# Patient Record
Sex: FEMALE | Race: WHITE | NOT HISPANIC OR LATINO | Employment: OTHER | URBAN - METROPOLITAN AREA
[De-identification: names, ages, dates, MRNs, and addresses within clinical notes are randomized per-mention and may not be internally consistent; named-entity substitution may affect disease eponyms.]

---

## 2017-02-13 ENCOUNTER — HOSPITAL ENCOUNTER (OUTPATIENT)
Dept: OTHER | Facility: HOSPITAL | Age: 42
Discharge: HOME | End: 2017-02-13
Attending: OBSTETRICS & GYNECOLOGY | Admitting: OBSTETRICS & GYNECOLOGY

## 2017-02-13 LAB
ABO + RH BLD: NORMAL
ANION GAP SERPL CALC-SCNC: 11 MMOL/L (ref 10–20)
ARMBAND: NORMAL
BASOPHILS # BLD AUTO: 0 10*3/UL (ref 0–0.2)
BASOPHILS NFR BLD AUTO: 1 % (ref 0–2)
BLD COMPONENT TYPE: NORMAL
BLD GP AB SCN SERPL QL: NEGATIVE
BUN SERPL-MCNC: 12 MG/DL (ref 8–20)
BUN/CREAT SERPL: 13.3 (ref 5.4–26.2)
CALCIUM SERPL-MCNC: 9.1 MG/DL (ref 8.9–10.3)
CHLORIDE SERPL-SCNC: 105 MMOL/L (ref 101–111)
CONV CO2: 26 MMOL/L (ref 22–32)
CREAT UR-MCNC: 0.9 MG/DL (ref 0.4–1)
CROSSMATCH EXPIRATION: NORMAL
DIFFERENTIAL METHOD BLD: (no result)
EOSINOPHIL # BLD AUTO: 0 10*3/UL (ref 0–0.3)
EOSINOPHIL # BLD AUTO: 1 % (ref 0–3)
ERYTHROCYTE [DISTWIDTH] IN BLOOD BY AUTOMATED COUNT: 12.2 % (ref 11.5–14.5)
GLUCOSE SERPL-MCNC: 101 MG/DL (ref 65–99)
HCT VFR BLD AUTO: 38 % (ref 35–49)
HGB BLD-MCNC: 13.3 G/DL (ref 12–15)
LYMPHOCYTES # BLD AUTO: 1.2 10*3/UL (ref 0.8–4.8)
LYMPHOCYTES NFR BLD AUTO: 32 % (ref 18–42)
MCH RBC QN AUTO: 31.9 PG (ref 26–32)
MCHC RBC AUTO-ENTMCNC: 35.1 G/DL (ref 32–36)
MCV RBC AUTO: 91 FL (ref 80–94)
MONOCYTES # BLD AUTO: 0.1 10*3/UL (ref 0.1–1.3)
MONOCYTES NFR BLD AUTO: 4 % (ref 2–11)
NEUTROPHILS # BLD AUTO: 2.2 10*3/UL (ref 2.3–8.6)
NEUTROPHILS NFR BLD AUTO: 62 % (ref 50–75)
NRBC BLD AUTO-RTO: 0 /100{WBCS}
NRBC/RBC NFR BLD MANUAL: 0 10*3/UL
PLATELET # BLD AUTO: 221 10*3/UL (ref 150–450)
PMV BLD AUTO: 8.2 FL (ref 7.4–10.4)
POTASSIUM SERPL-SCNC: 4 MMOL/L (ref 3.6–5.1)
RBC # BLD AUTO: 4.18 10*6/UL (ref 4–5.4)
SODIUM SERPL-SCNC: 138 MMOL/L (ref 136–144)
WBC # BLD AUTO: 3.6 10*3/UL (ref 4.5–11.5)

## 2017-02-20 ENCOUNTER — HOSPITAL ENCOUNTER (OUTPATIENT)
Dept: OTHER | Facility: HOSPITAL | Age: 42
Setting detail: SPECIMEN
Discharge: HOME | End: 2017-02-20
Attending: OBSTETRICS & GYNECOLOGY | Admitting: OBSTETRICS & GYNECOLOGY

## 2017-06-05 ENCOUNTER — HOSPITAL ENCOUNTER (OUTPATIENT)
Dept: GENERAL RADIOLOGY | Facility: HOSPITAL | Age: 42
Discharge: HOME | End: 2017-06-05
Attending: NURSE PRACTITIONER | Admitting: NURSE PRACTITIONER

## 2017-06-22 ENCOUNTER — HOSPITAL ENCOUNTER (OUTPATIENT)
Dept: PREADMISSION TESTING | Facility: HOSPITAL | Age: 42
Discharge: HOME | End: 2017-06-22
Attending: ORTHOPAEDIC SURGERY | Admitting: ORTHOPAEDIC SURGERY

## 2017-06-22 LAB
ANION GAP SERPL CALC-SCNC: 11.4 MMOL/L (ref 10–20)
BACTERIA SPEC AEROBE CULT: NORMAL
BASOPHILS # BLD AUTO: 0 10*3/UL (ref 0–0.2)
BASOPHILS NFR BLD AUTO: 1 % (ref 0–2)
BILIRUB UR QL STRIP: NEGATIVE MG/DL
BUN SERPL-MCNC: 8 MG/DL (ref 8–20)
BUN/CREAT SERPL: 8.9 (ref 5.4–26.2)
CALCIUM SERPL-MCNC: 9.3 MG/DL (ref 8.9–10.3)
CASTS URNS QL MICRO: NORMAL /[LPF]
CHLORIDE SERPL-SCNC: 103 MMOL/L (ref 101–111)
COLOR UR: YELLOW
CONV BACTERIA IN URINE MICRO: NEGATIVE
CONV CLARITY OF URINE: CLEAR
CONV CO2: 26 MMOL/L (ref 22–32)
CONV HYALINE CASTS IN URINE MICRO: 1 /[LPF] (ref 0–5)
CONV PROTEIN IN URINE BY AUTOMATED TEST STRIP: NEGATIVE MG/DL
CONV SMALL ROUND CELLS: NORMAL /[HPF]
CONV UROBILINOGEN IN URINE BY AUTOMATED TEST STRIP: 0.2 MG/DL
CREAT UR-MCNC: 0.9 MG/DL (ref 0.4–1)
CULTURE INDICATED?: NORMAL
DIFFERENTIAL METHOD BLD: (no result)
EOSINOPHIL # BLD AUTO: 0.1 10*3/UL (ref 0–0.3)
EOSINOPHIL # BLD AUTO: 1 % (ref 0–3)
ERYTHROCYTE [DISTWIDTH] IN BLOOD BY AUTOMATED COUNT: 13.1 % (ref 11.5–14.5)
GLUCOSE SERPL-MCNC: 89 MG/DL (ref 65–99)
GLUCOSE UR QL: NEGATIVE MG/DL
HCT VFR BLD AUTO: 43.2 % (ref 35–49)
HGB BLD-MCNC: 14.8 G/DL (ref 12–15)
HGB UR QL STRIP: NEGATIVE
KETONES UR QL STRIP: NEGATIVE MG/DL
LEUKOCYTE ESTERASE UR QL STRIP: NEGATIVE
LYMPHOCYTES # BLD AUTO: 1.3 10*3/UL (ref 0.8–4.8)
LYMPHOCYTES NFR BLD AUTO: 30 % (ref 18–42)
Lab: NORMAL
MCH RBC QN AUTO: 31.7 PG (ref 26–32)
MCHC RBC AUTO-ENTMCNC: 34.4 G/DL (ref 32–36)
MCV RBC AUTO: 92.3 FL (ref 80–94)
MICRO REPORT STATUS: NORMAL
MONOCYTES # BLD AUTO: 0.3 10*3/UL (ref 0.1–1.3)
MONOCYTES NFR BLD AUTO: 8 % (ref 2–11)
NEUTROPHILS # BLD AUTO: 2.7 10*3/UL (ref 2.3–8.6)
NEUTROPHILS NFR BLD AUTO: 60 % (ref 50–75)
NITRITE UR QL STRIP: NEGATIVE
NRBC BLD AUTO-RTO: 0 /100{WBCS}
NRBC/RBC NFR BLD MANUAL: 0 10*3/UL
PH UR STRIP.AUTO: 6.5 [PH] (ref 4.5–8)
PLATELET # BLD AUTO: 230 10*3/UL (ref 150–450)
PMV BLD AUTO: 8.2 FL (ref 7.4–10.4)
POTASSIUM SERPL-SCNC: 4.4 MMOL/L (ref 3.6–5.1)
RBC # BLD AUTO: 4.68 10*6/UL (ref 4–5.4)
RBC #/AREA URNS HPF: 0 /[HPF] (ref 0–3)
SODIUM SERPL-SCNC: 136 MMOL/L (ref 136–144)
SP GR UR: 1.01 (ref 1–1.03)
SPECIMEN SOURCE: NORMAL
SPERM URNS QL MICRO: NORMAL /[HPF]
SQUAMOUS SPT QL MICRO: 1 /[HPF] (ref 0–5)
UNIDENT CRYS URNS QL MICRO: NORMAL /[HPF]
WBC # BLD AUTO: 4.4 10*3/UL (ref 4.5–11.5)
WBC #/AREA URNS HPF: 1 /[HPF] (ref 0–5)
YEAST SPEC QL WET PREP: NORMAL /[HPF]

## 2017-06-28 ENCOUNTER — HOSPITAL ENCOUNTER (OUTPATIENT)
Dept: PREOP | Facility: HOSPITAL | Age: 42
Setting detail: HOSPITAL OUTPATIENT SURGERY
Discharge: HOME | End: 2017-06-28
Attending: ORTHOPAEDIC SURGERY | Admitting: ORTHOPAEDIC SURGERY

## 2017-08-10 ENCOUNTER — HOSPITAL ENCOUNTER (OUTPATIENT)
Dept: MRI IMAGING | Facility: HOSPITAL | Age: 42
Discharge: HOME | End: 2017-08-10
Attending: ORTHOPAEDIC SURGERY | Admitting: ORTHOPAEDIC SURGERY

## 2019-07-18 ENCOUNTER — HOSPITAL ENCOUNTER (EMERGENCY)
Facility: HOSPITAL | Age: 44
Discharge: HOME | End: 2019-07-18
Admitting: EMERGENCY MEDICINE

## 2019-07-18 ENCOUNTER — APPOINTMENT (OUTPATIENT)
Dept: CT IMAGING | Facility: HOSPITAL | Age: 44
End: 2019-07-18

## 2019-07-18 VITALS
SYSTOLIC BLOOD PRESSURE: 98 MMHG | HEART RATE: 61 BPM | RESPIRATION RATE: 16 BRPM | TEMPERATURE: 97.2 F | HEIGHT: 65 IN | DIASTOLIC BLOOD PRESSURE: 63 MMHG | BODY MASS INDEX: 23.21 KG/M2 | WEIGHT: 139.33 LBS | OXYGEN SATURATION: 99 %

## 2019-07-18 DIAGNOSIS — R10.84 GENERALIZED ABDOMINAL PAIN: Primary | ICD-10-CM

## 2019-07-18 LAB
ANION GAP SERPL CALCULATED.3IONS-SCNC: 13.6 MMOL/L (ref 5–15)
BASOPHILS # BLD AUTO: 0 10*3/MM3 (ref 0–0.2)
BASOPHILS NFR BLD AUTO: 0.6 % (ref 0–1.5)
BILIRUB UR QL STRIP: NEGATIVE
BUN BLD-MCNC: 6 MG/DL (ref 8–20)
BUN/CREAT SERPL: 6 (ref 5.4–26.2)
CALCIUM SPEC-SCNC: 9.4 MG/DL (ref 8.9–10.3)
CHLORIDE SERPL-SCNC: 102 MMOL/L (ref 101–111)
CLARITY UR: CLEAR
CO2 SERPL-SCNC: 27 MMOL/L (ref 22–32)
COLOR UR: YELLOW
CREAT BLD-MCNC: 1 MG/DL (ref 0.4–1)
DEPRECATED RDW RBC AUTO: 40.7 FL (ref 37–54)
EOSINOPHIL # BLD AUTO: 0.1 10*3/MM3 (ref 0–0.4)
EOSINOPHIL NFR BLD AUTO: 1.6 % (ref 0.3–6.2)
ERYTHROCYTE [DISTWIDTH] IN BLOOD BY AUTOMATED COUNT: 12.5 % (ref 12.3–15.4)
GFR SERPL CREATININE-BSD FRML MDRD: 60 ML/MIN/1.73
GLUCOSE BLD-MCNC: 79 MG/DL (ref 65–99)
GLUCOSE UR STRIP-MCNC: NEGATIVE MG/DL
HCT VFR BLD AUTO: 43.9 % (ref 34–46.6)
HGB BLD-MCNC: 14.7 G/DL (ref 12–15.9)
HGB UR QL STRIP.AUTO: NEGATIVE
KETONES UR QL STRIP: NEGATIVE
LEUKOCYTE ESTERASE UR QL STRIP.AUTO: NEGATIVE
LYMPHOCYTES # BLD AUTO: 1.7 10*3/MM3 (ref 0.7–3.1)
LYMPHOCYTES NFR BLD AUTO: 37.8 % (ref 19.6–45.3)
MCH RBC QN AUTO: 31.4 PG (ref 26.6–33)
MCHC RBC AUTO-ENTMCNC: 33.5 G/DL (ref 31.5–35.7)
MCV RBC AUTO: 93.6 FL (ref 79–97)
MONOCYTES # BLD AUTO: 0.2 10*3/MM3 (ref 0.1–0.9)
MONOCYTES NFR BLD AUTO: 5.3 % (ref 5–12)
NEUTROPHILS # BLD AUTO: 2.4 10*3/MM3 (ref 1.7–7)
NEUTROPHILS NFR BLD AUTO: 54.7 % (ref 42.7–76)
NITRITE UR QL STRIP: NEGATIVE
NRBC BLD AUTO-RTO: 0.1 /100 WBC (ref 0–0.2)
PH UR STRIP.AUTO: 7 [PH] (ref 5–8)
PLATELET # BLD AUTO: 261 10*3/MM3 (ref 140–450)
PMV BLD AUTO: 8 FL (ref 6–12)
POTASSIUM BLD-SCNC: 3.6 MMOL/L (ref 3.6–5.1)
PROT UR QL STRIP: NEGATIVE
RBC # BLD AUTO: 4.69 10*6/MM3 (ref 3.77–5.28)
SODIUM BLD-SCNC: 139 MMOL/L (ref 136–144)
SP GR UR STRIP: 1.01 (ref 1–1.03)
UROBILINOGEN UR QL STRIP: NORMAL
WBC NRBC COR # BLD: 4.4 10*3/MM3 (ref 3.4–10.8)

## 2019-07-18 PROCEDURE — 25010000002 KETOROLAC TROMETHAMINE PER 15 MG: Performed by: NURSE PRACTITIONER

## 2019-07-18 PROCEDURE — 99283 EMERGENCY DEPT VISIT LOW MDM: CPT

## 2019-07-18 PROCEDURE — 85025 COMPLETE CBC W/AUTO DIFF WBC: CPT | Performed by: NURSE PRACTITIONER

## 2019-07-18 PROCEDURE — 96374 THER/PROPH/DIAG INJ IV PUSH: CPT

## 2019-07-18 PROCEDURE — 80048 BASIC METABOLIC PNL TOTAL CA: CPT | Performed by: NURSE PRACTITIONER

## 2019-07-18 PROCEDURE — 25010000002 ONDANSETRON PER 1 MG: Performed by: NURSE PRACTITIONER

## 2019-07-18 PROCEDURE — 81003 URINALYSIS AUTO W/O SCOPE: CPT | Performed by: NURSE PRACTITIONER

## 2019-07-18 PROCEDURE — 74176 CT ABD & PELVIS W/O CONTRAST: CPT

## 2019-07-18 PROCEDURE — 96375 TX/PRO/DX INJ NEW DRUG ADDON: CPT

## 2019-07-18 RX ORDER — LAMOTRIGINE 100 MG/1
150 TABLET ORAL DAILY
COMMUNITY
End: 2022-02-10

## 2019-07-18 RX ORDER — SODIUM CHLORIDE 0.9 % (FLUSH) 0.9 %
10 SYRINGE (ML) INJECTION AS NEEDED
Status: DISCONTINUED | OUTPATIENT
Start: 2019-07-18 | End: 2019-07-18 | Stop reason: HOSPADM

## 2019-07-18 RX ORDER — CYCLOBENZAPRINE HCL 10 MG
10 TABLET ORAL 3 TIMES DAILY PRN
Qty: 15 TABLET | Refills: 0 | Status: SHIPPED | OUTPATIENT
Start: 2019-07-18 | End: 2022-02-10

## 2019-07-18 RX ORDER — MELOXICAM 15 MG/1
15 TABLET ORAL DAILY
Qty: 30 TABLET | Refills: 0 | Status: SHIPPED | OUTPATIENT
Start: 2019-07-18 | End: 2019-07-18 | Stop reason: ALTCHOICE

## 2019-07-18 RX ORDER — ONDANSETRON 4 MG/1
4 TABLET, ORALLY DISINTEGRATING ORAL EVERY 8 HOURS PRN
Qty: 20 TABLET | Refills: 0 | Status: SHIPPED | OUTPATIENT
Start: 2019-07-18 | End: 2019-07-18 | Stop reason: ALTCHOICE

## 2019-07-18 RX ORDER — QUETIAPINE 150 MG/1
150 TABLET, FILM COATED, EXTENDED RELEASE ORAL NIGHTLY
COMMUNITY
End: 2022-02-10

## 2019-07-18 RX ORDER — ATORVASTATIN CALCIUM 20 MG/1
20 TABLET, FILM COATED ORAL DAILY
COMMUNITY
End: 2022-02-10

## 2019-07-18 RX ORDER — KETOROLAC TROMETHAMINE 30 MG/ML
30 INJECTION, SOLUTION INTRAMUSCULAR; INTRAVENOUS ONCE
Status: COMPLETED | OUTPATIENT
Start: 2019-07-18 | End: 2019-07-18

## 2019-07-18 RX ORDER — ZOLPIDEM TARTRATE 10 MG/1
10 TABLET ORAL NIGHTLY PRN
COMMUNITY
End: 2022-02-10

## 2019-07-18 RX ORDER — ONDANSETRON 2 MG/ML
4 INJECTION INTRAMUSCULAR; INTRAVENOUS ONCE
Status: COMPLETED | OUTPATIENT
Start: 2019-07-18 | End: 2019-07-18

## 2019-07-18 RX ADMIN — ONDANSETRON 4 MG: 2 INJECTION INTRAMUSCULAR; INTRAVENOUS at 17:43

## 2019-07-18 RX ADMIN — KETOROLAC TROMETHAMINE 30 MG: 30 INJECTION, SOLUTION INTRAMUSCULAR at 17:42

## 2020-02-21 ENCOUNTER — HOSPITAL ENCOUNTER (EMERGENCY)
Facility: HOSPITAL | Age: 45
Discharge: LEFT BEFORE DISCHARGE | End: 2020-02-21

## 2020-02-21 VITALS
RESPIRATION RATE: 16 BRPM | DIASTOLIC BLOOD PRESSURE: 71 MMHG | HEIGHT: 65 IN | HEART RATE: 80 BPM | WEIGHT: 124.56 LBS | BODY MASS INDEX: 20.75 KG/M2 | SYSTOLIC BLOOD PRESSURE: 109 MMHG | TEMPERATURE: 97.4 F | OXYGEN SATURATION: 96 %

## 2020-02-21 RX ORDER — CLONAZEPAM 1 MG/1
1 TABLET ORAL NIGHTLY PRN
COMMUNITY
End: 2022-02-10

## 2020-11-14 ENCOUNTER — HOSPITAL ENCOUNTER (EMERGENCY)
Facility: HOSPITAL | Age: 45
Discharge: HOME | End: 2020-11-14
Attending: EMERGENCY MEDICINE | Admitting: EMERGENCY MEDICINE

## 2020-11-14 ENCOUNTER — APPOINTMENT (OUTPATIENT)
Dept: CT IMAGING | Facility: HOSPITAL | Age: 45
End: 2020-11-14

## 2020-11-14 VITALS
RESPIRATION RATE: 18 BRPM | BODY MASS INDEX: 19.87 KG/M2 | SYSTOLIC BLOOD PRESSURE: 111 MMHG | DIASTOLIC BLOOD PRESSURE: 79 MMHG | HEIGHT: 65 IN | HEART RATE: 79 BPM | TEMPERATURE: 97.9 F | OXYGEN SATURATION: 98 % | WEIGHT: 119.27 LBS

## 2020-11-14 DIAGNOSIS — M54.50 ACUTE BILATERAL LOW BACK PAIN WITHOUT SCIATICA: Primary | ICD-10-CM

## 2020-11-14 DIAGNOSIS — R20.2 PARESTHESIA: ICD-10-CM

## 2020-11-14 DIAGNOSIS — F43.0 ACUTE STRESS REACTION: ICD-10-CM

## 2020-11-14 LAB
ALBUMIN SERPL-MCNC: 4.5 G/DL (ref 3.5–5.2)
ALBUMIN/GLOB SERPL: 2.6 G/DL
ALP SERPL-CCNC: 62 U/L (ref 39–117)
ALT SERPL W P-5'-P-CCNC: 8 U/L (ref 1–33)
AMPHET+METHAMPHET UR QL: NEGATIVE
ANION GAP SERPL CALCULATED.3IONS-SCNC: 9 MMOL/L (ref 5–15)
AST SERPL-CCNC: 13 U/L (ref 1–32)
BARBITURATES UR QL SCN: NEGATIVE
BASOPHILS # BLD AUTO: 0.1 10*3/MM3 (ref 0–0.2)
BASOPHILS NFR BLD AUTO: 1.8 % (ref 0–1.5)
BENZODIAZ UR QL SCN: POSITIVE
BILIRUB SERPL-MCNC: <0.2 MG/DL (ref 0–1.2)
BILIRUB UR QL STRIP: NEGATIVE
BUN SERPL-MCNC: 14 MG/DL (ref 6–20)
BUN/CREAT SERPL: 15.4 (ref 7–25)
CALCIUM SPEC-SCNC: 9.1 MG/DL (ref 8.6–10.5)
CANNABINOIDS SERPL QL: POSITIVE
CHLORIDE SERPL-SCNC: 103 MMOL/L (ref 98–107)
CK SERPL-CCNC: 89 U/L (ref 20–180)
CLARITY UR: CLEAR
CO2 SERPL-SCNC: 28 MMOL/L (ref 22–29)
COCAINE UR QL: NEGATIVE
COLOR UR: YELLOW
CREAT SERPL-MCNC: 0.91 MG/DL (ref 0.57–1)
DEPRECATED RDW RBC AUTO: 39.8 FL (ref 37–54)
EOSINOPHIL # BLD AUTO: 0.1 10*3/MM3 (ref 0–0.4)
EOSINOPHIL NFR BLD AUTO: 1.8 % (ref 0.3–6.2)
ERYTHROCYTE [DISTWIDTH] IN BLOOD BY AUTOMATED COUNT: 12.5 % (ref 12.3–15.4)
GFR SERPL CREATININE-BSD FRML MDRD: 67 ML/MIN/1.73
GLOBULIN UR ELPH-MCNC: 1.7 GM/DL
GLUCOSE SERPL-MCNC: 86 MG/DL (ref 65–99)
GLUCOSE UR STRIP-MCNC: NEGATIVE MG/DL
HCT VFR BLD AUTO: 39.3 % (ref 34–46.6)
HGB BLD-MCNC: 13.4 G/DL (ref 12–15.9)
HGB UR QL STRIP.AUTO: NEGATIVE
KETONES UR QL STRIP: NEGATIVE
LEUKOCYTE ESTERASE UR QL STRIP.AUTO: NEGATIVE
LYMPHOCYTES # BLD AUTO: 1.5 10*3/MM3 (ref 0.7–3.1)
LYMPHOCYTES NFR BLD AUTO: 34.9 % (ref 19.6–45.3)
MAGNESIUM SERPL-MCNC: 2.3 MG/DL (ref 1.6–2.6)
MCH RBC QN AUTO: 30.9 PG (ref 26.6–33)
MCHC RBC AUTO-ENTMCNC: 34.1 G/DL (ref 31.5–35.7)
MCV RBC AUTO: 90.5 FL (ref 79–97)
METHADONE UR QL SCN: NEGATIVE
MONOCYTES # BLD AUTO: 0.2 10*3/MM3 (ref 0.1–0.9)
MONOCYTES NFR BLD AUTO: 5.2 % (ref 5–12)
NEUTROPHILS NFR BLD AUTO: 2.5 10*3/MM3 (ref 1.7–7)
NEUTROPHILS NFR BLD AUTO: 56.3 % (ref 42.7–76)
NITRITE UR QL STRIP: NEGATIVE
NRBC BLD AUTO-RTO: 0.1 /100 WBC (ref 0–0.2)
OPIATES UR QL: NEGATIVE
OXYCODONE UR QL SCN: NEGATIVE
PH UR STRIP.AUTO: 6 [PH] (ref 5–8)
PLATELET # BLD AUTO: 241 10*3/MM3 (ref 140–450)
PMV BLD AUTO: 7.1 FL (ref 6–12)
POTASSIUM SERPL-SCNC: 4.2 MMOL/L (ref 3.5–5.2)
PROT SERPL-MCNC: 6.2 G/DL (ref 6–8.5)
PROT UR QL STRIP: NEGATIVE
RBC # BLD AUTO: 4.35 10*6/MM3 (ref 3.77–5.28)
SODIUM SERPL-SCNC: 140 MMOL/L (ref 136–145)
SP GR UR STRIP: 1.02 (ref 1–1.03)
UROBILINOGEN UR QL STRIP: NORMAL
WBC # BLD AUTO: 4.4 10*3/MM3 (ref 3.4–10.8)

## 2020-11-14 PROCEDURE — 99283 EMERGENCY DEPT VISIT LOW MDM: CPT

## 2020-11-14 PROCEDURE — 80307 DRUG TEST PRSMV CHEM ANLYZR: CPT | Performed by: EMERGENCY MEDICINE

## 2020-11-14 PROCEDURE — 85025 COMPLETE CBC W/AUTO DIFF WBC: CPT | Performed by: EMERGENCY MEDICINE

## 2020-11-14 PROCEDURE — 83735 ASSAY OF MAGNESIUM: CPT | Performed by: EMERGENCY MEDICINE

## 2020-11-14 PROCEDURE — 81003 URINALYSIS AUTO W/O SCOPE: CPT | Performed by: EMERGENCY MEDICINE

## 2020-11-14 PROCEDURE — 80053 COMPREHEN METABOLIC PANEL: CPT | Performed by: EMERGENCY MEDICINE

## 2020-11-14 PROCEDURE — 96374 THER/PROPH/DIAG INJ IV PUSH: CPT

## 2020-11-14 PROCEDURE — 74176 CT ABD & PELVIS W/O CONTRAST: CPT

## 2020-11-14 PROCEDURE — 25010000002 LORAZEPAM PER 2 MG: Performed by: EMERGENCY MEDICINE

## 2020-11-14 PROCEDURE — 82550 ASSAY OF CK (CPK): CPT | Performed by: EMERGENCY MEDICINE

## 2020-11-14 RX ORDER — LAMOTRIGINE 25 MG/1
TABLET ORAL
Qty: 60 TABLET | Refills: 0 | Status: SHIPPED | OUTPATIENT
Start: 2020-11-14 | End: 2022-02-10

## 2020-11-14 RX ORDER — LORAZEPAM 2 MG/ML
1 INJECTION INTRAMUSCULAR ONCE
Status: COMPLETED | OUTPATIENT
Start: 2020-11-14 | End: 2020-11-14

## 2020-11-14 RX ORDER — BUDESONIDE AND FORMOTEROL FUMARATE DIHYDRATE 160; 4.5 UG/1; UG/1
2 AEROSOL RESPIRATORY (INHALATION)
COMMUNITY
End: 2022-02-10

## 2020-11-14 RX ADMIN — LORAZEPAM 1 MG: 2 INJECTION INTRAMUSCULAR; INTRAVENOUS at 14:10

## 2020-11-14 RX ADMIN — SODIUM CHLORIDE 1000 ML: 9 INJECTION, SOLUTION INTRAVENOUS at 14:10

## 2020-11-14 RX ADMIN — SODIUM CHLORIDE 500 ML: 9 INJECTION, SOLUTION INTRAVENOUS at 14:12

## 2020-11-14 NOTE — ED PROVIDER NOTES
Subjective   45-year-old complaining of paresthesias and back pain.  The patient also states she has had flank discomfort.  She states she has had no definite fever or chills.  She reports that she has had no vomiting or diarrhea but has had some intermittent nausea.  She reports that she has been taking her medication as directed.  She denies night sweats hemoptysis.  She reports no history of recent impact trauma to her back.  She has been told in the past that she may have neuropathy.  She reports no cough congestion or no novel coronavirus exposure      There is no saddle anesthesia or change in bowel or bladder habits    Review of Systems   Constitutional: Positive for fatigue. Negative for chills and fever.   HENT: Negative for sore throat.    Eyes: Negative for photophobia.   Respiratory: Negative for cough, chest tightness and shortness of breath.    Gastrointestinal: Positive for abdominal pain.   Genitourinary: Positive for flank pain. Negative for pelvic pain.   Neurological: Positive for numbness.   Psychiatric/Behavioral: The patient is nervous/anxious.    All other systems reviewed and are negative.      Past Medical History:   Diagnosis Date   • Asthma    • Bipolar affective (CMS/HCC)    • COPD (chronic obstructive pulmonary disease) (CMS/HCC)    • DDD (degenerative disc disease), cervical    • Hypertension    • PTSD (post-traumatic stress disorder)        Allergies   Allergen Reactions   • Oxycodone Shortness Of Breath and Itching   • Penicillins Anaphylaxis and Swelling   • Percocet [Oxycodone-Acetaminophen] Anaphylaxis   • Codeine Hives and Itching   • Buprenorphine Rash       Past Surgical History:   Procedure Laterality Date   • CARPAL TUNNEL RELEASE     • DILATATION AND CURETTAGE     • HYSTERECTOMY         History reviewed. No pertinent family history.    Social History     Socioeconomic History   • Marital status:      Spouse name: Not on file   • Number of children: Not on file   •  Years of education: Not on file   • Highest education level: Not on file   Tobacco Use   • Smoking status: Current Every Day Smoker     Packs/day: 1.00     Types: Cigarettes   • Smokeless tobacco: Never Used   Substance and Sexual Activity   • Alcohol use: No     Frequency: Never   • Drug use: No   • Sexual activity: Defer       Inspect was reviewed    Objective   Physical Exam  Alert [place] Coma Scale 15   HEENT: Pupils equal and reactive to light. Conjunctivae are not injected. normal tympanic membranes. Oropharynx and nares are normal.   Neck: Supple. Midline trachea. No JVD. No goiter.   Chest: Clear and equal breath sounds bilaterally regular rate and rhythm without murmur or rub.   Abdomen: Positive bowel sounds nontender nondistended. No rebound or peritoneal signs. No CVA tenderness.   Extremities no clubbing cyanosis or edema motor sensory exam is normal the full range of motion is intact.  Negative straight leg raising test numbness is not in a anatomic peripheral nerve or radicular distribution   skin: Warm and dry, no rashes or petechia.   Lymphatic: No regional lymphadenopathy. No calf pain, swelling or Jorje's sign    Procedures           ED Course                                 Labs Reviewed   URINE DRUG SCREEN - Abnormal; Notable for the following components:       Result Value    Benzodiazepine Screen, Urine Positive (*)     THC, Screen, Urine Positive (*)     All other components within normal limits    Narrative:     Negative Thresholds For Drugs Screened:     Amphetamines               500 ng/ml   Barbiturates               200 ng/ml   Benzodiazepines            100 ng/ml   Cocaine                    300 ng/ml   Methadone                  300 ng/ml   Opiates                    300 ng/ml   Oxycodone                  100 ng/ml   THC                        50 ng/ml    The Normal Value for all drugs tested is negative. This report includes final unconfirmed screening results to be used for medical  treatment purposes only. Unconfirmed results must not be used for non-medical purposes such as employment or legal testing. Clinical consideration should be applied to any drug of abuse test, particulary when unconfirmed results are used.  All urine drugs of abuse requests without chain of custody are for medical screening purposes only.  False positives are possible.     CBC WITH AUTO DIFFERENTIAL - Abnormal; Notable for the following components:    Basophil % 1.8 (*)     All other components within normal limits   URINALYSIS W/ MICROSCOPIC IF INDICATED (NO CULTURE) - Normal    Narrative:     Urine microscopic not indicated.   MAGNESIUM - Normal   CK - Normal   COMPREHENSIVE METABOLIC PANEL    Narrative:     GFR Normal >60  Chronic Kidney Disease <60  Kidney Failure <15     CBC AND DIFFERENTIAL    Narrative:     The following orders were created for panel order CBC & Differential.  Procedure                               Abnormality         Status                     ---------                               -----------         ------                     CBC Auto Differential[[id number]]        Abnormal            Final result                 Please view results for these tests on the individual orders.     Medications   sodium chloride 0.9 % bolus 1,000 mL (1,000 mL Intravenous New Bag 11/14/20 1410)   sodium chloride 0.9 % bolus 500 mL (0 mL Intravenous Stopped 11/14/20 1531)   LORazepam (ATIVAN) injection 1 mg (1 mg Intravenous Given 11/14/20 1410)     Ct Abdomen Pelvis Without Contrast    Result Date: 11/14/2020  Normal CT of the abdomen and pelvis.    Electronically Signed By-Mitesh Carpio MD On:11/14/2020 3:45 PM This report was finalized on [id number] by  Mitesh Carpio MD.            MDM  Number of Diagnoses or Management Options     Amount and/or Complexity of Data Reviewed  Clinical lab tests: reviewed  Tests in the radiology section of CPT®: reviewed  Review and summarize past medical records:  yes  Independent visualization of images, tracings, or specimens: yes    Risk of Complications, Morbidity, and/or Mortality  Presenting problems: high  Diagnostic procedures: high  Management options: high  General comments: Patient will be advised to increase her lamotrigine by adding 100 mg.  The patient also placed on diclofenac for the back discomfort.  The patient was a stable at discharge and vocalized understanding of discharge instructions and warnings        Final diagnoses:   Acute bilateral low back pain without sciatica   Acute stress reaction   Paresthesia            Roverto Henry MD  11/14/20 4372

## 2020-11-14 NOTE — DISCHARGE INSTRUCTIONS
Rest next 24 hours  No work next 2 days  Avoid heavy lifting bending stooping or squatting for the next 3 days  Medication as directed  Lamotrigine: Take your current dose, 150 mg, at bedtime.  Take new dose of 50 mg during the day

## 2020-12-17 ENCOUNTER — APPOINTMENT (OUTPATIENT)
Dept: GENERAL RADIOLOGY | Facility: HOSPITAL | Age: 45
End: 2020-12-17

## 2020-12-17 ENCOUNTER — APPOINTMENT (OUTPATIENT)
Dept: CT IMAGING | Facility: HOSPITAL | Age: 45
End: 2020-12-17

## 2020-12-17 ENCOUNTER — HOSPITAL ENCOUNTER (EMERGENCY)
Facility: HOSPITAL | Age: 45
Discharge: PSYCHIATRIC OR SUBSTANCE USE FACILITY | End: 2020-12-17
Admitting: EMERGENCY MEDICINE

## 2020-12-17 VITALS
HEART RATE: 100 BPM | OXYGEN SATURATION: 98 % | HEIGHT: 65 IN | SYSTOLIC BLOOD PRESSURE: 101 MMHG | TEMPERATURE: 98.1 F | DIASTOLIC BLOOD PRESSURE: 57 MMHG | WEIGHT: 111 LBS | RESPIRATION RATE: 19 BRPM | BODY MASS INDEX: 18.49 KG/M2

## 2020-12-17 DIAGNOSIS — Z79.899 POLYPHARMACY: ICD-10-CM

## 2020-12-17 DIAGNOSIS — Z91.89 AT RISK FOR INTENTIONAL SELF-HARM: ICD-10-CM

## 2020-12-17 DIAGNOSIS — F99 PSYCHIATRIC DISORDER: ICD-10-CM

## 2020-12-17 DIAGNOSIS — R53.1 WEAKNESS: Primary | ICD-10-CM

## 2020-12-17 LAB
ALBUMIN SERPL-MCNC: 5 G/DL (ref 3.5–5.2)
ALBUMIN/GLOB SERPL: 2.4 G/DL
ALP SERPL-CCNC: 68 U/L (ref 39–117)
ALT SERPL W P-5'-P-CCNC: 12 U/L (ref 1–33)
AMPHET+METHAMPHET UR QL: NEGATIVE
ANION GAP SERPL CALCULATED.3IONS-SCNC: 12 MMOL/L (ref 5–15)
APAP SERPL-MCNC: <5 MCG/ML (ref 0–30)
AST SERPL-CCNC: 15 U/L (ref 1–32)
BARBITURATES UR QL SCN: NEGATIVE
BASOPHILS # BLD AUTO: 0 10*3/MM3 (ref 0–0.2)
BASOPHILS NFR BLD AUTO: 0.4 % (ref 0–1.5)
BENZODIAZ UR QL SCN: POSITIVE
BILIRUB SERPL-MCNC: 0.4 MG/DL (ref 0–1.2)
BUN SERPL-MCNC: 12 MG/DL (ref 6–20)
BUN/CREAT SERPL: 11.1 (ref 7–25)
CALCIUM SPEC-SCNC: 10 MG/DL (ref 8.6–10.5)
CANNABINOIDS SERPL QL: NEGATIVE
CHLORIDE SERPL-SCNC: 100 MMOL/L (ref 98–107)
CO2 SERPL-SCNC: 25 MMOL/L (ref 22–29)
COCAINE UR QL: NEGATIVE
CREAT SERPL-MCNC: 1.08 MG/DL (ref 0.57–1)
DEPRECATED RDW RBC AUTO: 40.3 FL (ref 37–54)
EOSINOPHIL # BLD AUTO: 0.1 10*3/MM3 (ref 0–0.4)
EOSINOPHIL NFR BLD AUTO: 1.5 % (ref 0.3–6.2)
ERYTHROCYTE [DISTWIDTH] IN BLOOD BY AUTOMATED COUNT: 12.7 % (ref 12.3–15.4)
ETHANOL UR QL: <0.01 %
GFR SERPL CREATININE-BSD FRML MDRD: 55 ML/MIN/1.73
GLOBULIN UR ELPH-MCNC: 2.1 GM/DL
GLUCOSE SERPL-MCNC: 90 MG/DL (ref 65–99)
HCT VFR BLD AUTO: 44 % (ref 34–46.6)
HGB BLD-MCNC: 14.7 G/DL (ref 12–15.9)
HOLD SPECIMEN: NORMAL
LIPASE SERPL-CCNC: 19 U/L (ref 13–60)
LYMPHOCYTES # BLD AUTO: 1.5 10*3/MM3 (ref 0.7–3.1)
LYMPHOCYTES NFR BLD AUTO: 40.8 % (ref 19.6–45.3)
MCH RBC QN AUTO: 30.7 PG (ref 26.6–33)
MCHC RBC AUTO-ENTMCNC: 33.4 G/DL (ref 31.5–35.7)
MCV RBC AUTO: 92.1 FL (ref 79–97)
METHADONE UR QL SCN: NEGATIVE
MONOCYTES # BLD AUTO: 0.3 10*3/MM3 (ref 0.1–0.9)
MONOCYTES NFR BLD AUTO: 7.8 % (ref 5–12)
NEUTROPHILS NFR BLD AUTO: 1.8 10*3/MM3 (ref 1.7–7)
NEUTROPHILS NFR BLD AUTO: 49.5 % (ref 42.7–76)
NRBC BLD AUTO-RTO: 0.1 /100 WBC (ref 0–0.2)
OPIATES UR QL: NEGATIVE
OXYCODONE UR QL SCN: NEGATIVE
PLATELET # BLD AUTO: 223 10*3/MM3 (ref 140–450)
PMV BLD AUTO: 8.3 FL (ref 6–12)
POTASSIUM SERPL-SCNC: 3.9 MMOL/L (ref 3.5–5.2)
PROT SERPL-MCNC: 7.1 G/DL (ref 6–8.5)
QT INTERVAL: 361 MS
RBC # BLD AUTO: 4.78 10*6/MM3 (ref 3.77–5.28)
SALICYLATES SERPL-MCNC: <0.3 MG/DL
SODIUM SERPL-SCNC: 137 MMOL/L (ref 136–145)
TROPONIN T SERPL-MCNC: <0.01 NG/ML (ref 0–0.03)
TSH SERPL DL<=0.05 MIU/L-ACNC: 3.98 UIU/ML (ref 0.27–4.2)
WBC # BLD AUTO: 3.7 10*3/MM3 (ref 3.4–10.8)
WHOLE BLOOD HOLD SPECIMEN: NORMAL
WHOLE BLOOD HOLD SPECIMEN: NORMAL

## 2020-12-17 PROCEDURE — 80307 DRUG TEST PRSMV CHEM ANLYZR: CPT | Performed by: PHYSICIAN ASSISTANT

## 2020-12-17 PROCEDURE — 84443 ASSAY THYROID STIM HORMONE: CPT | Performed by: PHYSICIAN ASSISTANT

## 2020-12-17 PROCEDURE — 80053 COMPREHEN METABOLIC PANEL: CPT | Performed by: PHYSICIAN ASSISTANT

## 2020-12-17 PROCEDURE — 93005 ELECTROCARDIOGRAM TRACING: CPT

## 2020-12-17 PROCEDURE — 84484 ASSAY OF TROPONIN QUANT: CPT | Performed by: PHYSICIAN ASSISTANT

## 2020-12-17 PROCEDURE — 70450 CT HEAD/BRAIN W/O DYE: CPT

## 2020-12-17 PROCEDURE — 71045 X-RAY EXAM CHEST 1 VIEW: CPT

## 2020-12-17 PROCEDURE — 83690 ASSAY OF LIPASE: CPT | Performed by: PHYSICIAN ASSISTANT

## 2020-12-17 PROCEDURE — 99284 EMERGENCY DEPT VISIT MOD MDM: CPT

## 2020-12-17 PROCEDURE — 85025 COMPLETE CBC W/AUTO DIFF WBC: CPT | Performed by: PHYSICIAN ASSISTANT

## 2020-12-17 RX ORDER — SODIUM CHLORIDE 0.9 % (FLUSH) 0.9 %
10 SYRINGE (ML) INJECTION AS NEEDED
Status: DISCONTINUED | OUTPATIENT
Start: 2020-12-17 | End: 2020-12-17 | Stop reason: HOSPADM

## 2020-12-17 RX ADMIN — SODIUM CHLORIDE 1000 ML: 0.9 INJECTION, SOLUTION INTRAVENOUS at 14:10

## 2020-12-17 NOTE — ED PROVIDER NOTES
"Subjective   Chief Complaint: Weakness    Patient is a 45-year-old  female with a history of depression, anxiety, bipolar affective disorder, COPD, PTSD presents the ER with chief complaint of generalized weakness for last few days.  Patient presents with her daughter, who reports that patient has been exhibiting depressive and manic behavior for the last few months.  Daughter states patient has not been taking her medication as prescribed, she has been taking other medication from friends and other people including baclofen, Klonopin that she is not prescribed.  Daughter states that she has not been sleeping or eating for weeks at a time and has also exhibited episodes of self-harm including hitting herself in the head with a hammer and pointing a gun to her head.  In talking with the patient patient appears agitated, manic, has multiple complaints including weakness, fatigue, low appetite.  Patient states that she currently takes Lamictal and Klonopin, states that she was recently taken off her Seroquel for \"it made me feel suicidal\".  Patient states that her Klonopin recently increased from 0.5 mg daily to 2 mg twice a day per her psychiatrist.  Patient states at 1 point she forgot to take her Klonopin, states the next day she took double dose.  Patient does admit to hitting herself in the head with a hammer, she states \"my  made me so mad I could not stand it\".  Patient states multiple times that she is severely depressed \"because my  makes me this way he has been with this other girl\".  At this time patient denies SI or HI.  Patient denies any chest pain, cough headache nausea vomiting or diarrhea.    PCP: Crystal Calderon      History provided by:  Patient and significant other (Family, patient's daughter)      Review of Systems   Constitutional: Positive for chills. Negative for fever.   HENT: Negative for sore throat and trouble swallowing.    Eyes: Negative for visual disturbance. "   Respiratory: Negative for shortness of breath and wheezing.    Cardiovascular: Negative for chest pain.   Gastrointestinal: Positive for nausea. Negative for abdominal pain, diarrhea and vomiting.   Genitourinary: Negative for dysuria.   Musculoskeletal: Positive for myalgias.   Skin: Negative for rash.   Neurological: Positive for weakness and light-headedness. Negative for numbness and headaches.   Psychiatric/Behavioral: Negative for behavioral problems.   All other systems reviewed and are negative.      Past Medical History:   Diagnosis Date   • Asthma    • Bipolar affective (CMS/[facility])    • COPD (chronic obstructive pulmonary disease) (CMS/[facility])    • DDD (degenerative disc disease), cervical    • Hypertension    • PTSD (post-traumatic stress disorder)        Allergies   Allergen Reactions   • Oxycodone Shortness Of Breath and Itching   • Penicillins Anaphylaxis and Swelling   • Percocet [Oxycodone-Acetaminophen] Anaphylaxis   • Codeine Hives and Itching   • Buprenorphine Rash       Past Surgical History:   Procedure Laterality Date   • CARPAL TUNNEL RELEASE     • DILATATION AND CURETTAGE     • HYSTERECTOMY         No family history on file.    Social History     Socioeconomic History   • Marital status:      Spouse name: Not on file   • Number of children: Not on file   • Years of education: Not on file   • Highest education level: Not on file   Tobacco Use   • Smoking status: Current Every Day Smoker     Packs/day: 1.00     Types: Cigarettes   • Smokeless tobacco: Never Used   Substance and Sexual Activity   • Alcohol use: No     Frequency: Never   • Drug use: No   • Sexual activity: Defer           Objective   Physical Exam  Vitals signs reviewed.   Constitutional:       General: She is not in acute distress.     Appearance: Normal appearance. She is well-developed and normal weight. She is not ill-appearing or toxic-appearing.   HENT:      Head: Normocephalic and atraumatic.      Nose: Nose normal. No  congestion.      Mouth/Throat:      Mouth: Mucous membranes are moist.   Eyes:      Extraocular Movements: Extraocular movements intact.      Conjunctiva/sclera: Conjunctivae normal.      Pupils: Pupils are equal, round, and reactive to light.   Cardiovascular:      Rate and Rhythm: Normal rate and regular rhythm.      Heart sounds: Normal heart sounds.   Pulmonary:      Effort: Pulmonary effort is normal. No respiratory distress.      Breath sounds: Normal breath sounds. No wheezing.   Abdominal:      General: Bowel sounds are normal. There is no distension.      Palpations: Abdomen is soft. There is no mass.      Tenderness: There is no abdominal tenderness. There is no guarding.   Skin:     General: Skin is warm and dry.      Capillary Refill: Capillary refill takes less than 2 seconds.      Findings: No rash.   Neurological:      Mental Status: She is alert and oriented to person, place, and time.   Psychiatric:         Attention and Perception: Attention normal.         Mood and Affect: Mood is anxious.         Speech: Speech is rapid and pressured.         Behavior: Behavior normal.         Thought Content: Thought content normal. Thought content does not include homicidal or suicidal plan.         Cognition and Memory: Cognition normal.         Judgment: Judgment normal.         ECG 12 Lead      Date/Time: 12/17/2020 4:29 PM  Performed by: Lissette Belle PA  Authorized by: Lissette Belle PA   Interpreted by physician (Mindy)  Comparison: compared with previous ECG from 3/16/2019  Similar to previous ECG  Comparison to previous ECG: Sinus rhythm at a rate of 91 with right axis deviation  Rhythm: sinus rhythm  Rate: normal  BPM: 77  QRS axis: normal  Conduction: conduction normal  ST Segments: ST segments normal  T Waves: T waves normal  Other: no other findings  Clinical impression: normal ECG                 ED Course  ED Course as of Dec 17 1638   Thu Dec 17, 2020   1445 Will consult psych    [MM]  "  1500 Spoke at length with patient and her  at bedside regarding concerns, patient's polypharmacy and manic episodes. Patient is agreeable to talk with [place] at this time    [MM]   1512 Patient currently on phone with Krystal from [facility]      [MM]   1529 Spoke to Krystal from [facility] who spoke with patient and her .  Patient initially refused evaluation and refused to go to [facility].  Krystal states she spoke with her physician, Dr. Azul who is the accepting physician    [MM]      ED Course User Index  [MM] Lissette Belle, PA    /57   Pulse 89   Temp 98.1 °F (36.7 °C) (Oral)   Resp 14   Ht 165.1 cm (65\")   Wt 50.3 kg (111 lb)   SpO2 98%   Breastfeeding No   BMI 18.47 kg/m²   Labs Reviewed   COMPREHENSIVE METABOLIC PANEL - Abnormal; Notable for the following components:       Result Value    Creatinine 1.08 (*)     eGFR Non  Amer 55 (*)     All other components within normal limits    Narrative:     GFR Normal >60  Chronic Kidney Disease <60  Kidney Failure <15     URINE DRUG SCREEN - Abnormal; Notable for the following components:    Benzodiazepine Screen, Urine Positive (*)     All other components within normal limits    Narrative:     Negative Thresholds For Drugs Screened:     Amphetamines               500 ng/ml   Barbiturates               200 ng/ml   Benzodiazepines            100 ng/ml   Cocaine                    300 ng/ml   Methadone                  300 ng/ml   Opiates                    300 ng/ml   Oxycodone                  100 ng/ml   THC                        50 ng/ml    The Normal Value for all drugs tested is negative. This report includes final unconfirmed screening results to be used for medical treatment purposes only. Unconfirmed results must not be used for non-medical purposes such as employment or legal testing. Clinical consideration should be applied to any drug of abuse test, particulary when unconfirmed results are used.  All urine drugs of " abuse requests without chain of custody are for medical screening purposes only.  False positives are possible.     LIPASE - Normal   TROPONIN (IN-HOUSE) - Normal    Narrative:     Troponin T Reference Range:  <= 0.03 ng/mL-   Negative for AMI  >0.03 ng/mL-     Abnormal for myocardial necrosis.  Clinicians would have to utilize clinical acumen, EKG, Troponin and serial changes to determine if it is an Acute Myocardial Infarction or myocardial injury due to an underlying chronic condition.       Results may be falsely decreased if patient taking Biotin.     ACETAMINOPHEN LEVEL - Normal    Narrative:     Acetaminophen Therapeutic Range  5-20 ug/mL      Hours after ingestion            Toxic Value    4 Hours                           150 ug/mL    8 Hours                            70 ug/mL   12 Hours                            40 ug/mL   16 Hours                            20 ug/mL    These values apply to a single ingestion only.    SALICYLATE LEVEL - Normal   TSH - Normal   CBC WITH AUTO DIFFERENTIAL - Normal   RAINBOW DRAW    Narrative:     The following orders were created for panel order [place] Draw.  Procedure                               Abnormality         Status                     ---------                               -----------         ------                     Light Blue Top[[id number]]                                   Final result               Green Top (Gel)[[id number]]                                  Final result               Lavender Top[[id number]]                                     Final result               Gold Top - SST[[id number]]                                   Final result                 Please view results for these tests on the individual orders.   ETHANOL    Narrative:     Plasma Ethanol Clinical Symptoms:    ETOH (%)               Clinical Symptom  .01-.05              No apparent influence  .03-.12              Euphoria, Diminished judgment and attention   .09-.25               Impaired comprehension, Muscle incoordination  .18-.30              Confusion, Staggered gait, Slurred speech  .25-.40              Markedly decreased response to stimuli, unable to stand or                        walk, vomitting, sleep or stupor  .35-.50              Comatose, Anesthesia, Subnormal body temperature       CBC AND DIFFERENTIAL    Narrative:     The following orders were created for panel order CBC & Differential.  Procedure                               Abnormality         Status                     ---------                               -----------         ------                     CBC Auto Differential[[id number]]        Normal              Final result                 Please view results for these tests on the individual orders.   LIGHT BLUE TOP   GREEN TOP   LAVENDER TOP   GOLD TOP - SST   EXTRA TUBES    Narrative:     The following orders were created for panel order Extra Tubes.  Procedure                               Abnormality         Status                     ---------                               -----------         ------                     Green Top (Gel)[[id number]]                                  Final result                 Please view results for these tests on the individual orders.   GREEN TOP     Medications   sodium chloride 0.9 % flush 10 mL (has no administration in time range)   sodium chloride 0.9 % bolus 1,000 mL (0 mL Intravenous Stopped 12/17/20 1535)     Ct Head Without Contrast    Result Date: 12/17/2020  No acute intracranial hemorrhage or mass/mass effect.  Electronically Signed By-Laci Robison MD On:12/17/2020 12:45 PM This report was finalized on [id number] by  Laci Robison MD.    Xr Chest 1 View    Result Date: 12/17/2020  No active disease.  Electronically Signed By-Laci Robison MD On:12/17/2020 12:28 PM This report was finalized on [id number] by  Laci Robison MD.                                           MDM  Number of Diagnoses or Management  "Options  At risk for intentional self-harm:   Polypharmacy:   Psychiatric disorder:   Weakness:   Diagnosis management comments: MEDICAL DECISION  Epic Chart Review: No recent hospitalizations or ER visits pertaining to psychiatric disorder, depression or jane or SI  Comorbidities: COPD, bipolar affective disorder, depression,  Differentials:, Electrolyte abnormality, dysrhythmia, SI; this list is not all inclusive and does not constitute the entirety of considered causes  Radiology interpretation:  Images reviewed by me and interpreted by radiologist,   CT head shows no acute intracranial normality  Lab interpretation:  Labs viewed by me significant for, urine drug screen positive for benzos.  CMP creatinine 1.08, otherwise unremarkable.  Lipase, 19.  Troponin normal less than 0.01.  Acetaminophen less than 5.0.  Ethanol less than 0.01.  Salicylate level less than 0.3.  TSH normal 3.98.  CBC normal.  EKG interpretation: Reviewed by self, turbid by Dr. Guillen normal sinus rhythm heart rate of 77 with no acute ST changes    While in the ED IV was placed and labs were obtained appropriate PPE was worn during exam and throughout all encounters with the patient.  Patient had the above evaluation.  IV established, lab work obtained and is as noted above, no significant abnormalities.  CT head shows no acute intracranial abnormalities.  After speaking with patient, patient's daughter and patient's , there is serious concern for self-harm and worsening manic episode.  Patient has admitted to polypharmacy, dependence, she has admitted to previous self-harm, hitting herself in the head with a hammer and pointing a gun to her head.  Patient is adamant, states that this is solely because she has not eaten \"because my  drives me crazy and I was so depressed I did leave my bed for weeks\".  I feel that patient is noncompliant with her medication, is experiencing polypharmacy that is complicating her psychiatric " "mental health.  I consulted [facility], spoke with Krystal regarding patient status and my recommendation for psychiatric evaluation.  Krystal spoke with patient in the room, Krystal spoke with Dr. Azul who recommended EPO, 72-hour hold and transfer to [facility] for further evaluation and medical management.  This discussed with patient and her  at bedside, her  is agreeable to plan, patient states that she did not want to go to [facility] \"they just make me take pills\".  72-hour hold status was sent to  via fax for signing.  Security outside patient's door for safety.  Plan of care discussed with Dr. Guillen who agreed, Dr. Guillen signed EMTALA form.  Patient will be transferred to [facility] per EMS for further psychiatric care and evaluation.       Amount and/or Complexity of Data Reviewed  Clinical lab tests: reviewed and ordered  Tests in the radiology section of CPT®: reviewed and ordered    Patient Progress  Patient progress: stable      Final diagnoses:   Weakness   Psychiatric disorder   Polypharmacy   At risk for intentional self-harm            Lissette Belle PA  12/17/20 6182    "

## 2021-09-20 ENCOUNTER — HOSPITAL ENCOUNTER (EMERGENCY)
Facility: HOSPITAL | Age: 46
Discharge: HOME | End: 2021-09-20
Attending: EMERGENCY MEDICINE | Admitting: EMERGENCY MEDICINE

## 2021-09-20 ENCOUNTER — APPOINTMENT (OUTPATIENT)
Dept: CT IMAGING | Facility: HOSPITAL | Age: 46
End: 2021-09-20

## 2021-09-20 VITALS
RESPIRATION RATE: 16 BRPM | DIASTOLIC BLOOD PRESSURE: 74 MMHG | TEMPERATURE: 98.4 F | SYSTOLIC BLOOD PRESSURE: 118 MMHG | OXYGEN SATURATION: 98 % | HEART RATE: 87 BPM
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
## 2021-09-20 DIAGNOSIS — S20.211A RIB CONTUSION, RIGHT, INITIAL ENCOUNTER: ICD-10-CM

## 2021-09-20 PROCEDURE — 71250 CT THORAX DX C-: CPT

## 2021-09-20 PROCEDURE — 63710000001 ONDANSETRON ODT 4 MG TABLET DISPERSIBLE: Performed by: NURSE PRACTITIONER

## 2021-09-20 PROCEDURE — 99283 EMERGENCY DEPT VISIT LOW MDM: CPT

## 2021-09-20 RX ORDER — HYDROCODONE BITARTRATE AND ACETAMINOPHEN 5; 325 MG/1; MG/1
1 TABLET ORAL ONCE
Status: COMPLETED | OUTPATIENT
Start: 2021-09-20 | End: 2021-09-20

## 2021-09-20 RX ORDER — LIDOCAINE 50 MG/G
1 PATCH TOPICAL EVERY 24 HOURS
Qty: 15 PATCH | Refills: 0 | Status: SHIPPED | OUTPATIENT
Start: 2021-09-20 | End: 2022-02-10

## 2021-09-20 RX ORDER — ONDANSETRON 4 MG/1
4 TABLET, ORALLY DISINTEGRATING ORAL ONCE
Status: COMPLETED | OUTPATIENT
Start: 2021-09-20 | End: 2021-09-20

## 2021-09-20 RX ADMIN — HYDROCODONE BITARTRATE AND ACETAMINOPHEN 1 TABLET: 5; 325 TABLET ORAL at 16:32

## 2021-09-20 RX ADMIN — ONDANSETRON 4 MG: 4 TABLET, ORALLY DISINTEGRATING ORAL at 16:32

## 2021-09-20 NOTE — ED NOTES
Patient from home with c/o mechanical fall down steps that happened 5 days ago. Patient c/o right sided rib pain towards the mid part of her back.      Degonda, Janet, RN  09/20/21 3325

## 2021-09-20 NOTE — DISCHARGE INSTRUCTIONS
Chest pain  Drink plenty fluids.  Lidoderm patch as needed for pain.  Avoid heavy lifting.  Cough and deep breathe to avoid developing pneumonia.  Return for worsening symptoms or concerns  Continue care with your primary care physician and have your blood pressure regularly checked and managed. Normal blood pressure is 120/80.   Follow-up with your family doctor.

## 2021-09-20 NOTE — ED PROVIDER NOTES
" EMERGENCY DEPARTMENT ENCOUNTER    Room Number:  B04/04  Date of encounter:  9/20/2021  PCP: Aminta Quezada PA-C  Historian: patient   Full history not obtainable due to: none     HPI:  Chief Complaint: Right rib pain    Context: Jesus Ovalles is a 46 y.o. female who presents to the ED c/o right rib pain onset 5 days ago. States she woke in the middle of the night to let her dog out and inadvertently slipped and fell down the stairs. She has been reporting localized, nonradiating, right rib pain for 5 days. Moderate to severe at times. Is worse with movement, twisting, raising her right arm or breathing. She was seen by her family doctor who told her her \"asthma was acting up \" and prescribed her steroids and muscle relaxers. She denies any improvement in her symptoms. She states she did have a chest x-ray which was negative for any fracture at that time. She is concerned there is something going on as she is continuing to have pain.      PAST MEDICAL HISTORY    Active Ambulatory Problems     Diagnosis Date Noted   • No Active Ambulatory Problems     Resolved Ambulatory Problems     Diagnosis Date Noted   • No Resolved Ambulatory Problems     Past Medical History:   Diagnosis Date   • Asthma    • Bipolar affective (CMS/[facility])    • COPD (chronic obstructive pulmonary disease) (CMS/[facility])    • DDD (degenerative disc disease), cervical    • Hypertension    • PTSD (post-traumatic stress disorder)          PAST SURGICAL HISTORY  Past Surgical History:   Procedure Laterality Date   • CARPAL TUNNEL RELEASE     • DILATATION AND CURETTAGE     • HYSTERECTOMY           FAMILY HISTORY  No family history on file.      SOCIAL HISTORY  Social History     Socioeconomic History   • Marital status:      Spouse name: Not on file   • Number of children: Not on file   • Years of education: Not on file   • Highest education level: Not on file   Tobacco Use   • Smoking status: Current Every Day Smoker     Packs/day: 1.00     Types: " Cigarettes   • Smokeless tobacco: Never Used   Substance and Sexual Activity   • Alcohol use: No   • Drug use: No   • Sexual activity: Defer         ALLERGIES  Oxycodone, Penicillins, Percocet [oxycodone-acetaminophen], Codeine, and Buprenorphine        REVIEW OF SYSTEMS  Review of Systems   All systems reviewed and marked as negative except as listed in HPI       PHYSICAL EXAM    I have reviewed the triage vital signs and nursing notes.    ED Triage Vitals [09/20/21 1604]   Temp Heart Rate Resp BP SpO2   98.4 °F (36.9 °C) 87 16 -- 98 %      Temp src Heart Rate Source Patient Position BP Location FiO2 (%)   -- -- -- -- --       GENERAL: alert well developed, well nourished in no distress  HENT: NCAT, neck supple, trachea midline  EYES: no scleral icterus, PERRL, normal conjunctivae  CV: regular rhythm, regular rate, no murmur  RESPIRATORY: unlabored effort, CTAB. Right mid lateral rib tenderness. No wounds. No contusions. No crepitus.   ABDOMEN: soft, nontender, nondistended, bowel sounds present  MUSCULOSKELETAL: no gross deformity. No tenderness of the C T or L spine.   NEURO: alert,  sensory and motor function of extremities grossly intact, speech clear, mental status normal/baseline  SKIN: warm, dry, no rash  PSYCH:  Appropriate mood and affect    Vital signs and nursing notes reviewed.        RADIOLOGY  CT Chest Without Contrast Diagnostic    Result Date: 9/20/2021  CT CHEST WO CONTRAST DIAGNOSTIC-  INDICATIONS: Right rib pain  TECHNIQUE: Radiation dose reduction techniques were utilized, including automated exposure control and exposure modulation based on body size. Unenhanced CHEST CT. Three-dimensional reconstructions  COMPARISON: None available  FINDINGS:    The heart size is normal without pericardial effusion. A few small subcentimeter short axis mediastinal lymph nodes are seen that are not significant by size criteria. Assessment of vascular and mediastinal structures is limited without intravenous  contrast material.   The airways appear clear.  No pleural effusion or pneumothorax.  The lungs show no focal pulmonary consolidation or mass. Small likely scarring or atelectasis posteriorly in the right upper lobe.  Upper abdominal structures show no acute findings. A right hepatic cyst is seen..  Degenerative changes are seen in the spine. No acute fracture is identified.       No focal pulmonary consolidation or mass. No acute fracture identified.  This report was finalized on 9/20/2021 5:44 PM by Dr. Max Ng M.D.        I ordered the above noted radiological studies. Independently reviewed by me and discussed with radiologist.  See dictation above for official radiology interpretation.      PROCEDURES    Procedures        MEDICATIONS GIVEN IN ER    Medications   ondansetron ODT (ZOFRAN-ODT) disintegrating tablet 4 mg (4 mg Oral Given 9/20/21 1632)   HYDROcodone-acetaminophen (NORCO) 5-325 MG per tablet 1 tablet (1 tablet Oral Given 9/20/21 1632)         PROGRESS, DATA ANALYSIS, CONSULTS, AND MEDICAL DECISION MAKING    All labs have been independently reviewed by me.  All radiology studies have been reviewed by me.   EKG's independently reviewed by me.  Discussion below represents my analysis of pertinent findings related to patient's condition, differential diagnosis, treatment plan and final disposition.    DIFFERENTIAL DIAGNOSIS INCLUDE BUT NOT LIMITED TO: [place], ACS, aortic dissection, costochondritis, pericarditis, endocarditis, pneumonia, acute bronchitis, pneumothorax, PE, viral syndrome, pancreatitis, biliary colic, cholecystitis, GERD, muscle spasm, anxiety       ED Course as of Sep 20 1933   Mon Sep 20, 2021   1800 I viewed CT chest on PACS system.  My findings are no fracture.    [JS]      ED Course User Index  [JS] Charmaine Molina, GUI     MDM: Patient presents status post fall several days prior.  She reports constant right rib pain.  She was seen previously by her family physician  and had x-rays done which were negative but states that she is concerned there is a fracture given the degree of her pain.  CT imaging was performed today and shows no rib fracture, no effusion or pneumonia.  Likely she is experiencing muscle spasm and/or contusion.  She has been previously prescribed Lyrica and Robaxin and steroids.  I will prescribe her Lidoderm patches for pain control and advised her to take anti-inflammatory such as ibuprofen after completion of steroids.  Her family physician had told her she had had an asthma exacerbation during her visit, however today the patient's asthma seems to be well controlled.  There is no wheezing.  She does not report difficulty in breathing.  Her oxygen saturation is 98%.  She will need supportive therapy for her rib pain and follow-up closely with her family physician if she is not proving.  I do not feel she requires narcotic pain medication at this time given that there is no fracture.  There are no signs of trauma.  The fall was from several days prior.  She states understanding the plan.    BP - 118/74  HR - 87  TEMP - 98.4 °F (36.9 °C)  O2 SATS - 98%         Medication List      New Prescriptions    lidocaine 5 %  Commonly known as: Lidoderm  Place 1 patch on the skin as directed by provider Daily. Remove & Discard patch within 12 hours or as directed by MD           Where to Get Your Medications      You can get these medications from any pharmacy    Bring a paper prescription for each of these medications  · lidocaine 5 %           DIAGNOSIS  Final diagnoses:   Fall from standing, initial encounter   Rib contusion, right, initial encounter         DISPOSITION  Discharge     Pt masked in first look. I wore appropriate PPE throughout my encounters with the pt. I performed hand hygiene on entry into the pt room and upon exit.     Dictated utilizing Dragon dictation:  Much of this encounter note is an electronic transcription/translation of spoken language to  printed text. The electronic translation of spoken language may permit erroneous, or at times, nonsensical words or phrases to be inadvertently transcribed; Although I have reviewed the note for such errors, some may still exist.     Charmaine Molina, GUI  09/20/21 1933

## 2021-09-20 NOTE — ED PROVIDER NOTES
Pt presents to the ED complaining of right posterior rib pain after falling down the steps 5 days ago. She initially saw her PCP with negative chest x-rays and was placed on muscle relaxers but states she is no better and thus came to the emergency room for further evaluation.    On exam, pt is alert and orient x3 in no acute distress  Regular rate and rhythm  Clear to auscultation bilaterally  Right posterior rib tenderness but no crepitance or step-off    I agree with midlevel plan to check in a CT chest without contrast    PPE  Pt does not present with symptoms for COVID19; however, I was wearing a N95 mask and goggles throughout all patient interaction.    CT chest is negative acute for fracture, pneumothorax or hemothorax. We will give the patient a prescription for Lidoderm patches and have her follow-up with her PCP as an outpatient  The [facility] and I have discussed this patient's history, physical exam, and treatment plan.  I have reviewed the documentation and personally had a face to face interaction with the patient. I affirm the documentation and agree with the treatment and plan.  The attached note describes my personal findings.           Fabio Patterson MD  09/20/21 5862

## 2022-02-10 ENCOUNTER — APPOINTMENT (OUTPATIENT)
Dept: CT IMAGING | Facility: HOSPITAL | Age: 47
End: 2022-02-10

## 2022-02-10 ENCOUNTER — APPOINTMENT (OUTPATIENT)
Dept: GENERAL RADIOLOGY | Facility: HOSPITAL | Age: 47
End: 2022-02-10

## 2022-02-10 ENCOUNTER — HOSPITAL ENCOUNTER (INPATIENT)
Facility: HOSPITAL | Age: 47
LOS: 3 days | Discharge: HOME | End: 2022-02-13
Attending: EMERGENCY MEDICINE | Admitting: HOSPITALIST

## 2022-02-10 DIAGNOSIS — S82.401B TYPE I OR II OPEN FRACTURE OF RIGHT TIBIA AND FIBULA, INITIAL ENCOUNTER: Primary | ICD-10-CM

## 2022-02-10 DIAGNOSIS — S82.201B TYPE I OR II OPEN FRACTURE OF RIGHT TIBIA AND FIBULA, INITIAL ENCOUNTER: Primary | ICD-10-CM

## 2022-02-10 PROBLEM — F43.10 PTSD (POST-TRAUMATIC STRESS DISORDER): Chronic | Status: ACTIVE | Noted: 2022-02-10

## 2022-02-10 PROBLEM — J44.9 CHRONIC OBSTRUCTIVE PULMONARY DISEASE (HCC): Chronic | Status: ACTIVE | Noted: 2018-03-02

## 2022-02-10 PROBLEM — F31.9 BIPOLAR 1 DISORDER (HCC): Chronic | Status: ACTIVE | Noted: 2022-02-10

## 2022-02-10 PROBLEM — J45.909 ASTHMA: Chronic | Status: ACTIVE | Noted: 2018-03-02

## 2022-02-10 PROBLEM — E78.5 HLD (HYPERLIPIDEMIA): Chronic | Status: ACTIVE | Noted: 2022-02-10

## 2022-02-10 PROBLEM — J45.909 ASTHMA: Status: ACTIVE | Noted: 2018-03-02

## 2022-02-10 PROBLEM — J44.9 CHRONIC OBSTRUCTIVE PULMONARY DISEASE (HCC): Status: ACTIVE | Noted: 2018-03-02

## 2022-02-10 PROBLEM — F41.1 GAD (GENERALIZED ANXIETY DISORDER): Status: ACTIVE | Noted: 2022-02-10

## 2022-02-10 PROBLEM — F41.1 GAD (GENERALIZED ANXIETY DISORDER): Chronic | Status: ACTIVE | Noted: 2022-02-10

## 2022-02-10 LAB
ABO GROUP BLD: NORMAL
ALBUMIN SERPL-MCNC: 5 G/DL (ref 3.5–5.2)
ALBUMIN/GLOB SERPL: 2.3 G/DL
ALP SERPL-CCNC: 55 U/L (ref 39–117)
ALT SERPL W P-5'-P-CCNC: 9 U/L (ref 1–33)
ANION GAP SERPL CALCULATED.3IONS-SCNC: 11 MMOL/L (ref 5–15)
APTT PPP: 26.2 SECONDS (ref 24–31)
AST SERPL-CCNC: 15 U/L (ref 1–32)
BASOPHILS # BLD AUTO: 0 10*3/MM3 (ref 0–0.2)
BASOPHILS # BLD AUTO: 0 10*3/MM3 (ref 0–0.2)
BASOPHILS NFR BLD AUTO: 0.6 % (ref 0–1.5)
BASOPHILS NFR BLD AUTO: 0.6 % (ref 0–1.5)
BILIRUB SERPL-MCNC: 0.3 MG/DL (ref 0–1.2)
BLD GP AB SCN SERPL QL: NEGATIVE
BUN SERPL-MCNC: 14 MG/DL (ref 6–20)
BUN/CREAT SERPL: 17.7 (ref 7–25)
CALCIUM SPEC-SCNC: 10.1 MG/DL (ref 8.6–10.5)
CHLORIDE SERPL-SCNC: 102 MMOL/L (ref 98–107)
CO2 SERPL-SCNC: 27 MMOL/L (ref 22–29)
CREAT SERPL-MCNC: 0.79 MG/DL (ref 0.57–1)
DEPRECATED RDW RBC AUTO: 41.1 FL (ref 37–54)
DEPRECATED RDW RBC AUTO: 41.6 FL (ref 37–54)
EOSINOPHIL # BLD AUTO: 0 10*3/MM3 (ref 0–0.4)
EOSINOPHIL # BLD AUTO: 0.1 10*3/MM3 (ref 0–0.4)
EOSINOPHIL NFR BLD AUTO: 0.6 % (ref 0.3–6.2)
EOSINOPHIL NFR BLD AUTO: 2 % (ref 0.3–6.2)
ERYTHROCYTE [DISTWIDTH] IN BLOOD BY AUTOMATED COUNT: 12.8 % (ref 12.3–15.4)
ERYTHROCYTE [DISTWIDTH] IN BLOOD BY AUTOMATED COUNT: 12.9 % (ref 12.3–15.4)
GFR SERPL CREATININE-BSD FRML MDRD: 78 ML/MIN/1.73
GLOBULIN UR ELPH-MCNC: 2.2 GM/DL
GLUCOSE SERPL-MCNC: 117 MG/DL (ref 65–99)
HCT VFR BLD AUTO: 32.9 % (ref 34–46.6)
HCT VFR BLD AUTO: 40.9 % (ref 34–46.6)
HGB BLD-MCNC: 11.5 G/DL (ref 12–15.9)
HGB BLD-MCNC: 14 G/DL (ref 12–15.9)
HOLD SPECIMEN: NORMAL
HOLD SPECIMEN: NORMAL
INR PPP: 0.98 (ref 0.93–1.1)
LYMPHOCYTES # BLD AUTO: 1.5 10*3/MM3 (ref 0.7–3.1)
LYMPHOCYTES # BLD AUTO: 2.4 10*3/MM3 (ref 0.7–3.1)
LYMPHOCYTES NFR BLD AUTO: 25.6 % (ref 19.6–45.3)
LYMPHOCYTES NFR BLD AUTO: 39.6 % (ref 19.6–45.3)
MCH RBC QN AUTO: 31.2 PG (ref 26.6–33)
MCH RBC QN AUTO: 32 PG (ref 26.6–33)
MCHC RBC AUTO-ENTMCNC: 34.1 G/DL (ref 31.5–35.7)
MCHC RBC AUTO-ENTMCNC: 34.8 G/DL (ref 31.5–35.7)
MCV RBC AUTO: 91.5 FL (ref 79–97)
MCV RBC AUTO: 91.8 FL (ref 79–97)
MONOCYTES # BLD AUTO: 0.3 10*3/MM3 (ref 0.1–0.9)
MONOCYTES # BLD AUTO: 0.4 10*3/MM3 (ref 0.1–0.9)
MONOCYTES NFR BLD AUTO: 5.4 % (ref 5–12)
MONOCYTES NFR BLD AUTO: 7.3 % (ref 5–12)
NEUTROPHILS NFR BLD AUTO: 3.2 10*3/MM3 (ref 1.7–7)
NEUTROPHILS NFR BLD AUTO: 3.9 10*3/MM3 (ref 1.7–7)
NEUTROPHILS NFR BLD AUTO: 52.4 % (ref 42.7–76)
NEUTROPHILS NFR BLD AUTO: 65.9 % (ref 42.7–76)
NRBC BLD AUTO-RTO: 0 /100 WBC (ref 0–0.2)
NRBC BLD AUTO-RTO: 0.1 /100 WBC (ref 0–0.2)
PLATELET # BLD AUTO: 185 10*3/MM3 (ref 140–450)
PLATELET # BLD AUTO: 262 10*3/MM3 (ref 140–450)
PMV BLD AUTO: 7.5 FL (ref 6–12)
PMV BLD AUTO: 7.8 FL (ref 6–12)
POTASSIUM SERPL-SCNC: 3.7 MMOL/L (ref 3.5–5.2)
PROT SERPL-MCNC: 7.2 G/DL (ref 6–8.5)
PROTHROMBIN TIME: 10.9 SECONDS (ref 9.6–11.7)
RBC # BLD AUTO: 3.58 10*6/MM3 (ref 3.77–5.28)
RBC # BLD AUTO: 4.47 10*6/MM3 (ref 3.77–5.28)
RH BLD: POSITIVE
SARS-COV-2 RNA PNL SPEC NAA+PROBE: NOT DETECTED
SODIUM SERPL-SCNC: 140 MMOL/L (ref 136–145)
T&S EXPIRATION DATE: NORMAL
WBC NRBC COR # BLD: 5.9 10*3/MM3 (ref 3.4–10.8)
WBC NRBC COR # BLD: 6.1 10*3/MM3 (ref 3.4–10.8)
WHOLE BLOOD HOLD SPECIMEN: NORMAL
WHOLE BLOOD HOLD SPECIMEN: NORMAL

## 2022-02-10 PROCEDURE — 25010000002 MORPHINE PER 10 MG: Performed by: EMERGENCY MEDICINE

## 2022-02-10 PROCEDURE — 25010000002 ONDANSETRON PER 1 MG: Performed by: INTERNAL MEDICINE

## 2022-02-10 PROCEDURE — 25010000002 FENTANYL CITRATE (PF) 50 MCG/ML SOLUTION

## 2022-02-10 PROCEDURE — 86850 RBC ANTIBODY SCREEN: CPT | Performed by: EMERGENCY MEDICINE

## 2022-02-10 PROCEDURE — 36415 COLL VENOUS BLD VENIPUNCTURE: CPT | Performed by: EMERGENCY MEDICINE

## 2022-02-10 PROCEDURE — 86900 BLOOD TYPING SEROLOGIC ABO: CPT

## 2022-02-10 PROCEDURE — 85730 THROMBOPLASTIN TIME PARTIAL: CPT | Performed by: EMERGENCY MEDICINE

## 2022-02-10 PROCEDURE — 73700 CT LOWER EXTREMITY W/O DYE: CPT

## 2022-02-10 PROCEDURE — 71045 X-RAY EXAM CHEST 1 VIEW: CPT

## 2022-02-10 PROCEDURE — 86900 BLOOD TYPING SEROLOGIC ABO: CPT | Performed by: EMERGENCY MEDICINE

## 2022-02-10 PROCEDURE — 87635 SARS-COV-2 COVID-19 AMP PRB: CPT | Performed by: EMERGENCY MEDICINE

## 2022-02-10 PROCEDURE — 85025 COMPLETE CBC W/AUTO DIFF WBC: CPT | Performed by: INTERNAL MEDICINE

## 2022-02-10 PROCEDURE — 25010000002 HYDROMORPHONE 1 MG/ML SOLUTION: Performed by: INTERNAL MEDICINE

## 2022-02-10 PROCEDURE — 86901 BLOOD TYPING SEROLOGIC RH(D): CPT

## 2022-02-10 PROCEDURE — 85025 COMPLETE CBC W/AUTO DIFF WBC: CPT | Performed by: EMERGENCY MEDICINE

## 2022-02-10 PROCEDURE — 99223 1ST HOSP IP/OBS HIGH 75: CPT | Performed by: INTERNAL MEDICINE

## 2022-02-10 PROCEDURE — 25010000002 FENTANYL CITRATE (PF) 50 MCG/ML SOLUTION: Performed by: EMERGENCY MEDICINE

## 2022-02-10 PROCEDURE — 73610 X-RAY EXAM OF ANKLE: CPT

## 2022-02-10 PROCEDURE — 85610 PROTHROMBIN TIME: CPT | Performed by: EMERGENCY MEDICINE

## 2022-02-10 PROCEDURE — 25010000002 ONDANSETRON PER 1 MG

## 2022-02-10 PROCEDURE — 25010000002 ONDANSETRON PER 1 MG: Performed by: EMERGENCY MEDICINE

## 2022-02-10 PROCEDURE — 73590 X-RAY EXAM OF LOWER LEG: CPT

## 2022-02-10 PROCEDURE — 80053 COMPREHEN METABOLIC PANEL: CPT | Performed by: EMERGENCY MEDICINE

## 2022-02-10 PROCEDURE — 25010000002 ONDANSETRON PER 1 MG: Performed by: NURSE PRACTITIONER

## 2022-02-10 PROCEDURE — 86901 BLOOD TYPING SEROLOGIC RH(D): CPT | Performed by: EMERGENCY MEDICINE

## 2022-02-10 PROCEDURE — 99284 EMERGENCY DEPT VISIT MOD MDM: CPT

## 2022-02-10 RX ORDER — MORPHINE SULFATE 4 MG/ML
4 INJECTION, SOLUTION INTRAMUSCULAR; INTRAVENOUS ONCE
Status: COMPLETED | OUTPATIENT
Start: 2022-02-10 | End: 2022-02-10

## 2022-02-10 RX ORDER — LORAZEPAM 0.5 MG/1
0.5 TABLET ORAL EVERY 6 HOURS PRN
Status: DISCONTINUED | OUTPATIENT
Start: 2022-02-10 | End: 2022-02-13 | Stop reason: HOSPADM

## 2022-02-10 RX ORDER — ETOMIDATE 2 MG/ML
INJECTION INTRAVENOUS
Status: COMPLETED
Start: 2022-02-10 | End: 2022-02-10

## 2022-02-10 RX ORDER — LORAZEPAM 2 MG/ML
0.5 INJECTION INTRAMUSCULAR EVERY 6 HOURS PRN
Status: DISCONTINUED | OUTPATIENT
Start: 2022-02-10 | End: 2022-02-13 | Stop reason: HOSPADM

## 2022-02-10 RX ORDER — METHOCARBAMOL 750 MG/1
750 TABLET, FILM COATED ORAL 3 TIMES DAILY
COMMUNITY
End: 2022-02-19 | Stop reason: HOSPADM

## 2022-02-10 RX ORDER — CHOLECALCIFEROL (VITAMIN D3) 125 MCG
5 CAPSULE ORAL NIGHTLY PRN
Status: DISCONTINUED | OUTPATIENT
Start: 2022-02-10 | End: 2022-02-13 | Stop reason: HOSPADM

## 2022-02-10 RX ORDER — ONDANSETRON 4 MG/1
4 TABLET, FILM COATED ORAL EVERY 6 HOURS PRN
Status: DISCONTINUED | OUTPATIENT
Start: 2022-02-10 | End: 2022-02-10

## 2022-02-10 RX ORDER — CLINDAMYCIN PHOSPHATE 900 MG/50ML
900 INJECTION, SOLUTION INTRAVENOUS EVERY 8 HOURS
Status: DISCONTINUED | OUTPATIENT
Start: 2022-02-10 | End: 2022-02-13 | Stop reason: HOSPADM

## 2022-02-10 RX ORDER — FENTANYL CITRATE 50 UG/ML
50 INJECTION, SOLUTION INTRAMUSCULAR; INTRAVENOUS ONCE
Status: COMPLETED | OUTPATIENT
Start: 2022-02-10 | End: 2022-02-10

## 2022-02-10 RX ORDER — FENTANYL CITRATE 50 UG/ML
INJECTION, SOLUTION INTRAMUSCULAR; INTRAVENOUS
Status: COMPLETED
Start: 2022-02-10 | End: 2022-02-10

## 2022-02-10 RX ORDER — SODIUM CHLORIDE 0.9 % (FLUSH) 0.9 %
10 SYRINGE (ML) INJECTION EVERY 12 HOURS SCHEDULED
Status: DISCONTINUED | OUTPATIENT
Start: 2022-02-10 | End: 2022-02-13 | Stop reason: HOSPADM

## 2022-02-10 RX ORDER — NALOXONE HCL 0.4 MG/ML
0.4 VIAL (ML) INJECTION
Status: DISCONTINUED | OUTPATIENT
Start: 2022-02-10 | End: 2022-02-13 | Stop reason: HOSPADM

## 2022-02-10 RX ORDER — NALOXONE HCL 0.4 MG/ML
0.4 VIAL (ML) INJECTION
Status: DISCONTINUED | OUTPATIENT
Start: 2022-02-10 | End: 2022-02-10

## 2022-02-10 RX ORDER — SODIUM CHLORIDE 0.9 % (FLUSH) 0.9 %
10 SYRINGE (ML) INJECTION AS NEEDED
Status: DISCONTINUED | OUTPATIENT
Start: 2022-02-10 | End: 2022-02-13 | Stop reason: HOSPADM

## 2022-02-10 RX ORDER — NALOXONE HCL 0.4 MG/ML
0.1 VIAL (ML) INJECTION
Status: DISCONTINUED | OUTPATIENT
Start: 2022-02-10 | End: 2022-02-13 | Stop reason: HOSPADM

## 2022-02-10 RX ORDER — CLINDAMYCIN PHOSPHATE 900 MG/50ML
900 INJECTION, SOLUTION INTRAVENOUS ONCE
Status: COMPLETED | OUTPATIENT
Start: 2022-02-10 | End: 2022-02-10

## 2022-02-10 RX ORDER — ALUMINA, MAGNESIA, AND SIMETHICONE 2400; 2400; 240 MG/30ML; MG/30ML; MG/30ML
15 SUSPENSION ORAL EVERY 6 HOURS PRN
Status: DISCONTINUED | OUTPATIENT
Start: 2022-02-10 | End: 2022-02-13 | Stop reason: HOSPADM

## 2022-02-10 RX ORDER — MORPHINE SULFATE 4 MG/ML
4 INJECTION, SOLUTION INTRAMUSCULAR; INTRAVENOUS EVERY 4 HOURS PRN
Status: DISCONTINUED | OUTPATIENT
Start: 2022-02-10 | End: 2022-02-10

## 2022-02-10 RX ORDER — SODIUM CHLORIDE 9 MG/ML
100 INJECTION, SOLUTION INTRAVENOUS CONTINUOUS
Status: DISCONTINUED | OUTPATIENT
Start: 2022-02-10 | End: 2022-02-13 | Stop reason: HOSPADM

## 2022-02-10 RX ORDER — ONDANSETRON 2 MG/ML
INJECTION INTRAMUSCULAR; INTRAVENOUS
Status: COMPLETED
Start: 2022-02-10 | End: 2022-02-10

## 2022-02-10 RX ORDER — NICOTINE 21 MG/24HR
1 PATCH, TRANSDERMAL 24 HOURS TRANSDERMAL
Status: DISCONTINUED | OUTPATIENT
Start: 2022-02-10 | End: 2022-02-13 | Stop reason: HOSPADM

## 2022-02-10 RX ORDER — ONDANSETRON 2 MG/ML
4 INJECTION INTRAMUSCULAR; INTRAVENOUS ONCE
Status: COMPLETED | OUTPATIENT
Start: 2022-02-10 | End: 2022-02-10

## 2022-02-10 RX ORDER — CYCLOBENZAPRINE HCL 10 MG
5 TABLET ORAL 3 TIMES DAILY PRN
Status: DISCONTINUED | OUTPATIENT
Start: 2022-02-10 | End: 2022-02-13 | Stop reason: HOSPADM

## 2022-02-10 RX ORDER — SODIUM CHLORIDE, SODIUM LACTATE, POTASSIUM CHLORIDE, CALCIUM CHLORIDE 600; 310; 30; 20 MG/100ML; MG/100ML; MG/100ML; MG/100ML
100 INJECTION, SOLUTION INTRAVENOUS CONTINUOUS
Status: DISCONTINUED | OUTPATIENT
Start: 2022-02-10 | End: 2022-02-10

## 2022-02-10 RX ORDER — ONDANSETRON 2 MG/ML
4 INJECTION INTRAMUSCULAR; INTRAVENOUS EVERY 6 HOURS PRN
Status: DISCONTINUED | OUTPATIENT
Start: 2022-02-10 | End: 2022-02-10

## 2022-02-10 RX ORDER — ONDANSETRON 2 MG/ML
8 INJECTION INTRAMUSCULAR; INTRAVENOUS EVERY 6 HOURS PRN
Status: DISCONTINUED | OUTPATIENT
Start: 2022-02-10 | End: 2022-02-13 | Stop reason: HOSPADM

## 2022-02-10 RX ORDER — POTASSIUM CHLORIDE 20 MEQ/1
40 TABLET, EXTENDED RELEASE ORAL AS NEEDED
Status: DISCONTINUED | OUTPATIENT
Start: 2022-02-10 | End: 2022-02-13 | Stop reason: HOSPADM

## 2022-02-10 RX ORDER — POTASSIUM CHLORIDE 7.45 MG/ML
10 INJECTION INTRAVENOUS
Status: DISCONTINUED | OUTPATIENT
Start: 2022-02-10 | End: 2022-02-13 | Stop reason: HOSPADM

## 2022-02-10 RX ORDER — CLINDAMYCIN PHOSPHATE 600 MG/50ML
600 INJECTION, SOLUTION INTRAVENOUS EVERY 8 HOURS
Status: DISCONTINUED | OUTPATIENT
Start: 2022-02-10 | End: 2022-02-10

## 2022-02-10 RX ORDER — ETOMIDATE 2 MG/ML
0.3 INJECTION INTRAVENOUS ONCE
Status: COMPLETED | OUTPATIENT
Start: 2022-02-10 | End: 2022-02-10

## 2022-02-10 RX ORDER — POTASSIUM CHLORIDE 1.5 G/1.77G
40 POWDER, FOR SOLUTION ORAL AS NEEDED
Status: DISCONTINUED | OUTPATIENT
Start: 2022-02-10 | End: 2022-02-13 | Stop reason: HOSPADM

## 2022-02-10 RX ORDER — ONDANSETRON 4 MG/1
8 TABLET, FILM COATED ORAL EVERY 6 HOURS PRN
Status: DISCONTINUED | OUTPATIENT
Start: 2022-02-10 | End: 2022-02-13 | Stop reason: HOSPADM

## 2022-02-10 RX ORDER — HYDROCODONE BITARTRATE AND ACETAMINOPHEN 5; 325 MG/1; MG/1
2 TABLET ORAL EVERY 6 HOURS PRN
Status: DISCONTINUED | OUTPATIENT
Start: 2022-02-10 | End: 2022-02-11

## 2022-02-10 RX ORDER — PROMETHAZINE HYDROCHLORIDE 12.5 MG/1
12.5 SUPPOSITORY RECTAL EVERY 4 HOURS PRN
Status: DISCONTINUED | OUTPATIENT
Start: 2022-02-10 | End: 2022-02-13 | Stop reason: HOSPADM

## 2022-02-10 RX ORDER — ALBUTEROL SULFATE 90 UG/1
2 AEROSOL, METERED RESPIRATORY (INHALATION) EVERY 4 HOURS PRN
Status: DISCONTINUED | OUTPATIENT
Start: 2022-02-10 | End: 2022-02-13 | Stop reason: HOSPADM

## 2022-02-10 RX ADMIN — HYDROMORPHONE HYDROCHLORIDE 1 MG: 1 INJECTION, SOLUTION INTRAMUSCULAR; INTRAVENOUS; SUBCUTANEOUS at 14:32

## 2022-02-10 RX ADMIN — PROMETHAZINE HYDROCHLORIDE 12.5 MG: 12.5 SUPPOSITORY RECTAL at 20:54

## 2022-02-10 RX ADMIN — HYDROMORPHONE HYDROCHLORIDE 1 MG: 1 INJECTION, SOLUTION INTRAMUSCULAR; INTRAVENOUS; SUBCUTANEOUS at 18:08

## 2022-02-10 RX ADMIN — MORPHINE SULFATE 4 MG: 4 INJECTION INTRAVENOUS at 07:54

## 2022-02-10 RX ADMIN — SODIUM CHLORIDE 100 ML/HR: 9 INJECTION, SOLUTION INTRAVENOUS at 14:47

## 2022-02-10 RX ADMIN — ONDANSETRON 4 MG: 2 INJECTION INTRAMUSCULAR; INTRAVENOUS at 04:50

## 2022-02-10 RX ADMIN — HYDROMORPHONE HYDROCHLORIDE 1 MG: 1 INJECTION, SOLUTION INTRAMUSCULAR; INTRAVENOUS; SUBCUTANEOUS at 20:13

## 2022-02-10 RX ADMIN — HYDROMORPHONE HYDROCHLORIDE 1 MG: 1 INJECTION, SOLUTION INTRAMUSCULAR; INTRAVENOUS; SUBCUTANEOUS at 12:46

## 2022-02-10 RX ADMIN — ONDANSETRON 8 MG: 2 INJECTION INTRAMUSCULAR; INTRAVENOUS at 20:14

## 2022-02-10 RX ADMIN — FENTANYL CITRATE 50 MCG: 50 INJECTION INTRAMUSCULAR; INTRAVENOUS at 06:29

## 2022-02-10 RX ADMIN — FENTANYL CITRATE 50 MCG: 50 INJECTION, SOLUTION INTRAMUSCULAR; INTRAVENOUS at 05:47

## 2022-02-10 RX ADMIN — ONDANSETRON 4 MG: 2 INJECTION INTRAMUSCULAR; INTRAVENOUS at 07:54

## 2022-02-10 RX ADMIN — ETOMIDATE 12 MG: 2 INJECTION INTRAVENOUS at 06:13

## 2022-02-10 RX ADMIN — HYDROCODONE BITARTRATE AND ACETAMINOPHEN 2 TABLET: 5; 325 TABLET ORAL at 09:37

## 2022-02-10 RX ADMIN — CLINDAMYCIN PHOSPHATE 900 MG: 900 INJECTION, SOLUTION INTRAVENOUS at 18:08

## 2022-02-10 RX ADMIN — FENTANYL CITRATE 50 MCG: 50 INJECTION, SOLUTION INTRAMUSCULAR; INTRAVENOUS at 04:50

## 2022-02-10 RX ADMIN — FENTANYL CITRATE 50 MCG: 50 INJECTION INTRAMUSCULAR; INTRAVENOUS at 04:50

## 2022-02-10 RX ADMIN — CLINDAMYCIN PHOSPHATE 900 MG: 900 INJECTION, SOLUTION INTRAVENOUS at 08:45

## 2022-02-10 RX ADMIN — Medication: at 21:52

## 2022-02-10 RX ADMIN — SODIUM CHLORIDE, POTASSIUM CHLORIDE, SODIUM LACTATE AND CALCIUM CHLORIDE 100 ML/HR: 600; 310; 30; 20 INJECTION, SOLUTION INTRAVENOUS at 08:45

## 2022-02-10 RX ADMIN — AZTREONAM 2 G: 2 INJECTION, POWDER, LYOPHILIZED, FOR SOLUTION INTRAMUSCULAR; INTRAVENOUS at 06:29

## 2022-02-10 RX ADMIN — ONDANSETRON 4 MG: 2 INJECTION INTRAMUSCULAR; INTRAVENOUS at 14:50

## 2022-02-10 RX ADMIN — ETOMIDATE 12 MG: 40 INJECTION, SOLUTION INTRAVENOUS at 06:13

## 2022-02-10 NOTE — PROGRESS NOTES
"I was called by the emergency room for surgical management of this patient's leg injury.  Unfortunately, I am not available today for surgery but I am going to get this patient added on for tomorrow.  She will be covered with Ancef until then.  Surgical plan for tomorrow is likely intramedullary nailing of the tibia with ORIF of pilon fracture and fibular fracture.  CT scan of the leg to be performed for surgical planning.    R \"Jarett\" Keaton GIVENS MD  Orthopaedic Surgery  [facility]  [phone] - [place] Office  [phone] - [place] Office    "

## 2022-02-10 NOTE — H&P
"    [facility] Medicine Services      Patient Name: Jesus Ovalles  : 1975  MRN: [id number]  Primary Care Physician:  Amnita Quezada PA-C  Date of admission: 2/10/2022      Subjective      Chief Complaint: Fall, right leg pain    History of Present Illness: Jesus Ovalles is a 46 y.o. female with a past medical history of asthma, bipolar, COPD, chronic pain, depression, anxiety, hyperlipidemia, PTSD, and tobacco abuse who presented to [facility] on 2/10/2022 due to a fall.  Patient reports this morning she walked outside to smoke a cigarette and fell on the ice landing on her right leg.  She explains she felt and heard a \"pop.\"  She instantly started having right leg pain.  Her current right leg pains is a 9/10 and she describes this as sharp.  She denies any aggravating or alleviating factors of the pain.    In the ED, CXR unremarkle. Xray right ankle/tib/fib showedComminuted displaced oblique fractures of the distal tibia and fibula  shafts. The tibia fracture extends to the anterior articular surface and  medial malleolus.  All labs unremarkable upon admission.  All vital signs stable upon admission.  Patient received clindamycin, fentanyl, morphine, Zofran in the ED.  Ortho consulted.    Review of Systems   Constitutional: Negative.   HENT: Negative.    Eyes: Negative.    Cardiovascular: Negative.    Respiratory: Negative.    Skin: Negative.    Musculoskeletal:        Right leg pain    Gastrointestinal: Negative.    Genitourinary: Negative.    Neurological: Negative.    Psychiatric/Behavioral: Negative.         Personal History     Past Medical History:   Diagnosis Date   • Asthma    • Bipolar affective (HCC)    • COPD (chronic obstructive pulmonary disease) (HCC)    • DDD (degenerative disc disease), cervical    • Hypertension    • PTSD (post-traumatic stress disorder)        Past Surgical History:   Procedure Laterality Date   • CARPAL TUNNEL RELEASE     • DILATATION AND " CURETTAGE     • HYSTERECTOMY         Family History: family history includes Heart disease in her mother. Otherwise pertinent FHx was reviewed and not pertinent to current issue.    Social History:  reports that she has been smoking cigarettes. She has been smoking about 1.00 pack per day. She has never used smokeless tobacco. She reports that she does not drink alcohol and does not use drugs.    Home Medications:  Prior to Admission Medications     Prescriptions Last Dose Informant Patient Reported? Taking?    atorvastatin (LIPITOR) 20 MG tablet  Self Yes No    Take 20 mg by mouth Daily.    budesonide-formoterol (SYMBICORT) 160-4.5 MCG/ACT inhaler   Yes No    Inhale 2 puffs 2 (Two) Times a Day.    clonazePAM (KlonoPIN) 1 MG tablet   Yes No    Take 1 mg by mouth At Night As Needed for Seizures.    cyclobenzaprine (FLEXERIL) 10 MG tablet   No No    Take 1 tablet by mouth 3 (Three) Times a Day As Needed for Muscle Spasms for up to 15 doses.    diclofenac (VOLTAREN) 50 MG EC tablet   No No    1 p.o. 3 times daily with food as needed low back pain    lamoTRIgine (LaMICtal) 100 MG tablet  Self Yes No    Take 150 mg by mouth Daily.    lamoTRIgine (LaMICtal) 25 MG tablet   No No    Take your previous dose of 150 mg at bedtime and take 2 tablets during the day    lidocaine (Lidoderm) 5 %   No No    Place 1 patch on the skin as directed by provider Daily. Remove & Discard patch within 12 hours or as directed by MD    QUEtiapine XR (SEROquel XR) 150 MG 24 hr tablet  Self Yes No    Take 150 mg by mouth Every Night.    zolpidem (AMBIEN) 10 MG tablet  Self Yes No    Take 10 mg by mouth At Night As Needed for Sleep.            Allergies:  Allergies   Allergen Reactions   • Oxycodone Shortness Of Breath and Itching   • Penicillins Anaphylaxis and Swelling   • Percocet [Oxycodone-Acetaminophen] Anaphylaxis   • Codeine Hives and Itching   • Buprenorphine Rash       Objective      Vitals:   Temp:  [98.4 °F (36.9 °C)] 98.4 °F (36.9  °C)  Heart Rate:  [] 66  Resp:  [24] 24  BP: ()/(56-96) 104/61    Physical Exam  Vitals reviewed.   Constitutional:       Appearance: Normal appearance. She is normal weight.   HENT:      Head: Normocephalic and atraumatic.      Nose: Nose normal.      Mouth/Throat:      Mouth: Mucous membranes are moist.      Pharynx: Oropharynx is clear.   Eyes:      Extraocular Movements: Extraocular movements intact.      Conjunctiva/sclera: Conjunctivae normal.      Pupils: Pupils are equal, round, and reactive to light.   Cardiovascular:      Rate and Rhythm: Normal rate and regular rhythm.      Pulses: Normal pulses.      Heart sounds: Normal heart sounds.      Comments: S1, S2 audible  Pulmonary:      Effort: Pulmonary effort is normal.      Breath sounds: Normal breath sounds.      Comments: On room air   Abdominal:      General: Abdomen is flat. Bowel sounds are normal.      Palpations: Abdomen is soft.   Musculoskeletal:      Cervical back: Normal range of motion.      Comments: RLE in ace wrap with bloody drainage noted, immobilizer in place   Skin:     General: Skin is warm and dry.      Comments: Blood drainage noted RLE on ace wrap   Neurological:      General: No focal deficit present.      Mental Status: She is alert and oriented to person, place, and time. Mental status is at baseline.   Psychiatric:         Mood and Affect: Mood normal.         Behavior: Behavior normal.         Thought Content: Thought content normal.         Judgment: Judgment normal.         Result Review    Result Review:  I have personally reviewed the results from the time of this admission to 2/10/2022 07:46 EST and agree with these findings:  [x]  Laboratory  []  Microbiology  [x]  Radiology  []  EKG/Telemetry   []  Cardiology/Vascular   []  Pathology  []  Old records  []  Other:  Most notable findings include: Xray right ankle/tib/fib showedComminuted displaced oblique fractures of the distal tibia and fibula shafts. The tibia  fracture extends to the anterior articular surface and medial malleolus.      Assessment/Plan        Active Hospital Problems:  Active Hospital Problems    Diagnosis    • **Type I or II open fracture of right tibia and fibula    • EMMA (generalized anxiety disorder)    • Bipolar 1 disorder (HCC)    • PTSD (post-traumatic stress disorder)    • HLD (hyperlipidemia)    • Asthma    • Chronic obstructive pulmonary disease (HCC)    • Chronic pain    • Tobacco abuse    • Depression      Plan:     --- Home medications not verified at time of assessment and plan---    Acute open right tib/fib fracture  - CXR reviewed  - Xray right tib/fib reviewed  - Received clindamycin, fentanyl, morphine, and Zofran in the ED  - Neurovascular checks  - Encourage incentive spirometry  - Fall risk precautions  - NPO, IV fluids ordered  - Cefazolin ordered per ortho   - Pain medication ordered  - Ortho consulted    HLD  -Continue statin    Bipolar with PTSD  -Patient reports she is no longer on any medication for this    Asthma/COPD  -Not in exacerbation  -Currently on room air  -Continue home breathing treatments    Chronic pain  -Continue Flexeril    Tobacco abuse  - Encourage cessation   - Nicotine patch ordered     DVT prophylaxis: SCD left leg     CODE STATUS:    Level Of Support Discussed With: Patient  Code Status (Patient has no pulse and is not breathing): CPR (Attempt to Resuscitate)  Medical Interventions (Patient has pulse or is breathing): Full Support    Admission Status:  I believe this patient meets Inpatient status.    I discussed the patient's findings and my recommendations with patient and family.    This patient has been examined wearing appropriate Personal Protective Equipment. 02/10/22      Signature: Electronically signed by GUI Peñaloza, 02/10/22, 7:46 AM EST.      Hospitalist Physician Assessment/Plan: DOS 02/10/22    History: Pt is a 45yo female with PMHx as reviewed above who presents after a fall on  the ice which resulted in severe right lower   extremity pain. The patient notes that she felt and heard a pop and her leg seemed to bend in one direction and then back into the opposite direction as she fell. She denies falling or landing on any foreign objects. She is tearful and in extreme pain despite attempts to treat her pain thus far. Orthopedic surgery is consulted and aware of the patient and her condition. The patient denies any history of seizure disorders and admits that she came off all her psych meds 6 months ago with the full [facility] who stated to the patient that she is doing very well off of the medications.    Exam:  General: well-developed and well-nourished, severe distress due to pain  Head: Normocephalic and atraumatic.   Eyes: EOM are normal. Pupils are equal, round, and reactive to light.   Heart: Normal rate and regular rhythm. No murmur   Chest: Effort normal and breath sounds normal. No wheezing, rales or rhonchi  Abdominal: Soft. NT/ND. Bowel sounds present  Musculoskeletal: RLE in immobilizer, ACE-wrapped with gauze underlying, strike-through bleeding noted around the right ankle area  Neurological: AAOx3, no focal deficits  Skin: Skin is warm and dry. No rash  Psychiatric: Very tearful due to physical pain of her injury, otherwise appropriate mood and affect    Medical Decision Making:  RLE Open Tib-Fib fractures  --xray reviewed and noted above  --Orthopedic surgery consulted from ED and following:    --CT ordered for surgical planning    --Surgery planned for tomorrow       --Plan for intramedullary nailing of tibia, with ORIF of pilon fracture and fibular fracture    --cover with Ancef [place]-operatively    --contacting OR/anesthesia for nerve block for better control of pain    --CT RLE: confirms x-ray findings with complex comminuted fractures of distal tib-fib and also proximal fibula, with multiple longitudinal fracture lines through the tibia.      --Hemorrhage noted throughout the margins of the fracture of distal tibia and soft tissues, air/gas seen throughout related to trauma  --Hgb 14.0 and normal coags in ED on admission    --repeat stat CBC with differential now  --Type/Screen and Cross 1 unit of blood due to active bleeding  --will continue to monitor with H&H    Bipolar disorder/PTSD  --currently on no medications, vacation holiday, as opposed to non-compliance, with full knowledge of her treating provider  --follows with [facility]  --stable at this time    Asthma/COPD  --no home medications  --no exacerbation at this time  --monitor oximetry with vitals  --breathing treatments as needed    Chronic pain  --currently on no medications  --acute pain due to above fractures managed as noted    I have reviewed and agree with the management of the patient's acute and chronic illnesses as outlined in the documentation above per the NP's assessment and plan.  I have also reviewed and completed the patient's med reconciliation with the assistance of the [facility] pharmacist.    Attending Physician Attestation    For this patient encounter, I have reviewed the mid-level provider documentation, medical decision making and treatment plan, and I have personally spent time with the patient.  All procedures were done by me, and/or all procedures were performed by the mid-level under my supervision.      Electronically signed by Peggy Angeles MD, 02/10/22, 12:45 PM EST.

## 2022-02-10 NOTE — PLAN OF CARE
Problem: Surgical Site Infection  Goal: Improved Infection Symptoms  Outcome: Ongoing, Progressing     Problem: Bleeding (Orthopaedic Fracture)  Goal: Absence of Bleeding  Outcome: Ongoing, Progressing     Problem: Bowel Elimination Impaired (Orthopaedic Fracture)  Goal: Effective Bowel Elimination  Outcome: Ongoing, Progressing     Problem: Delayed Union/Nonunion (Orthopaedic Fracture)  Goal: Fracture Stability  Outcome: Ongoing, Progressing     Problem: Embolism (Orthopaedic Fracture)  Goal: Absence of Embolism Signs and Symptoms  Outcome: Ongoing, Progressing  Intervention: Prevent and Manage Embolism Risk  Recent Flowsheet Documentation  Taken 2/10/2022 0900 by Isabel Ty RN  VTE Prevention/Management: (No SCD's available at this time while in the ED.) other (see comments)  Taken 2/10/2022 0800 by Isabel Ty RN  VTE Prevention/Management: bleeding risk factor(s) identified     Problem: Functional Ability Impaired (Orthopaedic Fracture)  Goal: Optimal Functional Ability  Outcome: Ongoing, Progressing  Intervention: Optimize Functional Ability  Recent Flowsheet Documentation  Taken 2/10/2022 0800 by Isabel Ty RN  Activity Management: activity adjusted per tolerance     Problem: Infection (Orthopaedic Fracture)  Goal: Absence of Infection Signs and Symptoms  Outcome: Ongoing, Progressing     Problem: Neurovascular Compromise (Orthopaedic Fracture)  Goal: Effective Tissue Perfusion  Outcome: Ongoing, Progressing     Problem: Pain (Orthopaedic Fracture)  Goal: Acceptable Pain Control  Outcome: Ongoing, Progressing  Intervention: Manage Acute Orthopaedic-Related Pain  Recent Flowsheet Documentation  Taken 2/10/2022 0800 by Isabel Ty RN  Diversional Activities:   television   smartphone   Goal Outcome Evaluation:

## 2022-02-10 NOTE — ED PROVIDER NOTES
Subjective   Chief complaint fall with right leg injury    History of present illness this is a 46-year-old female who went outside this morning when she slipped and hurt her right leg.  Patient has severe pain in her leg and swelling and bleeding EMS was called she was transported without incident she denies any other complaints and no other injury no headache no neck pain or back pain chest or abdominal pain no syncope.  Pain is severe throbbing aching radiates all the way up her leg worse with movement better with rest ongoing for the last couple hours.  No numbness or tingling associated with it.  No other complaints or associated symptoms          Review of Systems   Constitutional: Negative for chills and fever.   Respiratory: Negative for chest tightness and shortness of breath.    Cardiovascular: Negative for chest pain and palpitations.   Gastrointestinal: Negative for abdominal pain and vomiting.   Musculoskeletal: Negative for back pain and neck pain.   Skin: Negative for color change and rash.   Neurological: Negative for dizziness and headaches.   Psychiatric/Behavioral: Negative for agitation and behavioral problems.       Past Medical History:   Diagnosis Date   • Asthma    • Bipolar affective ([facility])    • COPD (chronic obstructive pulmonary disease) ([facility])    • DDD (degenerative disc disease), cervical    • Hypertension    • PTSD (post-traumatic stress disorder)        Allergies   Allergen Reactions   • Oxycodone Shortness Of Breath and Itching   • Penicillins Anaphylaxis and Swelling   • Percocet [Oxycodone-Acetaminophen] Anaphylaxis   • Codeine Hives and Itching   • Buprenorphine Rash       Past Surgical History:   Procedure Laterality Date   • CARPAL TUNNEL RELEASE     • DILATATION AND CURETTAGE     • HYSTERECTOMY         History reviewed. No pertinent family history.    Social History     Socioeconomic History   • Marital status:    Tobacco Use   • Smoking status: Current Every Day Smoker      Packs/day: 1.00     Types: Cigarettes   • Smokeless tobacco: Never Used   Substance and Sexual Activity   • Alcohol use: No   • Drug use: No   • Sexual activity: Defer     Prior to Admission medications    Medication Sig Start Date End Date Taking? Authorizing Provider   atorvastatin (LIPITOR) 20 MG tablet Take 20 mg by mouth Daily.    Jacob Gallegos MD   budesonide-formoterol (SYMBICORT) 160-4.5 MCG/ACT inhaler Inhale 2 puffs 2 (Two) Times a Day.    Jacob Gallegos MD   clonazePAM (KlonoPIN) 1 MG tablet Take 1 mg by mouth At Night As Needed for Seizures.    Jacob Gallegos MD   cyclobenzaprine (FLEXERIL) 10 MG tablet Take 1 tablet by mouth 3 (Three) Times a Day As Needed for Muscle Spasms for up to 15 doses. 7/18/19   Lauryn Martinez APRN   diclofenac (VOLTAREN) 50 MG EC tablet 1 p.o. 3 times daily with food as needed low back pain 11/14/20   Roverto Henry MD   lamoTRIgine (LaMICtal) 100 MG tablet Take 150 mg by mouth Daily.    Jacob Gallegos MD   lamoTRIgine (LaMICtal) 25 MG tablet Take your previous dose of 150 mg at bedtime and take 2 tablets during the day 11/14/20   Roverto Henry MD   lidocaine (Lidoderm) 5 % Place 1 patch on the skin as directed by provider Daily. Remove & Discard patch within 12 hours or as directed by MD 9/20/21   Charmaine Molina APRN   QUEtiapine XR (SEROquel XR) 150 MG 24 hr tablet Take 150 mg by mouth Every Night.    Jacob Gallegos MD   zolpidem (AMBIEN) 10 MG tablet Take 10 mg by mouth At Night As Needed for Sleep.    Jacob Gallegos MD             Objective   Physical Exam  46-year-old awake alert severe pain but nontoxic-appearing HEENT no obvious trauma extraocular static pupils equal reactive no raccoon or boyd sign.  Back no cervical thoracic lumbar spine tenderness noted trachea midline lungs clear no retractions heart regular without murmur abdomen soft without tenderness no masses extremities arms full range of motion no  deformities left leg full range of motion no deformities right leg patient has obvious deformity to the lower tib-fib.  There is no pain to the hip patient has a small 1 cm puncture wound to the right lower distal third tib-fib with some venous bleeding oozing from this.  The patient has no pain to the hip no pain to the knee.  She has no pain to the foot the foot is warm and dry good cap refill good and equal dorsalis pedis and posterior tibialis pulses.  Dorsiflex plantarflex at with difficulty I think secondary to pain toes up-and-down including big toe.  Compartments are soft to palpation no evidence of compartment syndrome on exam.  Squeeze on the calf is downgoing.  Good sensation throughout the lower extremity.  Distally neurovascular motor and sensory intact.  Neurologic awake alert orientated x4 with [place] Coma Scale 15  Procedures  Patient consented for conscious sedation and splinting of fracture.  Risk benefits complication alternative treatments explained.  Patient voiced understanding proceed with procedure.  The correct leg was identified the right side.  Patient was placed on a monitor and oxygen she was sedated with etomidate 12 mg IV.  He had good sedation the wound on her leg was cleaned with saline and a wet-to-dry dressing applied she had some padding applied to the skin and long-leg Ortho-Glass splint applied Ace wrap around this and then a mild pressure dressing to where the wound is on her lower leg to control bleeding.  The patient had no decompensation sats remained at 98% she woke up without incident.  She remained distally neurovascular motor sensor intact after the splint applied without evidence of compartment syndrome.         ED Course      Results for orders placed or performed during the hospital encounter of 02/10/22   Comprehensive Metabolic Panel    Specimen: Blood   Result Value Ref Range    Glucose 117 (H) 65 - 99 mg/dL    BUN 14 6 - 20 mg/dL    Creatinine 0.79 0.57 - 1.00  mg/dL    Sodium 140 136 - 145 mmol/L    Potassium 3.7 3.5 - 5.2 mmol/L    Chloride 102 98 - 107 mmol/L    CO2 27.0 22.0 - 29.0 mmol/L    Calcium 10.1 8.6 - 10.5 mg/dL    Total Protein 7.2 6.0 - 8.5 g/dL    Albumin 5.00 3.50 - 5.20 g/dL    ALT (SGPT) 9 1 - 33 U/L    AST (SGOT) 15 1 - 32 U/L    Alkaline Phosphatase 55 39 - 117 U/L    Total Bilirubin 0.3 0.0 - 1.2 mg/dL    eGFR Non African Amer 78 >60 mL/min/1.73    Globulin 2.2 gm/dL    A/G Ratio 2.3 g/dL    BUN/Creatinine Ratio 17.7 7.0 - 25.0    Anion Gap 11.0 5.0 - 15.0 mmol/L   Protime-INR    Specimen: Blood   Result Value Ref Range    Protime 10.9 9.6 - 11.7 Seconds    INR 0.98 0.93 - 1.10   aPTT    Specimen: Blood   Result Value Ref Range    PTT 26.2 24.0 - 31.0 seconds   CBC Auto Differential    Specimen: Blood   Result Value Ref Range    WBC 6.10 3.40 - 10.80 10*3/mm3    RBC 4.47 3.77 - 5.28 10*6/mm3    Hemoglobin 14.0 12.0 - 15.9 g/dL    Hematocrit 40.9 34.0 - 46.6 %    MCV 91.5 79.0 - 97.0 fL    MCH 31.2 26.6 - 33.0 pg    MCHC 34.1 31.5 - 35.7 g/dL    RDW 12.8 12.3 - 15.4 %    RDW-SD 41.1 37.0 - 54.0 fl    MPV 7.8 6.0 - 12.0 fL    Platelets 262 140 - 450 10*3/mm3    Neutrophil % 52.4 42.7 - 76.0 %    Lymphocyte % 39.6 19.6 - 45.3 %    Monocyte % 5.4 5.0 - 12.0 %    Eosinophil % 2.0 0.3 - 6.2 %    Basophil % 0.6 0.0 - 1.5 %    Neutrophils, Absolute 3.20 1.70 - 7.00 10*3/mm3    Lymphocytes, Absolute 2.40 0.70 - 3.10 10*3/mm3    Monocytes, Absolute 0.30 0.10 - 0.90 10*3/mm3    Eosinophils, Absolute 0.10 0.00 - 0.40 10*3/mm3    Basophils, Absolute 0.00 0.00 - 0.20 10*3/mm3    nRBC 0.0 0.0 - 0.2 /100 WBC   Type & Screen    Specimen: Blood   Result Value Ref Range    ABO Type O     RH type Positive     Antibody Screen Negative     T&S Expiration Date 2/13/2022 11:59:59 PM    Green Top (Gel)   Result Value Ref Range    Extra Tube Hold for add-ons.    Lavender Top   Result Value Ref Range    Extra Tube     Gold Top - SST   Result Value Ref Range    Extra Tube Hold  for add-ons.    Light Blue Top   Result Value Ref Range    Extra Tube hold for add-on      XR Tibia Fibula 2 View Right    Result Date: 2/10/2022  Fractures of the tibia and fibula as discussed. No dislocation    Electronically Signed ByStephan Hurtado On:2/10/2022 7:09 AM This report was finalized on [id number] by  Yvon Pedraza    XR Ankle 3+ View Right    Result Date: 2/10/2022  Fractures of the tibia and fibula as discussed. No dislocation    Electronically Signed ByStephan Hurtado On:2/10/2022 7:09 AM This report was finalized on [id number] by  Sameer Hurtado .    XR Chest 1 View    Result Date: 2/10/2022   Negative chest x-ray. No evidence of acute cardiopulmonary disease.  Electronically Signed ByStephan Hurtado On:2/10/2022 7:10 AM This report was finalized on [id number] by  Sameer Hurtado .    Medications   sodium chloride 0.9 % flush 10 mL (has no administration in time range)   clindamycin (CLEOCIN) 900 mg in sodium chloride 0.9% 50 mL IVPB (premix) (has no administration in time range)   Morphine sulfate (PF) injection 4 mg (has no administration in time range)   ondansetron (ZOFRAN) injection 4 mg (has no administration in time range)   ondansetron (ZOFRAN) injection 4 mg (4 mg Intravenous Given 2/10/22 0450)   fentaNYL citrate (PF) (SUBLIMAZE) injection 50 mcg (50 mcg Intravenous Given 2/10/22 0450)   fentaNYL citrate (PF) (SUBLIMAZE) injection 50 mcg (50 mcg Intravenous Given 2/10/22 0547)   etomidate (AMIDATE) injection 16.74 mg (12 mg Intravenous Given 2/10/22 0613)   fentaNYL citrate (PF) (SUBLIMAZE) injection 50 mcg (50 mcg Intravenous Given 2/10/22 0635)                                                  MDM  Number of Diagnoses or Management Options  Type I or II open fracture of right tibia and fibula, initial encounter: new and requires workup  Diagnosis management comments: Medical decision making.  Patient had IV established placed on the monitor and had the above exam and evaluation.  The  patient was given fentanyl 50 mics IV and Zofran 4 IV for pain and that was repeated.  The patient had the fractures open fracture comminuted spiral fracture distal tibia fibula and proximal fibula as well.  Patient's tetanus is up-to-date we talked about antibiotic she has anaphylactic reaction she states with tongue swelling throat swelling and body shutting down with penicillin.  I talked to Dr. Pugh.  We are going to use Kefzol but on further questioning the patient about cephalosporin she is pretty sure that there was 1 of those that her tongue swelled.  I talked to the pharmacy and we decided to go with clindamycin 900 mg IV.  The patient underwent conscious sedation for splinting.  Tolerated well she had a long-leg posterior Ortho-Glass splint applied she had saline wet-to-dry dressing applied to the wound prior to the splint and wrapping.  She remained distally neurovascular motor sensor intact status post splinting no evidence of compartment syndrome.  Laboratory evaluation reviewed by me was unremarkable CBC electrolytes.  Chest x-ray obtained as well for preop was unremarkable and reviewed by me.  She was made aware of the findings she required for morphine IV additional 4 Zofran IV.  She has been given antibiotics she is remained stable there is no other injury.  Hospitalist nurse practitioner notified Dr. Pugh has been notified and patient be admitted to hospital for further care       Amount and/or Complexity of Data Reviewed  Clinical lab tests: reviewed  Tests in the radiology section of CPT®: reviewed  Discuss the patient with other providers: yes    Risk of Complications, Morbidity, and/or Mortality  Presenting problems: high  Diagnostic procedures: high  Management options: high    Patient Progress  Patient progress: stable      Final diagnoses:   Type I or II open fracture of right tibia and fibula, initial encounter       ED Disposition  ED Disposition     ED Disposition Condition Comment     Decision to Admit  Level of Care: Med/Surg [1]   Diagnosis: Type I or II open fracture of right tibia and fibula, initial encounter [[id number]]   Admitting Physician: SONAL LOPEZ [[id number]]   Attending Physician: SONAL LOPEZ [[id number]]   Certification: I Certify That Inpatient Hospital Services Are Medically Necessary For Greater Than 2 Midnights            No follow-up provider specified.       Medication List      No changes were made to your prescriptions during this visit.          Harshil Arriaga MD  02/10/22 0724

## 2022-02-10 NOTE — NURSING NOTE
Notified and spoke with Dr. Pugh regarding CT scan of leg resulted and patient concerns on surgery scheduled for tomorrow and not for today.  Pt pain is severe and pt and daughter of patient are asking for a pain block.  Dr. Pugh is notifying OR as well as myself.

## 2022-02-10 NOTE — NURSING NOTE
Notified Dr. Pugh of patient bleeding / blood tinged on right leg ace wrap to right leg. No further orders.

## 2022-02-10 NOTE — NURSING NOTE
Spoke with Dr. Osborne regarding patient pain relief and pain block. Patient ok for pain block, awaiting procedure at this time.

## 2022-02-11 ENCOUNTER — ANESTHESIA (OUTPATIENT)
Dept: PERIOP | Facility: HOSPITAL | Age: 47
End: 2022-02-11

## 2022-02-11 ENCOUNTER — ANESTHESIA EVENT (OUTPATIENT)
Dept: PERIOP | Facility: HOSPITAL | Age: 47
End: 2022-02-11

## 2022-02-11 ENCOUNTER — APPOINTMENT (OUTPATIENT)
Dept: GENERAL RADIOLOGY | Facility: HOSPITAL | Age: 47
End: 2022-02-11

## 2022-02-11 LAB
ANION GAP SERPL CALCULATED.3IONS-SCNC: 6 MMOL/L (ref 5–15)
ANION GAP SERPL CALCULATED.3IONS-SCNC: 7 MMOL/L (ref 5–15)
BASOPHILS # BLD AUTO: 0 10*3/MM3 (ref 0–0.2)
BASOPHILS # BLD AUTO: 0 10*3/MM3 (ref 0–0.2)
BASOPHILS NFR BLD AUTO: 0.5 % (ref 0–1.5)
BASOPHILS NFR BLD AUTO: 0.6 % (ref 0–1.5)
BUN SERPL-MCNC: 7 MG/DL (ref 6–20)
BUN SERPL-MCNC: 7 MG/DL (ref 6–20)
BUN/CREAT SERPL: 9 (ref 7–25)
BUN/CREAT SERPL: 9.7 (ref 7–25)
CALCIUM SPEC-SCNC: 8.5 MG/DL (ref 8.6–10.5)
CALCIUM SPEC-SCNC: 8.5 MG/DL (ref 8.6–10.5)
CHLORIDE SERPL-SCNC: 103 MMOL/L (ref 98–107)
CHLORIDE SERPL-SCNC: 104 MMOL/L (ref 98–107)
CO2 SERPL-SCNC: 27 MMOL/L (ref 22–29)
CO2 SERPL-SCNC: 27 MMOL/L (ref 22–29)
CREAT SERPL-MCNC: 0.72 MG/DL (ref 0.57–1)
CREAT SERPL-MCNC: 0.78 MG/DL (ref 0.57–1)
DEPRECATED RDW RBC AUTO: 40.7 FL (ref 37–54)
DEPRECATED RDW RBC AUTO: 41.1 FL (ref 37–54)
EOSINOPHIL # BLD AUTO: 0 10*3/MM3 (ref 0–0.4)
EOSINOPHIL # BLD AUTO: 0 10*3/MM3 (ref 0–0.4)
EOSINOPHIL NFR BLD AUTO: 0.3 % (ref 0.3–6.2)
EOSINOPHIL NFR BLD AUTO: 0.7 % (ref 0.3–6.2)
ERYTHROCYTE [DISTWIDTH] IN BLOOD BY AUTOMATED COUNT: 12.7 % (ref 12.3–15.4)
ERYTHROCYTE [DISTWIDTH] IN BLOOD BY AUTOMATED COUNT: 12.8 % (ref 12.3–15.4)
GFR SERPL CREATININE-BSD FRML MDRD: 80 ML/MIN/1.73
GFR SERPL CREATININE-BSD FRML MDRD: 87 ML/MIN/1.73
GLUCOSE SERPL-MCNC: 104 MG/DL (ref 65–99)
GLUCOSE SERPL-MCNC: 134 MG/DL (ref 65–99)
HCT VFR BLD AUTO: 28 % (ref 34–46.6)
HCT VFR BLD AUTO: 29.4 % (ref 34–46.6)
HCT VFR BLD AUTO: 30.8 % (ref 34–46.6)
HCT VFR BLD AUTO: 31.8 % (ref 34–46.6)
HGB BLD-MCNC: 10.1 G/DL (ref 12–15.9)
HGB BLD-MCNC: 10.6 G/DL (ref 12–15.9)
HGB BLD-MCNC: 10.9 G/DL (ref 12–15.9)
HGB BLD-MCNC: 9.8 G/DL (ref 12–15.9)
LYMPHOCYTES # BLD AUTO: 1 10*3/MM3 (ref 0.7–3.1)
LYMPHOCYTES # BLD AUTO: 1.2 10*3/MM3 (ref 0.7–3.1)
LYMPHOCYTES NFR BLD AUTO: 24.6 % (ref 19.6–45.3)
LYMPHOCYTES NFR BLD AUTO: 30 % (ref 19.6–45.3)
MCH RBC QN AUTO: 31.4 PG (ref 26.6–33)
MCH RBC QN AUTO: 31.6 PG (ref 26.6–33)
MCHC RBC AUTO-ENTMCNC: 34.4 G/DL (ref 31.5–35.7)
MCHC RBC AUTO-ENTMCNC: 34.4 G/DL (ref 31.5–35.7)
MCV RBC AUTO: 91.3 FL (ref 79–97)
MCV RBC AUTO: 91.8 FL (ref 79–97)
MONOCYTES # BLD AUTO: 0.4 10*3/MM3 (ref 0.1–0.9)
MONOCYTES # BLD AUTO: 0.4 10*3/MM3 (ref 0.1–0.9)
MONOCYTES NFR BLD AUTO: 9 % (ref 5–12)
MONOCYTES NFR BLD AUTO: 9.8 % (ref 5–12)
MRSA DNA SPEC QL NAA+PROBE: NORMAL
NEUTROPHILS NFR BLD AUTO: 2.4 10*3/MM3 (ref 1.7–7)
NEUTROPHILS NFR BLD AUTO: 2.7 10*3/MM3 (ref 1.7–7)
NEUTROPHILS NFR BLD AUTO: 59.8 % (ref 42.7–76)
NEUTROPHILS NFR BLD AUTO: 64.7 % (ref 42.7–76)
NRBC BLD AUTO-RTO: 0.1 /100 WBC (ref 0–0.2)
PLATELET # BLD AUTO: 153 10*3/MM3 (ref 140–450)
PLATELET # BLD AUTO: 162 10*3/MM3 (ref 140–450)
PMV BLD AUTO: 7.5 FL (ref 6–12)
PMV BLD AUTO: 8 FL (ref 6–12)
POTASSIUM SERPL-SCNC: 3.7 MMOL/L (ref 3.5–5.2)
POTASSIUM SERPL-SCNC: 4.2 MMOL/L (ref 3.5–5.2)
RBC # BLD AUTO: 3.2 10*6/MM3 (ref 3.77–5.28)
RBC # BLD AUTO: 3.37 10*6/MM3 (ref 3.77–5.28)
SODIUM SERPL-SCNC: 137 MMOL/L (ref 136–145)
SODIUM SERPL-SCNC: 137 MMOL/L (ref 136–145)
WBC NRBC COR # BLD: 4.1 10*3/MM3 (ref 3.4–10.8)
WBC NRBC COR # BLD: 4.2 10*3/MM3 (ref 3.4–10.8)

## 2022-02-11 PROCEDURE — C1769 GUIDE WIRE: HCPCS | Performed by: ORTHOPAEDIC SURGERY

## 2022-02-11 PROCEDURE — C1713 ANCHOR/SCREW BN/BN,TIS/BN: HCPCS | Performed by: ORTHOPAEDIC SURGERY

## 2022-02-11 PROCEDURE — 76000 FLUOROSCOPY <1 HR PHYS/QHP: CPT

## 2022-02-11 PROCEDURE — 94799 UNLISTED PULMONARY SVC/PX: CPT

## 2022-02-11 PROCEDURE — 0QSJ04Z REPOSITION RIGHT FIBULA WITH INTERNAL FIXATION DEVICE, OPEN APPROACH: ICD-10-PCS | Performed by: ORTHOPAEDIC SURGERY

## 2022-02-11 PROCEDURE — 99232 SBSQ HOSP IP/OBS MODERATE 35: CPT | Performed by: HOSPITALIST

## 2022-02-11 PROCEDURE — 85018 HEMOGLOBIN: CPT | Performed by: INTERNAL MEDICINE

## 2022-02-11 PROCEDURE — 25010000002 MIDAZOLAM PER 1 MG: Performed by: ANESTHESIOLOGY

## 2022-02-11 PROCEDURE — 73590 X-RAY EXAM OF LOWER LEG: CPT

## 2022-02-11 PROCEDURE — 94640 AIRWAY INHALATION TREATMENT: CPT

## 2022-02-11 PROCEDURE — 87641 MR-STAPH DNA AMP PROBE: CPT | Performed by: ORTHOPAEDIC SURGERY

## 2022-02-11 PROCEDURE — 36415 COLL VENOUS BLD VENIPUNCTURE: CPT | Performed by: INTERNAL MEDICINE

## 2022-02-11 PROCEDURE — 94761 N-INVAS EAR/PLS OXIMETRY MLT: CPT

## 2022-02-11 PROCEDURE — 85025 COMPLETE CBC W/AUTO DIFF WBC: CPT | Performed by: INTERNAL MEDICINE

## 2022-02-11 PROCEDURE — 0QSG36Z REPOSITION RIGHT TIBIA WITH INTRAMEDULLARY INTERNAL FIXATION DEVICE, PERCUTANEOUS APPROACH: ICD-10-PCS | Performed by: ORTHOPAEDIC SURGERY

## 2022-02-11 PROCEDURE — 25010000002 HYDROMORPHONE 1 MG/ML SOLUTION: Performed by: ORTHOPAEDIC SURGERY

## 2022-02-11 PROCEDURE — 85014 HEMATOCRIT: CPT | Performed by: INTERNAL MEDICINE

## 2022-02-11 PROCEDURE — 25010000002 DEXAMETHASONE PER 1 MG: Performed by: ANESTHESIOLOGY

## 2022-02-11 PROCEDURE — 80048 BASIC METABOLIC PNL TOTAL CA: CPT | Performed by: INTERNAL MEDICINE

## 2022-02-11 PROCEDURE — 25010000002 ONDANSETRON PER 1 MG: Performed by: ANESTHESIOLOGY

## 2022-02-11 PROCEDURE — 25010000002 PROPOFOL 10 MG/ML EMULSION: Performed by: ANESTHESIOLOGY

## 2022-02-11 PROCEDURE — 25010000002 FENTANYL CITRATE (PF) 100 MCG/2ML SOLUTION: Performed by: ANESTHESIOLOGY

## 2022-02-11 PROCEDURE — 0QSG04Z REPOSITION RIGHT TIBIA WITH INTERNAL FIXATION DEVICE, OPEN APPROACH: ICD-10-PCS | Performed by: ORTHOPAEDIC SURGERY

## 2022-02-11 DEVICE — TRIGEN LOW PROFILE SCREW 5.0MM X 35MM
Type: IMPLANTABLE DEVICE | Site: LEG | Status: FUNCTIONAL
Brand: TRIGEN

## 2022-02-11 DEVICE — TRIGEN LOW PROFILE SCREW 5.0MM X 30MM
Type: IMPLANTABLE DEVICE | Site: LEG | Status: FUNCTIONAL
Brand: TRIGEN

## 2022-02-11 DEVICE — PLATE, LATERAL FIBULA, MED, RIGHT
Type: IMPLANTABLE DEVICE | Site: LEG | Status: FUNCTIONAL
Brand: MEDLINE UNITE

## 2022-02-11 DEVICE — SCREW, LOCKING, 3.5 X 12MM
Type: IMPLANTABLE DEVICE | Site: LEG | Status: FUNCTIONAL
Brand: MEDLINE UNITE

## 2022-02-11 DEVICE — TRIGEN LOW PROFILE SCREW 5.0MM X 37.5MM
Type: IMPLANTABLE DEVICE | Site: LEG | Status: FUNCTIONAL
Brand: TRIGEN

## 2022-02-11 DEVICE — TRIGEN LOW PROFILE SCREW 5.0MM X 45MM
Type: IMPLANTABLE DEVICE | Site: LEG | Status: FUNCTIONAL
Brand: TRIGEN

## 2022-02-11 DEVICE — TRIGEN LOW PROFILE SCREW 5.0MM X 32.5MM
Type: IMPLANTABLE DEVICE | Site: LEG | Status: FUNCTIONAL
Brand: TRIGEN

## 2022-02-11 DEVICE — SCREW, HEADED, 4.0 X 34MM
Type: IMPLANTABLE DEVICE | Site: LEG | Status: FUNCTIONAL
Brand: MEDLINE UNITE

## 2022-02-11 DEVICE — SCREW, LOCKING, 3.5 X 14MM
Type: IMPLANTABLE DEVICE | Site: LEG | Status: FUNCTIONAL
Brand: MEDLINE UNITE

## 2022-02-11 DEVICE — SCREW, LOCKING, 3.5 X 16MM
Type: IMPLANTABLE DEVICE | Site: LEG | Status: FUNCTIONAL
Brand: MEDLINE UNITE

## 2022-02-11 DEVICE — SCREW, HEADED, 4.0 X 46MM
Type: IMPLANTABLE DEVICE | Site: LEG | Status: FUNCTIONAL
Brand: MEDLINE UNITE

## 2022-02-11 DEVICE — SCREW, HEADED, 4.0 X 40MM
Type: IMPLANTABLE DEVICE | Site: LEG | Status: FUNCTIONAL
Brand: MEDLINE UNITE

## 2022-02-11 DEVICE — TRIGEN META TIBIAL NAIL 10MM X 32CM
Type: IMPLANTABLE DEVICE | Site: LEG | Status: FUNCTIONAL
Brand: TRIGEN

## 2022-02-11 DEVICE — SCREW, LOCKING, 3.5 X 10MM
Type: IMPLANTABLE DEVICE | Site: LEG | Status: FUNCTIONAL
Brand: MEDLINE UNITE

## 2022-02-11 RX ORDER — ONDANSETRON 2 MG/ML
4 INJECTION INTRAMUSCULAR; INTRAVENOUS ONCE AS NEEDED
Status: DISCONTINUED | OUTPATIENT
Start: 2022-02-11 | End: 2022-02-11 | Stop reason: HOSPADM

## 2022-02-11 RX ORDER — BUDESONIDE AND FORMOTEROL FUMARATE DIHYDRATE 160; 4.5 UG/1; UG/1
2 AEROSOL RESPIRATORY (INHALATION)
Status: DISCONTINUED | OUTPATIENT
Start: 2022-02-11 | End: 2022-02-13 | Stop reason: HOSPADM

## 2022-02-11 RX ORDER — MIDAZOLAM HYDROCHLORIDE 1 MG/ML
INJECTION INTRAMUSCULAR; INTRAVENOUS AS NEEDED
Status: DISCONTINUED | OUTPATIENT
Start: 2022-02-11 | End: 2022-02-11 | Stop reason: SURG

## 2022-02-11 RX ORDER — SODIUM CHLORIDE, SODIUM LACTATE, POTASSIUM CHLORIDE, CALCIUM CHLORIDE 600; 310; 30; 20 MG/100ML; MG/100ML; MG/100ML; MG/100ML
INJECTION, SOLUTION INTRAVENOUS CONTINUOUS PRN
Status: DISCONTINUED | OUTPATIENT
Start: 2022-02-11 | End: 2022-02-11 | Stop reason: SURG

## 2022-02-11 RX ORDER — FENTANYL CITRATE 50 UG/ML
INJECTION, SOLUTION INTRAMUSCULAR; INTRAVENOUS AS NEEDED
Status: DISCONTINUED | OUTPATIENT
Start: 2022-02-11 | End: 2022-02-11 | Stop reason: SURG

## 2022-02-11 RX ORDER — HYDROMORPHONE HCL 110MG/55ML
0.5 PATIENT CONTROLLED ANALGESIA SYRINGE INTRAVENOUS
Status: DISCONTINUED | OUTPATIENT
Start: 2022-02-11 | End: 2022-02-11 | Stop reason: HOSPADM

## 2022-02-11 RX ORDER — PROPOFOL 10 MG/ML
VIAL (ML) INTRAVENOUS AS NEEDED
Status: DISCONTINUED | OUTPATIENT
Start: 2022-02-11 | End: 2022-02-11 | Stop reason: SURG

## 2022-02-11 RX ORDER — DEXAMETHASONE SODIUM PHOSPHATE 4 MG/ML
INJECTION, SOLUTION INTRA-ARTICULAR; INTRALESIONAL; INTRAMUSCULAR; INTRAVENOUS; SOFT TISSUE AS NEEDED
Status: DISCONTINUED | OUTPATIENT
Start: 2022-02-11 | End: 2022-02-11 | Stop reason: SURG

## 2022-02-11 RX ORDER — HYDROCODONE BITARTRATE AND ACETAMINOPHEN 7.5; 325 MG/1; MG/1
2 TABLET ORAL EVERY 4 HOURS PRN
Status: DISCONTINUED | OUTPATIENT
Start: 2022-02-11 | End: 2022-02-13 | Stop reason: HOSPADM

## 2022-02-11 RX ORDER — LIDOCAINE HYDROCHLORIDE 20 MG/ML
INJECTION, SOLUTION EPIDURAL; INFILTRATION; INTRACAUDAL; PERINEURAL AS NEEDED
Status: DISCONTINUED | OUTPATIENT
Start: 2022-02-11 | End: 2022-02-11 | Stop reason: SURG

## 2022-02-11 RX ORDER — IPRATROPIUM BROMIDE AND ALBUTEROL SULFATE 2.5; .5 MG/3ML; MG/3ML
3 SOLUTION RESPIRATORY (INHALATION)
Status: DISCONTINUED | OUTPATIENT
Start: 2022-02-11 | End: 2022-02-13 | Stop reason: HOSPADM

## 2022-02-11 RX ORDER — HYDROCODONE BITARTRATE AND ACETAMINOPHEN 7.5; 325 MG/1; MG/1
1 TABLET ORAL EVERY 4 HOURS PRN
Status: DISCONTINUED | OUTPATIENT
Start: 2022-02-11 | End: 2022-02-13 | Stop reason: HOSPADM

## 2022-02-11 RX ORDER — ACETAMINOPHEN 325 MG/1
650 TABLET ORAL ONCE AS NEEDED
Status: DISCONTINUED | OUTPATIENT
Start: 2022-02-11 | End: 2022-02-11 | Stop reason: HOSPADM

## 2022-02-11 RX ORDER — KETAMINE HCL IN NACL, ISO-OSM 100MG/10ML
SYRINGE (ML) INJECTION AS NEEDED
Status: DISCONTINUED | OUTPATIENT
Start: 2022-02-11 | End: 2022-02-11 | Stop reason: SURG

## 2022-02-11 RX ORDER — ONDANSETRON 2 MG/ML
INJECTION INTRAMUSCULAR; INTRAVENOUS AS NEEDED
Status: DISCONTINUED | OUTPATIENT
Start: 2022-02-11 | End: 2022-02-11 | Stop reason: SURG

## 2022-02-11 RX ORDER — IPRATROPIUM BROMIDE AND ALBUTEROL SULFATE 2.5; .5 MG/3ML; MG/3ML
3 SOLUTION RESPIRATORY (INHALATION) ONCE AS NEEDED
Status: DISCONTINUED | OUTPATIENT
Start: 2022-02-11 | End: 2022-02-11 | Stop reason: HOSPADM

## 2022-02-11 RX ADMIN — CLINDAMYCIN PHOSPHATE 900 MG: 900 INJECTION, SOLUTION INTRAVENOUS at 08:51

## 2022-02-11 RX ADMIN — ONDANSETRON 4 MG: 2 INJECTION INTRAMUSCULAR; INTRAVENOUS at 21:51

## 2022-02-11 RX ADMIN — MIDAZOLAM 2 MG: 1 INJECTION INTRAMUSCULAR; INTRAVENOUS at 20:14

## 2022-02-11 RX ADMIN — SODIUM CHLORIDE, PRESERVATIVE FREE 10 ML: 5 INJECTION INTRAVENOUS at 08:51

## 2022-02-11 RX ADMIN — CLINDAMYCIN PHOSPHATE 900 MG: 900 INJECTION, SOLUTION INTRAVENOUS at 17:19

## 2022-02-11 RX ADMIN — SODIUM CHLORIDE, SODIUM LACTATE, POTASSIUM CHLORIDE, AND CALCIUM CHLORIDE: .6; .31; .03; .02 INJECTION, SOLUTION INTRAVENOUS at 20:10

## 2022-02-11 RX ADMIN — FENTANYL CITRATE 50 MCG: 50 INJECTION INTRAMUSCULAR; INTRAVENOUS at 21:25

## 2022-02-11 RX ADMIN — SODIUM CHLORIDE 100 ML/HR: 9 INJECTION, SOLUTION INTRAVENOUS at 23:38

## 2022-02-11 RX ADMIN — PROPOFOL 150 MG: 10 INJECTION, EMULSION INTRAVENOUS at 20:14

## 2022-02-11 RX ADMIN — DEXAMETHASONE SODIUM PHOSPHATE 8 MG: 4 INJECTION, SOLUTION INTRAMUSCULAR; INTRAVENOUS at 20:40

## 2022-02-11 RX ADMIN — SODIUM CHLORIDE, SODIUM LACTATE, POTASSIUM CHLORIDE, AND CALCIUM CHLORIDE: .6; .31; .03; .02 INJECTION, SOLUTION INTRAVENOUS at 22:07

## 2022-02-11 RX ADMIN — CYCLOBENZAPRINE 5 MG: 10 TABLET, FILM COATED ORAL at 03:05

## 2022-02-11 RX ADMIN — IPRATROPIUM BROMIDE AND ALBUTEROL SULFATE 3 ML: 2.5; .5 SOLUTION RESPIRATORY (INHALATION) at 17:12

## 2022-02-11 RX ADMIN — ONDANSETRON 4 MG: 2 INJECTION INTRAMUSCULAR; INTRAVENOUS at 21:00

## 2022-02-11 RX ADMIN — CLINDAMYCIN PHOSPHATE 900 MG: 900 INJECTION, SOLUTION INTRAVENOUS at 04:31

## 2022-02-11 RX ADMIN — HYDROMORPHONE HYDROCHLORIDE 1 MG: 1 INJECTION, SOLUTION INTRAMUSCULAR; INTRAVENOUS; SUBCUTANEOUS at 23:55

## 2022-02-11 RX ADMIN — SODIUM CHLORIDE, PRESERVATIVE FREE 10 ML: 5 INJECTION INTRAVENOUS at 04:30

## 2022-02-11 RX ADMIN — LIDOCAINE HYDROCHLORIDE 100 MG: 20 INJECTION, SOLUTION EPIDURAL; INFILTRATION; INTRACAUDAL; PERINEURAL at 20:14

## 2022-02-11 RX ADMIN — FENTANYL CITRATE 100 MCG: 50 INJECTION INTRAMUSCULAR; INTRAVENOUS at 20:14

## 2022-02-11 RX ADMIN — Medication 30 MG: at 20:14

## 2022-02-11 RX ADMIN — FENTANYL CITRATE 50 MCG: 50 INJECTION INTRAMUSCULAR; INTRAVENOUS at 21:54

## 2022-02-11 NOTE — PAYOR COMM NOTE
UTILIZATION REVIEW  BENITO HICKEY RN   PH:   FAX: [phone]    [facility]  NPI# [id number]  TID # [id number]  ==============================    REQUEST FOR  INPATIENT MEDICAL PRECERT    ------------------------------------      ===============================  Indicia® Completed 2/11/2022 08:26       Criteria Set Name - Subset   Tibia/Fibula Shaft Fracture, Closed or Open Reduction - Operative Status      Criteria Review      Operative Status Criteria    Most Recent : Benito Hickey Most Recent Date: 2/11/2022 08:26:51 EST    (X) Inpatient: Most adult and some pediatric patients undergoing open reduction, and patients    with open fractures, complex (eg, comminuted) fractures, multiple fractures, or who require    prolonged (eg, more than 24 hours) inpatient monitoring or treatment (eg, for compartment    syndrome, other injuries, unstable comorbidities) are treated on an inpatient basis. (29)    2/11/2022 08:26:51 EST by Benito Hickey      PLANNED FOR SURGERY TODAY- HAVING A LOT OF PAIN- ON PCA DILAUDID   CLINDAMYCIN IV     PER NURSING 2/11 AT 0408: Patient admitted from ED with right open tib/fib fracture. Patient on dilaudid PCA pump and still having severe pain and muscle spasms. Patient to have surgery today with Jesus Grigsby (46 y.o. Female)             Date of Birth Social Security Number Address Home Phone MRN    1975  [address] [place], [place] [address]  [id number]    Gnosticist Marital Status             None        Admission Date Admission Type Admitting Provider Attending Provider Department, Room/Bed    2/10/22 Emergency Chrissie Griffith MD Hall, Kelli G, MD [facility] SURGICAL INPATIENT, 4102/1    Discharge Date Discharge Disposition Discharge Destination                         Attending Provider: Chrissie Griffith MD    Allergies: Oxycodone, Penicillins, Percocet [Oxycodone-acetaminophen], Codeine, Buprenorphine    Isolation: None  "  Infection: None   Code Status: CPR   Advance Care Planning Activity    Ht: 165.1 cm (65\")   Wt: 57.6 kg (126 lb 14.4 oz)    Admission Cmt: None   Principal Problem: Type I or II open fracture of right tibia and fibula [S82.201B,S82.401B]                 Active Insurance as of 2/10/2022     Primary Coverage     Payor Plan Insurance Group Employer/Plan Group    [facility] [employer]LL[employer] MEDICARE REPLACEMENT [id number]     Payor Plan Address Payor Plan Phone Number Payor Plan Fax Number Effective Dates    [address] [address] [phone]  2022 - None Entered    [facility] [address]       Subscriber Name Subscriber Birth Date Member ID       JESUS OVALLES 1975 [id number]           Secondary Coverage     Payor Plan Insurance Group Employer/Plan Group    [place]      Payor Plan Address Payor Plan Phone Number Payor Plan Fax Number Effective Dates    [address]   2020 - None Entered    [facility] [id number]       Subscriber Name Subscriber Birth Date Member ID       JESUS OVALLES 1975 [id number]                 Emergency Contacts      (Rel.) Home Phone Work Phone Mobile Phone    CharletteMat sylvester (Spouse) -- -- [phone]    GladysJanneth (Daughter) -- -- [phone]               History & Physical      Peggy Angeles MD at 02/10/22 0709              [facility]      Patient Name: Jesus Ovalles  : 1975  MRN: [id number]  Primary Care Physician:  Aminta Quezada PA-C  Date of admission: 2/10/2022      Subjective      Chief Complaint: Fall, right leg pain    History of Present Illness: Jesus Ovalles is a 46 y.o. female with a past medical history of asthma, bipolar, COPD, chronic pain, depression, anxiety, hyperlipidemia, PTSD, and tobacco abuse who presented to [facility] on 2/10/2022 due to a fall.  Patient reports this morning she walked outside to smoke a cigarette and fell on the ice " "landing on her right leg.  She explains she felt and heard a \"pop.\"  She instantly started having right leg pain.  Her current right leg pains is a 9/10 and she describes this as sharp.  She denies any aggravating or alleviating factors of the pain.    In the ED, CXR unremarkle. Xray right ankle/tib/fib showedComminuted displaced oblique fractures of the distal tibia and fibula  shafts. The tibia fracture extends to the anterior articular surface and  medial malleolus.  All labs unremarkable upon admission.  All vital signs stable upon admission.  Patient received clindamycin, fentanyl, morphine, Zofran in the ED.  Ortho consulted.    Review of Systems   Constitutional: Negative.   HENT: Negative.    Eyes: Negative.    Cardiovascular: Negative.    Respiratory: Negative.    Skin: Negative.    Musculoskeletal:        Right leg pain    Gastrointestinal: Negative.    Genitourinary: Negative.    Neurological: Negative.    Psychiatric/Behavioral: Negative.         Personal History     Past Medical History:   Diagnosis Date   • Asthma    • Bipolar affective (HCC)    • COPD (chronic obstructive pulmonary disease) (HCC)    • DDD (degenerative disc disease), cervical    • Hypertension    • PTSD (post-traumatic stress disorder)        Past Surgical History:   Procedure Laterality Date   • CARPAL TUNNEL RELEASE     • DILATATION AND CURETTAGE     • HYSTERECTOMY         Family History: family history includes Heart disease in her mother. Otherwise pertinent FHx was reviewed and not pertinent to current issue.    Social History:  reports that she has been smoking cigarettes. She has been smoking about 1.00 pack per day. She has never used smokeless tobacco. She reports that she does not drink alcohol and does not use drugs.    Home Medications:  Prior to Admission Medications     Prescriptions Last Dose Informant Patient Reported? Taking?    atorvastatin (LIPITOR) 20 MG tablet  Self Yes No    Take 20 mg by mouth Daily.    " budesonide-formoterol (SYMBICORT) 160-4.5 MCG/ACT inhaler   Yes No    Inhale 2 puffs 2 (Two) Times a Day.    clonazePAM (KlonoPIN) 1 MG tablet   Yes No    Take 1 mg by mouth At Night As Needed for Seizures.    cyclobenzaprine (FLEXERIL) 10 MG tablet   No No    Take 1 tablet by mouth 3 (Three) Times a Day As Needed for Muscle Spasms for up to 15 doses.    diclofenac (VOLTAREN) 50 MG EC tablet   No No    1 p.o. 3 times daily with food as needed low back pain    lamoTRIgine (LaMICtal) 100 MG tablet  Self Yes No    Take 150 mg by mouth Daily.    lamoTRIgine (LaMICtal) 25 MG tablet   No No    Take your previous dose of 150 mg at bedtime and take 2 tablets during the day    lidocaine (Lidoderm) 5 %   No No    Place 1 patch on the skin as directed by provider Daily. Remove & Discard patch within 12 hours or as directed by MD    QUEtiapine XR (SEROquel XR) 150 MG 24 hr tablet  Self Yes No    Take 150 mg by mouth Every Night.    zolpidem (AMBIEN) 10 MG tablet  Self Yes No    Take 10 mg by mouth At Night As Needed for Sleep.            Allergies:  Allergies   Allergen Reactions   • Oxycodone Shortness Of Breath and Itching   • Penicillins Anaphylaxis and Swelling   • Percocet [Oxycodone-Acetaminophen] Anaphylaxis   • Codeine Hives and Itching   • Buprenorphine Rash       Objective      Vitals:   Temp:  [98.4 °F (36.9 °C)] 98.4 °F (36.9 °C)  Heart Rate:  [] 66  Resp:  [24] 24  BP: ()/(56-96) 104/61    Physical Exam  Vitals reviewed.   Constitutional:       Appearance: Normal appearance. She is normal weight.   HENT:      Head: Normocephalic and atraumatic.      Nose: Nose normal.      Mouth/Throat:      Mouth: Mucous membranes are moist.      Pharynx: Oropharynx is clear.   Eyes:      Extraocular Movements: Extraocular movements intact.      Conjunctiva/sclera: Conjunctivae normal.      Pupils: Pupils are equal, round, and reactive to light.   Cardiovascular:      Rate and Rhythm: Normal rate and regular rhythm.       Pulses: Normal pulses.      Heart sounds: Normal heart sounds.      Comments: S1, S2 audible  Pulmonary:      Effort: Pulmonary effort is normal.      Breath sounds: Normal breath sounds.      Comments: On room air   Abdominal:      General: Abdomen is flat. Bowel sounds are normal.      Palpations: Abdomen is soft.   Musculoskeletal:      Cervical back: Normal range of motion.      Comments: RLE in ace wrap with bloody drainage noted, immobilizer in place   Skin:     General: Skin is warm and dry.      Comments: Blood drainage noted RLE on ace wrap   Neurological:      General: No focal deficit present.      Mental Status: She is alert and oriented to person, place, and time. Mental status is at baseline.   Psychiatric:         Mood and Affect: Mood normal.         Behavior: Behavior normal.         Thought Content: Thought content normal.         Judgment: Judgment normal.         Result Review    Result Review:  I have personally reviewed the results from the time of this admission to 2/10/2022 07:46 EST and agree with these findings:  [x]  Laboratory  []  Microbiology  [x]  Radiology  []  EKG/Telemetry   []  Cardiology/Vascular   []  Pathology  []  Old records  []  Other:  Most notable findings include: Xray right ankle/tib/fib showedComminuted displaced oblique fractures of the distal tibia and fibula shafts. The tibia fracture extends to the anterior articular surface and medial malleolus.      Assessment/Plan        Active Hospital Problems:  Active Hospital Problems    Diagnosis    • **Type I or II open fracture of right tibia and fibula    • EMMA (generalized anxiety disorder)    • Bipolar 1 disorder (HCC)    • PTSD (post-traumatic stress disorder)    • HLD (hyperlipidemia)    • Asthma    • Chronic obstructive pulmonary disease (HCC)    • Chronic pain    • Tobacco abuse    • Depression      Plan:     --- Home medications not verified at time of assessment and plan---    Acute open right tib/fib fracture  -  CXR reviewed  - Xray right tib/fib reviewed  - Received clindamycin, fentanyl, morphine, and Zofran in the ED  - Neurovascular checks  - Encourage incentive spirometry  - Fall risk precautions  - NPO, IV fluids ordered  - Cefazolin ordered per ortho   - Pain medication ordered  - Ortho consulted    HLD  -Continue statin    Bipolar with PTSD  -Patient reports she is no longer on any medication for this    Asthma/COPD  -Not in exacerbation  -Currently on room air  -Continue home breathing treatments    Chronic pain  -Continue Flexeril    Tobacco abuse  - Encourage cessation   - Nicotine patch ordered     DVT prophylaxis: SCD left leg     CODE STATUS:    Level Of Support Discussed With: Patient  Code Status (Patient has no pulse and is not breathing): CPR (Attempt to Resuscitate)  Medical Interventions (Patient has pulse or is breathing): Full Support    Admission Status:  I believe this patient meets Inpatient status.    I discussed the patient's findings and my recommendations with patient and family.    This patient has been examined wearing appropriate Personal Protective Equipment. 02/10/22      Signature: Electronically signed by GUI Peñaloza, 02/10/22, 7:46 AM EST.      Hospitalist Physician Assessment/Plan: DOS 02/10/22    History: Pt is a 47yo female with PMHx as reviewed above who presents after a fall on the ice which resulted in severe right lower   extremity pain. The patient notes that she felt and heard a pop and her leg seemed to bend in one direction and then back into the opposite direction as she fell. She denies falling or landing on any foreign objects. She is tearful and in extreme pain despite attempts to treat her pain thus far. Orthopedic surgery is consulted and aware of the patient and her condition. The patient denies any history of seizure disorders and admits that she came off all her psych meds 6 months ago with the full knowledge of psychiatrist at [facility], who stated to  the patient that she is doing very well off of the medications.    Exam:  General: well-developed and well-nourished, severe distress due to pain  Head: Normocephalic and atraumatic.   Eyes: EOM are normal. Pupils are equal, round, and reactive to light.   Heart: Normal rate and regular rhythm. No murmur   Chest: Effort normal and breath sounds normal. No wheezing, rales or rhonchi  Abdominal: Soft. NT/ND. Bowel sounds present  Musculoskeletal: RLE in immobilizer, ACE-wrapped with gauze underlying, strike-through bleeding noted around the right ankle area  Neurological: AAOx3, no focal deficits  Skin: Skin is warm and dry. No rash  Psychiatric: Very tearful due to physical pain of her injury, otherwise appropriate mood and affect    Medical Decision Making:  RLE Open Tib-Fib fractures  --xray reviewed and noted above  --Orthopedic surgery consulted from ED and following:    --CT ordered for surgical planning    --Surgery planned for tomorrow       --Plan for intramedullary nailing of tibia, with ORIF of pilon fracture and fibular fracture    --cover with Ancef savannah-operatively    --contacting OR/anesthesia for nerve block for better control of pain    --CT RLE: confirms x-ray findings with complex comminuted fractures of distal tib-fib and also proximal fibula, with multiple longitudinal fracture lines through the tibia.     --Hemorrhage noted throughout the margins of the fracture of distal tibia and soft tissues, air/gas seen throughout related to trauma  --Hgb 14.0 and normal coags in ED on admission    --repeat stat CBC with differential now  --Type/Screen and Cross 1 unit of blood due to active bleeding  --will continue to monitor with H&H    Bipolar disorder/PTSD  --currently on no medications, vacation holiday, as opposed to non-compliance, with full knowledge of her treating provider  --follows with [facility]  --stable at this time    Asthma/COPD  --no home medications  --no exacerbation at this  time  --monitor oximetry with vitals  --breathing treatments as needed    Chronic pain  --currently on no medications  --acute pain due to above fractures managed as noted    I have reviewed and agree with the management of the patient's acute and chronic illnesses as outlined in the documentation above per the NP's assessment and plan.  I have also reviewed and completed the patient's med reconciliation with the assistance of the [facility] pharmacist.    Attending Physician Attestation    For this patient encounter, I have reviewed the mid-level provider documentation, medical decision making and treatment plan, and I have personally spent time with the patient.  All procedures were done by me, and/or all procedures were performed by the mid-level under my supervision.      Electronically signed by Peggy Angeles MD, 02/10/22, 12:45 PM EST.    Electronically signed by Peggy Angeles MD at 02/10/22 1324          Emergency Department Notes      Harshil Arriaga MD at 02/10/22 0637          Subjective   Chief complaint fall with right leg injury    History of present illness this is a 46-year-old female who went outside this morning when she slipped and hurt her right leg.  Patient has severe pain in her leg and swelling and bleeding EMS was called she was transported without incident she denies any other complaints and no other injury no headache no neck pain or back pain chest or abdominal pain no syncope.  Pain is severe throbbing aching radiates all the way up her leg worse with movement better with rest ongoing for the last couple hours.  No numbness or tingling associated with it.  No other complaints or associated symptoms          Review of Systems   Constitutional: Negative for chills and fever.   Respiratory: Negative for chest tightness and shortness of breath.    Cardiovascular: Negative for chest pain and palpitations.   Gastrointestinal: Negative for abdominal pain and vomiting.   Musculoskeletal:  Negative for back pain and neck pain.   Skin: Negative for color change and rash.   Neurological: Negative for dizziness and headaches.   Psychiatric/Behavioral: Negative for agitation and behavioral problems.       Past Medical History:   Diagnosis Date   • Asthma    • Bipolar affective (HCC)    • COPD (chronic obstructive pulmonary disease) (HCC)    • DDD (degenerative disc disease), cervical    • Hypertension    • PTSD (post-traumatic stress disorder)        Allergies   Allergen Reactions   • Oxycodone Shortness Of Breath and Itching   • Penicillins Anaphylaxis and Swelling   • Percocet [Oxycodone-Acetaminophen] Anaphylaxis   • Codeine Hives and Itching   • Buprenorphine Rash       Past Surgical History:   Procedure Laterality Date   • CARPAL TUNNEL RELEASE     • DILATATION AND CURETTAGE     • HYSTERECTOMY         History reviewed. No pertinent family history.    Social History     Socioeconomic History   • Marital status:    Tobacco Use   • Smoking status: Current Every Day Smoker     Packs/day: 1.00     Types: Cigarettes   • Smokeless tobacco: Never Used   Substance and Sexual Activity   • Alcohol use: No   • Drug use: No   • Sexual activity: Defer     Prior to Admission medications    Medication Sig Start Date End Date Taking? Authorizing Provider   atorvastatin (LIPITOR) 20 MG tablet Take 20 mg by mouth Daily.    ProviderJacob MD   budesonide-formoterol (SYMBICORT) 160-4.5 MCG/ACT inhaler Inhale 2 puffs 2 (Two) Times a Day.    ProviderJacob MD   clonazePAM (KlonoPIN) 1 MG tablet Take 1 mg by mouth At Night As Needed for Seizures.    ProviderJacob MD   cyclobenzaprine (FLEXERIL) 10 MG tablet Take 1 tablet by mouth 3 (Three) Times a Day As Needed for Muscle Spasms for up to 15 doses. 7/18/19   Lauryn Martinez APRN   diclofenac (VOLTAREN) 50 MG EC tablet 1 p.o. 3 times daily with food as needed low back pain 11/14/20   Roverto Henry MD   lamoTRIgine (LaMICtal) 100 MG tablet  Take 150 mg by mouth Daily.    Provider, MD Jacob   lamoTRIgine (LaMICtal) 25 MG tablet Take your previous dose of 150 mg at bedtime and take 2 tablets during the day 11/14/20   Roverto Henry MD   lidocaine (Lidoderm) 5 % Place 1 patch on the skin as directed by provider Daily. Remove & Discard patch within 12 hours or as directed by MD 9/20/21   Charmaine Molina APRN   QUEtiapine XR (SEROquel XR) 150 MG 24 hr tablet Take 150 mg by mouth Every Night.    Provider, MD Jacob   zolpidem (AMBIEN) 10 MG tablet Take 10 mg by mouth At Night As Needed for Sleep.    Provider, MD Jacob             Objective   Physical Exam  46-year-old awake alert severe pain but nontoxic-appearing HEENT no obvious trauma extraocular static pupils equal reactive no raccoon or boyd sign.  Back no cervical thoracic lumbar spine tenderness noted trachea midline lungs clear no retractions heart regular without murmur abdomen soft without tenderness no masses extremities arms full range of motion no deformities left leg full range of motion no deformities right leg patient has obvious deformity to the lower tib-fib.  There is no pain to the hip patient has a small 1 cm puncture wound to the right lower distal third tib-fib with some venous bleeding oozing from this.  The patient has no pain to the hip no pain to the knee.  She has no pain to the foot the foot is warm and dry good cap refill good and equal dorsalis pedis and posterior tibialis pulses.  Dorsiflex plantarflex at with difficulty I think secondary to pain toes up-and-down including big toe.  Compartments are soft to palpation no evidence of compartment syndrome on exam.  Squeeze on the calf is downgoing.  Good sensation throughout the lower extremity.  Distally neurovascular motor and sensory intact.  Neurologic awake alert orientated x4 with Etta Coma Scale 15  Procedures  Patient consented for conscious sedation and splinting of fracture.  Risk  benefits complication alternative treatments explained.  Patient voiced understanding proceed with procedure.  The correct leg was identified the right side.  Patient was placed on a monitor and oxygen she was sedated with etomidate 12 mg IV.  He had good sedation the wound on her leg was cleaned with saline and a wet-to-dry dressing applied she had some padding applied to the skin and long-leg Ortho-Glass splint applied Ace wrap around this and then a mild pressure dressing to where the wound is on her lower leg to control bleeding.  The patient had no decompensation sats remained at 98% she woke up without incident.  She remained distally neurovascular motor sensor intact after the splint applied without evidence of compartment syndrome.        ED Course      Results for orders placed or performed during the hospital encounter of 02/10/22   Comprehensive Metabolic Panel    Specimen: Blood   Result Value Ref Range    Glucose 117 (H) 65 - 99 mg/dL    BUN 14 6 - 20 mg/dL    Creatinine 0.79 0.57 - 1.00 mg/dL    Sodium 140 136 - 145 mmol/L    Potassium 3.7 3.5 - 5.2 mmol/L    Chloride 102 98 - 107 mmol/L    CO2 27.0 22.0 - 29.0 mmol/L    Calcium 10.1 8.6 - 10.5 mg/dL    Total Protein 7.2 6.0 - 8.5 g/dL    Albumin 5.00 3.50 - 5.20 g/dL    ALT (SGPT) 9 1 - 33 U/L    AST (SGOT) 15 1 - 32 U/L    Alkaline Phosphatase 55 39 - 117 U/L    Total Bilirubin 0.3 0.0 - 1.2 mg/dL    eGFR Non African Amer 78 >60 mL/min/1.73    Globulin 2.2 gm/dL    A/G Ratio 2.3 g/dL    BUN/Creatinine Ratio 17.7 7.0 - 25.0    Anion Gap 11.0 5.0 - 15.0 mmol/L   Protime-INR    Specimen: Blood   Result Value Ref Range    Protime 10.9 9.6 - 11.7 Seconds    INR 0.98 0.93 - 1.10   aPTT    Specimen: Blood   Result Value Ref Range    PTT 26.2 24.0 - 31.0 seconds   CBC Auto Differential    Specimen: Blood   Result Value Ref Range    WBC 6.10 3.40 - 10.80 10*3/mm3    RBC 4.47 3.77 - 5.28 10*6/mm3    Hemoglobin 14.0 12.0 - 15.9 g/dL    Hematocrit 40.9 34.0 -  46.6 %    MCV 91.5 79.0 - 97.0 fL    MCH 31.2 26.6 - 33.0 pg    MCHC 34.1 31.5 - 35.7 g/dL    RDW 12.8 12.3 - 15.4 %    RDW-SD 41.1 37.0 - 54.0 fl    MPV 7.8 6.0 - 12.0 fL    Platelets 262 140 - 450 10*3/mm3    Neutrophil % 52.4 42.7 - 76.0 %    Lymphocyte % 39.6 19.6 - 45.3 %    Monocyte % 5.4 5.0 - 12.0 %    Eosinophil % 2.0 0.3 - 6.2 %    Basophil % 0.6 0.0 - 1.5 %    Neutrophils, Absolute 3.20 1.70 - 7.00 10*3/mm3    Lymphocytes, Absolute 2.40 0.70 - 3.10 10*3/mm3    Monocytes, Absolute 0.30 0.10 - 0.90 10*3/mm3    Eosinophils, Absolute 0.10 0.00 - 0.40 10*3/mm3    Basophils, Absolute 0.00 0.00 - 0.20 10*3/mm3    nRBC 0.0 0.0 - 0.2 /100 WBC   Type & Screen    Specimen: Blood   Result Value Ref Range    ABO Type O     RH type Positive     Antibody Screen Negative     T&S Expiration Date 2/13/2022 11:59:59 PM    Green Top (Gel)   Result Value Ref Range    Extra Tube Hold for add-ons.    Lavender Top   Result Value Ref Range    Extra Tube     Gold Top - SST   Result Value Ref Range    Extra Tube Hold for add-ons.    Light Blue Top   Result Value Ref Range    Extra Tube hold for add-on      XR Tibia Fibula 2 View Right    Result Date: 2/10/2022  Fractures of the tibia and fibula as discussed. No dislocation    Electronically Signed ByStephan Hurtado On:2/10/2022 7:09 AM This report was finalized on [id number] by  Yvon Pedraza    XR Ankle 3+ View Right    Result Date: 2/10/2022  Fractures of the tibia and fibula as discussed. No dislocation    Electronically Signed ByStephan Hurtado On:2/10/2022 7:09 AM This report was finalized on [id number] by  Yvon Pedraza    XR Chest 1 View    Result Date: 2/10/2022   Negative chest x-ray. No evidence of acute cardiopulmonary disease.  Electronically Signed By-Sameer Hurtado On:2/10/2022 7:10 AM This report was finalized on [id number] by  Sameer Hurtado, .    Medications   sodium chloride 0.9 % flush 10 mL (has no administration in time range)   clindamycin (CLEOCIN) 900 mg  in sodium chloride 0.9% 50 mL IVPB (premix) (has no administration in time range)   Morphine sulfate (PF) injection 4 mg (has no administration in time range)   ondansetron (ZOFRAN) injection 4 mg (has no administration in time range)   ondansetron (ZOFRAN) injection 4 mg (4 mg Intravenous Given 2/10/22 0450)   fentaNYL citrate (PF) (SUBLIMAZE) injection 50 mcg (50 mcg Intravenous Given 2/10/22 0450)   fentaNYL citrate (PF) (SUBLIMAZE) injection 50 mcg (50 mcg Intravenous Given 2/10/22 8382)   etomidate (AMIDATE) injection 16.74 mg (12 mg Intravenous Given 2/10/22 0613)   fentaNYL citrate (PF) (SUBLIMAZE) injection 50 mcg (50 mcg Intravenous Given 2/10/22 0655)                                                  MDM  Number of Diagnoses or Management Options  Type I or II open fracture of right tibia and fibula, initial encounter: new and requires workup  Diagnosis management comments: Medical decision making.  Patient had IV established placed on the monitor and had the above exam and evaluation.  The patient was given fentanyl 50 mics IV and Zofran 4 IV for pain and that was repeated.  The patient had the fractures open fracture comminuted spiral fracture distal tibia fibula and proximal fibula as well.  Patient's tetanus is up-to-date we talked about antibiotic she has anaphylactic reaction she states with tongue swelling throat swelling and body shutting down with penicillin.  I talked to Dr. Pugh.  We are going to use Kefzol but on further questioning the patient about cephalosporin she is pretty sure that there was 1 of those that her tongue swelled.  I talked to the pharmacy and we decided to go with clindamycin 900 mg IV.  The patient underwent conscious sedation for splinting.  Tolerated well she had a long-leg posterior Ortho-Glass splint applied she had saline wet-to-dry dressing applied to the wound prior to the splint and wrapping.  She remained distally neurovascular motor sensor intact status post  splinting no evidence of compartment syndrome.  Laboratory evaluation reviewed by me was unremarkable CBC electrolytes.  Chest x-ray obtained as well for preop was unremarkable and reviewed by me.  She was made aware of the findings she required for morphine IV additional 4 Zofran IV.  She has been given antibiotics she is remained stable there is no other injury.  Hospitalist nurse practitioner notified Dr. Pugh has been notified and patient be admitted to hospital for further care       Amount and/or Complexity of Data Reviewed  Clinical lab tests: reviewed  Tests in the radiology section of CPT®: reviewed  Discuss the patient with other providers: yes    Risk of Complications, Morbidity, and/or Mortality  Presenting problems: high  Diagnostic procedures: high  Management options: high    Patient Progress  Patient progress: stable      Final diagnoses:   Type I or II open fracture of right tibia and fibula, initial encounter       ED Disposition  ED Disposition     ED Disposition Condition Comment    Decision to Admit  Level of Care: Med/Surg [1]   Diagnosis: Type I or II open fracture of right tibia and fibula, initial encounter [[id number]]   Admitting Physician: SONAL ANGELES [[id number]]   Attending Physician: SONAL ANGELES [[id number]]   Certification: I Certify That Inpatient Hospital Services Are Medically Necessary For Greater Than 2 Midnights            No follow-up provider specified.       Medication List      No changes were made to your prescriptions during this visit.          Harshil Arriaga MD  02/10/22 0730      Electronically signed by Harshil Arriaga MD at 02/10/22 0730     Vidya Rizvi RN at 02/10/22 2059        Pt transferred to inpatient hospital bed. Spoke with Dr. Angeles on the phone about pt's pain and nausea, pt to be placed on a PCA pump for pain and phenergan suppository. No more bloody drainage seen on pads underneath pt;s leg      Vidya Rizvi RN  02/10/22  "2106      Electronically signed by Vidya Rizvi RN at 02/10/22 2106          Physician Progress Notes (last 48 hours)      Maurice Pugh II, MD at 02/10/22 0740        I was called by the emergency room for surgical management of this patient's leg injury.  Unfortunately, I am not available today for surgery but I am going to get this patient added on for tomorrow.  She will be covered with Ancef until then.  Surgical plan for tomorrow is likely intramedullary nailing of the tibia with ORIF of pilon fracture and fibular fracture.  CT scan of the leg to be performed for surgical planning.    R \"Jarett\" Keaton GIVENS MD  Orthopaedic Surgery  [facility]  [phone] - [place] Office  [phone] - [place] Office      Electronically signed by Maurice Pugh II, MD at 02/10/22 0741       Consult Notes (last 48 hours)  Notes from 02/09/22 0836 through 02/11/22 0836   No notes of this type exist for this encounter.       "

## 2022-02-11 NOTE — CASE MANAGEMENT/SOCIAL WORK
Discharge Planning Assessment   [facility]     Patient Name: Jesus Ovalles  MRN: [id number]  Today's Date: 2/11/2022    Admit Date: 2/10/2022     Discharge Needs Assessment     Row Name 02/11/22 1307       Living Environment    Lives With spouse    Name(s) of Who Lives With Patient Mat    Unique Family Situation At discharge will stay with her [place], [address] [id number]    Current Living Arrangements home/apartment/condo    Primary Care Provided by self    Provides Primary Care For no one    Family Caregiver if Needed child(natalie), adult; spouse    Family Caregiver Names shaye, Janneth,  spouse, Mat    Quality of Family Relationships supportive    Able to Return to Prior Arrangements yes       Resource/Environmental Concerns    Resource/Environmental Concerns none    Transportation Concerns car, none       Transition Planning    Patient/Family Anticipates Transition to home with family    Patient/Family Anticipated Services at Transition home health care    Transportation Anticipated family or friend will provide       Discharge Needs Assessment    Readmission Within the Last 30 Days no previous admission in last 30 days    Equipment Currently Used at Home none    Concerns to be Addressed discharge planning    Anticipated Changes Related to Illness none    Equipment Needed After Discharge walker, rolling    Outpatient/Agency/Support Group Needs homecare agency    Discharge Facility/Level of Care Needs home with home health    Provided Post Acute Provider List? Yes    Post Acute Provider List Home Health; Inpatient Rehab    Delivered To Patient; Support Person    Method of Delivery In person               Discharge Plan     Row Name 02/11/22 1309       Plan    Plan Anticipate home with family Surgery 02/111/2022    Plan Comments Spoke with patient at bedside,  Confirmed pcp and pharmacy.  Anticipate Home health at discharge.  Given list of rehab agencies.  Patient and family selected  [facility], pending surgery and clinical course.  Surgery 02/11/2022              Continued Care and Services - Admitted Since 2/10/2022    Coordination has not been started for this encounter.          Demographic Summary     Row Name 02/11/22 1308       General Information    Admission Type inpatient    Arrived From emergency department    Referral Source admission list    Reason for Consult discharge planning    Preferred Language English               Functional Status     Row Name 02/11/22 130       Functional Status    Usual Activity Tolerance good    Current Activity Tolerance poor       Functional Status, IADL    Medications independent    Meal Preparation independent    Housekeeping independent    Laundry independent    Shopping independent       Mental Status Summary    Recent Changes in Mental Status/Cognitive Functioning no changes                           Flor Wolfe RN   Met with patient in room wearing PPE: mask, face shield/goggles, gloves, gown.      Maintained distance greater than six feet and spent less than 15 minutes in the room.

## 2022-02-11 NOTE — PLAN OF CARE
Goal Outcome Evaluation:              Outcome Summary: pt remains very painful on PCA pump however has been falling to sleep frequently, she believes its when her leg jerks that she gets more painful. Pt will have surgery today with Dr. Pugh. He has seen the pt and explained the surgery to her and her spouse.

## 2022-02-11 NOTE — PROGRESS NOTES
[facility] Emergency Medicine Services Daily Progress Note    Patient Name: Jesus Ovalles  : 1975  MRN: [id number]  Primary Care Physician:  Aminta Quezada PA-C  Date of admission: 2/10/2022      Subjective      Chief Complaint: Right leg pain      Patient Reports   2022: Patient complains of uncontrolled right leg pain.  She is waiting on surgery.  The PCA pump dosage was adjusted for improved pain control.    ROS   12 point review of systems was reviewed and was negative except right leg pain.      Objective      Vitals:   Temp:  [97.7 °F (36.5 °C)-98.9 °F (37.2 °C)] 98.9 °F (37.2 °C)  Heart Rate:  [58-85] 80  Resp:  [12-22] 22  BP: ()/(44-88) 100/61  Flow (L/min):  [1] 1    Physical Exam   Vital signs and nurses notes reviewed.  Well-nourished female in no acute distress sitting up in bed awake and alert; mucous membranes moist; sclerae anicteric; lungs clear to auscultation bilaterally; CV regular rate and rhythm; abdomen soft nontender; left lower extremity with no edema, cyanosis or calf tenderness; right lower extremity with splint in place and capillary refill less than 2 seconds distally.  No Jonas catheter.       Result Review    Result Review:  I have personally reviewed the results from the time of this admission to 2022 15:51 EST and agree with these findings:  [x]  Laboratory  []  Microbiology  [x]  Radiology  [x]  EKG/Telemetry   [x]  Cardiology/Vascular   []  Pathology  []  Old records  []  Other:  Most notable findings are discussed in the assessment and plan.          Assessment/Plan      Brief Patient Summary:  Jesus Ovalles is a 46 y.o. female with a past medical history of asthma, bipolar, COPD, chronic pain, depression, anxiety, hyperlipidemia, PTSD, and tobacco abuse who presented to [facility] on 2/10/2022 due to a fall.  Patient reports this morning she walked outside to smoke a cigarette and fell on the ice landing on her right leg.  She  "explains she felt and heard a \"pop.\"  Xray right ankle/tib/fib showed comminuted displaced oblique fractures of the distal tibia and fibula shafts. The tibia fracture extends to the anterior articular surface and medial malleolus.   Dr. Pugh was consulted and plan was for surgical repair 2/11/2022.      Inpatient medications include:  clindamycin, 900 mg, Intravenous, Q8H  nicotine, 1 patch, Transdermal, Q24H  sodium chloride, 10 mL, Intravenous, Q12H       HYDROmorphone,   sodium chloride, 100 mL/hr, Last Rate: 100 mL/hr (02/11/22 0043)         Active Hospital Problems:  Active Hospital Problems    Diagnosis    • **Type I or II open fracture of right tibia and fibula    • EMMA (generalized anxiety disorder)    • Bipolar 1 disorder (HCC)    • PTSD (post-traumatic stress disorder)    • HLD (hyperlipidemia)    • Asthma    • Chronic obstructive pulmonary disease (HCC)    • Chronic pain    • Tobacco abuse    • Depression      Plan:   Acute open right tib/fib fracture  -Plan is for repair today  -Dilaudid PCA increased for improved pain control     HLD  -Continue statin     Bipolar with PTSD  -Patient reports she is no longer on any medication for this     Asthma/COPD  -Not in exacerbation  -Currently on room air  -Continue home breathing treatments     Chronic pain  -Continue Flexeril     Tobacco abuse  - Encourage cessation     DVT prophylaxis:  Mechanical DVT prophylaxis orders are present.    CODE STATUS:    Level Of Support Discussed With: Patient  Code Status (Patient has no pulse and is not breathing): CPR (Attempt to Resuscitate)  Medical Interventions (Patient has pulse or is breathing): Full Support      Disposition:  I expect patient to be discharged days after recovering from surgery.    This patient has been examined wearing appropriate Personal Protective Equipment and discussed with hospital infection control department. 02/11/22      Electronically signed by Chrissie Griffith MD, 02/11/22, 15:51 " EST.  [facility] Hospitalist Team

## 2022-02-11 NOTE — ANESTHESIA PREPROCEDURE EVALUATION
Anesthesia Evaluation     Patient summary reviewed and Nursing notes reviewed   NPO Solid Status: > 8 hours  NPO Liquid Status: > 8 hours           Airway   Dental      Pulmonary    (+) COPD, asthma,  Cardiovascular     ECG reviewed    (+) hypertension, hyperlipidemia,       Neuro/Psych  (+) psychiatric history Bipolar, Depression and PTSD,    GI/Hepatic/Renal/Endo - negative ROS     Musculoskeletal     Abdominal    Substance History - negative use     OB/GYN negative ob/gyn ROS         Other   arthritis,      ROS/Med Hx Other: Jesus Ovalles is a 46 y.o. female with a past medical history of asthma, bipolar, COPD, chronic pain, depression, anxiety, hyperlipidemia, PTSD, and tobacco abuse who presented to [facility] on 2/10/2022 due to a fall.                  Anesthesia Plan    ASA 2     general with block     intravenous induction     Anesthetic plan, all risks, benefits, and alternatives have been provided, discussed and informed consent has been obtained with: patient.        CODE STATUS:    Level Of Support Discussed With: Patient  Code Status (Patient has no pulse and is not breathing): CPR (Attempt to Resuscitate)  Medical Interventions (Patient has pulse or is breathing): Full Support

## 2022-02-11 NOTE — ED NOTES
Pt transferred to inpatient hospital bed. Spoke with Dr. Angeles on the phone about pt's pain and nausea, pt to be placed on a PCA pump for pain and phenergan suppository. No more bloody drainage seen on pads underneath pt;s leg      Vidya Rizvi, RN  02/10/22 7220

## 2022-02-11 NOTE — CONSULTS
Orthopaedic Surgery  Consult Note  Dr. NADINE Venegas [facility]  [phone]    HPI:  Patient is a 46 y.o. Not  or  female who presents with severe right leg pain after a fall yesterday.  The patient presented to the emergency room where x-rays demonstrated a comminuted and open right tibia and fibula fracture.  A CT scan was performed that showed a tibial shaft fracture, tibial pilon fracture, and displaced distal fibular fracture.  There was also a displaced proximal tibial fracture.  Patient was placed on Ancef in the emergency room.  The on-call physician did not feel comfortable managing this injury and asked me to take over.  I agreed but with the stipulation that I would not be able to take care of the injury yesterday and the patient would have to wait until today.  The hospital, patient, and everyone were aware that I was not available yesterday and we are planning on surgery later today.  She complains of sharp pains in the leg worse with any activity.  She is a smoker and has a history of bipolar and asthma.    MEDICAL HISTORY  Past Medical History:   Diagnosis Date   • Asthma    • Bipolar affective (HCC)    • COPD (chronic obstructive pulmonary disease) (HCC)    • DDD (degenerative disc disease), cervical    • Hypertension    • PTSD (post-traumatic stress disorder)    ·   Past Surgical History:   Procedure Laterality Date   • CARPAL TUNNEL RELEASE     • DILATATION AND CURETTAGE     • HYSTERECTOMY     ·   Prior to Admission medications    Medication Sig Start Date End Date Taking? Authorizing Provider   methocarbamol (ROBAXIN) 750 MG tablet Take 750 mg by mouth 3 (Three) Times a Day.   Yes Provider, MD Jacob   ·   Allergies   Allergen Reactions   • Oxycodone Shortness Of Breath and Itching   • Penicillins Anaphylaxis and Swelling   • Percocet [Oxycodone-Acetaminophen] Anaphylaxis   • Codeine Hives and Itching   • Buprenorphine Rash   ·   ·   · There is no immunization history on file for  "this patient.  Social History     Tobacco Use   • Smoking status: Current Every Day Smoker     Packs/day: 1.00     Types: Cigarettes   • Smokeless tobacco: Never Used   Substance Use Topics   • Alcohol use: No   ·    Social History     Substance and Sexual Activity   Drug Use No   ·     VITALS: /61 (BP Location: Right arm, Patient Position: Lying)   Pulse 80   Temp 98.9 °F (37.2 °C) (Oral)   Resp 22   Ht 165.1 cm (65\")   Wt 57.6 kg (126 lb 14.4 oz)   SpO2 100%   BMI 21.12 kg/m²  Body mass index is 21.12 kg/m².    PHYSICAL EXAM:   · CONSTITUTIONAL: No acute distress  · LUNGS: Equal chest rise, no shortness of air  · CARDIOVASCULAR: palpable peripheral pulses  · SKIN: no skin lesions in the area examined  · LYMPH: no lymphadenopathy in the area examined  · EXTREMITY: Right Lower Extremity  · Tenderness to Palpation: Tenderness to palpation at the Shin  · Gross Deformity: The leg is splinted and bandaged.  There is some blood noted distally although no active bleeding can be noted  · Pulses:  Brisk Capillary Refill  · Sensation: She claims sensation to the first three toes which are accessible  · Motor: Unable to assess    RADIOLOGY REVIEW:   XR Tibia Fibula 2 View Right    Result Date: 2/10/2022  Fractures of the tibia and fibula as discussed. No dislocation    Electronically Signed ByStephan Hurtado On:2/10/2022 7:09 AM This report was finalized on [id number] by  Sameer Hurtado .    XR Ankle 3+ View Right    Result Date: 2/10/2022  Fractures of the tibia and fibula as discussed. No dislocation    Electronically Signed ByStephan Hurtado On:2/10/2022 7:09 AM This report was finalized on [id number] by  Sameer Hurtado .    XR Chest 1 View    Result Date: 2/10/2022   Negative chest x-ray. No evidence of acute cardiopulmonary disease.  Electronically Signed ByStephan Hurtado On:2/10/2022 7:10 AM This report was finalized on [id number] by  Sameer Hurtado .    CT Lower Extremity Right Without Contrast    Result " Date: 2/10/2022  1.Complex comminuted fracture of the mid to distal right tibia. There are multiple fracture fragments at the distal diaphysis with mild displacement. Multiple longitudinal fracture lines also extend through the more distal portion of the tibia extending through the distal metaphysis and epiphysis. There are nondisplaced fracture lines which extend through the articular surface at the anterior lateral aspect of the distal tibia. There is also a nondisplaced fracture line which extends through the medial malleolus. 2.Comminuted fractures are also noted involving the proximal and distal diaphyses of the fibula with associated mild displacement. 3.There is hemorrhage throughout the margins of the fracture involving the distal tibia as well as within the surrounding soft tissues. There is also evidence for soft tissue trauma at the anteromedial aspect of the distal leg. Air or gas is seen throughout the soft tissues of the mid distal leg related to the trauma. Electronically Signed: Mandeep Foreman MD 2/10/2022 9:13 EST      LABS:   Results for the past 24 hours:   Recent Results (from the past 24 hour(s))   Basic Metabolic Panel    Collection Time: 02/11/22  3:02 AM    Specimen: Blood   Result Value Ref Range    Glucose 134 (H) 65 - 99 mg/dL    BUN 7 6 - 20 mg/dL    Creatinine 0.72 0.57 - 1.00 mg/dL    Sodium 137 136 - 145 mmol/L    Potassium 4.2 3.5 - 5.2 mmol/L    Chloride 104 98 - 107 mmol/L    CO2 27.0 22.0 - 29.0 mmol/L    Calcium 8.5 (L) 8.6 - 10.5 mg/dL    eGFR Non African Amer 87 >60 mL/min/1.73    BUN/Creatinine Ratio 9.7 7.0 - 25.0    Anion Gap 6.0 5.0 - 15.0 mmol/L   CBC Auto Differential    Collection Time: 02/11/22  3:02 AM    Specimen: Blood   Result Value Ref Range    WBC 4.20 3.40 - 10.80 10*3/mm3    RBC 3.37 (L) 3.77 - 5.28 10*6/mm3    Hemoglobin 10.6 (L) 12.0 - 15.9 g/dL    Hematocrit 30.8 (L) 34.0 - 46.6 %    MCV 91.3 79.0 - 97.0 fL    MCH 31.4 26.6 - 33.0 pg    MCHC 34.4 31.5 - 35.7  g/dL    RDW 12.8 12.3 - 15.4 %    RDW-SD 41.1 37.0 - 54.0 fl    MPV 7.5 6.0 - 12.0 fL    Platelets 162 140 - 450 10*3/mm3    Neutrophil % 64.7 42.7 - 76.0 %    Lymphocyte % 24.6 19.6 - 45.3 %    Monocyte % 9.8 5.0 - 12.0 %    Eosinophil % 0.3 0.3 - 6.2 %    Basophil % 0.6 0.0 - 1.5 %    Neutrophils, Absolute 2.70 1.70 - 7.00 10*3/mm3    Lymphocytes, Absolute 1.00 0.70 - 3.10 10*3/mm3    Monocytes, Absolute 0.40 0.10 - 0.90 10*3/mm3    Eosinophils, Absolute 0.00 0.00 - 0.40 10*3/mm3    Basophils, Absolute 0.00 0.00 - 0.20 10*3/mm3   Basic Metabolic Panel    Collection Time: 02/11/22  6:05 AM    Specimen: Blood   Result Value Ref Range    Glucose 104 (H) 65 - 99 mg/dL    BUN 7 6 - 20 mg/dL    Creatinine 0.78 0.57 - 1.00 mg/dL    Sodium 137 136 - 145 mmol/L    Potassium 3.7 3.5 - 5.2 mmol/L    Chloride 103 98 - 107 mmol/L    CO2 27.0 22.0 - 29.0 mmol/L    Calcium 8.5 (L) 8.6 - 10.5 mg/dL    eGFR Non African Amer 80 >60 mL/min/1.73    BUN/Creatinine Ratio 9.0 7.0 - 25.0    Anion Gap 7.0 5.0 - 15.0 mmol/L   CBC Auto Differential    Collection Time: 02/11/22  6:05 AM    Specimen: Blood   Result Value Ref Range    WBC 4.10 3.40 - 10.80 10*3/mm3    RBC 3.20 (L) 3.77 - 5.28 10*6/mm3    Hemoglobin 10.1 (L) 12.0 - 15.9 g/dL    Hematocrit 29.4 (L) 34.0 - 46.6 %    MCV 91.8 79.0 - 97.0 fL    MCH 31.6 26.6 - 33.0 pg    MCHC 34.4 31.5 - 35.7 g/dL    RDW 12.7 12.3 - 15.4 %    RDW-SD 40.7 37.0 - 54.0 fl    MPV 8.0 6.0 - 12.0 fL    Platelets 153 140 - 450 10*3/mm3    Neutrophil % 59.8 42.7 - 76.0 %    Lymphocyte % 30.0 19.6 - 45.3 %    Monocyte % 9.0 5.0 - 12.0 %    Eosinophil % 0.7 0.3 - 6.2 %    Basophil % 0.5 0.0 - 1.5 %    Neutrophils, Absolute 2.40 1.70 - 7.00 10*3/mm3    Lymphocytes, Absolute 1.20 0.70 - 3.10 10*3/mm3    Monocytes, Absolute 0.40 0.10 - 0.90 10*3/mm3    Eosinophils, Absolute 0.00 0.00 - 0.40 10*3/mm3    Basophils, Absolute 0.00 0.00 - 0.20 10*3/mm3    nRBC 0.1 0.0 - 0.2 /100 WBC   MRSA Screen, PCR (Inpatient) -  "Swab, Nares    Collection Time: 02/11/22  8:24 AM    Specimen: Nares; Swab   Result Value Ref Range    MRSA PCR No MRSA Detected No MRSA Detected   Hemoglobin & Hematocrit, Blood    Collection Time: 02/11/22 12:33 PM    Specimen: Blood   Result Value Ref Range    Hemoglobin 10.9 (L) 12.0 - 15.9 g/dL    Hematocrit 31.8 (L) 34.0 - 46.6 %       IMPRESSION:  Patient is a 46 y.o. Not  or  female with right open tibia fracture, right tibial shaft fracture, right tibial pilon fracture, right ankle fracture    PLAN:   · Admited to: Chrissie Griffith MD  · Diet: NPO Now  · Weight Bearing:Right Lower Extremity Non Weight Bearing  · Labs: None additional needed  · Imaging: None additional needed  · Surgery: Irrigation and debridement of right open tibia fracture, open reduction internal fixation of right tibial pilon fracture with open reduction and internal fixation of the ankle, and intramedullary nailing of the right tibia  · Consent: The risks and benefits of operative versus nonoperative treatment were discussed.  The patient elected to undergo operative treatment of their injury.  The risks discussed included but were not limited to malunion, nonunion,, hardware prominence,, loss of range of motion, stiffness, and blood clots, MI, stroke, other medical complications, infection, damage to neurovascular structures, the need for further procedures, and risk of anesthesia..  No guarantees were made   · Disposition: Surgery today on the right leg    R \"Jarett\" Keaton GIVENS MD  Orthopaedic Surgery  [facility]  [phone] - [place] Office  [phone] - [place] Office            "

## 2022-02-11 NOTE — PLAN OF CARE
OT orders received. Pt with right open tib/fib fracture and plans for sx today. Will f/u next service date.    Bebe Delgado, OTD, OTR

## 2022-02-11 NOTE — PLAN OF CARE
Goal Outcome Evaluation:      Patient admitted from ED with right open tib/fib fracture. Patient on dilaudid PCA pump and still having severe pain and muscle spasms. Patient to have surgery today with Dr. Pugh. Will continue to monitor.

## 2022-02-11 NOTE — SIGNIFICANT NOTE
02/11/22 0819   OTHER   Discipline physical therapist   Rehab Time/Intention   Session Not Performed other (see comments)  (Pt with sx planned for today, PT to follow up tomorrow if appropriate)   Recommendation   PT - Next Appointment 02/12/22

## 2022-02-12 LAB
HCT VFR BLD AUTO: 28.8 % (ref 34–46.6)
HGB BLD-MCNC: 10.1 G/DL (ref 12–15.9)

## 2022-02-12 PROCEDURE — 85018 HEMOGLOBIN: CPT | Performed by: ORTHOPAEDIC SURGERY

## 2022-02-12 PROCEDURE — 85014 HEMATOCRIT: CPT | Performed by: ORTHOPAEDIC SURGERY

## 2022-02-12 PROCEDURE — 94799 UNLISTED PULMONARY SVC/PX: CPT

## 2022-02-12 PROCEDURE — 97530 THERAPEUTIC ACTIVITIES: CPT

## 2022-02-12 PROCEDURE — 94640 AIRWAY INHALATION TREATMENT: CPT

## 2022-02-12 PROCEDURE — 97162 PT EVAL MOD COMPLEX 30 MIN: CPT

## 2022-02-12 PROCEDURE — 99232 SBSQ HOSP IP/OBS MODERATE 35: CPT | Performed by: HOSPITALIST

## 2022-02-12 RX ADMIN — IPRATROPIUM BROMIDE AND ALBUTEROL SULFATE 3 ML: 2.5; .5 SOLUTION RESPIRATORY (INHALATION) at 11:47

## 2022-02-12 RX ADMIN — HYDROCODONE BITARTRATE AND ACETAMINOPHEN 2 TABLET: 7.5; 325 TABLET ORAL at 01:12

## 2022-02-12 RX ADMIN — NICOTINE 1 PATCH: 21 PATCH, EXTENDED RELEASE TRANSDERMAL at 08:31

## 2022-02-12 RX ADMIN — IPRATROPIUM BROMIDE AND ALBUTEROL SULFATE 3 ML: 2.5; .5 SOLUTION RESPIRATORY (INHALATION) at 19:18

## 2022-02-12 RX ADMIN — HYDROCODONE BITARTRATE AND ACETAMINOPHEN 2 TABLET: 7.5; 325 TABLET ORAL at 21:09

## 2022-02-12 RX ADMIN — HYDROCODONE BITARTRATE AND ACETAMINOPHEN 2 TABLET: 7.5; 325 TABLET ORAL at 05:32

## 2022-02-12 RX ADMIN — SODIUM CHLORIDE, PRESERVATIVE FREE 10 ML: 5 INJECTION INTRAVENOUS at 19:49

## 2022-02-12 RX ADMIN — BUDESONIDE AND FORMOTEROL FUMARATE DIHYDRATE 2 PUFF: 160; 4.5 AEROSOL RESPIRATORY (INHALATION) at 19:18

## 2022-02-12 RX ADMIN — CLINDAMYCIN PHOSPHATE 900 MG: 900 INJECTION, SOLUTION INTRAVENOUS at 16:06

## 2022-02-12 RX ADMIN — CLINDAMYCIN PHOSPHATE 900 MG: 900 INJECTION, SOLUTION INTRAVENOUS at 01:13

## 2022-02-12 RX ADMIN — IPRATROPIUM BROMIDE AND ALBUTEROL SULFATE 3 ML: 2.5; .5 SOLUTION RESPIRATORY (INHALATION) at 15:29

## 2022-02-12 RX ADMIN — PROMETHAZINE HYDROCHLORIDE 12.5 MG: 12.5 SUPPOSITORY RECTAL at 01:13

## 2022-02-12 RX ADMIN — IPRATROPIUM BROMIDE AND ALBUTEROL SULFATE 3 ML: 2.5; .5 SOLUTION RESPIRATORY (INHALATION) at 07:50

## 2022-02-12 RX ADMIN — APIXABAN 2.5 MG: 2.5 TABLET, FILM COATED ORAL at 21:09

## 2022-02-12 RX ADMIN — HYDROCODONE BITARTRATE AND ACETAMINOPHEN 2 TABLET: 7.5; 325 TABLET ORAL at 13:29

## 2022-02-12 RX ADMIN — HYDROCODONE BITARTRATE AND ACETAMINOPHEN 2 TABLET: 7.5; 325 TABLET ORAL at 17:02

## 2022-02-12 RX ADMIN — APIXABAN 2.5 MG: 2.5 TABLET, FILM COATED ORAL at 08:32

## 2022-02-12 RX ADMIN — SODIUM CHLORIDE 100 ML/HR: 9 INJECTION, SOLUTION INTRAVENOUS at 19:46

## 2022-02-12 RX ADMIN — CLINDAMYCIN PHOSPHATE 900 MG: 900 INJECTION, SOLUTION INTRAVENOUS at 08:32

## 2022-02-12 RX ADMIN — BUDESONIDE AND FORMOTEROL FUMARATE DIHYDRATE 2 PUFF: 160; 4.5 AEROSOL RESPIRATORY (INHALATION) at 07:50

## 2022-02-12 RX ADMIN — HYDROCODONE BITARTRATE AND ACETAMINOPHEN 2 TABLET: 7.5; 325 TABLET ORAL at 09:20

## 2022-02-12 NOTE — OP NOTE
IM Nail open tibia with ORIF pilon and fibula operative Note  Dr. NADINE Pugh II  [phone]    PATIENT NAME: Jesus Ovalles  MRN: [id number]  : 1975 AGE: 46 y.o. GENDER: female  DATE OF OPERATION: 2022  PREOPERATIVE DIAGNOSIS: Right open tibial shaft fracture with ipsilateral tibial pilon fracture and fibular fracture  POSTOPERATIVE DIAGNOSIS: Same  OPERATION PERFORMED: Irrigation debridement of open right tibial shaft fracture.  Open reduction internal fixation of right tibial pilon fracture.  Open reduction internal fixation of right lateral malleolus fracture.  Intramedullary nailing of right tibial shaft fracture.  SURGEON: Maurice Pugh MD  Circulator: Lanie Forbes RN  Radiology Technologist: Susan Covington  Scrub Person: Sabrina Wan  ANESTHESIA: General  ASSISTANT: None   ESTIMATED BLOOD LOSS: 200cc  SPONGE AND NEEDLE COUNT: Correct  INDICATIONS: This patient was found to have an open tibial and fibular fracture. A discussion of operative versus nonoperative treatment was had with the patient. They elected to undergo intramedullary nailing of the fracture. The risks of surgery were discussed and included the risk of anesthesia, infection, damage to neurovascular structures, implant loosening/failure, hardware prominence, nonunion/malunion, the need for further procedures, medical complications, and others. No guarantees were made. The patient wished to proceed with surgery and a surgical consent was signed.  COMPONENTS:   · [employer] intramedullary tibial nail  · Cannulated 4.0 millimeter screws for the pilon  · Distal fibular plate for the fibula    PERTINENT FINDINGS: Open tibia fracture, pilon fracture, fibular fracture    DETAILS OF PROCEDURE:  The patient was met in the preoperative area. The site was marked. The consent and H&P were reviewed. The patient was then wheeled back to the operative suite and underwent anesthesia. The patient was secured in the supine position with  a large foam bump under the operative knee and lower leg. Excess hair about the operative hip was removed using surgical clippers. Surgical alcohol was used to thoroughly clean the entire operative extremity.    The leg was then prepped in the normal sterile fashion, which included ChloraPrep and multiple layers of sterile drapes.     There is a small puncture wound at the distal tibia and I began the case by making a larger incision at this site and washing out the open fracture.  I found the tip of the tibia that had been exposed to the air and this was curetted.  Multiple liters of saline and Betadine were utilized to thoroughly wash out the fracture.    I then turned my attention to the fibula.  In order to gain some sort of stability and regain length, I decided to fix the fibula first.  It was highly comminuted but I was able to fix a plate to it and fix it with multiple locking screws to allow for some stability.  X-ray was taken throughout to demonstrate appropriate position and fibular length.    At that point I turned my attention to the pilon fracture.  There were multiple fracture lines extending into the tibial plafond seen on the CT scan.  There was one fracture line seen on the coronal and another seen on the sagittal.  Therefore, I passed 1 screw from anterior to posterior and another screw from medial to lateral under x-ray guidance.  These were passed as distal as possible so that I will be able to get adequate purchase with the intramedullary nail.  There links were appropriately measured and x-rays demonstrated good positioning.    I then clamped the tibial fracture as best as possible, although this was quite difficult due to the comminution.  An incision was made just superior to the patella and the starting point for the suprapatellar nail was then found.  After an opening reamer, a guidewire was passed down the shaft of the tibia and the length of the nail was assessed.  Reaming was then  "performed, being very gentle not to disturb the fracture fragments had been provisionally clamped.  Afterwards, a real nail 1.5 mm narrower in diameter was passed down the tibia.     My next move was to pass 3 interlocking screws into the distal tibia through a perfect [place] technique.  X-ray was taken throughout.  I then passed to percutaneous screws proximally through the nail to help lock it.  Final x-rays were taken that demonstrated appropriate bony reduction and appropriate position of all hardware.  The wounds were all irrigated and the open wound from the open fracture was again irrigated with multiple liters of saline and Betadine.    All wounds were then closed and a sterile dressing was applied.    The patient was moved from the OR table to the bed. The patient was awoken from anesthesia and taken to the recovery room in stable condition. There were no complications and the patient tolerated the procedure well.      R \"Jarett\" Keaton GIVENS MD  Orthopaedic Surgery  [facility]  [phone]        "

## 2022-02-12 NOTE — THERAPY EVALUATION
Patient Name: Jesus Ovalles  : 1975    MRN: [id number]                              Today's Date: 2022       Admit Date: 2/10/2022    Visit Dx:     ICD-10-CM ICD-9-CM   1. Type I or II open fracture of right tibia and fibula, initial encounter  S82.201B 823.92    S82.401B      Patient Active Problem List   Diagnosis   • Asthma   • Chronic obstructive pulmonary disease (HCC)   • Chronic pain   • Depression   • EMMA (generalized anxiety disorder)   • Hx of suicide attempt   • Tobacco abuse   • Bipolar 1 disorder (HCC)   • PTSD (post-traumatic stress disorder)   • HLD (hyperlipidemia)   • Type I or II open fracture of right tibia and fibula     Past Medical History:   Diagnosis Date   • Asthma    • Bipolar affective (HCC)    • COPD (chronic obstructive pulmonary disease) ([facility])    • DDD (degenerative disc disease), cervical    • Hypertension    • PTSD (post-traumatic stress disorder)      Past Surgical History:   Procedure Laterality Date   • CARPAL TUNNEL RELEASE     • DILATATION AND CURETTAGE     • HYSTERECTOMY        General Information     Row Name 22 1232          Physical Therapy Time and Intention    Document Type evaluation  -     Mode of Treatment physical therapy  -     Row Name 22 1232          General Information    Prior Level of Function independent:; community mobility; transfer; bed mobility; ADL's; driving  Pt was independent w/ all community ambulation w/out an AD and was driving  -     Existing Precautions/Restrictions fall; weight bearing  RLE NWB  -     Row Name 22 1232          Living Environment    Lives With spouse; other (see comments)  Pt will be staying w/ her dght at d/c  -     Row Name 22 1232          Home Main Entrance    Number of Stairs, Main Entrance four  -     Stair Railings, Main Entrance none  -     Row Name 22 1232          Stairs Within Home, Primary    Number of Stairs, Within Home, Primary eight  Eight steps to shower, can  live on main floor but on shower  -     Stair Railings, Within Home, Primary railing on left side (ascending)  -     Row Name 02/12/22 1232          Cognition    Orientation Status (Cognition) oriented x 4  -     Row Name 02/12/22 1232          Safety Issues, Functional Mobility    Impairments Affecting Function (Mobility) balance; endurance/activity tolerance; pain; strength  -           User Key  (r) = Recorded By, (t) = Taken By, (c) = Cosigned By    Initials Name Provider Type     Evelin Long, PT Physical Therapist               Mobility     Row Name 02/12/22 1235          Bed Mobility    Bed Mobility supine-sit; sit-supine; scooting/bridging  -     Scooting/Bridging [place] (Bed Mobility) independent  -     Supine-Sit [place] (Bed Mobility) standby assist  -     Sit-Supine [place] (Bed Mobility) minimum assist (75% patient effort)  -     Comment (Bed Mobility) Pt required min A at RLE for supine to sit.  -     Row Name 02/12/22 1235          Bed-Chair Transfer    Bed-Chair [place] (Transfers) supervision  -     Assistive Device (Bed-Chair Transfers) walker, front-wheeled  -     Row Name 02/12/22 1235          Sit-Stand Transfer    Sit-Stand [place] (Transfers) supervision  -     Assistive Device (Sit-Stand Transfers) walker, front-wheeled  -     Row Name 02/12/22 1235          Gait/Stairs (Locomotion)    [place] Level (Gait) standby assist  -     Assistive Device (Gait) walker, front-wheeled  -     Distance in Feet (Gait) 75ft  -     [place] Level (Stairs) stand by assist  -     Assistive Device (Stairs) other (see comments)  Pt ascends/descends steps bumping up w/ mod I and good control.  -     Number of Steps (Stairs) 10  -     Ascending Technique (Stairs) bumping  -     Descending Technique (Stairs) bumping  -     Comment (Gait/Stairs) Pt demonstrates some instability completing a turn w/ her RW to sit on the commode. She was  able to correct herself and maintain NWB to her RLE. Pt demonstrates good control w/ bumping up the steps as well as on descent. She maintains NWB to her RLE. Assessed pt's ability to ambulate using b crutches, but pt demonstrates difficulty and required max A to correct LOB.  -     Row Name 02/12/22 1235          Mobility    Extremity Weight-bearing Status right lower extremity  -     Right Lower Extremity (Weight-bearing Status) non weight-bearing (NWB)  -           User Key  (r) = Recorded By, (t) = Taken By, (c) = Cosigned By    Initials Name Provider Type    Evelin Reynolds, ALMA Physical Therapist               Obj/Interventions     Row Name 02/12/22 1247          Range of Motion Comprehensive    General Range of Motion bilateral upper extremity ROM WFL  -     Comment, General Range of Motion Unable to assess RLE ankle ROM, but all others w/in normal limits.  -     Row Name 02/12/22 1247          Strength Comprehensive (MMT)    Comment, General Manual Muscle Testing (MMT) Assessment LLE 4+-5/5, RLE hip flexion 4+/5, did not assess others d/t recent sx.  -     Row Name 02/12/22 1247          Balance    Balance Assessment sitting static balance; sitting dynamic balance; standing static balance; standing dynamic balance  -     Static Sitting Balance WFL; unsupported; sitting, edge of bed  -     Dynamic Sitting Balance WFL; unsupported; sitting, edge of bed  -     Static Standing Balance WFL; supported; standing  -     Dynamic Standing Balance mild impairment; supported; standing  -     Comment, Balance Pt demonstrates ability to sit on regular commode w/out use of grab bars and w/out LOB. She demonstrates independence w/ toileting and hand hygiene w/ RW. Pt requires SBA for ambulation to/from bathroom.  -     Row Name 02/12/22 1247          Sensory Assessment (Somatosensory)    Sensory Assessment (Somatosensory) LE sensation intact  -           User Key  (r) = Recorded By, (t) = Taken  By, (c) = Cosigned By    Initials Name Provider Type    Evelin Reynolds, PT Physical Therapist               Goals/Plan     Row Name 02/12/22 1309          Bed Mobility Goal 1 (PT)    Activity/Assistive Device (Bed Mobility Goal 1, PT) bed mobility activities, all  -ALVARO     [place] Level/Cues Needed (Bed Mobility Goal 1, PT) independent  -ALVARO     Time Frame (Bed Mobility Goal 1, PT) long term goal (LTG); 2 weeks  -ALVARO     Row Name 02/12/22 1309          Transfer Goal 1 (PT)    Activity/Assistive Device (Transfer Goal 1, PT) transfers, all  -ALVARO     [place] Level/Cues Needed (Transfer Goal 1, PT) modified independence  -ALVARO     Time Frame (Transfer Goal 1, PT) long term goal (LTG); 2 weeks  -ALVARO     Row Name 02/12/22 1309          Gait Training Goal 1 (PT)    Activity/Assistive Device (Gait Training Goal 1, PT) gait (walking locomotion)  -ALVARO     [place] Level (Gait Training Goal 1, PT) modified independence  -ALVARO     Distance (Gait Training Goal 1, PT) 50ft  -ALVARO     Time Frame (Gait Training Goal 1, PT) long term goal (LTG); 2 weeks  -ALVARO     Row Name 02/12/22 1309          Patient Education Goal (PT)    Activity (Patient Education Goal, PT) Pt will be independent w/ HEP.  -ALVARO     [place]/Cues/Accuracy (Memory Goal 2, PT) demonstrates adequately  -ALVARO     Time Frame (Patient Education Goal, PT) long term goal (LTG); 2 weeks  -ALVARO           User Key  (r) = Recorded By, (t) = Taken By, (c) = Cosigned By    Initials Name Provider Type    Evelin Reynolds, PT Physical Therapist               Clinical Impression     Row Name 02/12/22 1256          Pain    Additional Documentation Pain Scale: Numbers Pre/Post-Treatment (Group)  -     Row Name 02/12/22 1256          Pain Scale: Numbers Pre/Post-Treatment    Pretreatment Pain Rating 5/10  -ALVARO     Posttreatment Pain Rating 5/10  -ALVARO     Pain Location - Side Right  -ALVARO     Pain Location hip  -ALVARO     Pain Intervention(s) Repositioned; Ambulation/increased  activity  -     Row Name 02/12/22 1256          Plan of Care Review    Plan of Care Reviewed With patient; spouse  -     Outcome Summary Pt is a 45 y/o female who presents to [facility] after falling on the ice. She is s/p irrigation and debridement of right open tibia fracture, ORIF of right tibial pilon fracture with ORIF of the ankle, and IM nailing of the right tibia on 2/11/22. At [facility] pt was independent w/ community ambulation w/out an AD and driving. Pt will be going home w/ her dght at d/c who has a bi-level home w/ tub shower on the second floor and 4 stepped entry. At this time pt requires SBA for ambulation using RW x75ft. She demonstrated some instability with use of B crutches and preferred to use a RW for mobility. Pt required max A to correct LOB when using B crutches, but was able to maintain NWB to RLE. She demonstrates ability to bump up and down 10 steps w/ good control and no LOB. Pt cued to slow pace w/ use of RW. Pt will need a RW and shower bench at d/c. Recommend home w/ 24/7 assist/supervision for a smooth transition home.  -     Row Name 02/12/22 1256          Therapy Assessment/Plan (PT)    Predicted Duration of Therapy Intervention (PT) Until d/c  -     Row Name 02/12/22 1256          Vital Signs    O2 Delivery Pre Treatment room air  -     O2 Delivery Intra Treatment room air  -     O2 Delivery Post Treatment room air  -     Row Name 02/12/22 1256          Positioning and Restraints    Pre-Treatment Position in bed  -     Post Treatment Position bed  -ALVARO     In Bed fowlers; call light within reach; encouraged to call for assist; with family/caregiver  -           User Key  (r) = Recorded By, (t) = Taken By, (c) = Cosigned By    Initials Name Provider Type    Evelin Reynolds PT Physical Therapist               Outcome Measures    No documentation.                              Physical Therapy Education                 Title: PT OT SLP Therapies (Done)     Topic: Physical  Therapy (Done)     Point: Mobility training (Done)     Learning Progress Summary           Patient Acceptance, E,TB, VU by  at 2/12/2022 1311                   Point: Home exercise program (Done)     Learning Progress Summary           Patient Acceptance, E,TB, VU by  at 2/12/2022 1311                   Point: Body mechanics (Done)     Learning Progress Summary           Patient Acceptance, E,TB, VU by  at 2/12/2022 1311                   Point: Precautions (Done)     Learning Progress Summary           Patient Acceptance, E,TB, VU by  at 2/12/2022 1311                               User Key     Initials Effective Dates Name Provider Type Discipline     08/23/21 -  Evelin Long, PT Physical Therapist PT              PT Recommendation and Plan  Planned Therapy Interventions (PT): balance training, bed mobility training, gait training, home exercise program, motor coordination training, neuromuscular re-education, patient/family education, stair training, strengthening, ROM (range of motion), transfer training  Plan of Care Reviewed With: patient, spouse  Outcome Summary: Pt is a 45 y/o female who presents to [facility] after falling on the ice. She is s/p irrigation and debridement of right open tibia fracture, ORIF of right tibial pilon fracture with ORIF of the ankle, and IM nailing of the right tibia on 2/11/22. At [facility] pt was independent w/ community ambulation w/out an AD and driving. Pt will be going home w/ her dght at [facility] who has a bi-level home w/ tub shower on the second floor and 4 stepped entry. At this time pt requires SBA for ambulation using RW x75ft. She demonstrated some instability with use of B crutches and preferred to use a RW for mobility. Pt required max A to correct LOB when using B crutches, but was able to maintain NWB to RLE. She demonstrates ability to bump up and down 10 steps w/ good control and no LOB. Pt cued to slow pace w/ use of RW. Pt will need a RW and shower bench at [facility].  Recommend home w/ 24/7 assist/supervision for a smooth transition home.     Time Calculation:    PT Charges     Row Name 02/12/22 1312             Time Calculation    Start Time 0947  -ALVARO      Stop Time 1023  -ALVARO      Time Calculation (min) 36 min  -ALVARO      PT Received On 02/12/22  -ALVARO      PT - Next Appointment 02/14/22  -ALVARO      PT Goal Re-Cert Due Date 02/26/22  -ALVARO              Time Calculation- PT    Total Timed Code Minutes- PT 12 minute(s)  -ALVARO            User Key  (r) = Recorded By, (t) = Taken By, (c) = Cosigned By    Initials Name Provider Type    ALVARO Evelin Long, PT Physical Therapist              Therapy Charges for Today     Code Description Service Date Service Provider Modifiers Qty    47186184590  PT EVAL MOD COMPLEXITY 4 2/12/2022 Evelin Long, PT GP 1    [id number]  PT THERAPEUTIC ACT EA 15 MIN 2/12/2022 Evelin Long, PT GP 1               Evelin Long PT  2/12/2022

## 2022-02-12 NOTE — PLAN OF CARE
Goal Outcome Evaluation:    Outcome Summary: Pt is a 47 y/o female who presents to [facility] after falling on the ice. She is s/p irrigation and debridement of right open tibia fracture, ORIF of right tibial pilon fracture with ORIF of the ankle, and IM nailing of the right tibia on 2/11/22. At [facility] pt was independent w/ community ambulation w/out an AD and driving. Pt will be going home w/ her dght at d/c who has a bi-level home w/ shower on the second floor and 4 stepped entry. At this time pt requires SBA for ambulation using RW x75ft. She demonstrated some instability with use of B crutches and preferred to use a RW for mobility. Pt required max A to correct LOB when using B crutches, but was able to maintain NWB to RLE. She demonstrates ability to bump up and down 10 steps w/ good control and no LOB. Pt cued to slow pace w/ use of RW. Pt will need a RW at d/c. Recommend home w/ 24/7 assist/supervision for a smooth transition home.

## 2022-02-12 NOTE — PLAN OF CARE
Goal Outcome Evaluation:         Patient alert and able to make needs known, pleasant and cooperative with care. Pain treated with PRN pain medication. Anticipating discharge to home tomorrow. Will continue to monitor.

## 2022-02-12 NOTE — ANESTHESIA PROCEDURE NOTES
Airway  Urgency: elective    Date/Time: 2/11/2022 8:15 PM  Airway not difficult    General Information and Staff    Patient location during procedure: OR  Anesthesiologist: John Osborne MD    Indications and Patient Condition  Indications for airway management: airway protection    Preoxygenated: yes  Mask difficulty assessment: 0 - not attempted    Final Airway Details  Final airway type: supraglottic airway      Successful airway: LMA  Size 4    Number of attempts at approach: 1  Assessment: lips, teeth, and gum same as pre-op and atraumatic intubation

## 2022-02-12 NOTE — PLAN OF CARE
Goal Outcome Evaluation:      Resting abed. Spouse at bedside. Dsg and ace to rle dry and intact. No s/s of distress. O2 sats mid to hi 90s. Call light in reach.

## 2022-02-12 NOTE — ANESTHESIA POSTPROCEDURE EVALUATION
Patient: Jesus Ovalles    Procedure Summary     Date: 02/11/22 Room / Location: [facility] OR 06 / [facility] MAIN OR    Anesthesia Start: 2010 Anesthesia Stop: 2219    Procedures:       TIBIA INTRAMEDULLARY NAIL/LAURI INSERTION WITH IRRIGATION AND DEBRIDEMENT (Right Leg Lower)      ANKLE OPEN REDUCTION INTERNAL FIXATION (Right Ankle) Diagnosis:       Type I or II open fracture of right tibia and fibula, initial encounter      (Type I or II open fracture of right tibia and fibula, initial encounter [S82.201B, S82.401B])    Surgeons: Maurice Pugh II, MD Provider: John Osborne MD    Anesthesia Type: general with block ASA Status: 2          Anesthesia Type: general with block    Vitals  Vitals Value Taken Time   /61 02/11/22 2220   Temp 98.5 °F (36.9 °C) 02/11/22 2218   Pulse 83 02/11/22 2221   Resp 15 02/11/22 2218   SpO2 98 % 02/11/22 2221   Vitals shown include unvalidated device data.        Post Anesthesia Care and Evaluation    Patient location during evaluation: PACU  Patient participation: complete - patient participated  Level of consciousness: sleepy but conscious and responsive to verbal stimuli  Pain score: 0  Pain management: adequate  Airway patency: patent  Anesthetic complications: No anesthetic complications  PONV Status: none  Cardiovascular status: acceptable  Respiratory status: acceptable  Hydration status: acceptable

## 2022-02-12 NOTE — PROGRESS NOTES
"Patient is postop day 1 right open tibia fracture irrigation and debridement with open reduction and internal fixation of right pilon fracture, open reduction internal fixation of right fibula, and intramedullary nailing of right tibia.  She is in a splint postoperatively.  The splint is to remain in place for 3 weeks.  I want to see her back in 3 weeks on March 4 in the office to remove the splint and check her wounds.    R \"Jarett\" Keaton GIVENS MD  Orthopaedic Surgery  [facility]  [phone] - [place] Office  [phone] - [place] Office    "

## 2022-02-12 NOTE — PROGRESS NOTES
[facility] Medicine Services Daily Progress Note    Patient Name: Jesus Ovalles  : 1975  MRN: [id number]  Primary Care Physician:  Aminta Quezada PA-C  Date of admission: 2/10/2022      Subjective      Chief Complaint: Right leg pain      Patient Reports   2022: Patient complains of uncontrolled right leg pain.  She is waiting on surgery.  The PCA pump dosage was adjusted for improved pain control.  10/11/2022: Pt reports adequate pain control. No current c/o.    ROS   12 point review of systems was reviewed and was negative.      Objective      Vitals:   Temp:  [98.1 °F (36.7 °C)-99.1 °F (37.3 °C)] 98.1 °F (36.7 °C)  Heart Rate:  [65-91] 86  Resp:  [11-18] 16  BP: ()/(59-76) 106/65  Flow (L/min):  [1-3] 3    Physical Exam   Vital signs and nurses notes reviewed.  Well-nourished female in no acute distress sitting up in bed awake and alert; mucous membranes moist; sclerae anicteric; lungs clear to auscultation bilaterally; CV regular rate and rhythm; abdomen soft nontender; left lower extremity with no edema, cyanosis or calf tenderness; right lower extremity with splint in place and capillary refill less than 2 seconds distally.  No Jonas catheter. Exam unchanged from 2022.       Result Review    Result Review:  I have personally reviewed the results from the time of this admission to 2022 13:04 EST and agree with these findings:  [x]  Laboratory  []  Microbiology  [x]  Radiology  [x]  EKG/Telemetry   [x]  Cardiology/Vascular   []  Pathology  []  Old records  []  Other:  Most notable findings are discussed in the assessment and plan.    Wounds (last 24 hours)     LDA Wound     Row Name 22 [id number] 22 0342 22 2340       Wound 22 Right anterior ankle Incision    Wound - Properties Group Placement Date: 22  -MK Placement Time:   - Present on Hospital Admission: N  -MK, BILATERAL INCISIONS ON RIGHT ANKLE  Side: Right  -MK  Orientation: anterior  -MK Location: ankle  -MK Primary Wound Type: Incision  -MK    Dressing Appearance intact; dry  -SS dry; intact  -LS dry; intact; no drainage  -LS    Closure PHILOMENA  -SS PHILOMENA  -LS PHILOMENA  -LS    Retired Wound - Properties Group Date first assessed: 02/11/22 - Time first assessed: 2027 -MK Present on Hospital Admission: N  -MK, BILATERAL INCISIONS ON RIGHT ANKLE  Side: Right  -MK Location: ankle  -MK Primary Wound Type: Incision  -MK       Wound 02/11/22 2100 Right distal thigh Incision    Wound - Properties Group Placement Date: 02/11/22 - Placement Time: 2100 -MK Present on Hospital Admission: N  -MK Side: Right  -MK Orientation: distal  -MK Location: thigh  -MK Primary Wound Type: Incision  -MK    Dressing Appearance dry; intact  -SS dry; intact  -LS dry; intact; no drainage  -LS    Closure PHILOMENA  -SS PHILOMENA  -LS PHILOMENA  -LS    Retired Wound - Properties Group Date first assessed: 02/11/22 - Time first assessed: 2100 -MK Present on Hospital Admission: N  -MK Side: Right  -MK Location: thigh  -MK Primary Wound Type: Incision  -MK    Row Name 02/11/22 2303 02/11/22 2248 02/11/22 2233       Wound 02/11/22 2027 Right anterior ankle Incision    Wound - Properties Group Placement Date: 02/11/22 - Placement Time: 2027 -MK Present on Hospital Admission: N  -MK, BILATERAL INCISIONS ON RIGHT ANKLE  Side: Right  -MK Orientation: anterior  -MK Location: ankle  -MK Primary Wound Type: Incision  -MK    Dressing Appearance dry; intact; no drainage  -SD dry; intact; no drainage  -SD dry; intact; no drainage  -SD    Closure PHILOMENA  -SD PHILOMENA  -SD PHILOMENA  -SD    Retired Wound - Properties Group Date first assessed: 02/11/22 - Time first assessed: 2027 -MK Present on Hospital Admission: N  -MK, BILATERAL INCISIONS ON RIGHT ANKLE  Side: Right  -MK Location: ankle  -MK Primary Wound Type: Incision  -MK       Wound 02/11/22 2100 Right distal thigh Incision    Wound - Properties Group Placement Date: 02/11/22 -  Placement Time: 2100 -MK Present on Hospital Admission: N  -MK Side: Right  -MK Orientation: distal  -MK Location: thigh  -MK Primary Wound Type: Incision  -MK    Dressing Appearance dry; intact; no drainage  -SD dry; intact; no drainage  -SD dry; intact; no drainage  -SD    Closure PHILOMENA  -SD PHILOMENA  -SD PHILOMENA  -SD    Retired Wound - Properties Group Date first assessed: 02/11/22 - Time first assessed: 2100 -MK Present on Hospital Admission: N  -MK Side: Right  -MK Location: thigh  -MK Primary Wound Type: Incision  -MK    Row Name 02/11/22 2218 02/11/22 2157 02/11/22 2155       Wound 02/11/22 2027 Right anterior ankle Incision    Wound - Properties Group Placement Date: 02/11/22 - Placement Time: 2027 -MK Present on Hospital Admission: N  -MK, BILATERAL INCISIONS ON RIGHT ANKLE  Side: Right  -MK Orientation: anterior  -MK Location: ankle  -MK Primary Wound Type: Incision  -MK    Dressing Appearance dry; intact; no drainage  -SD -- --    Closure PHILOMENA  -SD -- --    Dressing Care -- dressing applied  XEROFORM, FLUFFS, WEBRIL, ACE, SPLINT  -MK --    Retired Wound - Properties Group Date first assessed: 02/11/22 - Time first assessed: 2027 -MK Present on Hospital Admission: N  -MK, BILATERAL INCISIONS ON RIGHT ANKLE  Side: Right  -MK Location: ankle  -MK Primary Wound Type: Incision  -MK       Wound 02/11/22 2100 Right distal thigh Incision    Wound - Properties Group Placement Date: 02/11/22 - Placement Time: 2100 -MK Present on Hospital Admission: N  -MK Side: Right  -MK Orientation: distal  -MK Location: thigh  -MK Primary Wound Type: Incision  -MK    Dressing Appearance dry; intact; no drainage  -SD -- --    Closure PHILOMENA  -SD -- --    Dressing Care -- -- dressing applied  XEROFORM, FLUFFS, WEBRIL, ACE, SPLINT  -MK    Retired Wound - Properties Group Date first assessed: 02/11/22 - Time first assessed: 2100 -MK Present on Hospital Admission: N  -MK Side: Right  -MK Location: thigh  -MK Primary Wound Type:  "Incision  -MK          User Key  (r) = Recorded By, (t) = Taken By, (c) = Cosigned By    Initials Name Provider Type    Addie Rogers, RN Registered Nurse    Lanie Ervin, RN Registered Nurse    Tish Loco, RN Registered Nurse    Sadie Arroyo RN Registered Nurse                  Assessment/Plan      Brief Patient Summary:  Jesus Ovalles is a 46 y.o. female with a past medical history of asthma, bipolar, COPD, chronic pain, depression, anxiety, hyperlipidemia, PTSD, and tobacco abuse who presented to [facility] on 2/10/2022 due to a fall.  Patient reports this morning she walked outside to smoke a cigarette and fell on the ice landing on her right leg.  She explains she felt and heard a \"pop.\"  Xray right ankle/tib/fib showed comminuted displaced oblique fractures of the distal tibia and fibula shafts. The tibia fracture extends to the anterior articular surface and medial malleolus.   Dr. Pugh was consulted and plan was for surgical repair 2/11/2022.      Inpatient medications include:  apixaban, 2.5 mg, Oral, Q12H  budesonide-formoterol, 2 puff, Inhalation, BID - RT  clindamycin, 900 mg, Intravenous, Q8H  ipratropium-albuterol, 3 mL, Nebulization, 4x Daily - RT  nicotine, 1 patch, Transdermal, Q24H  sodium chloride, 10 mL, Intravenous, Q12H       sodium chloride, 100 mL/hr, Last Rate: 100 mL/hr (02/11/22 3849)         Active Hospital Problems:  Active Hospital Problems    Diagnosis    • **Type I or II open fracture of right tibia and fibula    • EMMA (generalized anxiety disorder)    • Bipolar 1 disorder (HCC)    • PTSD (post-traumatic stress disorder)    • HLD (hyperlipidemia)    • Asthma    • Chronic obstructive pulmonary disease (HCC)    • Chronic pain    • Tobacco abuse    • Depression      Plan:   Acute open right tib/fib fracture  -Status post right open tibia fracture irrigation and debridement with open reduction and internal fixation of right pilon fracture, open reduction " internal fixation of right fibula, and intramedullary nailing of right tibia.   -Dilaudid PCA increased for improved pain control     HLD  -Continue statin     Bipolar with PTSD  -Patient reports she is no longer on any medication for this     Asthma/COPD  -Not in exacerbation  -Currently on room air  -Symbicort ordered preoperatively  -Continue home breathing treatments as needed     Chronic pain  -Continue Flexeril     Tobacco abuse  - Encourage cessation     DVT prophylaxis:  Medical and mechanical DVT prophylaxis orders are present.    CODE STATUS:    Level Of Support Discussed With: Patient  Code Status (Patient has no pulse and is not breathing): CPR (Attempt to Resuscitate)  Medical Interventions (Patient has pulse or is breathing): Full Support      Disposition:  I expect patient to be discharged 2/13/22.    This patient has been examined wearing appropriate Personal Protective Equipment and discussed with hospital infection control department. 02/12/22      Electronically signed by Chrissie Griffith MD, 02/12/22, 13:04 EST.  [facility] Team

## 2022-02-13 VITALS
RESPIRATION RATE: 16 BRPM | BODY MASS INDEX: 22.63 KG/M2 | HEIGHT: 65 IN | DIASTOLIC BLOOD PRESSURE: 62 MMHG | TEMPERATURE: 98.9 F | WEIGHT: 135.8 LBS | HEART RATE: 81 BPM | SYSTOLIC BLOOD PRESSURE: 105 MMHG | OXYGEN SATURATION: 98 %

## 2022-02-13 PROCEDURE — 97166 OT EVAL MOD COMPLEX 45 MIN: CPT

## 2022-02-13 PROCEDURE — 94799 UNLISTED PULMONARY SVC/PX: CPT

## 2022-02-13 PROCEDURE — 99238 HOSP IP/OBS DSCHRG MGMT 30/<: CPT | Performed by: HOSPITALIST

## 2022-02-13 RX ORDER — CLINDAMYCIN HYDROCHLORIDE 300 MG/1
300 CAPSULE ORAL 3 TIMES DAILY
Qty: 30 CAPSULE | Refills: 0 | Status: SHIPPED | OUTPATIENT
Start: 2022-02-13 | End: 2022-02-23

## 2022-02-13 RX ORDER — BUDESONIDE AND FORMOTEROL FUMARATE DIHYDRATE 160; 4.5 UG/1; UG/1
2 AEROSOL RESPIRATORY (INHALATION) 2 TIMES DAILY
Qty: 10.2 G | Refills: 0 | Status: SHIPPED | OUTPATIENT
Start: 2022-02-13 | End: 2022-08-30 | Stop reason: HOSPADM

## 2022-02-13 RX ORDER — CEPHALEXIN 500 MG/1
500 CAPSULE ORAL 3 TIMES DAILY
Qty: 30 CAPSULE | Refills: 0 | Status: SHIPPED | OUTPATIENT
Start: 2022-02-13 | End: 2022-02-13 | Stop reason: HOSPADM

## 2022-02-13 RX ORDER — NICOTINE 21 MG/24HR
1 PATCH, TRANSDERMAL 24 HOURS TRANSDERMAL
Qty: 30 PATCH | Refills: 0 | Status: SHIPPED | OUTPATIENT
Start: 2022-02-14 | End: 2022-08-30 | Stop reason: HOSPADM

## 2022-02-13 RX ORDER — HYDROCODONE BITARTRATE AND ACETAMINOPHEN 7.5; 325 MG/1; MG/1
1 TABLET ORAL EVERY 4 HOURS PRN
Qty: 18 TABLET | Refills: 0 | Status: ON HOLD | OUTPATIENT
Start: 2022-02-13 | End: 2022-02-19

## 2022-02-13 RX ORDER — ONDANSETRON 4 MG/1
4 TABLET, ORALLY DISINTEGRATING ORAL EVERY 8 HOURS PRN
Qty: 12 TABLET | Refills: 0 | Status: SHIPPED | OUTPATIENT
Start: 2022-02-13 | End: 2022-08-30 | Stop reason: HOSPADM

## 2022-02-13 RX ORDER — ALBUTEROL SULFATE 90 UG/1
2 AEROSOL, METERED RESPIRATORY (INHALATION) EVERY 4 HOURS PRN
Qty: 18 G | Refills: 0 | Status: SHIPPED | OUTPATIENT
Start: 2022-02-13 | End: 2022-08-30 | Stop reason: HOSPADM

## 2022-02-13 RX ADMIN — BUDESONIDE AND FORMOTEROL FUMARATE DIHYDRATE 2 PUFF: 160; 4.5 AEROSOL RESPIRATORY (INHALATION) at 07:37

## 2022-02-13 RX ADMIN — CLINDAMYCIN PHOSPHATE 900 MG: 900 INJECTION, SOLUTION INTRAVENOUS at 08:20

## 2022-02-13 RX ADMIN — HYDROCODONE BITARTRATE AND ACETAMINOPHEN 2 TABLET: 7.5; 325 TABLET ORAL at 12:36

## 2022-02-13 RX ADMIN — HYDROCODONE BITARTRATE AND ACETAMINOPHEN 2 TABLET: 7.5; 325 TABLET ORAL at 05:26

## 2022-02-13 RX ADMIN — CLINDAMYCIN PHOSPHATE 900 MG: 900 INJECTION, SOLUTION INTRAVENOUS at 01:04

## 2022-02-13 RX ADMIN — IPRATROPIUM BROMIDE AND ALBUTEROL SULFATE 3 ML: 2.5; .5 SOLUTION RESPIRATORY (INHALATION) at 07:36

## 2022-02-13 RX ADMIN — APIXABAN 2.5 MG: 2.5 TABLET, FILM COATED ORAL at 08:20

## 2022-02-13 RX ADMIN — HYDROCODONE BITARTRATE AND ACETAMINOPHEN 2 TABLET: 7.5; 325 TABLET ORAL at 01:03

## 2022-02-13 NOTE — DISCHARGE PLACEMENT REQUEST
[facility] SURGICAL INPATIENT  [address] [facility] [facility] [place] [address]  Phone:  [phone]  Fax:  [phone] Date: 2022      Ambulatory Referral to Home Health     Patient:  Jesus Ovalles MRN:  [id number]   [address] [facility] [address] :  1975  SSN:    Phone: [phone] Sex:  F      INSURANCE PAYOR PLAN GROUP # SUBSCRIBER ID   Primary:  Secondary:    [facility] MEDICARE REPLACEMENT  [place] [id number]  [id number] [facility]    [id number]  [id number]      Referring Provider Information:  ADALID SCHULZ Phone: [phone] Fax: [phone]      Referral Information:   # Visits:  999 Referral Type: Home Health [42]   Urgency:  Routine Referral Reason: Specialty Services Required   Start Date: 2022 End Date:  To be determined by Insurer   Diagnosis: Type I or II open fracture of right tibia and fibula, initial encounter (S82.201B,S82.401B [ICD-10-CM] 823.92 [ICD-9-CM])      Refer to Dept:   Refer to Provider:   Refer to Facility:       Face to Face Visit Date: 2022  Follow-up provider for Plan of Care? I treated the patient in an acute care facility and will not continue treatment after discharge.  Follow-up provider: SHAUN SCHAFFER [[id number]]  Reason/Clinical Findings: Right tib/fib fracture  Describe mobility limitations that make leaving home difficult: taxing to leave home  Nursing/Therapeutic Services Requested: Occupational Therapy  Occupational orders: Home safety assessment  Occupational orders: Activities of daily living  Frequency: 1 Week 1     This document serves as a request of services and does not constitute Insurance authorization or approval of services.  To determine eligibility, please contact the members Insurance carrier to verify and review coverage.     If you have medical questions regarding this request for services. Please contact [facility] SURGICAL INPATIENT at [phone] during normal business hours.  "       Authorizing Provider:Chrissie Griffith MD  Authorizing Provider's NPI: [id number]  Order Entered By: Chrissie Griffith MD 2/13/2022  2:56 PM     Electronically signed by: Chrissie Griffith MD 2/13/2022  2:56 PM      Jesus Ovalles (46 y.o. Female)             Date of Birth Social Security Number Address Home Phone MRN    1975  [facility] [address]  [id number]    [employer] Marital Status             None        Admission Date Admission Type Admitting Provider Attending Provider Department, Room/Bed    2/10/22 Emergency Chrissie Griffith MD  [facility] SURGICAL INPATIENT, 4102/1    Discharge Date Discharge Disposition Discharge Destination          2/13/2022 Home or Self Care              Attending Provider: (none)   Allergies: Oxycodone, Penicillins, Percocet [Oxycodone-acetaminophen], Codeine, Buprenorphine    Isolation: None   Infection: None   Code Status: CPR   Advance Care Planning Activity    Ht: 165.1 cm (65\")   Wt: 61.6 kg (135 lb 12.9 oz)    Admission Cmt: None   Principal Problem: Type I or II open fracture of right tibia and fibula [S82.201B,S82.401B]                 Active Insurance as of 2/10/2022     Primary Coverage     Payor Plan Insurance Group Employer/Plan Group    [facility] MEDICARE REPLACEMENT [employer] MEDICARE REPLACEMENT [id number]     Payor Plan Address Payor Plan Phone Number Payor Plan Fax Number Effective Dates    [address] [address] [phone]  2/1/2022 - None Entered    [facility] [id number]       Subscriber Name Subscriber Birth Date Member ID       JESUS OVALLES 1975 [id number]           Secondary Coverage     Payor Plan Insurance Group Employer/Plan Group    [place]      Payor Plan Address Payor Plan Phone Number Payor Plan Fax Number Effective Dates    [address]   11/14/2020 - None Entered    [place] [address]       Subscriber Name Subscriber Birth Date Member ID       JESUS OVALLES ARMIN 1975 [id number]        " "         Emergency Contacts      (Rel.) Home Phone Work Phone Mobile Phone    Mat Ovalles (Spouse) -- -- [phone]    Janneth Graham (Daughter) -- -- [phone]               History & Physical      Peggy Angeles MD at 02/10/22 0709              [facility]      Patient Name: Jesus Ovalles  : 1975  MRN: [id number]  Primary Care Physician:  Aminta Quezada PA-C  Date of admission: 2/10/2022      Subjective      Chief Complaint: Fall, right leg pain    History of Present Illness: Jesus Ovalles is a 46 y.o. female with a past medical history of asthma, bipolar, COPD, chronic pain, depression, anxiety, hyperlipidemia, PTSD, and tobacco abuse who presented to Psychiatric on 2/10/2022 due to a fall.  Patient reports this morning she walked outside to smoke a cigarette and fell on the ice landing on her right leg.  She explains she felt and heard a \"pop.\"  She instantly started having right leg pain.  Her current right leg pains is a 9/10 and she describes this as sharp.  She denies any aggravating or alleviating factors of the pain.    In the ED, CXR unremarkle. Xray right ankle/tib/fib showedComminuted displaced oblique fractures of the distal tibia and fibula  shafts. The tibia fracture extends to the anterior articular surface and  medial malleolus.  All labs unremarkable upon admission.  All vital signs stable upon admission.  Patient received clindamycin, fentanyl, morphine, Zofran in the ED.  Ortho consulted.    Review of Systems   Constitutional: Negative.   HENT: Negative.    Eyes: Negative.    Cardiovascular: Negative.    Respiratory: Negative.    Skin: Negative.    Musculoskeletal:        Right leg pain    Gastrointestinal: Negative.    Genitourinary: Negative.    Neurological: Negative.    Psychiatric/Behavioral: Negative.         Personal History     Past Medical History:   Diagnosis Date   • Asthma    • Bipolar affective (HCC)  "   • COPD (chronic obstructive pulmonary disease) (HCC)    • DDD (degenerative disc disease), cervical    • Hypertension    • PTSD (post-traumatic stress disorder)        Past Surgical History:   Procedure Laterality Date   • CARPAL TUNNEL RELEASE     • DILATATION AND CURETTAGE     • HYSTERECTOMY         Family History: family history includes Heart disease in her mother. Otherwise pertinent FHx was reviewed and not pertinent to current issue.    Social History:  reports that she has been smoking cigarettes. She has been smoking about 1.00 pack per day. She has never used smokeless tobacco. She reports that she does not drink alcohol and does not use drugs.    Home Medications:  Prior to Admission Medications     Prescriptions Last Dose Informant Patient Reported? Taking?    atorvastatin (LIPITOR) 20 MG tablet  Self Yes No    Take 20 mg by mouth Daily.    budesonide-formoterol (SYMBICORT) 160-4.5 MCG/ACT inhaler   Yes No    Inhale 2 puffs 2 (Two) Times a Day.    clonazePAM (KlonoPIN) 1 MG tablet   Yes No    Take 1 mg by mouth At Night As Needed for Seizures.    cyclobenzaprine (FLEXERIL) 10 MG tablet   No No    Take 1 tablet by mouth 3 (Three) Times a Day As Needed for Muscle Spasms for up to 15 doses.    diclofenac (VOLTAREN) 50 MG EC tablet   No No    1 p.o. 3 times daily with food as needed low back pain    lamoTRIgine (LaMICtal) 100 MG tablet  Self Yes No    Take 150 mg by mouth Daily.    lamoTRIgine (LaMICtal) 25 MG tablet   No No    Take your previous dose of 150 mg at bedtime and take 2 tablets during the day    lidocaine (Lidoderm) 5 %   No No    Place 1 patch on the skin as directed by provider Daily. Remove & Discard patch within 12 hours or as directed by MD    QUEtiapine XR (SEROquel XR) 150 MG 24 hr tablet  Self Yes No    Take 150 mg by mouth Every Night.    zolpidem (AMBIEN) 10 MG tablet  Self Yes No    Take 10 mg by mouth At Night As Needed for Sleep.            Allergies:  Allergies   Allergen  Reactions   • Oxycodone Shortness Of Breath and Itching   • Penicillins Anaphylaxis and Swelling   • Percocet [Oxycodone-Acetaminophen] Anaphylaxis   • Codeine Hives and Itching   • Buprenorphine Rash       Objective      Vitals:   Temp:  [98.4 °F (36.9 °C)] 98.4 °F (36.9 °C)  Heart Rate:  [] 66  Resp:  [24] 24  BP: ()/(56-96) 104/61    Physical Exam  Vitals reviewed.   Constitutional:       Appearance: Normal appearance. She is normal weight.   HENT:      Head: Normocephalic and atraumatic.      Nose: Nose normal.      Mouth/Throat:      Mouth: Mucous membranes are moist.      Pharynx: Oropharynx is clear.   Eyes:      Extraocular Movements: Extraocular movements intact.      Conjunctiva/sclera: Conjunctivae normal.      Pupils: Pupils are equal, round, and reactive to light.   Cardiovascular:      Rate and Rhythm: Normal rate and regular rhythm.      Pulses: Normal pulses.      Heart sounds: Normal heart sounds.      Comments: S1, S2 audible  Pulmonary:      Effort: Pulmonary effort is normal.      Breath sounds: Normal breath sounds.      Comments: On room air   Abdominal:      General: Abdomen is flat. Bowel sounds are normal.      Palpations: Abdomen is soft.   Musculoskeletal:      Cervical back: Normal range of motion.      Comments: RLE in ace wrap with bloody drainage noted, immobilizer in place   Skin:     General: Skin is warm and dry.      Comments: Blood drainage noted RLE on ace wrap   Neurological:      General: No focal deficit present.      Mental Status: She is alert and oriented to person, place, and time. Mental status is at baseline.   Psychiatric:         Mood and Affect: Mood normal.         Behavior: Behavior normal.         Thought Content: Thought content normal.         Judgment: Judgment normal.         Result Review    Result Review:  I have personally reviewed the results from the time of this admission to 2/10/2022 07:46 EST and agree with these findings:  [x]   Laboratory  []  Microbiology  [x]  Radiology  []  EKG/Telemetry   []  Cardiology/Vascular   []  Pathology  []  Old records  []  Other:  Most notable findings include: Xray right ankle/tib/fib showedComminuted displaced oblique fractures of the distal tibia and fibula shafts. The tibia fracture extends to the anterior articular surface and medial malleolus.      Assessment/Plan        Active Hospital Problems:  Active Hospital Problems    Diagnosis    • **Type I or II open fracture of right tibia and fibula    • EMMA (generalized anxiety disorder)    • Bipolar 1 disorder (HCC)    • PTSD (post-traumatic stress disorder)    • HLD (hyperlipidemia)    • Asthma    • Chronic obstructive pulmonary disease (HCC)    • Chronic pain    • Tobacco abuse    • Depression      Plan:     --- Home medications not verified at time of assessment and plan---    Acute open right tib/fib fracture  - CXR reviewed  - Xray right tib/fib reviewed  - Received clindamycin, fentanyl, morphine, and Zofran in the ED  - Neurovascular checks  - Encourage incentive spirometry  - Fall risk precautions  - NPO, IV fluids ordered  - Cefazolin ordered per ortho   - Pain medication ordered  - Ortho consulted    HLD  -Continue statin    Bipolar with PTSD  -Patient reports she is no longer on any medication for this    Asthma/COPD  -Not in exacerbation  -Currently on room air  -Continue home breathing treatments    Chronic pain  -Continue Flexeril    Tobacco abuse  - Encourage cessation   - Nicotine patch ordered     DVT prophylaxis: SCD left leg     CODE STATUS:    Level Of Support Discussed With: Patient  Code Status (Patient has no pulse and is not breathing): CPR (Attempt to Resuscitate)  Medical Interventions (Patient has pulse or is breathing): Full Support    Admission Status:  I believe this patient meets Inpatient status.    I discussed the patient's findings and my recommendations with patient and family.    This patient has been examined wearing  appropriate Personal Protective Equipment. 02/10/22      Signature: Electronically signed by GUI Peñaloza, 02/10/22, 7:46 AM EST.      Hospitalist Physician Assessment/Plan: DOS 02/10/22    History: Pt is a 47yo female with PMHx as reviewed above who presents after a fall on the ice which resulted in severe right lower   extremity pain. The patient notes that she felt and heard a pop and her leg seemed to bend in one direction and then back into the opposite direction as she fell. She denies falling or landing on any foreign objects. She is tearful and in extreme pain despite attempts to treat her pain thus far. Orthopedic surgery is consulted and aware of the patient and her condition. The patient denies any history of seizure disorders and admits that she came off all her psych meds 6 months ago with the full knowledge of psychiatrist at [facility], who stated to the patient that she is doing very well off of the medications.    Exam:  General: well-developed and well-nourished, severe distress due to pain  Head: Normocephalic and atraumatic.   Eyes: EOM are normal. Pupils are equal, round, and reactive to light.   Heart: Normal rate and regular rhythm. No murmur   Chest: Effort normal and breath sounds normal. No wheezing, rales or rhonchi  Abdominal: Soft. NT/ND. Bowel sounds present  Musculoskeletal: RLE in immobilizer, ACE-wrapped with gauze underlying, strike-through bleeding noted around the right ankle area  Neurological: AAOx3, no focal deficits  Skin: Skin is warm and dry. No rash  Psychiatric: Very tearful due to physical pain of her injury, otherwise appropriate mood and affect    Medical Decision Making:  RLE Open Tib-Fib fractures  --xray reviewed and noted above  --Orthopedic surgery consulted from ED and following:    --CT ordered for surgical planning    --Surgery planned for tomorrow       --Plan for intramedullary nailing of tibia, with ORIF of pilon fracture and fibular fracture     --cover with Ancef savannah-operatively    --contacting OR/anesthesia for nerve block for better control of pain    --CT RLE: confirms x-ray findings with complex comminuted fractures of distal tib-fib and also proximal fibula, with multiple longitudinal fracture lines through the tibia.     --Hemorrhage noted throughout the margins of the fracture of distal tibia and soft tissues, air/gas seen throughout related to trauma  --Hgb 14.0 and normal coags in ED on admission    --repeat stat CBC with differential now  --Type/Screen and Cross 1 unit of blood due to active bleeding  --will continue to monitor with H&H    Bipolar disorder/PTSD  --currently on no medications, vacation holiday, as opposed to non-compliance, with full knowledge of her treating provider  --follows with [facility]  --stable at this time    Asthma/COPD  --no home medications  --no exacerbation at this time  --monitor oximetry with vitals  --breathing treatments as needed    Chronic pain  --currently on no medications  --acute pain due to above fractures managed as noted    I have reviewed and agree with the management of the patient's acute and chronic illnesses as outlined in the documentation above per the NP's assessment and plan.  I have also reviewed and completed the patient's med reconciliation with the assistance of the [facility] pharmacist.    Attending Physician Attestation    For this patient encounter, I have reviewed the mid-level provider documentation, medical decision making and treatment plan, and I have personally spent time with the patient.  All procedures were done by me, and/or all procedures were performed by the mid-level under my supervision.      Electronically signed by Peggy Angeles MD, 02/10/22, 12:45 PM EST.    Electronically signed by Peggy Angeles MD at 02/10/22 0357

## 2022-02-13 NOTE — CASE MANAGEMENT/SOCIAL WORK
Patient is going to be staying with her daughter at [address] [place] [address], [place] [address]    Pt's phone [phone]  Janneth (DTR) [phone]

## 2022-02-13 NOTE — CASE MANAGEMENT/SOCIAL WORK
Continued Stay Note   Pedro     Patient Name: Jesus Ovalles  MRN: [id number]  Today's Date: 2/13/2022    Admit Date: 2/10/2022     Discharge Plan     Row Name 02/13/22 1636       Plan    Plan Comments Unable to obtain hh ot only- must have 2 services for HH.  pt does not have any skilled nursing need and is not ready for pt yet.  cm notified dr chang who stated just to notify the patient.    Final Discharge Disposition Code 01 - home or self-care    Final Note home                    Expected Discharge Date and Time     Expected Discharge Date Expected Discharge Time    Feb 13, 2022         Phone communication or documentation only - no physical contact with patient or family.      Jeimy Puente RN

## 2022-02-13 NOTE — PLAN OF CARE
Goal Outcome Evaluation:  Plan of Care Reviewed With: patient        Progress: improving  Outcome Summary: Pt is admitted after fall on ice. She is disabled & lives w/ spouse, but plans d/c to dtr's home where there will be 24 hour supervisoin & IADL assist. She is s/p Irrigation, debridement of open right tibial shaft fracture.  Open reduction internal fixation of right tibial pilon fracture.  Open reduction internal fixation of right lateral malleolus fracture.  Intramedullary nailing of right tibial shaft fracture. She is NWB to her srgical leg & has incision over the knee as well as hard open-toe cast to below the knee. Pt will be needing to use a tub/shower which has a hand-help shower head but she will require a SC due to decreased balance w/ NWB and the need to leave casted leg outside the tub. She also would benefit from adaptive training for LBD as well as the LBB techniques needed to safely function alone in the shower. Recommend [facility] OT, RW, and SC.

## 2022-02-13 NOTE — DISCHARGE INSTR - LAB
Follow up with Dr Pugh as discussed- 3 weeks, please call Monday to make your appointment if you do not already have one  [phone]

## 2022-02-13 NOTE — THERAPY EVALUATION
Patient Name: Jesus Ovalles  : 1975    MRN: [id number]                              Today's Date: 2022       Admit Date: 2/10/2022    Visit Dx:     ICD-10-CM ICD-9-CM   1. Type I or II open fracture of right tibia and fibula, initial encounter  S82.201B 823.92    S82.401B      Patient Active Problem List   Diagnosis   • Asthma   • Chronic obstructive pulmonary disease (HCC)   • Chronic pain   • Depression   • EMMA (generalized anxiety disorder)   • Hx of suicide attempt   • Tobacco abuse   • Bipolar 1 disorder (HCC)   • PTSD (post-traumatic stress disorder)   • HLD (hyperlipidemia)   • Type I or II open fracture of right tibia and fibula     Past Medical History:   Diagnosis Date   • Asthma    • Bipolar affective (HCC)    • COPD (chronic obstructive pulmonary disease) (HCC)    • DDD (degenerative disc disease), cervical    • Hypertension    • PTSD (post-traumatic stress disorder)      Past Surgical History:   Procedure Laterality Date   • CARPAL TUNNEL RELEASE     • DILATATION AND CURETTAGE     • HYSTERECTOMY        General Information     Row Name 22          General Information    Prior Level of Function independent:; home management; driving; ADL's; community mobility  on disability due to spinal issues  -     Existing Precautions/Restrictions fall; weight bearing  RLE NWB  -     Barriers to Rehab none identified  -     Row Name 22          Living Environment    Lives With spouse  will be staying w/ dtr who works but other arrangements are made for some presence in the home during the day to be caring for the children & they will also assist pt w/ IADL/meal prep.  -     Row Name 22          Home Main Entrance    Number of Stairs, Main Entrance four  -     Stair Railings, Main Entrance none  -     Row Name 22          Stairs Within Home, Primary    Stairs, Within Home, Primary tub/shower on 2nd floor  -     Number of Stairs, Within Home, Primary  eight  -     Stair Railings, Within Home, Primary railing on left side (ascending)  -     Stairs Comment, Within Home, Primary pt practiced scoting up on bottom w/ PT to use stairs at home.  -     Row Name 02/13/22 0905          Cognition    Orientation Status (Cognition) oriented x 4  -     Row Name 02/13/22 0905          Safety Issues, Functional Mobility    Impairments Affecting Function (Mobility) endurance/activity tolerance; pain; range of motion (ROM); strength  -     Comment, Safety Issues/Impairments (Mobility) RLE has hard cast to near the knee & there are staples over the patella.  -           User Key  (r) = Recorded By, (t) = Taken By, (c) = Cosigned By    Initials Name Provider Type     Monique Carlisle OT Occupational Therapist                 Mobility/ADL's     Row Name 02/13/22 0909          Bed Mobility    Scooting/Bridging [place] (Bed Mobility) independent  -     Sit-Supine [place] (Bed Mobility) minimum assist (75% patient effort)  -     Row Name 02/13/22 0909          Transfers    Transfers sit-stand transfer  -     Sit-Stand [place] (Transfers) set up; standby assist  -     Row Name 02/13/22 0909          Sit-Stand Transfer    Assistive Device (Sit-Stand Transfers) walker, front-wheeled  -     Row Name 02/13/22 0909          Activities of Daily Living    BADL Assessment/Intervention lower body dressing  -     Row Name 02/13/22 0909          Mobility    Extremity Weight-bearing Status right lower extremity  -     Right Lower Extremity (Weight-bearing Status) non weight-bearing (NWB)  there were fractures in 3 areas of the ankle & it is not safe to bear weight.  -     Row Name 02/13/22 0909          Lower Body Dressing Assessment/Training    [place] Level (Lower Body Dressing) minimum assist (75% patient effort)  -           User Key  (r) = Recorded By, (t) = Taken By, (c) = Cosigned By    Initials Name Provider Type    Monique Phillips OT  Occupational Therapist               Obj/Interventions     Row Name 02/13/22 0913          Sensory Assessment (Somatosensory)    Sensory Assessment (Somatosensory) sensation intact  -MH     Row Name 02/13/22 0913          Vision Assessment/Intervention    Visual Impairment/Limitations WFL  -MH     Row Name 02/13/22 0913          Range of Motion Comprehensive    Comment, General Range of Motion Rt knee flexion limited 50% w/ pain, surgical dressing, & staples.  -MH     Row Name 02/13/22 0913          Strength Comprehensive (MMT)    Comment, General Manual Muscle Testing (MMT) Assessment WFL  -MH     Row Name 02/13/22 0913          Balance    Static Sitting Balance WFL  -     Dynamic Sitting Balance WFL  -     Static Standing Balance WFL  -     Comment, Balance WFL when using   -           User Key  (r) = Recorded By, (t) = Taken By, (c) = Cosigned By    Initials Name Provider Type    Monique Phillips, OT Occupational Therapist               Goals/Plan     Row Name 02/13/22 0920          Transfer Goal 1 (OT)    Activity/Assistive Device (Transfer Goal 1, OT) transfers, all  -     Bard Level/Cues Needed (Transfer Goal 1, OT) modified independence  -     Time Frame (Transfer Goal 1, OT) 2 weeks  -MH     Row Name 02/13/22 0920          Bathing Goal 1 (OT)    Activity/Device (Bathing Goal 1, OT) bathing skills, all; hand-held shower spray hose; shower chair; grab bar, tub/shower; long-handled sponge  -     Bard Level/Cues Needed (Bathing Goal 1, OT) modified independence  -     Time Frame (Bathing Goal 1, OT) 2 weeks  -MH     Row Name 02/13/22 0920          Dressing Goal 1 (OT)    Activity/Device (Dressing Goal 1, OT) dressing skills, all  -     Bard/Cues Needed (Dressing Goal 1, OT) modified independence  -     Time Frame (Dressing Goal 1, OT) 2 weeks  -MH     Row Name 02/13/22 0920          Therapy Assessment/Plan (OT)    Planned Therapy Interventions (OT) activity  tolerance training; adaptive equipment training; BADL retraining; functional balance retraining; occupation/activity based interventions; transfer/mobility retraining; patient/caregiver education/training  -           User Key  (r) = Recorded By, (t) = Taken By, (c) = Cosigned By    Initials Name Provider Type     Monique Carlisle OT Occupational Therapist               Clinical Impression     Row Name 02/13/22 0914          Pain Assessment    Additional Documentation Pain Scale: FACES Pre/Post-Treatment (Group)  -     Row Name 02/13/22 0914          Pain Scale: Numbers Pre/Post-Treatment    Pretreatment Pain Rating 2/10  -     Posttreatment Pain Rating 2/10  -     Pain Location - Side Right  -     Pain Location knee; foot  -     Pain Intervention(s) Repositioned  -     Row Name 02/13/22 0914          Plan of Care Review    Plan of Care Reviewed With patient  -     Progress improving  -     Outcome Summary Pt is admitted after fall on ice. She is disabled & lives w/ spouse, but plans d/c to dtr's home where there will be 24 hour supervisoin & IADL assist. She is s/p Irrigation, debridement of open right tibial shaft fracture.  Open reduction internal fixation of right tibial pilon fracture.  Open reduction internal fixation of right lateral malleolus fracture.  Intramedullary nailing of right tibial shaft fracture. She is NWB to her srgical leg & has incision over the knee as well as hard open-toe cast to below the knee. Pt will be needing to use a tub/shower which has a hand-help  head but she will require a SC due to decreased balance w/ NWB and the need to leave cast outside the tub. She also would benefit from adaptive training for LBD as well as the LBB techniques needed to safely function alone in the shower. Recommend [facility] OT, RW, and SC.  -     Row Name 02/13/22 0914          Therapy Assessment/Plan (OT)    Rehab Potential (OT) good, to achieve stated therapy goals  -     Criteria  for Skilled Therapeutic Interventions Met (OT) skilled treatment is necessary  -     Therapy Frequency (OT) 5 times/wk  -     Row Name 02/13/22 0914          Therapy Plan Review/Discharge Plan (OT)    Equipment Needs Upon Discharge (OT) walker, rolling; shower chair  -     Anticipated Discharge Disposition (OT) home with 24/7 care  -     Row Name 02/13/22 0914          Vital Signs    O2 Delivery Pre Treatment room air  -     Pre Patient Position Supine  -     Intra Patient Position Standing  -     Post Patient Position Supine  -     Row Name 02/13/22 0914          Positioning and Restraints    Pre-Treatment Position in bed  -     Post Treatment Position bed  -     In Bed sitting; call light within reach  long sit w/ one leg over the edge.  -           User Key  (r) = Recorded By, (t) = Taken By, (c) = Cosigned By    Initials Name Provider Type    Monique Phillips, OT Occupational Therapist               Outcome Measures    No documentation.                 Occupational Therapy Education                 Title: PT OT SLP Therapies (In Progress)     Topic: Occupational Therapy (In Progress)     Point: ADL training (Done)     Description:   Instruct learner(s) on proper safety adaptation and remediation techniques during self care or transfers.   Instruct in proper use of assistive devices.              Learning Progress Summary           Patient Acceptance, E,TB, VU,DU by  at 2/13/2022 0921                   Point: Home exercise program (Not Started)     Description:   Instruct learner(s) on appropriate technique for monitoring, assisting and/or progressing therapeutic exercises/activities.              Learner Progress:  Not documented in this visit.          Point: Precautions (Done)     Description:   Instruct learner(s) on prescribed precautions during self-care and functional transfers.              Learning Progress Summary           Patient Acceptance, E,TB, VU,DU by  at 2/13/2022 0921                    Point: Body mechanics (Done)     Description:   Instruct learner(s) on proper positioning and spine alignment during self-care, functional mobility activities and/or exercises.              Learning Progress Summary           Patient Acceptance, E,TB, VU,DU by  at 2/13/2022 0921                               User Key     Initials Effective Dates Name Provider Type Discipline     06/16/21 -  Monique Carlisle OT Occupational Therapist OT              OT Recommendation and Plan  Planned Therapy Interventions (OT): activity tolerance training, adaptive equipment training, BADL retraining, functional balance retraining, occupation/activity based interventions, transfer/mobility retraining, patient/caregiver education/training  Therapy Frequency (OT): 5 times/wk  Plan of Care Review  Plan of Care Reviewed With: patient  Progress: improving  Outcome Summary: Pt is admitted after fall on ice. She is disabled & lives w/ spouse, but plans d/c to dtr's home where there will be 24 hour supervisoin & IADL assist. She is s/p Irrigation, debridement of open right tibial shaft fracture.  Open reduction internal fixation of right tibial pilon fracture.  Open reduction internal fixation of right lateral malleolus fracture.  Intramedullary nailing of right tibial shaft fracture. She is NWB to her srgical leg & has incision over the knee as well as hard open-toe cast to below the knee. Pt will be needing to use a tub/shower which has a hand-help  head but she will require a SC due to decreased balance w/ NWB and the need to leave cast outside the tub. She also would benefit from adaptive training for LBD as well as the LBB techniques needed to safely function alone in the shower. Recommend [facility] OT, RW, and SC.     Time Calculation:    Time Calculation- OT     Row Name 02/13/22 0922             Time Calculation-     OT Start Time 0802  -      OT Stop Time 0820  -      OT Time Calculation (min) 18 min  -       Total Timed Code Minutes- OT 0 minute(s)  -      OT Received On 02/13/22  -      OT - Next Appointment 02/14/22  -      OT Goal Re-Cert Due Date 02/27/22  -            User Key  (r) = Recorded By, (t) = Taken By, (c) = Cosigned By    Initials Name Provider Type    Monique Phillips, OT Occupational Therapist              Therapy Charges for Today     Code Description Service Date Service Provider Modifiers Qty    [id number] HC OT EVAL MOD COMPLEXITY 3 2/13/2022 Monique Carlisle OT GO 1               Monique Carlisle OT  2/13/2022

## 2022-02-13 NOTE — DISCHARGE SUMMARY
[facility] Medicine Services  DISCHARGE SUMMARY    Patient Name: Jesus Ovalles  : 1975   MRN: [id number]    Date of Admission: 2/10/2022  Discharge Diagnosis: Acute open right tib/fib fracture  Date of Discharge: 2022  Primary Care Physician: Aminta Quezada PA-C      Presenting Problem:   Type I or II open fracture of right tibia and fibula, initial encounter [S82.201B, S82.401B]    Active and Resolved Hospital Problems:  Active Hospital Problems    Diagnosis POA   • **Type I or II open fracture of right tibia and fibula [S82.201B, S82.401B] Yes     Priority: High   • EMMA (generalized anxiety disorder) [F41.1] Yes   • Bipolar 1 disorder (HCC) [F31.9] Yes   • PTSD (post-traumatic stress disorder) [F43.10] Yes   • HLD (hyperlipidemia) [E78.5] Yes   • Asthma [J45.909] Yes   • Chronic obstructive pulmonary disease (HCC) [J44.9] Yes   • Chronic pain [G89.29] Yes   • Tobacco abuse [Z72.0] Yes   • Depression [F32.A] Yes      Resolved Hospital Problems   No resolved problems to display.     Acute open right tib/fib fracture  -Status post right open tibia fracture irrigation and debridement with open reduction and internal fixation of right pilon fracture, open reduction internal fixation of right fibula, and intramedullary nailing of right tibia.   -Dilaudid PCA used initially postoperatively and was subsequently changed to p.o. analgesics adequate pain control    HLD  -Continue statin     Bipolar with PTSD  -Patient reports she is no longer on any medication for this     Asthma/COPD  -Not in exacerbation  -Currently on room air  -Symbicort ordered preoperatively  -Continue home breathing treatments as needed     Chronic pain  -Continue Flexeril     Tobacco abuse  - Encourage cessation     Hospital Course     Hospital Course:  Jesus Ovalles is a 46 y.o. female with a past medical history of asthma, bipolar, COPD, chronic pain, depression, anxiety, hyperlipidemia, PTSD, and  "tobacco abuse who presented to [facility] on 2/10/2022 due to a fall.  Patient reports this morning she walked outside to smoke a cigarette and fell on the ice landing on her right leg.  She explains she felt and heard a \"pop.\"  Xray right ankle/tib/fib showed comminuted displaced oblique fractures of the distal tibia and fibula shafts. The tibia fracture extends to the anterior articular surface and medial malleolus.   Dr. Pugh was consulted and performed: Irrigation debridement of open right tibial shaft fracture.  Open reduction internal fixation of right tibial pilon fracture.  Open reduction internal fixation of right lateral malleolus fracture.  Intramedullary nailing of right tibial shaft fracture.  The patient did well postoperatively.  She is now felt to be stable for discharge.    DISCHARGE Follow Up:     Dr. Pugh in 3 weeks      Reasons For Change In Medications and Indications for New Medications:    Apixaban for DVT prophylaxis.    Clindamycin for prevention of wound infection associated with open fracture.    Symbicort and albuterol for COPD.    Nicotine patch for smoking cessation.    Zofran for nausea.    Day of Discharge     Vital Signs:  Temp:  [97.9 °F (36.6 °C)-98.9 °F (37.2 °C)] 98.9 °F (37.2 °C)  Heart Rate:  [73-91] 81  Resp:  [14-18] 16  BP: ()/(50-65) 105/62    Physical Exam:  Physical Exam   Vital signs and nurses notes reviewed.  Awake and alert in no acute distress; mucous membranes moist; sclerae anicteric; lungs clear to auscultation bilaterally; CV regular rate and rhythm; abdomen soft and nontender; left lower extremity with no edema cyanosis or calf tenderness; right lower extremity with splint in place and capillary refill less than 2 seconds distally.      Pertinent  and/or Most Recent Results     LAB RESULTS:      Lab 02/12/22  0109 02/11/22  1810 02/11/22  1233 02/11/22  0605 02/11/22  0302 02/10/22  1316 02/10/22  1316 02/10/22  0452 02/10/22  0452   WBC  --   " --   --  4.10 4.20  --  5.90  --  6.10   HEMOGLOBIN 10.1* 9.8* 10.9* 10.1* 10.6*   < > 11.5*   < > 14.0   HEMATOCRIT 28.8* 28.0* 31.8* 29.4* 30.8*   < > 32.9*   < > 40.9   PLATELETS  --   --   --  153 162  --  185  --  262   NEUTROS ABS  --   --   --  2.40 2.70  --  3.90  --  3.20   LYMPHS ABS  --   --   --  1.20 1.00  --  1.50  --  2.40   MONOS ABS  --   --   --  0.40 0.40  --  0.40  --  0.30   EOS ABS  --   --   --  0.00 0.00  --  0.00  --  0.10   MCV  --   --   --  91.8 91.3  --  91.8  --  91.5   PROTIME  --   --   --   --   --   --   --   --  10.9   APTT  --   --   --   --   --   --   --   --  26.2    < > = values in this interval not displayed.         Lab 02/11/22  0605 02/11/22  0302 02/10/22  0452   SODIUM 137 137 140   POTASSIUM 3.7 4.2 3.7   CHLORIDE 103 104 102   CO2 27.0 27.0 27.0   ANION GAP 7.0 6.0 11.0   BUN 7 7 14   CREATININE 0.78 0.72 0.79   GLUCOSE 104* 134* 117*   CALCIUM 8.5* 8.5* 10.1         Lab 02/10/22  0452   TOTAL PROTEIN 7.2   ALBUMIN 5.00   GLOBULIN 2.2   ALT (SGPT) 9   AST (SGOT) 15   BILIRUBIN 0.3   ALK PHOS 55         Lab 02/10/22  0452   PROTIME 10.9   INR 0.98             Lab 02/10/22  0452   ABO TYPING O   RH TYPING Positive   ANTIBODY SCREEN Negative         Brief Urine Lab Results     None        Microbiology Results (last 10 days)     Procedure Component Value - Date/Time    MRSA Screen, PCR (Inpatient) - Swab, Nares [[id number]]  (Normal) Collected: 02/11/22 0824    Lab Status: Final result Specimen: Swab from Nares Updated: 02/11/22 0942     MRSA PCR No MRSA Detected    COVID PRE-OP / PRE-PROCEDURE SCREENING ORDER (NO ISOLATION) - Swab, Nasopharynx [[id number]]  (Normal) Collected: 02/10/22 0855    Lab Status: Final result Specimen: Swab from Nasopharynx Updated: 02/10/22 0920    Narrative:      The following orders were created for panel order COVID PRE-OP / PRE-PROCEDURE SCREENING ORDER (NO ISOLATION) - Swab, Nasopharynx.  Procedure                               Abnormality          Status                     ---------                               -----------         ------                     COVID-19,CEPHEID/BRENDA,CO...[[id number]]  Normal              Final result                 Please view results for these tests on the individual orders.    COVID-19,CEPHEID/BRENDA,COR/CHARO/PAD/AQUILES IN-HOUSE(OR EMERGENT/ADD-ON),NP SWAB IN TRANSPORT MEDIA 3-4 HR TAT, RT-PCR - Swab, Nasopharynx [[id number]]  (Normal) Collected: 02/10/22 0855    Lab Status: Final result Specimen: Swab from Nasopharynx Updated: 02/10/22 0920     COVID19 Not Detected    Narrative:      Fact sheet for providers: https://www.fda.gov/media/926579/download     Fact sheet for patients: https://www.fda.gov/media/896711/download  Fact sheet for providers: https://www.fda.gov/media/211476/download    Fact sheet for patients: https://www.fda.gov/media/193917/download    Test performed by PCR.          XR Tibia Fibula 2 View Right    Result Date: 2/12/2022  Impression: 1. Status post ORIF with intramedullary nail fixation of the distal tibial fractures with improved alignment. 2. Status post plate and screw fixation of the distal fibula with improved alignment of the distal fibular fractures. 3. Oblique displaced proximal fibular fracture unchanged from prior study.  Electronically Signed By-Jose Momin MD On:2/12/2022 8:02 AM This report was finalized on [id number] by  Jose Momin MD.    XR Tibia Fibula 2 View Right    Result Date: 2/10/2022  Impression: Fractures of the tibia and fibula as discussed. No dislocation    Electronically Signed By-Sameer Hurtado On:2/10/2022 7:09 AM This report was finalized on [id number] by  Sameer Hurtado .    XR Ankle 3+ View Right    Result Date: 2/10/2022  Impression: Fractures of the tibia and fibula as discussed. No dislocation    Electronically Signed ByStephan Hurtado On:2/10/2022 7:09 AM This report was finalized on [id number] by  Sameer Hurtado .    XR Chest 1 View    Result Date:  2/10/2022  Impression:  Negative chest x-ray. No evidence of acute cardiopulmonary disease.  Electronically Signed By-Sameer Hurtado On:2/10/2022 7:10 AM This report was finalized on [id number] by  Sameer Hurtado, .    CT Lower Extremity Right Without Contrast    Result Date: 2/10/2022  Impression: 1.Complex comminuted fracture of the mid to distal right tibia. There are multiple fracture fragments at the distal diaphysis with mild displacement. Multiple longitudinal fracture lines also extend through the more distal portion of the tibia extending through the distal metaphysis and epiphysis. There are nondisplaced fracture lines which extend through the articular surface at the anterior lateral aspect of the distal tibia. There is also a nondisplaced fracture line which extends through the medial malleolus. 2.Comminuted fractures are also noted involving the proximal and distal diaphyses of the fibula with associated mild displacement. 3.There is hemorrhage throughout the margins of the fracture involving the distal tibia as well as within the surrounding soft tissues. There is also evidence for soft tissue trauma at the anteromedial aspect of the distal leg. Air or gas is seen throughout the soft tissues of the mid distal leg related to the trauma. Electronically Signed: Mandeep Foreman MD 2/10/2022 9:13 EST                  Labs Pending at Discharge:      Procedures Performed  Procedure(s):  TIBIA INTRAMEDULLARY NAIL/LAURI INSERTION WITH IRRIGATION AND DEBRIDEMENT  ANKLE OPEN REDUCTION INTERNAL FIXATION         Consults:   Consults     Date and Time Order Name Status Description    2/10/2022 10:59 AM Inpatient Anesthesiology Consult              Discharge Details        Discharge Medications      New Medications      Instructions Start Date   albuterol sulfate  (90 Base) MCG/ACT inhaler  Commonly known as: PROVENTIL HFA;VENTOLIN HFA;PROAIR HFA   2 puffs, Inhalation, Every 4 Hours PRN      apixaban 2.5 MG tablet  tablet  Commonly known as: ELIQUIS   2.5 mg, Oral, Every 12 Hours Scheduled      budesonide-formoterol 160-4.5 MCG/ACT inhaler  Commonly known as: SYMBICORT   2 puffs, Inhalation, 2 Times Daily - RT      clindamycin 300 MG capsule  Commonly known as: Cleocin   300 mg, Oral, 3 Times Daily      HYDROcodone-acetaminophen 7.5-325 MG per tablet  Commonly known as: NORCO   1 tablet, Oral, Every 4 Hours PRN      nicotine 21 MG/24HR patch  Commonly known as: NICODERM CQ   1 patch, Transdermal, Every 24 Hours Scheduled   Start Date: February 14, 2022     ondansetron ODT 4 MG disintegrating tablet  Commonly known as: Zofran ODT   4 mg, Translingual, Every 8 Hours PRN         Continue These Medications      Instructions Start Date   methocarbamol 750 MG tablet  Commonly known as: ROBAXIN   750 mg, Oral, 3 Times Daily             Allergies   Allergen Reactions   • Oxycodone Shortness Of Breath and Itching   • Penicillins Anaphylaxis and Swelling   • Percocet [Oxycodone-Acetaminophen] Anaphylaxis   • Codeine Hives and Itching   • Buprenorphine Rash         Discharge Disposition:   Home or Self Care    Diet:  Hospital:  Diet Order   Procedures   • Diet Regular         Discharge Activity:   Activity Instructions     Activity as Tolerated              CODE STATUS:  Code Status and Medical Interventions:   Ordered at: 02/10/22 5304     Level Of Support Discussed With:    Patient     Code Status (Patient has no pulse and is not breathing):    CPR (Attempt to Resuscitate)     Medical Interventions (Patient has pulse or is breathing):    Full Support         No future appointments.    Additional Instructions for the Follow-ups that You Need to Schedule     Call MD With Problems / Concerns   As directed      Instructions: Call [phone] or email [email or web address] for problems or concerns.    Order Comments: Instructions: Call [phone] or email [email or web address] for problems or concerns.          Discharge  Follow-up with PCP   As directed       Currently Documented PCP:    Aminta Quezada PA-C    PCP Phone Number:    None     Follow Up Details: 2-3 business days via telehealth if possible               Time spent on Discharge including face to face service:  25 minutes    This patient has been examined wearing appropriate Personal Protective Equipment and discussed with hospital infection control department. 2/13/2022    Signature: Electronically signed by Chrissie Griffith MD, 02/13/22, 11:00 AM EST.

## 2022-02-13 NOTE — PLAN OF CARE
Goal Outcome Evaluation:         Gets up with one assist with walker and is compliant with nwb rle restrictions. Plans are for dc home tomorrow with dtr to her home. Takes prn pain meds about every 4 hrs which controls her pain. Dsg intact to rle without signs of active bleeding.

## 2022-02-14 ENCOUNTER — READMISSION MANAGEMENT (OUTPATIENT)
Dept: CALL CENTER | Facility: HOSPITAL | Age: 47
End: 2022-02-14

## 2022-02-14 NOTE — PAYOR COMM NOTE
"DC Notice/Requesting IP Determination  RE:  Сергей Ovalles  1975  Policy no. [id number]  DC 22 routine to home    AUTHORIZATION PENDING  PLEASE FORWARD DETERMINATION TO FOLLOWING CONTACT:    SEMAJ KOROMA LPN UR  Utilization Review Nurse  [facility]  Direct & confidential phone # [phone]  Fax # [phone]  Сергей Ovalles (46 y.o. Female)             Date of Birth Social Security Number Address Home Phone MRN    1975  [facility] [place] [id number]  [id number]    Church Marital Status             None        Admission Date Admission Type Admitting Provider Attending Provider Department, Room/Bed    2/10/22 Emergency Chrissie Griffith MD  [facility] SURGICAL INPATIENT,     Discharge Date Discharge Disposition Discharge Destination          2022 Home or Self Care              Attending Provider: (none)   Allergies: Oxycodone, Penicillins, Percocet [Oxycodone-acetaminophen], Codeine, Buprenorphine    Isolation: None   Infection: None   Code Status: Prior   Advance Care Planning Activity    Ht: 165.1 cm (65\")   Wt: 61.6 kg (135 lb 12.9 oz)    Admission Cmt: None   Principal Problem: Type I or II open fracture of right tibia and fibula [S82.201B,S82.401B]                 Active Insurance as of 2/10/2022     Primary Coverage     Payor Plan Insurance Group Employer/Plan Group    [facility] MEDICARE REPLACEMENT [facility]IC[facility] REPLACEMENT [id number]     Payor Plan Address Payor Plan Phone Number Payor Plan Fax Number Effective Dates    [address] [address] [phone]  2022 - None Entered    [facility] [address]       Subscriber Name Subscriber Birth Date Member ID       СЕРГЕЙ OVALLES 1975 [id number]           Secondary Coverage     Payor Plan Insurance Group Employer/Plan Group    [place]      Payor Plan Address Payor Plan Phone Number Payor Plan Fax Number Effective Dates    [address]   2020 - None Entered "    [facility] [id number]       Subscriber Name Subscriber Birth Date Member ID       JESUS OVALLES 1975 [id number]                 Emergency Contacts      (Rel.) Home Phone Work Phone Mobile Phone    Mat Ovalles (Spouse) -- -- [phone]    Janneth Graham (Daughter) -- -- [phone]               Physician Progress Notes (last 48 hours)      Chrissie Griffith MD at [address] [facility]              [facility] Medicine Services Daily Progress Note    Patient Name: Jesus Ovalles  : 1975  MRN: [id number]  Primary Care Physician:  Aminta Quezada PA-C  Date of admission: 2/10/2022      Subjective      Chief Complaint: Right leg pain      Patient Reports   2022: Patient complains of uncontrolled right leg pain.  She is waiting on surgery.  The PCA pump dosage was adjusted for improved pain control.  10/11/2022: Pt reports adequate pain control. No current c/o.    ROS   12 point review of systems was reviewed and was negative.      Objective      Vitals:   Temp:  [98.1 °F (36.7 °C)-99.1 °F (37.3 °C)] 98.1 °F (36.7 °C)  Heart Rate:  [65-91] 86  Resp:  [11-18] 16  BP: ()/(59-76) 106/65  Flow (L/min):  [1-3] 3    Physical Exam   Vital signs and nurses notes reviewed.  Well-nourished female in no acute distress sitting up in bed awake and alert; mucous membranes moist; sclerae anicteric; lungs clear to auscultation bilaterally; CV regular rate and rhythm; abdomen soft nontender; left lower extremity with no edema, cyanosis or calf tenderness; right lower extremity with splint in place and capillary refill less than 2 seconds distally.  No Jonas catheter. Exam unchanged from 2022.       Result Review    Result Review:  I have personally reviewed the results from the time of this admission to 2022 13:04 EST and agree with these findings:  [x]  Laboratory  []  Microbiology  [x]  Radiology  [x]  EKG/Telemetry   [x]  Cardiology/Vascular   []  Pathology  []  Old  records  []  Other:  Most notable findings are discussed in the assessment and plan.    Wounds (last 24 hours)     LDA Wound     Row Name 02/12/22 0648 02/12/22 0342 02/11/22 2340       Wound 02/11/22 2027 Right anterior ankle Incision    Wound - Properties Group Placement Date: 02/11/22  - Placement Time: 2027 -MK Present on Hospital Admission: N  -MK, BILATERAL INCISIONS ON RIGHT ANKLE  Side: Right  -MK Orientation: anterior  -MK Location: ankle  -MK Primary Wound Type: Incision  -MK    Dressing Appearance intact; dry  -SS dry; intact  -LS dry; intact; no drainage  -LS    Closure PHILOMENA  -SS PHILOMENA  -LS PHILOMENA  -LS    Retired Wound - Properties Group Date first assessed: 02/11/22  - Time first assessed: 2027 -MK Present on Hospital Admission: N  -MK, BILATERAL INCISIONS ON RIGHT ANKLE  Side: Right  -MK Location: ankle  -MK Primary Wound Type: Incision  -MK       Wound 02/11/22 2100 Right distal thigh Incision    Wound - Properties Group Placement Date: 02/11/22  - Placement Time: 2100 -MK Present on Hospital Admission: N  -MK Side: Right  -MK Orientation: distal  -MK Location: thigh  -MK Primary Wound Type: Incision  -MK    Dressing Appearance dry; intact  -SS dry; intact  -LS dry; intact; no drainage  -LS    Closure PHILOMENA  -SS PHILOMENA  -LS PHILOMENA  -LS    Retired Wound - Properties Group Date first assessed: 02/11/22  - Time first assessed: 2100 -MK Present on Hospital Admission: N  -MK Side: Right  -MK Location: thigh  -MK Primary Wound Type: Incision  -MK    Row Name 02/11/22 2303 02/11/22 2248 02/11/22 2233       Wound 02/11/22 2027 Right anterior ankle Incision    Wound - Properties Group Placement Date: 02/11/22  - Placement Time: 2027 -MK Present on Hospital Admission: N  -MK, BILATERAL INCISIONS ON RIGHT ANKLE  Side: Right  -MK Orientation: anterior  -MK Location: ankle  -MK Primary Wound Type: Incision  -MK    Dressing Appearance dry; intact; no drainage  -SD dry; intact; no drainage  -SD dry; intact; no  drainage  -SD    Closure PHILOMENA  -SD PHILOMENA  -SD PHILOMENA  -SD    Retired Wound - Properties Group Date first assessed: 02/11/22  - Time first assessed: 2027 -MK Present on Hospital Admission: N  -MK, BILATERAL INCISIONS ON RIGHT ANKLE  Side: Right  -MK Location: ankle  -MK Primary Wound Type: Incision  -MK       Wound 02/11/22 2100 Right distal thigh Incision    Wound - Properties Group Placement Date: 02/11/22  - Placement Time: 2100 -MK Present on Hospital Admission: N  -MK Side: Right  -MK Orientation: distal  -MK Location: thigh  -MK Primary Wound Type: Incision  -MK    Dressing Appearance dry; intact; no drainage  -SD dry; intact; no drainage  -SD dry; intact; no drainage  -SD    Closure PHILOMENA  -SD PHILOMENA  -SD PHILOMENA  -SD    Retired Wound - Properties Group Date first assessed: 02/11/22 - Time first assessed: 2100 -MK Present on Hospital Admission: N  -MK Side: Right  -MK Location: thigh  - Primary Wound Type: Incision  -MK    Row Name 02/11/22 2218 02/11/22 2157 02/11/22 2155       Wound 02/11/22 2027 Right anterior ankle Incision    Wound - Properties Group Placement Date: 02/11/22  - Placement Time: 2027 -MK Present on Hospital Admission: N  -MK, BILATERAL INCISIONS ON RIGHT ANKLE  Side: Right  - Orientation: anterior  -MK Location: ankle  -MK Primary Wound Type: Incision  -MK    Dressing Appearance dry; intact; no drainage  -SD -- --    Closure PHILOMENA  -SD -- --    Dressing Care -- dressing applied  XEROFORM, FLUFFS, WEBRIL, ACE, SPLINT  -MK --    Retired Wound - Properties Group Date first assessed: 02/11/22 - Time first assessed: 2027 -MK Present on Hospital Admission: N  -MK, BILATERAL INCISIONS ON RIGHT ANKLE  Side: Right  -MK Location: ankle  -MK Primary Wound Type: Incision  -MK       Wound 02/11/22 2100 Right distal thigh Incision    Wound - Properties Group Placement Date: 02/11/22 - Placement Time: 2100 -MK Present on Hospital Admission: N  -MK Side: Right  -MK Orientation: distal  -  "Location: thigh  -MK Primary Wound Type: Incision  -MK    Dressing Appearance dry; intact; no drainage  -SD -- --    Closure PHILOMENA  -SD -- --    Dressing Care -- -- dressing applied  XEROFORM, FLUFFS, WEBRIL, ACE, SPLINT  -MK    Retired Wound - Properties Group Date first assessed: 02/11/22  - Time first assessed: 2100  -MK Present on Hospital Admission: N  -MK Side: Right  -MK Location: thigh  -MK Primary Wound Type: Incision  -MK          User Key  (r) = Recorded By, (t) = Taken By, (c) = Cosigned By    Initials Name Provider Type    Addie Rogers, RN Registered Nurse    Lanie Ervin, RN Registered Nurse    Tish Loco RN Registered Nurse    Sadie Arroyo RN Registered Nurse                  Assessment/Plan      Brief Patient Summary:  Jesus Ovalles is a 46 y.o. female with a past medical history of asthma, bipolar, COPD, chronic pain, depression, anxiety, hyperlipidemia, PTSD, and tobacco abuse who presented to [facility] on 2/10/2022 due to a fall.  Patient reports this morning she walked outside to smoke a cigarette and fell on the ice landing on her right leg.  She explains she felt and heard a \"pop.\"  Xray right ankle/tib/fib showed comminuted displaced oblique fractures of the distal tibia and fibula shafts. The tibia fracture extends to the anterior articular surface and medial malleolus.   Dr. Pugh was consulted and plan was for surgical repair 2/11/2022.      Inpatient medications include:  apixaban, 2.5 mg, Oral, Q12H  budesonide-formoterol, 2 puff, Inhalation, BID - RT  clindamycin, 900 mg, Intravenous, Q8H  ipratropium-albuterol, 3 mL, Nebulization, 4x Daily - RT  nicotine, 1 patch, Transdermal, Q24H  sodium chloride, 10 mL, Intravenous, Q12H       sodium chloride, 100 mL/hr, Last Rate: 100 mL/hr (02/11/22 4296)         Active Hospital Problems:  Active Hospital Problems    Diagnosis    • **Type I or II open fracture of right tibia and fibula    • EMMA (generalized anxiety " disorder)    • Bipolar 1 disorder (HCC)    • PTSD (post-traumatic stress disorder)    • HLD (hyperlipidemia)    • Asthma    • Chronic obstructive pulmonary disease (HCC)    • Chronic pain    • Tobacco abuse    • Depression      Plan:   Acute open right tib/fib fracture  -Status post right open tibia fracture irrigation and debridement with open reduction and internal fixation of right pilon fracture, open reduction internal fixation of right fibula, and intramedullary nailing of right tibia.   -Dilaudid PCA increased for improved pain control     HLD  -Continue statin     Bipolar with PTSD  -Patient reports she is no longer on any medication for this     Asthma/COPD  -Not in exacerbation  -Currently on room air  -Symbicort ordered preoperatively  -Continue home breathing treatments as needed     Chronic pain  -Continue Flexeril     Tobacco abuse  - Encourage cessation     DVT prophylaxis:  Medical and mechanical DVT prophylaxis orders are present.    CODE STATUS:    Level Of Support Discussed With: Patient  Code Status (Patient has no pulse and is not breathing): CPR (Attempt to Resuscitate)  Medical Interventions (Patient has pulse or is breathing): Full Support      Disposition:  I expect patient to be discharged 2/13/22.    This patient has been examined wearing appropriate Personal Protective Equipment and discussed with hospital infection control department. 02/12/22      Electronically signed by Chrissie Griffith MD, 02/12/22, 13:04 [facility].  [facility] Hospitalist Team             Electronically signed by Chrissie Griffith MD at 02/13/22 0606     Maurice Pugh II, MD at 02/12/22 0908        Patient is postop day 1 right open tibia fracture irrigation and debridement with open reduction and internal fixation of right pilon fracture, open reduction internal fixation of right fibula, and intramedullary nailing of right tibia.  She is in a splint postoperatively.  The splint is to remain in place for 3 weeks.  " I want to see her back in 3 weeks on March 4 in the office to remove the splint and check her wounds.    R \"Jarett\" Keaton GIVENS MD  Orthopaedic Surgery  [facility]  [phone] - [place] Office  [phone] - [place] Office      Electronically signed by Maurice Pugh II, MD at 02/12/22 0909       "

## 2022-02-14 NOTE — OUTREACH NOTE
Prep Survey      Responses   [facility] from? [facility]   Is LACE score < 7 ? No   Emergency Room discharge w/ pulse ox? No   Eligibility Readm Mgmt   Discharge diagnosis Acute open right tib/fib fracture, s/p Irrigation debridement of open right tibial shaft fracture.  Open reduction internal fixation of right tibial pilon fracture.  Open reduction internal fixation of right lateral malleolus fracture.  Intramedullary nailing of right tibial shaft fracture   Does the patient have one of the following disease processes/diagnoses(primary or secondary)? General Surgery   Does the patient have Home health ordered? No   Is there a DME ordered? No   Prep survey completed? Yes          Terri Yun RN

## 2022-02-16 ENCOUNTER — READMISSION MANAGEMENT (OUTPATIENT)
Dept: CALL CENTER | Facility: HOSPITAL | Age: 47
End: 2022-02-16

## 2022-02-16 NOTE — OUTREACH NOTE
General Surgery Week 1 Survey      Responses   [facility] patient discharged from? [facility]   Does the patient have one of the following disease processes/diagnoses(primary or secondary)? General Surgery   Week 1 attempt successful? No   Unsuccessful attempts Attempt 1  [patient and spouse]          Patt Cho RN

## 2022-02-18 ENCOUNTER — READMISSION MANAGEMENT (OUTPATIENT)
Dept: CALL CENTER | Facility: HOSPITAL | Age: 47
End: 2022-02-18

## 2022-02-18 PROCEDURE — 99284 EMERGENCY DEPT VISIT MOD MDM: CPT

## 2022-02-18 PROCEDURE — G0378 HOSPITAL OBSERVATION PER HR: HCPCS

## 2022-02-18 NOTE — OUTREACH NOTE
General Surgery Week 1 Survey      Responses   [facility] patient discharged from? [facility]   Does the patient have one of the following disease processes/diagnoses(primary or secondary)? General Surgery   Week 1 attempt successful? Yes   Call start time 0943   Call end time 0950   Discharge diagnosis Acute open right tib/fib fracture, s/p Irrigation debridement of open right tibial shaft fracture.  Open reduction internal fixation of right tibial pilon fracture.  Open reduction internal fixation of right lateral malleolus fracture.  Intramedullary nailing of right tibial shaft fracture   Is patient permission given to speak with other caregiver? Yes   List who call center can speak with Mat Charlette, spouse   Person spoke with today (if not patient) and relationship spouse   Meds reviewed with patient/caregiver? Yes   Does the patient have all medications related to this admission filled (includes all antibiotics, pain medications, etc.) Yes   Is the patient taking all medications as directed (includes completed medication regime)? Yes   Medication comments Poor pain control. Advised to contact ortho office and report.    Does the patient have a follow up appointment scheduled with their surgeon? Yes   Has the patient kept scheduled appointments due by today? N/A   Has home health visited the patient within 72 hours of discharge? N/A   Psychosocial issues? No   Did the patient receive a copy of their discharge instructions? Yes   Nursing interventions Reviewed instructions with patient   What is the patient's perception of their health status since discharge? Same   Is the patient/caregiver able to teach back signs and symptoms of incisional infection? Fever,  Increased redness, swelling or pain at the incisonal site   Is the patient/caregiver able to teach back steps to recovery at home? Set small, achievable goals for return to baseline health,  Rest and rebuild strength, gradually increase activity   If the  patient is a current smoker, are they able to teach back resources for cessation? Smoking cessation medications  [ reports patient with desire to quit smoking and has patches to help with cessation. ]   Is the patient/caregiver able to teach back the hierarchy of who to call/visit for symptoms/problems? PCP, Specialist, Home health nurse, Urgent Care, ED, 911 Yes   Week 1 call completed? Yes          Lissette Hatfield RN

## 2022-02-19 ENCOUNTER — READMISSION MANAGEMENT (OUTPATIENT)
Dept: CALL CENTER | Facility: HOSPITAL | Age: 47
End: 2022-02-19

## 2022-02-19 ENCOUNTER — HOSPITAL ENCOUNTER (OUTPATIENT)
Facility: HOSPITAL | Age: 47
Setting detail: OBSERVATION
Discharge: HOME | End: 2022-02-19
Attending: EMERGENCY MEDICINE | Admitting: EMERGENCY MEDICINE

## 2022-02-19 ENCOUNTER — APPOINTMENT (OUTPATIENT)
Dept: GENERAL RADIOLOGY | Facility: HOSPITAL | Age: 47
End: 2022-02-19

## 2022-02-19 VITALS
WEIGHT: 123.02 LBS | OXYGEN SATURATION: 97 % | BODY MASS INDEX: 20.5 KG/M2 | HEART RATE: 70 BPM | RESPIRATION RATE: 16 BRPM | SYSTOLIC BLOOD PRESSURE: 102 MMHG | HEIGHT: 65 IN | DIASTOLIC BLOOD PRESSURE: 58 MMHG | TEMPERATURE: 98.3 F

## 2022-02-19 DIAGNOSIS — G89.18 POSTOPERATIVE PAIN: Primary | ICD-10-CM

## 2022-02-19 DIAGNOSIS — M79.604 RIGHT LEG PAIN: ICD-10-CM

## 2022-02-19 LAB
ANION GAP SERPL CALCULATED.3IONS-SCNC: 9 MMOL/L (ref 5–15)
BASOPHILS # BLD AUTO: 0 10*3/MM3 (ref 0–0.2)
BASOPHILS NFR BLD AUTO: 0.9 % (ref 0–1.5)
BUN SERPL-MCNC: 10 MG/DL (ref 6–20)
BUN/CREAT SERPL: 13.7 (ref 7–25)
CALCIUM SPEC-SCNC: 8.9 MG/DL (ref 8.6–10.5)
CHLORIDE SERPL-SCNC: 99 MMOL/L (ref 98–107)
CO2 SERPL-SCNC: 27 MMOL/L (ref 22–29)
CREAT SERPL-MCNC: 0.73 MG/DL (ref 0.57–1)
DEPRECATED RDW RBC AUTO: 42.4 FL (ref 37–54)
EOSINOPHIL # BLD AUTO: 0.1 10*3/MM3 (ref 0–0.4)
EOSINOPHIL NFR BLD AUTO: 4.1 % (ref 0.3–6.2)
ERYTHROCYTE [DISTWIDTH] IN BLOOD BY AUTOMATED COUNT: 13.2 % (ref 12.3–15.4)
GFR SERPL CREATININE-BSD FRML MDRD: 86 ML/MIN/1.73
GLUCOSE SERPL-MCNC: 101 MG/DL (ref 65–99)
HCT VFR BLD AUTO: 25.8 % (ref 34–46.6)
HGB BLD-MCNC: 9 G/DL (ref 12–15.9)
LYMPHOCYTES # BLD AUTO: 1.2 10*3/MM3 (ref 0.7–3.1)
LYMPHOCYTES NFR BLD AUTO: 38 % (ref 19.6–45.3)
MCH RBC QN AUTO: 32 PG (ref 26.6–33)
MCHC RBC AUTO-ENTMCNC: 35 G/DL (ref 31.5–35.7)
MCV RBC AUTO: 91.5 FL (ref 79–97)
MONOCYTES # BLD AUTO: 0.3 10*3/MM3 (ref 0.1–0.9)
MONOCYTES NFR BLD AUTO: 9.1 % (ref 5–12)
NEUTROPHILS NFR BLD AUTO: 1.5 10*3/MM3 (ref 1.7–7)
NEUTROPHILS NFR BLD AUTO: 47.9 % (ref 42.7–76)
NRBC BLD AUTO-RTO: 0 /100 WBC (ref 0–0.2)
PLATELET # BLD AUTO: 366 10*3/MM3 (ref 140–450)
PMV BLD AUTO: 6.6 FL (ref 6–12)
POTASSIUM SERPL-SCNC: 3.9 MMOL/L (ref 3.5–5.2)
RBC # BLD AUTO: 2.82 10*6/MM3 (ref 3.77–5.28)
SARS-COV-2 RNA PNL SPEC NAA+PROBE: NOT DETECTED
SODIUM SERPL-SCNC: 135 MMOL/L (ref 136–145)
WBC NRBC COR # BLD: 3.2 10*3/MM3 (ref 3.4–10.8)

## 2022-02-19 PROCEDURE — C9803 HOPD COVID-19 SPEC COLLECT: HCPCS

## 2022-02-19 PROCEDURE — 0 MORPHINE SULFATE 4 MG/ML SOLUTION: Performed by: NURSE PRACTITIONER

## 2022-02-19 PROCEDURE — 96375 TX/PRO/DX INJ NEW DRUG ADDON: CPT

## 2022-02-19 PROCEDURE — G0378 HOSPITAL OBSERVATION PER HR: HCPCS

## 2022-02-19 PROCEDURE — 96376 TX/PRO/DX INJ SAME DRUG ADON: CPT

## 2022-02-19 PROCEDURE — 0 MORPHINE SULFATE 4 MG/ML SOLUTION: Performed by: EMERGENCY MEDICINE

## 2022-02-19 PROCEDURE — U0003 INFECTIOUS AGENT DETECTION BY NUCLEIC ACID (DNA OR RNA); SEVERE ACUTE RESPIRATORY SYNDROME CORONAVIRUS 2 (SARS-COV-2) (CORONAVIRUS DISEASE [COVID-19]), AMPLIFIED PROBE TECHNIQUE, MAKING USE OF HIGH THROUGHPUT TECHNOLOGIES AS DESCRIBED BY CMS-2020-01-R: HCPCS | Performed by: EMERGENCY MEDICINE

## 2022-02-19 PROCEDURE — 80048 BASIC METABOLIC PNL TOTAL CA: CPT | Performed by: EMERGENCY MEDICINE

## 2022-02-19 PROCEDURE — 73590 X-RAY EXAM OF LOWER LEG: CPT

## 2022-02-19 PROCEDURE — 25010000002 ONDANSETRON PER 1 MG: Performed by: EMERGENCY MEDICINE

## 2022-02-19 PROCEDURE — 85025 COMPLETE CBC W/AUTO DIFF WBC: CPT | Performed by: EMERGENCY MEDICINE

## 2022-02-19 PROCEDURE — 25010000002 HYDROMORPHONE PER 4 MG: Performed by: EMERGENCY MEDICINE

## 2022-02-19 PROCEDURE — 96374 THER/PROPH/DIAG INJ IV PUSH: CPT

## 2022-02-19 RX ORDER — MORPHINE SULFATE 4 MG/ML
4 INJECTION, SOLUTION INTRAMUSCULAR; INTRAVENOUS
Status: DISCONTINUED | OUTPATIENT
Start: 2022-02-19 | End: 2022-02-19

## 2022-02-19 RX ORDER — KETOROLAC TROMETHAMINE 15 MG/ML
15 INJECTION, SOLUTION INTRAMUSCULAR; INTRAVENOUS ONCE
Status: DISCONTINUED | OUTPATIENT
Start: 2022-02-19 | End: 2022-02-19 | Stop reason: HOSPADM

## 2022-02-19 RX ORDER — PREGABALIN 75 MG/1
75 CAPSULE ORAL EVERY 12 HOURS SCHEDULED
Qty: 60 CAPSULE | Refills: 0 | Status: SHIPPED | OUTPATIENT
Start: 2022-02-19 | End: 2022-08-30 | Stop reason: HOSPADM

## 2022-02-19 RX ORDER — NICOTINE 21 MG/24HR
1 PATCH, TRANSDERMAL 24 HOURS TRANSDERMAL
Status: DISCONTINUED | OUTPATIENT
Start: 2022-02-19 | End: 2022-02-19 | Stop reason: HOSPADM

## 2022-02-19 RX ORDER — ONDANSETRON 2 MG/ML
4 INJECTION INTRAMUSCULAR; INTRAVENOUS ONCE
Status: COMPLETED | OUTPATIENT
Start: 2022-02-19 | End: 2022-02-19

## 2022-02-19 RX ORDER — CYCLOBENZAPRINE HCL 10 MG
10 TABLET ORAL EVERY 8 HOURS SCHEDULED
Qty: 20 TABLET | Refills: 0 | Status: SHIPPED | OUTPATIENT
Start: 2022-02-19 | End: 2022-08-30 | Stop reason: HOSPADM

## 2022-02-19 RX ORDER — BUDESONIDE AND FORMOTEROL FUMARATE DIHYDRATE 160; 4.5 UG/1; UG/1
2 AEROSOL RESPIRATORY (INHALATION) 2 TIMES DAILY
Status: DISCONTINUED | OUTPATIENT
Start: 2022-02-19 | End: 2022-02-19 | Stop reason: HOSPADM

## 2022-02-19 RX ORDER — PREGABALIN 75 MG/1
75 CAPSULE ORAL EVERY 12 HOURS SCHEDULED
Status: DISCONTINUED | OUTPATIENT
Start: 2022-02-19 | End: 2022-02-19 | Stop reason: HOSPADM

## 2022-02-19 RX ORDER — ACETAMINOPHEN 325 MG/1
650 TABLET ORAL EVERY 4 HOURS PRN
Status: DISCONTINUED | OUTPATIENT
Start: 2022-02-19 | End: 2022-02-19 | Stop reason: HOSPADM

## 2022-02-19 RX ORDER — HYDROMORPHONE HCL 110MG/55ML
0.5 PATIENT CONTROLLED ANALGESIA SYRINGE INTRAVENOUS ONCE
Status: COMPLETED | OUTPATIENT
Start: 2022-02-19 | End: 2022-02-19

## 2022-02-19 RX ORDER — OXYCODONE HYDROCHLORIDE 5 MG/1
10 TABLET ORAL EVERY 4 HOURS PRN
Status: DISCONTINUED | OUTPATIENT
Start: 2022-02-19 | End: 2022-02-19 | Stop reason: HOSPADM

## 2022-02-19 RX ORDER — CLINDAMYCIN HYDROCHLORIDE 300 MG/1
300 CAPSULE ORAL 3 TIMES DAILY
Status: DISCONTINUED | OUTPATIENT
Start: 2022-02-19 | End: 2022-02-19 | Stop reason: HOSPADM

## 2022-02-19 RX ORDER — MORPHINE SULFATE 4 MG/ML
4 INJECTION, SOLUTION INTRAMUSCULAR; INTRAVENOUS EVERY 4 HOURS PRN
Status: DISCONTINUED | OUTPATIENT
Start: 2022-02-19 | End: 2022-02-19

## 2022-02-19 RX ORDER — CYCLOBENZAPRINE HCL 10 MG
10 TABLET ORAL ONCE
Status: COMPLETED | OUTPATIENT
Start: 2022-02-19 | End: 2022-02-19

## 2022-02-19 RX ORDER — CYCLOBENZAPRINE HCL 10 MG
10 TABLET ORAL EVERY 8 HOURS SCHEDULED
Status: DISCONTINUED | OUTPATIENT
Start: 2022-02-19 | End: 2022-02-19 | Stop reason: HOSPADM

## 2022-02-19 RX ORDER — OXYCODONE HYDROCHLORIDE 10 MG/1
10 TABLET ORAL EVERY 4 HOURS PRN
Qty: 20 EACH | Refills: 0 | Status: SHIPPED | OUTPATIENT
Start: 2022-02-19 | End: 2022-02-26

## 2022-02-19 RX ORDER — METHOCARBAMOL 500 MG/1
750 TABLET, FILM COATED ORAL 4 TIMES DAILY
Status: DISCONTINUED | OUTPATIENT
Start: 2022-02-19 | End: 2022-02-19

## 2022-02-19 RX ORDER — SODIUM CHLORIDE 0.9 % (FLUSH) 0.9 %
10 SYRINGE (ML) INJECTION AS NEEDED
Status: DISCONTINUED | OUTPATIENT
Start: 2022-02-19 | End: 2022-02-19 | Stop reason: HOSPADM

## 2022-02-19 RX ORDER — ONDANSETRON 2 MG/ML
4 INJECTION INTRAMUSCULAR; INTRAVENOUS EVERY 6 HOURS PRN
Status: DISCONTINUED | OUTPATIENT
Start: 2022-02-19 | End: 2022-02-19 | Stop reason: HOSPADM

## 2022-02-19 RX ORDER — SODIUM CHLORIDE 0.9 % (FLUSH) 0.9 %
10 SYRINGE (ML) INJECTION EVERY 12 HOURS SCHEDULED
Status: DISCONTINUED | OUTPATIENT
Start: 2022-02-19 | End: 2022-02-19 | Stop reason: HOSPADM

## 2022-02-19 RX ORDER — NITROGLYCERIN 0.4 MG/1
0.4 TABLET SUBLINGUAL
Status: DISCONTINUED | OUTPATIENT
Start: 2022-02-19 | End: 2022-02-19 | Stop reason: HOSPADM

## 2022-02-19 RX ORDER — DOCUSATE SODIUM 100 MG/1
100 CAPSULE, LIQUID FILLED ORAL 2 TIMES DAILY
Status: DISCONTINUED | OUTPATIENT
Start: 2022-02-19 | End: 2022-02-19 | Stop reason: HOSPADM

## 2022-02-19 RX ORDER — CHOLECALCIFEROL (VITAMIN D3) 125 MCG
5 CAPSULE ORAL NIGHTLY PRN
Status: DISCONTINUED | OUTPATIENT
Start: 2022-02-19 | End: 2022-02-19 | Stop reason: HOSPADM

## 2022-02-19 RX ADMIN — CYCLOBENZAPRINE 10 MG: 10 TABLET, FILM COATED ORAL at 13:43

## 2022-02-19 RX ADMIN — CLINDAMYCIN HYDROCHLORIDE 300 MG: 300 CAPSULE ORAL at 12:41

## 2022-02-19 RX ADMIN — OXYCODONE HYDROCHLORIDE 10 MG: 5 TABLET ORAL at 13:43

## 2022-02-19 RX ADMIN — HYDROMORPHONE HYDROCHLORIDE 0.5 MG: 2 INJECTION, SOLUTION INTRAMUSCULAR; INTRAVENOUS; SUBCUTANEOUS at 03:35

## 2022-02-19 RX ADMIN — HYDROMORPHONE HYDROCHLORIDE 0.5 MG: 2 INJECTION, SOLUTION INTRAMUSCULAR; INTRAVENOUS; SUBCUTANEOUS at 02:26

## 2022-02-19 RX ADMIN — ONDANSETRON 4 MG: 2 INJECTION INTRAMUSCULAR; INTRAVENOUS at 09:54

## 2022-02-19 RX ADMIN — CYCLOBENZAPRINE 10 MG: 10 TABLET, FILM COATED ORAL at 05:13

## 2022-02-19 RX ADMIN — MORPHINE SULFATE 4 MG: 4 INJECTION INTRAVENOUS at 06:10

## 2022-02-19 RX ADMIN — SODIUM CHLORIDE, PRESERVATIVE FREE 10 ML: 5 INJECTION INTRAVENOUS at 07:26

## 2022-02-19 RX ADMIN — MORPHINE SULFATE 4 MG: 4 INJECTION INTRAVENOUS at 09:54

## 2022-02-19 RX ADMIN — ONDANSETRON 4 MG: 2 INJECTION INTRAMUSCULAR; INTRAVENOUS at 02:26

## 2022-02-19 RX ADMIN — PREGABALIN 75 MG: 75 CAPSULE ORAL at 12:41

## 2022-02-19 RX ADMIN — DOCUSATE SODIUM 100 MG: 100 CAPSULE, LIQUID FILLED ORAL at 12:41

## 2022-02-19 RX ADMIN — APIXABAN 2.5 MG: 2.5 TABLET, FILM COATED ORAL at 12:41

## 2022-02-19 NOTE — PLAN OF CARE
Problem: Adult Inpatient Plan of Care  Goal: Plan of Care Review  Outcome: Ongoing, Progressing  Flowsheets (Taken 2/19/2022 0621)  Progress: no change  Outcome Summary: sdmitted for swelling of rt leg fracture. iv pain conrol. ortho md to see pt this am  Goal: Patient-Specific Goal (Individualized)  Outcome: Ongoing, Progressing  Goal: Absence of Hospital-Acquired Illness or Injury  Outcome: Ongoing, Progressing  Goal: Optimal Comfort and Wellbeing  Outcome: Ongoing, Progressing  Goal: Readiness for Transition of Care  Outcome: Ongoing, Progressing  Intervention: Mutually Develop Transition Plan  Recent Flowsheet Documentation  Taken 2/19/2022 0600 by Sabrina Holbrook, RN  Transportation Anticipated: family or friend will provide  Patient/Family Anticipated Services at Transition: none  Patient/Family Anticipates Transition to: home with family  Taken 2/19/2022 0558 by Sabrina Holbrook, RN  Equipment Currently Used at Home:   walker, rebel   wheelchair     Problem: Pain Acute  Goal: Optimal Pain Control  Outcome: Ongoing, Progressing     Problem: Bleeding (Orthopaedic Fracture)  Goal: Absence of Bleeding  Outcome: Ongoing, Progressing     Problem: Bowel Elimination Impaired (Orthopaedic Fracture)  Goal: Effective Bowel Elimination  Outcome: Ongoing, Progressing     Problem: Delayed Union/Nonunion (Orthopaedic Fracture)  Goal: Fracture Stability  Outcome: Ongoing, Progressing     Problem: Embolism (Orthopaedic Fracture)  Goal: Absence of Embolism Signs and Symptoms  Outcome: Ongoing, Progressing     Problem: Functional Ability Impaired (Orthopaedic Fracture)  Goal: Optimal Functional Ability  Outcome: Ongoing, Progressing     Problem: Infection (Orthopaedic Fracture)  Goal: Absence of Infection Signs and Symptoms  Outcome: Ongoing, Progressing     Problem: Neurovascular Compromise (Orthopaedic Fracture)  Goal: Effective Tissue Perfusion  Outcome: Ongoing, Progressing     Problem: Pain (Orthopaedic  Fracture)  Goal: Acceptable Pain Control  Outcome: Ongoing, Progressing     Problem: Respiratory Compromise (Orthopaedic Fracture)  Goal: Effective Oxygenation and Ventilation  Outcome: Ongoing, Progressing     Problem: Skin Injury (Orthopaedic Fracture)  Goal: Skin Health and Integrity  Outcome: Ongoing, Progressing     Problem: Urinary Retention (Orthopaedic Fracture)  Goal: Effective Urinary Elimination  Outcome: Ongoing, Progressing   Goal Outcome Evaluation:

## 2022-02-19 NOTE — ED PROVIDER NOTES
"Subjective   Chief complaint: Patient is a pleasant 46-year-old female.  She fell and had an open proximal tibia fracture.  This was repaired here.  She was discharged in a splint.  For the last 24 hours she has been having increasing severe pain.  Feels like her toes have been swelling.  She tried her medication and her pain medicine is not helping it.  So she came here concerned about the amount of swelling as she has been put on blood thinner noticed some bruising to the lower thigh as well.  No numbness.  Pain is \"muscle spasming pain\".  No weakness.    Context: Patient had an open tib-fib fracture.    Duration: Pains been better over the last 36 hours but has had pain since surgery and before.    Timing: Pain waxes and wanes.  But is been present all day.    Severity: Pain is severe.  Waxes and wanes.    Associated Symptoms: Negative except as noted above.  Appropriate PPE was used.        PCP:  LMP:          Review of Systems   Constitutional: Negative.    HENT: Negative.    Respiratory: Negative.    Cardiovascular: Negative.    Gastrointestinal: Negative.    Genitourinary: Negative.    Musculoskeletal: Positive for arthralgias and myalgias.   Skin: Negative.    Neurological: Negative for numbness.       Past Medical History:   Diagnosis Date   • Asthma    • Bipolar affective (HCC)    • COPD (chronic obstructive pulmonary disease) (HCC)    • DDD (degenerative disc disease), cervical    • Hypertension    • PTSD (post-traumatic stress disorder)        Allergies   Allergen Reactions   • Oxycodone Shortness Of Breath and Itching   • Penicillins Anaphylaxis and Swelling   • Codeine Hives and Itching   • Buprenorphine Rash       Past Surgical History:   Procedure Laterality Date   • ANKLE OPEN REDUCTION INTERNAL FIXATION Right 2/11/2022    Procedure: ANKLE OPEN REDUCTION INTERNAL FIXATION;  Surgeon: Maurice Pugh II, MD;  Location: [facility] MAIN OR;  Service: Orthopedics;  Laterality: Right;   • CARPAL " TUNNEL RELEASE     • DILATATION AND CURETTAGE     • HYSTERECTOMY     • TIBIA IM NAILING/RODDING Right 2/11/2022    Procedure: TIBIA INTRAMEDULLARY NAIL/LAURI INSERTION WITH IRRIGATION AND DEBRIDEMENT;  Surgeon: Maurice Pugh II, MD;  Location: [facility] MAIN OR;  Service: Orthopedics;  Laterality: Right;       Family History   Problem Relation Age of Onset   • Heart disease Mother        Social History     Socioeconomic History   • Marital status:    Tobacco Use   • Smoking status: Current Every Day Smoker     Packs/day: 1.00     Types: Cigarettes   • Smokeless tobacco: Never Used   Substance and Sexual Activity   • Alcohol use: No   • Drug use: No   • Sexual activity: Defer           Objective   Physical Exam  Vitals and nursing note reviewed.   Constitutional:       Appearance: Normal appearance.   HENT:      Head: Normocephalic and atraumatic.   Eyes:      Extraocular Movements: Extraocular movements intact.      Pupils: Pupils are equal, round, and reactive to light.   Cardiovascular:      Rate and Rhythm: Normal rate and regular rhythm.   Pulmonary:      Effort: Pulmonary effort is normal.      Breath sounds: Normal breath sounds.   Abdominal:      Tenderness: There is no abdominal tenderness.   Musculoskeletal:      Cervical back: Normal range of motion.   Skin:     General: Skin is warm and dry.      Capillary Refill: Capillary refill takes less than 2 seconds.      Comments: Ecchymosis present to the right leg.  Posterior distal thigh   Neurological:      General: No focal deficit present.      Mental Status: She is alert and oriented to person, place, and time.   Psychiatric:         Mood and Affect: Mood normal.         Behavior: Behavior normal.         Thought Content: Thought content normal.         Judgment: Judgment normal.         Procedures           ED Course                                                 MDM  Number of Diagnoses or Management Options  Diagnosis management comments:  Patient has pulses adequate papillary refill.  There is no numbness.  I do not think at this point there is compartment syndrome.  However still potentially at risk for blood clot.  Also with increasing pain postoperative will observe and placed in the ER observation unit and watch overnight.  Have Ortho consult in the morning.  Patient verbalized understanding is okay to stay.       Amount and/or Complexity of Data Reviewed  Clinical lab tests: reviewed    Risk of Complications, Morbidity, and/or Mortality  Presenting problems: high    Patient Progress  Patient progress: stable      Final diagnoses:   None   Intractable postoperative pain    ED Disposition  ED Disposition     None          No follow-up provider specified.       Medication List      No changes were made to your prescriptions during this visit.          Reji Magaña,   02/19/22 0337       Reji Magaña,   02/19/22 0337

## 2022-02-19 NOTE — CONSULTS
Orthopaedic Surgery  Consult Note  Dr. NADINE Pugh II  [phone]    HPI:  Patient is a 46 y.o. Not  or  female who presents with increased pain in her right leg.  This patient had a horrific right leg injury with an open tibia fracture, tibial pilon fracture, and fibular fracture.  She underwent open reduction internal fixation with irrigation debridement and intramedullary nailing of the tibia.  Postoperatively she has had problems with pain control.  She called my office just couple days ago we sent her in a stronger pain medication.  She presented the hospital last night with increased pain.  Her compartments were soft and there was no concern for compartment syndrome.  Her splint was slightly loosened and she was admitted for pain control.  Her pain is much better with morphine and bed rest.  It is made worse when she is up moving around.    MEDICAL HISTORY  Past Medical History:   Diagnosis Date   • Asthma    • Bipolar affective (HCC)    • COPD (chronic obstructive pulmonary disease) ([facility])    • DDD (degenerative disc disease), cervical    • Hypertension    • PTSD (post-traumatic stress disorder)    ·   Past Surgical History:   Procedure Laterality Date   • ANKLE OPEN REDUCTION INTERNAL FIXATION Right 2/11/2022    Procedure: ANKLE OPEN REDUCTION INTERNAL FIXATION;  Surgeon: Maurice Pugh II, MD;  Location: [facility];  Service: Orthopedics;  Laterality: Right;   • CARPAL TUNNEL RELEASE     • DILATATION AND CURETTAGE     • HYSTERECTOMY     • TIBIA IM NAILING/RODDING Right 2/11/2022    Procedure: TIBIA INTRAMEDULLARY NAIL/LAURI INSERTION WITH IRRIGATION AND DEBRIDEMENT;  Surgeon: Maurice Pugh II, MD;  Location: [facility] OR;  Service: Orthopedics;  Laterality: Right;   ·   Prior to Admission medications    Medication Sig Start Date End Date Taking? Authorizing Provider   apixaban (ELIQUIS) 2.5 MG tablet tablet Take 1 tablet by mouth Every 12 (Twelve) Hours for 30  "days. Indications: Other - full anticoagulation 2/13/22 3/15/22 Yes Chrissie Griffith MD   budesonide-formoterol (SYMBICORT) 160-4.5 MCG/ACT inhaler Inhale 2 puffs 2 (Two) Times a Day. 2/13/22  Yes Chrissie Griffith MD   clindamycin (Cleocin) 300 MG capsule Take 1 capsule by mouth 3 (Three) Times a Day for 10 days. 2/13/22 2/23/22 Yes Chrissie Griffith MD   HYDROcodone-acetaminophen (NORCO) 7.5-325 MG per tablet Take 1 tablet by mouth Every 4 (Four) Hours As Needed for Moderate Pain . 2/13/22  Yes Chrissie Griffith MD   methocarbamol (ROBAXIN) 750 MG tablet Take 750 mg by mouth 3 (Three) Times a Day.   Yes Provider, MD Jacob   ondansetron ODT (Zofran ODT) 4 MG disintegrating tablet Place 1 tablet on the tongue Every 8 (Eight) Hours As Needed for Nausea or Vomiting. 2/13/22  Yes Chrissie Griffith MD   oxyCODONE-acetaminophen (PERCOCET) 7.5-325 MG per tablet Take 1 tablet by mouth every four hours as needed for pain 2/15/22  Yes    albuterol sulfate  (90 Base) MCG/ACT inhaler Inhale 2 puffs Every 4 (Four) Hours As Needed for Wheezing. 2/13/22   Chrissie Griffith MD   nicotine (NICODERM CQ) 21 MG/24HR patch Place 1 patch on the skin as directed by provider Daily. 2/14/22   Chrissie Griffith MD   ·   Allergies   Allergen Reactions   • Oxycodone Shortness Of Breath and Itching   • Penicillins Anaphylaxis and Swelling   • Codeine Hives and Itching   • Buprenorphine Rash   ·   ·   · There is no immunization history on file for this patient.  Social History     Tobacco Use   • Smoking status: Current Every Day Smoker     Packs/day: 1.00     Types: Cigarettes   • Smokeless tobacco: Never Used   Substance Use Topics   • Alcohol use: No   ·    Social History     Substance and Sexual Activity   Drug Use No   ·     VITALS: /61 (BP Location: Right arm, Patient Position: Lying)   Pulse 81   Temp 98.6 °F (37 °C) (Oral)   Resp 17   Ht 165.1 cm (65\")   Wt 55.8 kg (123 lb 0.3 oz)   SpO2 98%   BMI 20.47 kg/m²  Body mass index is " "20.47 kg/m².    PHYSICAL EXAM:   · CONSTITUTIONAL: No acute distress  · LUNGS: Equal chest rise, no shortness of air  · CARDIOVASCULAR: palpable peripheral pulses  · SKIN: no skin lesions in the area examined  · LYMPH: no lymphadenopathy in the area examined  · EXTREMITY: Right Lower Extremity  · Tenderness to Palpation: Splinted  · Gross Deformity: Leg is splinted  · Pulses: Capillary refill intact to the toes  · Sensation: Intact to the toes  · Motor: Able to wiggle her toes in the splint    RADIOLOGY REVIEW:   XR Tibia Fibula 2 View Right    Result Date: 2/12/2022  1. Status post ORIF with intramedullary nail fixation of the distal tibial fractures with improved alignment. 2. Status post plate and screw fixation of the distal fibula with improved alignment of the distal fibular fractures. 3. Oblique displaced proximal fibular fracture unchanged from prior study.  Electronically Signed By-Jose Momin MD On:2/12/2022 8:02 AM This report was finalized on [id number] by  Jose Momin MD.      LABS:   Results for the past 24 hours:   Recent Results (from the past 24 hour(s))   COVID-19,CEPHEID/BRENDA,COR/CHARO/PAD/AQUILES IN-HOUSE(OR EMERGENT/ADD-ON),NP SWAB IN TRANSPORT MEDIA 3-4 HR TAT, RT-PCR - Swab, Nasopharynx    Collection Time: 02/19/22  4:07 AM    Specimen: Nasopharynx; Swab   Result Value Ref Range    COVID19 Not Detected Not Detected - Ref. Range       IMPRESSION:  Patient is a 46 y.o. Not  or  female with right leg open tibia fracture, pilon fracture, and fibular fracture status post ORIF with intramedullary nailing of the tibia    PLAN:   · Admited to: Reji Magaña,    · Disposition: Pain seems better controlled now.  Not much to do at this point.  Unfortunately, this is a truly horrific injury that will cause her a fair amount of pain.  From my standpoint now that her pain is better controlled she can probably be discharged later today.    R \"Jarett\" Keaton GIVENS MD  Orthopaedic " Surgery  [facility]  [phone] - [place] [facility]  [phone] - [facility]

## 2022-02-19 NOTE — DISCHARGE SUMMARY
[facility]  DISCHARGE SUMMARY        Prepared For PCP:  Aminta Quezada PA-C    Patient Name: Jesus Ovalles  : 1975  MRN: [id number]      Date of Admission:   2022    Date of Discharge:  2022    Length of stay:  LOS: 0 days     Hospital Course     Presenting Problem:   Postoperative pain [G89.18]  Right leg pain [M79.604]      Active Hospital Problems    Diagnosis  POA   • Right leg pain [M79.604]  Yes      Resolved Hospital Problems   No resolved problems to display.           Hospital Course:  Jesus Ovalles is a 46 y.o. female who presented to the ER with a chief complaint of right lower extremity leg pain following fracture with surgical repair.  The patient denies any new injury.  She was seen by orthopedic surgery with notated severe fracture which is likely very painful.  The patient had addition of Lyrica and Flexeril to her treatment regime as well as a 25 % increase in her narcotic pain medicine.  The patient reports that she has had minimal improvement in the pain but wants to go home.  I offered to make additional adjustments to pain medication was declined.  Patient has had significant pain medication increase over the last several days as an outpatient.  I do not feel comfortable increasing the pain medicine further without monitoring as the patient denies chronic narcotics at home.          Recommendation for Outpatient Providers:             Reasons For Change In Medications and Indications for New Medications:        Day of Discharge     HPI:   46-year-old female presents to the ER with a chief complaint of right lower extremity pain which is not controlled by home pain medication.  Patient has fracture to the tib-fib with surgical repair, ORIF, on 2022.  Since that time the patient's pain has not been well controlled especially with movement.  The patient came to the emergency room for further evaluation.  Review of notes from orthopedic surgeon indicate that  the fracture was extensive and would likely be very painful.  The patient reports the underlying pain is exacerbated by muscle spasms.  The patient reports that she has previously been on Lyrica for chronic back pain but had ran out of her prescription and has not been taking this medication for quite a while.     Review of records with summary: On 2/11/2022.  The patient had fractured her lower extremity after falling on the ice.  The patient was discharged on clindamycin secondary to open fracture, Eliquis to prevent DVT with decreased mobility, and oral hydrocodone 7.5 mg for pain control.  The patient reports her pain was not controlled and she contacted the orthopedic surgeon's office with recommendation to double the dose of hydrocodone.  This did not significantly improve the patient's pain.  The patient was then changed to oxycodone 7.5 mg and again without adequate again was told that she could double the dose of oxycodone which would bring her to a total of oxycodone 15 mg every 4 hours.     Vital Signs:   Temp:  [98.3 °F (36.8 °C)-98.6 °F (37 °C)] 98.3 °F (36.8 °C)  Heart Rate:  [70-85] 70  Resp:  [16-17] 16  BP: ()/(45-67) 102/58     ROS:  Review of Systems   Musculoskeletal: Positive for muscle cramps.   Psychiatric/Behavioral: The patient is nervous/anxious.    All other systems reviewed and are negative.    Physical Exam  Vitals and nursing note reviewed.   Constitutional:       Appearance: Normal appearance.   HENT:      Head: Normocephalic and atraumatic.      Right Ear: External ear normal.      Left Ear: External ear normal.      Nose: Nose normal.      Mouth/Throat:      Mouth: Mucous membranes are dry.   Eyes:      General: No scleral icterus.        Right eye: No discharge.         Left eye: No discharge.      Extraocular Movements: Extraocular movements intact.      Conjunctiva/sclera: Conjunctivae normal.      Pupils: Pupils are equal, round, and reactive to light.   Cardiovascular:       Rate and Rhythm: Normal rate and regular rhythm.      Pulses: Normal pulses.      Heart sounds: Normal heart sounds.   Pulmonary:      Effort: Pulmonary effort is normal.      Breath sounds: Normal breath sounds.   Abdominal:      General: Bowel sounds are normal.      Palpations: Abdomen is soft.   Musculoskeletal:         General: Tenderness and signs of injury present. Normal range of motion.      Cervical back: Normal range of motion and neck supple.        Legs:    Skin:     General: Skin is warm and dry.      Capillary Refill: Capillary refill takes less than 2 seconds.   Neurological:      General: No focal deficit present.      Mental Status: She is alert and oriented to person, place, and time.   Psychiatric:         Mood and Affect: Mood normal.         Behavior: Behavior normal.         Thought Content: Thought content normal.         Judgment: Judgment normal.        Pertinent  and/or Most Recent Results     Results from last 7 days   Lab Units 02/19/22  0658   WBC 10*3/mm3 3.20*   HEMOGLOBIN g/dL 9.0*   HEMATOCRIT % 25.8*   PLATELETS 10*3/mm3 366   SODIUM mmol/L 135*   POTASSIUM mmol/L 3.9   CHLORIDE mmol/L 99   CO2 mmol/L 27.0   BUN mg/dL 10   CREATININE mg/dL 0.73   GLUCOSE mg/dL 101*   CALCIUM mg/dL 8.9           Invalid input(s): PROT, LABALBU        Invalid input(s): TG, LDLCALC, LDLREALC        Brief Urine Lab Results     None          Microbiology Results Abnormal     Procedure Component Value - Date/Time    COVID PRE-OP / PRE-PROCEDURE SCREENING ORDER (NO ISOLATION) - Swab, Nasopharynx [[id number]]  (Normal) Collected: 02/19/22 0407    Lab Status: Final result Specimen: Swab from Nasopharynx Updated: 02/19/22 0446    Narrative:      The following orders were created for panel order COVID PRE-OP / PRE-PROCEDURE SCREENING ORDER (NO ISOLATION) - Swab, Nasopharynx.  Procedure                               Abnormality         Status                     ---------                                -----------         ------                     COVID-19,CEPHEID/BRENDA,CO...[[id number]]  Normal              Final result                 Please view results for these tests on the individual orders.    COVID-19,CEPHEID/BRENDA,COR/CHARO/PAD/AQUILES IN-HOUSE(OR EMERGENT/ADD-ON),NP SWAB IN TRANSPORT MEDIA 3-4 HR TAT, RT-PCR - Swab, Nasopharynx [[id number]]  (Normal) Collected: 02/19/22 0407    Lab Status: Final result Specimen: Swab from Nasopharynx Updated: 02/19/22 0446     COVID19 Not Detected    Narrative:      Fact sheet for providers: https://www.fda.gov/media/103574/download     Fact sheet for patients: https://www.fda.gov/media/863677/download  Fact sheet for providers: https://www.fda.gov/media/493951/download     Fact sheet for patients: https://www.fda.gov/media/161546/download          XR Tibia Fibula 2 View Right    Result Date: 2/19/2022  Impression: Stable unchanged appearance of the right tibia and fibula including the fractures and ORIF hardware.    Electronically Signed By-Sameer Hurtado On:2/19/2022 8:16 AM This report was finalized on [id number] by  Sameer Hurtado .    XR Tibia Fibula 2 View Right    Result Date: 2/12/2022  Impression: 1. Status post ORIF with intramedullary nail fixation of the distal tibial fractures with improved alignment. 2. Status post plate and screw fixation of the distal fibula with improved alignment of the distal fibular fractures. 3. Oblique displaced proximal fibular fracture unchanged from prior study.  Electronically Signed By-Jose Momin MD On:2/12/2022 8:02 AM This report was finalized on [id number] by  Jose Momin MD.    XR Tibia Fibula 2 View Right    Result Date: 2/10/2022  Impression: Fractures of the tibia and fibula as discussed. No dislocation    Electronically Signed ByStephan Hurtado On:2/10/2022 7:09 AM This report was finalized on [id number] by  Sameer Hurtado .    XR Ankle 3+ View Right    Result Date: 2/10/2022  Impression: Fractures of the tibia and fibula  as discussed. No dislocation    Electronically Signed ByStephan Hurtado On:2/10/2022 7:09 AM This report was finalized on [id number] by  Yvon Pedraza    XR Chest 1 View    Result Date: 2/10/2022  Impression:  Negative chest x-ray. No evidence of acute cardiopulmonary disease.  Electronically Signed ByStephan Hurtado On:2/10/2022 7:10 AM This report was finalized on [id number] by  Sameer Hurtado .    CT Lower Extremity Right Without Contrast    Result Date: 2/10/2022  Impression: 1.Complex comminuted fracture of the mid to distal right tibia. There are multiple fracture fragments at the distal diaphysis with mild displacement. Multiple longitudinal fracture lines also extend through the more distal portion of the tibia extending through the distal metaphysis and epiphysis. There are nondisplaced fracture lines which extend through the articular surface at the anterior lateral aspect of the distal tibia. There is also a nondisplaced fracture line which extends through the medial malleolus. 2.Comminuted fractures are also noted involving the proximal and distal diaphyses of the fibula with associated mild displacement. 3.There is hemorrhage throughout the margins of the fracture involving the distal tibia as well as within the surrounding soft tissues. There is also evidence for soft tissue trauma at the anteromedial aspect of the distal leg. Air or gas is seen throughout the soft tissues of the mid distal leg related to the trauma. Electronically Signed: Mandeep Foreman MD 2/10/2022 9:13 EST                          Test Results Pending at Discharge        Procedures Performed           Consults:   Consults     Date and Time Order Name Status Description    2/19/2022  5:47 AM Inpatient Orthopedic Surgery Consult Completed             Discharge Details        Discharge Medications      New Medications      Instructions Start Date   cyclobenzaprine 10 MG tablet  Commonly known as: FLEXERIL   10 mg, Oral, Every 8 Hours  Scheduled      oxyCODONE 10 MG tablet  Commonly known as: ROXICODONE   10 mg, Oral, Every 4 Hours PRN      pregabalin 75 MG capsule  Commonly known as: LYRICA   75 mg, Oral, Every 12 Hours Scheduled         Continue These Medications      Instructions Start Date   albuterol sulfate  (90 Base) MCG/ACT inhaler  Commonly known as: PROVENTIL HFA;VENTOLIN HFA;PROAIR HFA   2 puffs, Inhalation, Every 4 Hours PRN      budesonide-formoterol 160-4.5 MCG/ACT inhaler  Commonly known as: SYMBICORT   2 puffs, Inhalation, 2 Times Daily      clindamycin 300 MG capsule  Commonly known as: Cleocin   300 mg, Oral, 3 Times Daily      Eliquis 2.5 MG tablet tablet  Generic drug: apixaban   2.5 mg, Oral, Every 12 Hours Scheduled      nicotine 21 MG/24HR patch  Commonly known as: NICODERM CQ   1 patch, Transdermal, Every 24 Hours Scheduled      ondansetron ODT 4 MG disintegrating tablet  Commonly known as: Zofran ODT   4 mg, Translingual, Every 8 Hours PRN         Stop These Medications    methocarbamol 750 MG tablet  Commonly known as: ROBAXIN     oxyCODONE-acetaminophen 7.5-325 MG per tablet  Commonly known as: PERCOCET            Allergies   Allergen Reactions   • Oxycodone Shortness Of Breath and Itching   • Penicillins Anaphylaxis and Swelling   • Codeine Hives and Itching   • Buprenorphine Rash         Discharge Disposition:  Home or Self Care    Diet:  Hospital:  Diet Order   Procedures   • Diet Regular         Discharge Activity: as tolerated and per recommendation from physical therapy        CODE STATUS:    Code Status and Medical Interventions:   Ordered at: 02/19/22 4710     Level Of Support Discussed With:    Patient     Code Status (Patient has no pulse and is not breathing):    CPR (Attempt to Resuscitate)     Medical Interventions (Patient has pulse or is breathing):    Full Support         Follow-up Appointments  No future appointments.          Condition on Discharge:      Stable      This patient has been examined  wearing appropriate Personal Protective Equipment. 02/19/22      Electronically signed by GUI Ramesh, 02/19/22, 2:00 PM EST.      Time: I spent  60  minutes on this discharge activity which included face-to-face encounter with the patient/reviewing the data in the system/coordination of the care with the nursing staff as well as consultants/documentation/entering orders.

## 2022-02-19 NOTE — H&P
[facility] H&P    Patient Name: Jesus Ovalles  : 1975  MRN: [id number]  Primary Care Physician: Aminta Quezada PA-C  Date of admission: 2022     Patient Care Team:  Aminta Quezada PA-C as PCP - General (Internal Medicine)          Subjective   History Present Illness     Chief Complaint:   Chief Complaint   Patient presents with   • Leg Pain         Ms. Ovalles is a 46 y.o.  presents to [facility] complaining of right lower extremity pain.      46-year-old female presents to the ER with a chief complaint of right lower extremity pain which is not controlled by home pain medication.  Patient has fracture to the tib-fib with surgical repair, ORIF, on 2022.  Since that time the patient's pain has not been well controlled especially with movement.  The patient came to the emergency room for further evaluation.  Review of notes from orthopedic surgeon indicate that the fracture was extensive and would likely be very painful.  The patient reports the underlying pain is exacerbated by muscle spasms.  The patient reports that she has previously been on Lyrica for chronic back pain but had ran out of her prescription and has not been taking this medication for quite a while.    Review of records with summary: On 2022.  The patient had fractured her lower extremity after falling on the ice.  The patient was discharged on clindamycin secondary to open fracture, Eliquis to prevent DVT with decreased mobility, and oral hydrocodone 7.5 mg for pain control.  The patient reports her pain was not controlled and she contacted the orthopedic surgeon's office with recommendation to double the dose of hydrocodone.  This did not significantly improve the patient's pain.  The patient was then changed to oxycodone 7.5 mg and again without adequate again was told that she could double the dose of oxycodone which would bring her to a total of oxycodone 15 mg every 4 hours.       Review of  Systems   Constitutional: Negative for chills and fever.   Musculoskeletal: Positive for joint pain, joint swelling and muscle cramps.   Gastrointestinal: Positive for nausea.   All other systems reviewed and are negative.          Personal History     Past Medical History:   Past Medical History:   Diagnosis Date   • Asthma    • Bipolar affective (HCC)    • COPD (chronic obstructive pulmonary disease) (HCC)    • DDD (degenerative disc disease), cervical    • Hypertension    • PTSD (post-traumatic stress disorder)        Surgical History:      Past Surgical History:   Procedure Laterality Date   • ANKLE OPEN REDUCTION INTERNAL FIXATION Right 2/11/2022    Procedure: ANKLE OPEN REDUCTION INTERNAL FIXATION;  Surgeon: Maurice Pugh II, MD;  Location: [facility];  Service: Orthopedics;  Laterality: Right;   • CARPAL TUNNEL RELEASE     • DILATATION AND CURETTAGE     • HYSTERECTOMY     • TIBIA IM NAILING/RODDING Right 2/11/2022    Procedure: TIBIA INTRAMEDULLARY NAIL/LAURI INSERTION WITH IRRIGATION AND DEBRIDEMENT;  Surgeon: Maurice Pugh II, MD;  Location: [facility];  Service: Orthopedics;  Laterality: Right;           Family History: family history includes Heart disease in her mother. Otherwise pertinent FHx was reviewed and unremarkable.     Social History:  reports that she has been smoking cigarettes. She has been smoking about 1.00 pack per day. She has never used smokeless tobacco. She reports that she does not drink alcohol and does not use drugs.      Medications:  Prior to Admission medications    Medication Sig Start Date End Date Taking? Authorizing Provider   apixaban (ELIQUIS) 2.5 MG tablet tablet Take 1 tablet by mouth Every 12 (Twelve) Hours for 30 days. Indications: Other - full anticoagulation 2/13/22 3/15/22 Yes Chrissie Griffith MD   budesonide-formoterol (SYMBICORT) 160-4.5 MCG/ACT inhaler Inhale 2 puffs 2 (Two) Times a Day. 2/13/22  Yes Chrissie Griffith MD   clindamycin  (Cleocin) 300 MG capsule Take 1 capsule by mouth 3 (Three) Times a Day for 10 days. 2/13/22 2/23/22 Yes Chrissie Griffith MD   methocarbamol (ROBAXIN) 750 MG tablet Take 750 mg by mouth 3 (Three) Times a Day.   Yes Provider, MD Jacob   ondansetron ODT (Zofran ODT) 4 MG disintegrating tablet Place 1 tablet on the tongue Every 8 (Eight) Hours As Needed for Nausea or Vomiting. 2/13/22  Yes Chrissie Griffith MD   oxyCODONE-acetaminophen (PERCOCET) 7.5-325 MG per tablet Take 1 tablet by mouth every four hours as needed for pain 2/15/22  Yes    HYDROcodone-acetaminophen (NORCO) 7.5-325 MG per tablet Take 1 tablet by mouth Every 4 (Four) Hours As Needed for Moderate Pain . 2/13/22 2/19/22 Yes Chrissie Griffith MD   albuterol sulfate  (90 Base) MCG/ACT inhaler Inhale 2 puffs Every 4 (Four) Hours As Needed for Wheezing. 2/13/22   Chrissie Griffith MD   nicotine (NICODERM CQ) 21 MG/24HR patch Place 1 patch on the skin as directed by provider Daily. 2/14/22   Chrissie Griffith MD       Allergies:    Allergies   Allergen Reactions   • Oxycodone Shortness Of Breath and Itching   • Penicillins Anaphylaxis and Swelling   • Codeine Hives and Itching   • Buprenorphine Rash       Objective   Objective     Vital Signs  Temp:  [98.5 °F (36.9 °C)-98.6 °F (37 °C)] 98.6 °F (37 °C)  Heart Rate:  [70-85] 81  Resp:  [16-17] 17  BP: ()/(45-67) 101/61  SpO2:  [94 %-98 %] 98 %  on   ;   Device (Oxygen Therapy): room air  Body mass index is 20.47 kg/m².    Physical Exam  Vitals and nursing note reviewed.   Constitutional:       Appearance: Normal appearance.   HENT:      Head: Normocephalic and atraumatic.      Right Ear: External ear normal.      Left Ear: External ear normal.      Nose: Nose normal.      Mouth/Throat:      Mouth: Mucous membranes are dry.   Eyes:      General: No scleral icterus.        Right eye: No discharge.         Left eye: No discharge.      Extraocular Movements: Extraocular movements intact.       Conjunctiva/sclera: Conjunctivae normal.      Pupils: Pupils are equal, round, and reactive to light.   Cardiovascular:      Rate and Rhythm: Normal rate and regular rhythm.      Pulses: Normal pulses.      Heart sounds: Normal heart sounds.   Pulmonary:      Effort: Pulmonary effort is normal.      Breath sounds: Normal breath sounds.   Abdominal:      General: Bowel sounds are normal.      Palpations: Abdomen is soft.   Musculoskeletal:         General: Tenderness and signs of injury present. Normal range of motion.      Cervical back: Normal range of motion and neck supple.        Legs:    Skin:     General: Skin is warm and dry.      Capillary Refill: Capillary refill takes less than 2 seconds.   Neurological:      General: No focal deficit present.      Mental Status: She is alert and oriented to person, place, and time.   Psychiatric:         Mood and Affect: Mood normal.         Behavior: Behavior normal.         Thought Content: Thought content normal.         Judgment: Judgment normal.           Results Review:  I have personally reviewed most recent lab results and radiology images and interpretations and agree with findings.    Results from last 7 days   Lab Units 02/19/22  0658   WBC 10*3/mm3 3.20*   HEMOGLOBIN g/dL 9.0*   HEMATOCRIT % 25.8*   PLATELETS 10*3/mm3 366     Results from last 7 days   Lab Units 02/19/22  0658   SODIUM mmol/L 135*   POTASSIUM mmol/L 3.9   CHLORIDE mmol/L 99   CO2 mmol/L 27.0   BUN mg/dL 10   CREATININE mg/dL 0.73   GLUCOSE mg/dL 101*   CALCIUM mg/dL 8.9     Estimated Creatinine Clearance: 84.8 mL/min (by C-G formula based on SCr of 0.73 mg/dL).  Brief Urine Lab Results     None          Microbiology Results (last 10 days)     Procedure Component Value - Date/Time    COVID PRE-OP / PRE-PROCEDURE SCREENING ORDER (NO ISOLATION) - Swab, Nasopharynx [[id number]]  (Normal) Collected: 02/19/22 0785    Lab Status: Final result Specimen: Swab from Nasopharynx Updated: 02/19/22 9480     Narrative:      The following orders were created for panel order COVID PRE-OP / PRE-PROCEDURE SCREENING ORDER (NO ISOLATION) - Swab, Nasopharynx.  Procedure                               Abnormality         Status                     ---------                               -----------         ------                     COVID-19,CEPHEID/BRENDA,CO...[[id number]]  Normal              Final result                 Please view results for these tests on the individual orders.    COVID-19,CEPHEID/BRENDA,COR/CHARO/PAD/AQUILES IN-HOUSE(OR EMERGENT/ADD-ON),NP SWAB IN TRANSPORT MEDIA 3-4 HR TAT, RT-PCR - Swab, Nasopharynx [[id number]]  (Normal) Collected: 02/19/22 0407    Lab Status: Final result Specimen: Swab from Nasopharynx Updated: 02/19/22 0446     COVID19 Not Detected    Narrative:      Fact sheet for providers: https://www.fda.gov/media/412124/download     Fact sheet for patients: https://www.fda.gov/media/814167/download  Fact sheet for providers: https://www.fda.gov/media/297758/download     Fact sheet for patients: https://www.fda.gov/media/275358/download    MRSA Screen, PCR (Inpatient) - Swab, Nares [[id number]]  (Normal) Collected: 02/11/22 0824    Lab Status: Final result Specimen: Swab from Nares Updated: 02/11/22 0942     MRSA PCR No MRSA Detected    COVID PRE-OP / PRE-PROCEDURE SCREENING ORDER (NO ISOLATION) - Swab, Nasopharynx [[id number]]  (Normal) Collected: 02/10/22 0855    Lab Status: Final result Specimen: Swab from Nasopharynx Updated: 02/10/22 0920    Narrative:      The following orders were created for panel order COVID PRE-OP / PRE-PROCEDURE SCREENING ORDER (NO ISOLATION) - Swab, Nasopharynx.  Procedure                               Abnormality         Status                     ---------                               -----------         ------                     COVID-19,CEPHEID/BRENDA,CO...[[id number]]  Normal              Final result                 Please view results for these tests on the individual  orders.    COVID-19,CEPHEID/BRENDA,COR/CHARO/PAD/AQUILES IN-HOUSE(OR EMERGENT/ADD-ON),NP SWAB IN TRANSPORT MEDIA 3-4 HR TAT, RT-PCR - Swab, Nasopharynx [[id number]]  (Normal) Collected: 02/10/22 0855    Lab Status: Final result Specimen: Swab from Nasopharynx Updated: 02/10/22 0920     COVID19 Not Detected    Narrative:      Fact sheet for providers: https://www.fda.gov/media/071146/download     Fact sheet for patients: https://www.fda.gov/media/678717/download  Fact sheet for providers: https://www.fda.gov/media/293261/download    Fact sheet for patients: https://www.fda.gov/media/238319/download    Test performed by PCR.          ECG/EMG Results (most recent)     None                  XR Tibia Fibula 2 View Right    Result Date: 2/19/2022  Stable unchanged appearance of the right tibia and fibula including the fractures and ORIF hardware.    Electronically Signed By-Sameer Hurtado On:2/19/2022 8:16 AM This report was finalized on [id number] by  Sameer Hurtado, .    XR Tibia Fibula 2 View Right    Result Date: 2/12/2022  1. Status post ORIF with intramedullary nail fixation of the distal tibial fractures with improved alignment. 2. Status post plate and screw fixation of the distal fibula with improved alignment of the distal fibular fractures. 3. Oblique displaced proximal fibular fracture unchanged from prior study.  Electronically Signed By-Jose Momin MD On:2/12/2022 8:02 AM This report was finalized on [id number] by  Jose Momin MD.        Estimated Creatinine Clearance: 84.8 mL/min (by C-G formula based on SCr of 0.73 mg/dL).    Assessment/Plan   Assessment/Plan       Active Hospital Problems    Diagnosis  POA   • Right leg pain [M79.604]  Yes      Resolved Hospital Problems   No resolved problems to display.     Right lower extremity pain--secondary to tib-fib fracture: Orthopedic consult to evaluate for complications; Analgesics and antiemetics; add Toradol x1; add Lyrica 75 mg twice daily; add Robaxin; continue  Cleocin  -I would definitely like to add adjunct to opioid pain medicine but with patient on Eliquis frequent NSAIDs would not be discouraged    Anemia, hemoglobin 9.0 with comparison 14 on 2/10/2022 which likely represents blood loss from fracture: Repeat CBC    Chronic back pain    Asthma, chronic: Continue Symbicort; add albuterol    DVT prophylaxis: Continue apixaban      VTE Prophylaxis -   Mechanical Order History:       Pharmalogical Order History:      Ordered     Dose Route Frequency Stop    02/19/22 1014  apixaban (ELIQUIS) tablet 2.5 mg         2.5 mg PO Every 12 Hours Scheduled 03/15/22 0859                CODE STATUS:    Code Status and Medical Interventions:   Ordered at: 02/19/22 0344     Level Of Support Discussed With:    Patient     Code Status (Patient has no pulse and is not breathing):    CPR (Attempt to Resuscitate)     Medical Interventions (Patient has pulse or is breathing):    Full Support       This patient has been examined wearing personal protective equipment.     I discussed the patient's findings and my recommendations with patient and nursing staff.      Signature:Electronically signed by GUI Ramesh, 02/19/22, 10:41 AM EST.

## 2022-02-19 NOTE — PLAN OF CARE
Goal Outcome Evaluation:    Discussed with patient current home situation and pt reports no deficits with mobility including going up/down steps. Pt's main c/o is pain. Applied ice to R knee and educated to continue icing entire RLE at home to facilitate reduction in pain , swelling. Pt does not have insurance coverage for  PT follow up but currently independent with ALLYN lou on RLE therefore safe to return home with family.

## 2022-02-19 NOTE — OUTREACH NOTE
General Surgery Week 2 Survey      Responses   [facility] patient discharged from? [facility]   Does the patient have one of the following disease processes/diagnoses(primary or secondary)? General Surgery   Week 2 attempt successful? No   Unsuccessful attempts Attempt 1   Rescheduled Revoked   Revoke Readmitted          Terri Yun RN

## 2022-02-19 NOTE — PLAN OF CARE
Problem: Adult Inpatient Plan of Care  Goal: Plan of Care Review  Outcome: Met  Flowsheets (Taken 2/19/2022 1422)  Progress: improving  Plan of Care Reviewed With: patient  Outcome Summary: patient seen and cleared by ortho--Dr. Pugh, will be discharged home on flexeril/roxicodone/lyrica, pt stable, VSS, SR on the monitor  Goal: Patient-Specific Goal (Individualized)  Outcome: Met  Goal: Absence of Hospital-Acquired Illness or Injury  Outcome: Met  Intervention: Identify and Manage Fall Risk  Recent Flowsheet Documentation  Taken 2/19/2022 1316 by Maryan Rodriguez, RN  Safety Promotion/Fall Prevention: safety round/check completed  Taken 2/19/2022 1100 by Maryan Rodriguez RN  Safety Promotion/Fall Prevention: safety round/check completed  Taken 2/19/2022 0920 by Maryan Rodriguez RN  Safety Promotion/Fall Prevention: safety round/check completed  Taken 2/19/2022 0720 by Maryan Rodriguez RN  Safety Promotion/Fall Prevention:   safety round/check completed   activity supervised   assistive device/personal items within reach   clutter free environment maintained   fall prevention program maintained   lighting adjusted   mobility aid in reach   nonskid shoes/slippers when out of bed   room organization consistent  Intervention: Prevent Skin Injury  Recent Flowsheet Documentation  Taken 2/19/2022 0720 by Maryan Rodriguez, RN  Body Position:   supine   sitting up in bed   position changed independently   position maintained  Goal: Optimal Comfort and Wellbeing  Outcome: Met  Intervention: Provide Person-Centered Care  Recent Flowsheet Documentation  Taken 2/19/2022 0720 by Maryan Rodriguez RN  Trust Relationship/Rapport:   care explained   choices provided   emotional support provided   questions answered   empathic listening provided   questions encouraged   reassurance provided   thoughts/feelings acknowledged  Goal: Readiness for Transition of Care  Outcome: Met     Problem: Pain Acute  Goal: Optimal Pain  Control  Outcome: Met  Intervention: Develop Pain Management Plan  Recent Flowsheet Documentation  Taken 2/19/2022 0954 by Maryan Rodriguez, RN  Pain Management Interventions: see MAR  Intervention: Prevent or Manage Pain  Recent Flowsheet Documentation  Taken 2/19/2022 0720 by Maryan Rodriguez, RN  Sensory Stimulation Regulation: care clustered  Sleep/Rest Enhancement: awakenings minimized  Intervention: Optimize Psychosocial Wellbeing  Recent Flowsheet Documentation  Taken 2/19/2022 0720 by Maryan Rodriguez, RN  Supportive Measures: active listening utilized  Diversional Activities:   television   smartphone     Problem: Bleeding (Orthopaedic Fracture)  Goal: Absence of Bleeding  Outcome: Met     Problem: Bowel Elimination Impaired (Orthopaedic Fracture)  Goal: Effective Bowel Elimination  Outcome: Met     Problem: Delayed Union/Nonunion (Orthopaedic Fracture)  Goal: Fracture Stability  Outcome: Met     Problem: Embolism (Orthopaedic Fracture)  Goal: Absence of Embolism Signs and Symptoms  Outcome: Met     Problem: Functional Ability Impaired (Orthopaedic Fracture)  Goal: Optimal Functional Ability  Outcome: Met  Intervention: Optimize Functional Ability  Recent Flowsheet Documentation  Taken 2/19/2022 0720 by Maryan Rodriguez RN  Activity Management: activity adjusted per tolerance  Positioning/Transfer Devices:   pillows   in use     Problem: Infection (Orthopaedic Fracture)  Goal: Absence of Infection Signs and Symptoms  Outcome: Met     Problem: Neurovascular Compromise (Orthopaedic Fracture)  Goal: Effective Tissue Perfusion  Outcome: Met  Intervention: Prevent or Manage Compartment Syndrome  Recent Flowsheet Documentation  Taken 2/19/2022 0720 by Maryan Rodriguez RN  Compartment Syndrome Surveillance: no pain with passive muscle stretch     Problem: Pain (Orthopaedic Fracture)  Goal: Acceptable Pain Control  Outcome: Met  Intervention: Manage Acute Orthopaedic-Related Pain  Recent Flowsheet Documentation  Taken  2/19/2022 0954 by Maryan Rodriguez RN  Pain Management Interventions: see MAR  Taken 2/19/2022 0720 by Maryan Rodriguez RN  Diversional Activities:   television   smartphone     Problem: Respiratory Compromise (Orthopaedic Fracture)  Goal: Effective Oxygenation and Ventilation  Outcome: Met  Intervention: Promote Airway Secretion Clearance  Recent Flowsheet Documentation  Taken 2/19/2022 0720 by Maryan Rodriguez RN  Cough And Deep Breathing: done independently per patient  Intervention: Optimize Oxygenation and Ventilation  Recent Flowsheet Documentation  Taken 2/19/2022 0720 by Maryan Rodriguez RN  Airway/Ventilation Management: airway patency maintained     Problem: Skin Injury (Orthopaedic Fracture)  Goal: Skin Health and Integrity  Outcome: Met  Intervention: Promote Skin Integrity  Recent Flowsheet Documentation  Taken 2/19/2022 0720 by Maryan Rodriguez RN  Head of Bed (HOB): HOB at 30-45 degrees     Problem: Urinary Retention (Orthopaedic Fracture)  Goal: Effective Urinary Elimination  Outcome: Met     Problem: Skin Injury Risk Increased  Goal: Skin Health and Integrity  Outcome: Met  Intervention: Optimize Skin Protection  Recent Flowsheet Documentation  Taken 2/19/2022 0720 by Maryan Rodriguez RN  Head of Bed (HOB): HOB at 30-45 degrees     Problem: Fall Injury Risk  Goal: Absence of Fall and Fall-Related Injury  Outcome: Met  Intervention: Identify and Manage Contributors to Fall Injury Risk  Recent Flowsheet Documentation  Taken 2/19/2022 0720 by Maryan Rodriguez RN  Medication Review/Management: medications reviewed  Intervention: Promote Injury-Free Environment  Recent Flowsheet Documentation  Taken 2/19/2022 1316 by Maryan Rodriguez RN  Safety Promotion/Fall Prevention: safety round/check completed  Taken 2/19/2022 1100 by Maryan Rodriguez RN  Safety Promotion/Fall Prevention: safety round/check completed  Taken 2/19/2022 0920 by Maryan Rodriguez RN  Safety Promotion/Fall Prevention: safety round/check  completed  Taken 2/19/2022 0720 by Maryan Rodriguez, RN  Safety Promotion/Fall Prevention:   safety round/check completed   activity supervised   assistive device/personal items within reach   clutter free environment maintained   fall prevention program maintained   lighting adjusted   mobility aid in reach   nonskid shoes/slippers when out of bed   room organization consistent   Goal Outcome Evaluation:  Plan of Care Reviewed With: patient        Progress: improving  Outcome Summary: patient seen and cleared by ortho--Dr. Pugh, will be discharged home on flexeril/roxicodone/lyrica, pt stable, VSS, SR on the monitor

## 2022-02-21 NOTE — CASE MANAGEMENT/SOCIAL WORK
Case Management Discharge Note                Selected Continued Care - Discharged on 2/19/2022 Admission date: 2/19/2022 - Discharge disposition: Home or Self Care          Final Discharge Disposition Code: 01 - home or self-care

## 2022-02-21 NOTE — PAYOR COMM NOTE
"UTILIZATION REVIEW  BENITO DONALDSON RN   PH:   FAX: [phone]    [facility]  NPI# [id number]  TID # [id number]  ============================    REQUEST FOR OBSERVATION ADMISSION  ADMIT 2/19/22 AT 0342  DISCHARGED 2/19/2022 AT 1358  ---------------------------------------------------------------------                  Jesus Ovalles (46 y.o. Female)             Date of Birth Social Security Number Address Home Phone MRN    1975  [address] [place] [address]  [id number]    Jain Marital Status             None        Admission Date Admission Type Admitting Provider Attending Provider Department, Room/Bed    2/19/22 Emergency Reji Magaña DO  [facility] OBSERVATION, 115/1    Discharge Date Discharge Disposition Discharge Destination          2/19/2022 Home or Self Care              Attending Provider: (none)   Allergies: Oxycodone, Penicillins, Codeine, Buprenorphine    Isolation: None   Infection: None   Code Status: Prior   Advance Care Planning Activity    Ht: 165.1 cm (65\")   Wt: 55.8 kg (123 lb 0.3 oz)    Admission Cmt: None   Principal Problem: None                Active Insurance as of 2/19/2022     Primary Coverage     Payor Plan Insurance Group Employer/Plan Group    [facility] MEDICARE REPLACEMENT [employer] MEDICARE REPLACEMENT [id number]     Payor Plan Address Payor Plan Phone Number Payor Plan Fax Number Effective Dates    [address] [address] [phone]  2/1/2022 - None Entered    [facility] [address]       Subscriber Name Subscriber Birth Date Member ID       MARIA TERESA,JESUS FUNEZ 1975 [id number]           Secondary Coverage     Payor Plan Insurance Group Employer/Plan Group    [place]      Payor Plan Address Payor Plan Phone Number Payor Plan Fax Number Effective Dates    [address]   11/14/2020 - None Entered    [place] [address]       Subscriber Name Subscriber Birth Date Member ID       JESUS OVALLES ARMIN 1975 " [id number]                 Emergency Contacts      (Rel.) Home Phone Work Phone Mobile Phone    Mat Ovalles (Spouse) -- -- [phone]    Janneth Graham (Daughter) -- -- [phone]               History & Physical      Muriel BoggsGUI at 22 1020          [facility] H&P    Patient Name: Jesus Ovalles  : 1975  MRN: [id number]  Primary Care Physician: Aminta Quezada PA-C  Date of admission: 2022     Patient Care Team:  Aminta Quezada PA-C as PCP - General (Internal Medicine)          Subjective   History Present Illness     Chief Complaint:   Chief Complaint   Patient presents with   • Leg Pain         Ms. Ovalles is a 46 y.o.  presents to [facility] complaining of right lower extremity pain.      46-year-old female presents to the ER with a chief complaint of right lower extremity pain which is not controlled by home pain medication.  Patient has fracture to the tib-fib with surgical repair, ORIF, on 2022.  Since that time the patient's pain has not been well controlled especially with movement.  The patient came to the emergency room for further evaluation.  Review of notes from orthopedic surgeon indicate that the fracture was extensive and would likely be very painful.  The patient reports the underlying pain is exacerbated by muscle spasms.  The patient reports that she has previously been on Lyrica for chronic back pain but had ran out of her prescription and has not been taking this medication for quite a while.    Review of records with summary: On 2022.  The patient had fractured her lower extremity after falling on the ice.  The patient was discharged on clindamycin secondary to open fracture, Eliquis to prevent DVT with decreased mobility, and oral hydrocodone 7.5 mg for pain control.  The patient reports her pain was not controlled and she contacted the orthopedic surgeon's office with recommendation to double the dose of  hydrocodone.  This did not significantly improve the patient's pain.  The patient was then changed to oxycodone 7.5 mg and again without adequate again was told that she could double the dose of oxycodone which would bring her to a total of oxycodone 15 mg every 4 hours.       Review of Systems   Constitutional: Negative for chills and fever.   Musculoskeletal: Positive for joint pain, joint swelling and muscle cramps.   Gastrointestinal: Positive for nausea.   All other systems reviewed and are negative.          Personal History     Past Medical History:   Past Medical History:   Diagnosis Date   • Asthma    • Bipolar affective (HCC)    • COPD (chronic obstructive pulmonary disease) (HCC)    • DDD (degenerative disc disease), cervical    • Hypertension    • PTSD (post-traumatic stress disorder)        Surgical History:      Past Surgical History:   Procedure Laterality Date   • ANKLE OPEN REDUCTION INTERNAL FIXATION Right 2/11/2022    Procedure: ANKLE OPEN REDUCTION INTERNAL FIXATION;  Surgeon: Maurice Pugh II, MD;  Location: [facility];  Service: Orthopedics;  Laterality: Right;   • CARPAL TUNNEL RELEASE     • DILATATION AND CURETTAGE     • HYSTERECTOMY     • TIBIA IM NAILING/RODDING Right 2/11/2022    Procedure: TIBIA INTRAMEDULLARY NAIL/LAURI INSERTION WITH IRRIGATION AND DEBRIDEMENT;  Surgeon: Maurice Pugh II, MD;  Location: [facility];  Service: Orthopedics;  Laterality: Right;           Family History: family history includes Heart disease in her mother. Otherwise pertinent FHx was reviewed and unremarkable.     Social History:  reports that she has been smoking cigarettes. She has been smoking about 1.00 pack per day. She has never used smokeless tobacco. She reports that she does not drink alcohol and does not use drugs.      Medications:  Prior to Admission medications    Medication Sig Start Date End Date Taking? Authorizing Provider   apixaban (ELIQUIS) 2.5 MG tablet tablet  Take 1 tablet by mouth Every 12 (Twelve) Hours for 30 days. Indications: Other - full anticoagulation 2/13/22 3/15/22 Yes Chrissie Griffith MD   budesonide-formoterol (SYMBICORT) 160-4.5 MCG/ACT inhaler Inhale 2 puffs 2 (Two) Times a Day. 2/13/22  Yes Chrissie Griffith MD   clindamycin (Cleocin) 300 MG capsule Take 1 capsule by mouth 3 (Three) Times a Day for 10 days. 2/13/22 2/23/22 Yes Chrissie Griffith MD   methocarbamol (ROBAXIN) 750 MG tablet Take 750 mg by mouth 3 (Three) Times a Day.   Yes Provider, MD Jacob   ondansetron ODT (Zofran ODT) 4 MG disintegrating tablet Place 1 tablet on the tongue Every 8 (Eight) Hours As Needed for Nausea or Vomiting. 2/13/22  Yes Chrissie Griffith MD   oxyCODONE-acetaminophen (PERCOCET) 7.5-325 MG per tablet Take 1 tablet by mouth every four hours as needed for pain 2/15/22  Yes    HYDROcodone-acetaminophen (NORCO) 7.5-325 MG per tablet Take 1 tablet by mouth Every 4 (Four) Hours As Needed for Moderate Pain . 2/13/22 2/19/22 Yes Chrissie Griffith MD   albuterol sulfate  (90 Base) MCG/ACT inhaler Inhale 2 puffs Every 4 (Four) Hours As Needed for Wheezing. 2/13/22   Chrissie Griffith MD   nicotine (NICODERM CQ) 21 MG/24HR patch Place 1 patch on the skin as directed by provider Daily. 2/14/22   Chrissie Griffith MD       Allergies:    Allergies   Allergen Reactions   • Oxycodone Shortness Of Breath and Itching   • Penicillins Anaphylaxis and Swelling   • Codeine Hives and Itching   • Buprenorphine Rash       Objective   Objective     Vital Signs  Temp:  [98.5 °F (36.9 °C)-98.6 °F (37 °C)] 98.6 °F (37 °C)  Heart Rate:  [70-85] 81  Resp:  [16-17] 17  BP: ()/(45-67) 101/61  SpO2:  [94 %-98 %] 98 %  on   ;   Device (Oxygen Therapy): room air  Body mass index is 20.47 kg/m².    Physical Exam  Vitals and nursing note reviewed.   Constitutional:       Appearance: Normal appearance.   HENT:      Head: Normocephalic and atraumatic.      Right Ear: External ear normal.      Left Ear: External  ear normal.      Nose: Nose normal.      Mouth/Throat:      Mouth: Mucous membranes are dry.   Eyes:      General: No scleral icterus.        Right eye: No discharge.         Left eye: No discharge.      Extraocular Movements: Extraocular movements intact.      Conjunctiva/sclera: Conjunctivae normal.      Pupils: Pupils are equal, round, and reactive to light.   Cardiovascular:      Rate and Rhythm: Normal rate and regular rhythm.      Pulses: Normal pulses.      Heart sounds: Normal heart sounds.   Pulmonary:      Effort: Pulmonary effort is normal.      Breath sounds: Normal breath sounds.   Abdominal:      General: Bowel sounds are normal.      Palpations: Abdomen is soft.   Musculoskeletal:         General: Tenderness and signs of injury present. Normal range of motion.      Cervical back: Normal range of motion and neck supple.        Legs:    Skin:     General: Skin is warm and dry.      Capillary Refill: Capillary refill takes less than 2 seconds.   Neurological:      General: No focal deficit present.      Mental Status: She is alert and oriented to person, place, and time.   Psychiatric:         Mood and Affect: Mood normal.         Behavior: Behavior normal.         Thought Content: Thought content normal.         Judgment: Judgment normal.           Results Review:  I have personally reviewed most recent lab results and radiology images and interpretations and agree with findings.    Results from last 7 days   Lab Units 02/19/22  0658   WBC 10*3/mm3 3.20*   HEMOGLOBIN g/dL 9.0*   HEMATOCRIT % 25.8*   PLATELETS 10*3/mm3 366     Results from last 7 days   Lab Units 02/19/22  0658   SODIUM mmol/L 135*   POTASSIUM mmol/L 3.9   CHLORIDE mmol/L 99   CO2 mmol/L 27.0   BUN mg/dL 10   CREATININE mg/dL 0.73   GLUCOSE mg/dL 101*   CALCIUM mg/dL 8.9     Estimated Creatinine Clearance: 84.8 mL/min (by C-G formula based on SCr of 0.73 mg/dL).  Brief Urine Lab Results     None          Microbiology Results (last 10  days)     Procedure Component Value - Date/Time    COVID PRE-OP / PRE-PROCEDURE SCREENING ORDER (NO ISOLATION) - Swab, Nasopharynx [[id number]]  (Normal) Collected: 02/19/22 0407    Lab Status: Final result Specimen: Swab from Nasopharynx Updated: 02/19/22 0446    Narrative:      The following orders were created for panel order COVID PRE-OP / PRE-PROCEDURE SCREENING ORDER (NO ISOLATION) - Swab, Nasopharynx.  Procedure                               Abnormality         Status                     ---------                               -----------         ------                     COVID-19,CEPHEID/BRENDA,CO...[[id number]]  Normal              Final result                 Please view results for these tests on the individual orders.    COVID-19,CEPHEID/BRENDA,COR/CHARO/PAD/AQUILES IN-HOUSE(OR EMERGENT/ADD-ON),NP SWAB IN TRANSPORT MEDIA 3-4 HR TAT, RT-PCR - Swab, Nasopharynx [[id number]]  (Normal) Collected: 02/19/22 0407    Lab Status: Final result Specimen: Swab from Nasopharynx Updated: 02/19/22 0446     COVID19 Not Detected    Narrative:      Fact sheet for providers: https://www.fda.gov/media/552643/download     Fact sheet for patients: https://www.fda.gov/media/818639/download  Fact sheet for providers: https://www.fda.gov/media/751187/download     Fact sheet for patients: https://www.fda.gov/media/269167/download    MRSA Screen, PCR (Inpatient) - Swab, Nares [[id number]]  (Normal) Collected: 02/11/22 0824    Lab Status: Final result Specimen: Swab from Nares Updated: 02/11/22 0942     MRSA PCR No MRSA Detected    COVID PRE-OP / PRE-PROCEDURE SCREENING ORDER (NO ISOLATION) - Swab, Nasopharynx [[id number]]  (Normal) Collected: 02/10/22 0855    Lab Status: Final result Specimen: Swab from Nasopharynx Updated: 02/10/22 0920    Narrative:      The following orders were created for panel order COVID PRE-OP / PRE-PROCEDURE SCREENING ORDER (NO ISOLATION) - Swab, Nasopharynx.  Procedure                               Abnormality          Status                     ---------                               -----------         ------                     COVID-19,CEPHEID/BRENDA,CO...[[id number]]  Normal              Final result                 Please view results for these tests on the individual orders.    COVID-19,CEPHEID/BRENDA,COR/CHARO/PAD/AQUILES IN-HOUSE(OR EMERGENT/ADD-ON),NP SWAB IN TRANSPORT MEDIA 3-4 HR TAT, RT-PCR - Swab, Nasopharynx [[id number]]  (Normal) Collected: 02/10/22 0855    Lab Status: Final result Specimen: Swab from Nasopharynx Updated: 02/10/22 0920     COVID19 Not Detected    Narrative:      Fact sheet for providers: https://www.fda.gov/media/353044/download     Fact sheet for patients: https://www.fda.gov/media/908385/download  Fact sheet for providers: https://www.fda.gov/media/606428/download    Fact sheet for patients: https://www.fda.gov/media/585818/download    Test performed by PCR.          ECG/EMG Results (most recent)     None                  XR Tibia Fibula 2 View Right    Result Date: 2/19/2022  Stable unchanged appearance of the right tibia and fibula including the fractures and ORIF hardware.    Electronically Signed By-Sameer Hurtado On:2/19/2022 8:16 AM This report was finalized on [id number] by  Sameer Hurtado, .    XR Tibia Fibula 2 View Right    Result Date: 2/12/2022  1. Status post ORIF with intramedullary nail fixation of the distal tibial fractures with improved alignment. 2. Status post plate and screw fixation of the distal fibula with improved alignment of the distal fibular fractures. 3. Oblique displaced proximal fibular fracture unchanged from prior study.  Electronically Signed By-Jose Momin MD On:2/12/2022 8:02 AM This report was finalized on [id number] by  Jose Momin MD.        Estimated Creatinine Clearance: 84.8 mL/min (by C-G formula based on SCr of 0.73 mg/dL).    Assessment/Plan   Assessment/Plan       Active Hospital Problems    Diagnosis  POA   • Right leg pain [M79.604]  Yes      Resolved  Hospital Problems   No resolved problems to display.     Right lower extremity pain--secondary to tib-fib fracture: Orthopedic consult to evaluate for complications; Analgesics and antiemetics; add Toradol x1; add Lyrica 75 mg twice daily; add Robaxin; continue Cleocin  -I would definitely like to add adjunct to opioid pain medicine but with patient on Eliquis frequent NSAIDs would not be discouraged    Anemia, hemoglobin 9.0 with comparison 14 on 2/10/2022 which likely represents blood loss from fracture: Repeat CBC    Chronic back pain    Asthma, chronic: Continue Symbicort; add albuterol    DVT prophylaxis: Continue apixaban      VTE Prophylaxis -   Mechanical Order History:       Pharmalogical Order History:      Ordered     Dose Route Frequency Stop    02/19/22 1014  apixaban (ELIQUIS) tablet 2.5 mg         2.5 mg PO Every 12 Hours Scheduled 03/15/22 0859                CODE STATUS:    Code Status and Medical Interventions:   Ordered at: 02/19/22 0344     Level Of Support Discussed With:    Patient     Code Status (Patient has no pulse and is not breathing):    CPR (Attempt to Resuscitate)     Medical Interventions (Patient has pulse or is breathing):    Full Support       This patient has been examined wearing personal protective equipment.     I discussed the patient's findings and my recommendations with patient and nursing staff.      Signature:Electronically signed by GUI Ramesh, 02/19/22, 10:41 AM EST.              Electronically signed by Muriel Boggs APRN at 02/19/22 1042          Emergency Department Notes      Reji Magaña, DO at 02/19/22 0147          Subjective   Chief complaint: Patient is a pleasant 46-year-old female.  She fell and had an open proximal tibia fracture.  This was repaired here.  She was discharged in a splint.  For the last 24 hours she has been having increasing severe pain.  Feels like her toes have been swelling.  She tried her medication and her pain medicine is  "not helping it.  So she came here concerned about the amount of swelling as she has been put on blood thinner noticed some bruising to the lower thigh as well.  No numbness.  Pain is \"muscle spasming pain\".  No weakness.    Context: Patient had an open tib-fib fracture.    Duration: Pains been better over the last 36 hours but has had pain since surgery and before.    Timing: Pain waxes and wanes.  But is been present all day.    Severity: Pain is severe.  Waxes and wanes.    Associated Symptoms: Negative except as noted above.  Appropriate PPE was used.        PCP:  LMP:          Review of Systems   Constitutional: Negative.    HENT: Negative.    Respiratory: Negative.    Cardiovascular: Negative.    Gastrointestinal: Negative.    Genitourinary: Negative.    Musculoskeletal: Positive for arthralgias and myalgias.   Skin: Negative.    Neurological: Negative for numbness.       Past Medical History:   Diagnosis Date   • Asthma    • Bipolar affective ([facility])    • COPD (chronic obstructive pulmonary disease) ([facility])    • DDD (degenerative disc disease), cervical    • Hypertension    • PTSD (post-traumatic stress disorder)        Allergies   Allergen Reactions   • Oxycodone Shortness Of Breath and Itching   • Penicillins Anaphylaxis and Swelling   • Codeine Hives and Itching   • Buprenorphine Rash       Past Surgical History:   Procedure Laterality Date   • ANKLE OPEN REDUCTION INTERNAL FIXATION Right 2/11/2022    Procedure: ANKLE OPEN REDUCTION INTERNAL FIXATION;  Surgeon: Maurice Pugh II, MD;  Location: [facility] OR;  Service: Orthopedics;  Laterality: Right;   • CARPAL TUNNEL RELEASE     • DILATATION AND CURETTAGE     • HYSTERECTOMY     • TIBIA IM NAILING/RODDING Right 2/11/2022    Procedure: TIBIA INTRAMEDULLARY NAIL/LAURI INSERTION WITH IRRIGATION AND DEBRIDEMENT;  Surgeon: Maurice Pugh II, MD;  Location: [facility] OR;  Service: Orthopedics;  Laterality: Right;       Family History   Problem " Relation Age of Onset   • Heart disease Mother        Social History     Socioeconomic History   • Marital status:    Tobacco Use   • Smoking status: Current Every Day Smoker     Packs/day: 1.00     Types: Cigarettes   • Smokeless tobacco: Never Used   Substance and Sexual Activity   • Alcohol use: No   • Drug use: No   • Sexual activity: Defer           Objective   Physical Exam  Vitals and nursing note reviewed.   Constitutional:       Appearance: Normal appearance.   HENT:      Head: Normocephalic and atraumatic.   Eyes:      Extraocular Movements: Extraocular movements intact.      Pupils: Pupils are equal, round, and reactive to light.   Cardiovascular:      Rate and Rhythm: Normal rate and regular rhythm.   Pulmonary:      Effort: Pulmonary effort is normal.      Breath sounds: Normal breath sounds.   Abdominal:      Tenderness: There is no abdominal tenderness.   Musculoskeletal:      Cervical back: Normal range of motion.   Skin:     General: Skin is warm and dry.      Capillary Refill: Capillary refill takes less than 2 seconds.      Comments: Ecchymosis present to the right leg.  Posterior distal thigh   Neurological:      General: No focal deficit present.      Mental Status: She is alert and oriented to person, place, and time.   Psychiatric:         Mood and Affect: Mood normal.         Behavior: Behavior normal.         Thought Content: Thought content normal.         Judgment: Judgment normal.         Procedures          ED Course                                                 MDM  Number of Diagnoses or Management Options  Diagnosis management comments: Patient has pulses adequate papillary refill.  There is no numbness.  I do not think at this point there is compartment syndrome.  However still potentially at risk for blood clot.  Also with increasing pain postoperative will observe and placed in the ER observation unit and watch overnight.  Have Ortho consult in the morning.  Patient  verbalized understanding is okay to stay.       Amount and/or Complexity of Data Reviewed  Clinical lab tests: reviewed    Risk of Complications, Morbidity, and/or Mortality  Presenting problems: high    Patient Progress  Patient progress: stable      Final diagnoses:   None   Intractable postoperative pain    ED Disposition  ED Disposition     None          No follow-up provider specified.       Medication List      No changes were made to your prescriptions during this visit.          Reji Magaña DO  02/19/22 0337       Reji Magaña DO  02/19/22 0337      Electronically signed by Reji Magaña DO at 02/19/22 0337       Physician Progress Notes (last 72 hours)  Notes from 02/18/22 1306 through 02/21/22 1306   No notes of this type exist for this encounter.            Consult Notes (last 72 hours)      Maurice Pugh II, MD at 02/19/22 0619      Consult Orders    1. Inpatient Orthopedic Surgery Consult [[id number]] ordered by Reji Magaña DO at 02/19/22 0344                 Orthopaedic Surgery  Consult Note  Dr. NADINE Frost” [place]  [phone]    HPI:  Patient is a 46 y.o. Not  or  female who presents with increased pain in her right leg.  This patient had a horrific right leg injury with an open tibia fracture, tibial pilon fracture, and fibular fracture.  She underwent open reduction internal fixation with irrigation debridement and intramedullary nailing of the tibia.  Postoperatively she has had problems with pain control.  She called my office just couple days ago we sent her in a stronger pain medication.  She presented the hospital last night with increased pain.  Her compartments were soft and there was no concern for compartment syndrome.  Her splint was slightly loosened and she was admitted for pain control.  Her pain is much better with morphine and bed rest.  It is made worse when she is up moving around.    MEDICAL HISTORY  Past Medical  History:   Diagnosis Date   • Asthma    • Bipolar affective (HCC)    • COPD (chronic obstructive pulmonary disease) (HCC)    • DDD (degenerative disc disease), cervical    • Hypertension    • PTSD (post-traumatic stress disorder)      Past Surgical History:   Procedure Laterality Date   • ANKLE OPEN REDUCTION INTERNAL FIXATION Right 2/11/2022    Procedure: ANKLE OPEN REDUCTION INTERNAL FIXATION;  Surgeon: Maurice Pugh II, MD;  [facility]: [facility];  Service: Orthopedics;  Laterality: Right;   • CARPAL TUNNEL RELEASE     • DILATATION AND CURETTAGE     • HYSTERECTOMY     • TIBIA IM NAILING/RODDING Right 2/11/2022    Procedure: TIBIA INTRAMEDULLARY NAIL/LAURI INSERTION WITH IRRIGATION AND DEBRIDEMENT;  Surgeon: Maurice Pugh II, MD;  Location: [facility] OR;  Service: Orthopedics;  Laterality: Right;     Prior to Admission medications    Medication Sig Start Date End Date Taking? Authorizing Provider   apixaban (ELIQUIS) 2.5 MG tablet tablet Take 1 tablet by mouth Every 12 (Twelve) Hours for 30 days. Indications: Other - full anticoagulation 2/13/22 3/15/22 Yes Chrissie Griffith MD   budesonide-formoterol (SYMBICORT) 160-4.5 MCG/ACT inhaler Inhale 2 puffs 2 (Two) Times a Day. 2/13/22  Yes Chrissie Griffith MD   clindamycin (Cleocin) 300 MG capsule Take 1 capsule by mouth 3 (Three) Times a Day for 10 days. 2/13/22 2/23/22 Yes Chrissie Griffith MD   HYDROcodone-acetaminophen (NORCO) 7.5-325 MG per tablet Take 1 tablet by mouth Every 4 (Four) Hours As Needed for Moderate Pain . 2/13/22  Yes Chrissie Griffith MD   methocarbamol (ROBAXIN) 750 MG tablet Take 750 mg by mouth 3 (Three) Times a Day.   Yes Provider, MD Jacob   ondansetron ODT (Zofran ODT) 4 MG disintegrating tablet Place 1 tablet on the tongue Every 8 (Eight) Hours As Needed for Nausea or Vomiting. 2/13/22  Yes Chrissie Griffith MD   oxyCODONE-acetaminophen (PERCOCET) 7.5-325 MG per tablet Take 1 tablet by mouth every four hours as needed for  "pain 2/15/22  Yes    albuterol sulfate  (90 Base) MCG/ACT inhaler Inhale 2 puffs Every 4 (Four) Hours As Needed for Wheezing. 2/13/22   Chrissie Griffith MD   nicotine (NICODERM CQ) 21 MG/24HR patch Place 1 patch on the skin as directed by provider Daily. 2/14/22   Chrsisie Griffith MD     Allergies   Allergen Reactions   • Oxycodone Shortness Of Breath and Itching   • Penicillins Anaphylaxis and Swelling   • Codeine Hives and Itching   • Buprenorphine Rash       There is no immunization history on file for this patient.  Social History     Tobacco Use   • Smoking status: Current Every Day Smoker     Packs/day: 1.00     Types: Cigarettes   • Smokeless tobacco: Never Used   Substance Use Topics   • Alcohol use: No      Social History     Substance and Sexual Activity   Drug Use No       VITALS: /61 (BP Location: Right arm, Patient Position: Lying)   Pulse 81   Temp 98.6 °F (37 °C) (Oral)   Resp 17   Ht 165.1 cm (65\")   Wt 55.8 kg (123 lb 0.3 oz)   SpO2 98%   BMI 20.47 kg/m²  Body mass index is 20.47 kg/m².    PHYSICAL EXAM:   CONSTITUTIONAL: No acute distress  LUNGS: Equal chest rise, no shortness of air  CARDIOVASCULAR: palpable peripheral pulses  SKIN: no skin lesions in the area examined  LYMPH: no lymphadenopathy in the area examined  EXTREMITY: Right Lower Extremity  Tenderness to Palpation: Splinted  Gross Deformity: Leg is splinted  Pulses: Capillary refill intact to the toes  Sensation: Intact to the toes  Motor: Able to wiggle her toes in the splint    RADIOLOGY REVIEW:   XR Tibia Fibula 2 View Right    Result Date: 2/12/2022  1. Status post ORIF with intramedullary nail fixation of the distal tibial fractures with improved alignment. 2. Status post plate and screw fixation of the distal fibula with improved alignment of the distal fibular fractures. 3. Oblique displaced proximal fibular fracture unchanged from prior study.  Electronically Signed By-Jose Momin MD On:2/12/2022 8:02 AM This " "report was finalized on [id number] by  Jose Momin MD.      LABS:   Results for the past 24 hours:   Recent Results (from the past 24 hour(s))   COVID-19,CEPHEID/BRENDA,COR/CHARO/PAD/AQUILES IN-HOUSE(OR EMERGENT/ADD-ON),NP SWAB IN TRANSPORT MEDIA 3-4 HR TAT, RT-PCR - Swab, Nasopharynx    Collection Time: 22  4:07 AM    Specimen: Nasopharynx; Swab   Result Value Ref Range    COVID19 Not Detected Not Detected - Ref. Range       IMPRESSION:  Patient is a 46 y.o. Not  or  female with right leg open tibia fracture, pilon fracture, and fibular fracture status post ORIF with intramedullary nailing of the tibia    PLAN:   Admited to: Reji Magaña,    Disposition: Pain seems better controlled now.  Not much to do at this point.  Unfortunately, this is a truly horrific injury that will cause her a fair amount of pain.  From my standpoint now that her pain is better controlled she can probably be discharged later today.    R \"Jarett\" Keaton GIVENS MD  Orthopaedic Surgery  [facility]  [phone] - [place] Office  [phone] - [place] Office              Electronically signed by Maurice Pugh II, MD at 22 0636          Discharge Summary      Muriel Boggs APRN at 22 1400                [facility]  DISCHARGE SUMMARY        Prepared For PCP:  Aminta Quezada, FLORI    Patient Name: Jesus Ovalles  : 1975  MRN: [id number]      Date of Admission:   2022    Date of Discharge:  2022    Length of stay:  LOS: 0 days     Hospital Course     Presenting Problem:   Postoperative pain [G89.18]  Right leg pain [M79.604]      Active Hospital Problems    Diagnosis  POA   • Right leg pain [M79.604]  Yes      Resolved Hospital Problems   No resolved problems to display.           Hospital Course:  Jesus Ovalles is a 46 y.o. female who presented to the ER with a chief complaint of right lower extremity leg pain following fracture with surgical " repair.  The patient denies any new injury.  She was seen by orthopedic surgery with notated severe fracture which is likely very painful.  The patient had addition of Lyrica and Flexeril to her treatment regime as well as a 25 % increase in her narcotic pain medicine.  The patient reports that she has had minimal improvement in the pain but wants to go home.  I offered to make additional adjustments to pain medication was declined.  Patient has had significant pain medication increase over the last several days as an outpatient.  I do not feel comfortable increasing the pain medicine further without monitoring as the patient denies chronic narcotics at home.          Recommendation for Outpatient Providers:             Reasons For Change In Medications and Indications for New Medications:        Day of Discharge     HPI:   46-year-old female presents to the ER with a chief complaint of right lower extremity pain which is not controlled by home pain medication.  Patient has fracture to the tib-fib with surgical repair, ORIF, on 2/11/2022.  Since that time the patient's pain has not been well controlled especially with movement.  The patient came to the emergency room for further evaluation.  Review of notes from orthopedic surgeon indicate that the fracture was extensive and would likely be very painful.  The patient reports the underlying pain is exacerbated by muscle spasms.  The patient reports that she has previously been on Lyrica for chronic back pain but had ran out of her prescription and has not been taking this medication for quite a while.     Review of records with summary: On 2/11/2022.  The patient had fractured her lower extremity after falling on the ice.  The patient was discharged on clindamycin secondary to open fracture, Eliquis to prevent DVT with decreased mobility, and oral hydrocodone 7.5 mg for pain control.  The patient reports her pain was not controlled and she contacted the orthopedic  surgeon's office with recommendation to double the dose of hydrocodone.  This did not significantly improve the patient's pain.  The patient was then changed to oxycodone 7.5 mg and again without adequate again was told that she could double the dose of oxycodone which would bring her to a total of oxycodone 15 mg every 4 hours.     Vital Signs:   Temp:  [98.3 °F (36.8 °C)-98.6 °F (37 °C)] 98.3 °F (36.8 °C)  Heart Rate:  [70-85] 70  Resp:  [16-17] 16  BP: ()/(45-67) 102/58     ROS:  Review of Systems   Musculoskeletal: Positive for muscle cramps.   Psychiatric/Behavioral: The patient is nervous/anxious.    All other systems reviewed and are negative.    Physical Exam  Vitals and nursing note reviewed.   Constitutional:       Appearance: Normal appearance.   HENT:      Head: Normocephalic and atraumatic.      Right Ear: External ear normal.      Left Ear: External ear normal.      Nose: Nose normal.      Mouth/Throat:      Mouth: Mucous membranes are dry.   Eyes:      General: No scleral icterus.        Right eye: No discharge.         Left eye: No discharge.      Extraocular Movements: Extraocular movements intact.      Conjunctiva/sclera: Conjunctivae normal.      Pupils: Pupils are equal, round, and reactive to light.   Cardiovascular:      Rate and Rhythm: Normal rate and regular rhythm.      Pulses: Normal pulses.      Heart sounds: Normal heart sounds.   Pulmonary:      Effort: Pulmonary effort is normal.      Breath sounds: Normal breath sounds.   Abdominal:      General: Bowel sounds are normal.      Palpations: Abdomen is soft.   Musculoskeletal:         General: Tenderness and signs of injury present. Normal range of motion.      Cervical back: Normal range of motion and neck supple.        Legs:    Skin:     General: Skin is warm and dry.      Capillary Refill: Capillary refill takes less than 2 seconds.   Neurological:      General: No focal deficit present.      Mental Status: She is alert and  oriented to person, place, and time.   Psychiatric:         Mood and Affect: Mood normal.         Behavior: Behavior normal.         Thought Content: Thought content normal.         Judgment: Judgment normal.        Pertinent  and/or Most Recent Results     Results from last 7 days   Lab Units 02/19/22 0658   WBC 10*3/mm3 3.20*   HEMOGLOBIN g/dL 9.0*   HEMATOCRIT % 25.8*   PLATELETS 10*3/mm3 366   SODIUM mmol/L 135*   POTASSIUM mmol/L 3.9   CHLORIDE mmol/L 99   CO2 mmol/L 27.0   BUN mg/dL 10   CREATININE mg/dL 0.73   GLUCOSE mg/dL 101*   CALCIUM mg/dL 8.9           Invalid input(s): PROT, LABALBU        Invalid input(s): TG, LDLCALC, LDLREALC        Brief Urine Lab Results     None          Microbiology Results Abnormal     Procedure Component Value - Date/Time    COVID PRE-OP / PRE-PROCEDURE SCREENING ORDER (NO ISOLATION) - Swab, Nasopharynx [[id number]]  (Normal) Collected: 02/19/22 0407    Lab Status: Final result Specimen: Swab from Nasopharynx Updated: 02/19/22 0446    Narrative:      The following orders were created for panel order COVID PRE-OP / PRE-PROCEDURE SCREENING ORDER (NO ISOLATION) - Swab, Nasopharynx.  Procedure                               Abnormality         Status                     ---------                               -----------         ------                     COVID-19,CEPHEID/BRENDA,CO...[[id number]]  Normal              Final result                 Please view results for these tests on the individual orders.    COVID-19,CEPHEID/BRENDA,COR/CHARO/PAD/AQUILES IN-HOUSE(OR EMERGENT/ADD-ON),NP SWAB IN TRANSPORT MEDIA 3-4 HR TAT, RT-PCR - Swab, Nasopharynx [[id number]]  (Normal) Collected: 02/19/22 0407    Lab Status: Final result Specimen: Swab from Nasopharynx Updated: 02/19/22 0446     COVID19 Not Detected    Narrative:      Fact sheet for providers: https://www.fda.gov/media/913684/download     Fact sheet for patients: https://www.fda.gov/media/324436/download  Fact sheet for providers:  https://www.fda.gov/media/564338/download     Fact sheet for patients: https://www.fda.gov/media/033699/download          XR Tibia Fibula 2 View Right    Result Date: 2/19/2022  Impression: Stable unchanged appearance of the right tibia and fibula including the fractures and ORIF hardware.    Electronically Signed ByStephan Hurtado On:2/19/2022 8:16 AM This report was finalized on [id number] by  Sameer Hurtado .    XR Tibia Fibula 2 View Right    Result Date: 2/12/2022  Impression: 1. Status post ORIF with intramedullary nail fixation of the distal tibial fractures with improved alignment. 2. Status post plate and screw fixation of the distal fibula with improved alignment of the distal fibular fractures. 3. Oblique displaced proximal fibular fracture unchanged from prior study.  Electronically Signed By-Jose Momin MD On:2/12/2022 8:02 AM This report was finalized on [id number] by  Jose Momin MD.    XR Tibia Fibula 2 View Right    Result Date: 2/10/2022  Impression: Fractures of the tibia and fibula as discussed. No dislocation    Electronically Signed ByStephan Hurtado On:2/10/2022 7:09 AM This report was finalized on [id number] by  Sameer Hurtado .    XR Ankle 3+ View Right    Result Date: 2/10/2022  Impression: Fractures of the tibia and fibula as discussed. No dislocation    Electronically Signed ByStephan Hurtado On:2/10/2022 7:09 AM This report was finalized on [id number] by  Sameer Hurtado .    XR Chest 1 View    Result Date: 2/10/2022  Impression:  Negative chest x-ray. No evidence of acute cardiopulmonary disease.  Electronically Signed ByStephan Hurtado On:2/10/2022 7:10 AM This report was finalized on [id number] by  Sameer Hurtado .    CT Lower Extremity Right Without Contrast    Result Date: 2/10/2022  Impression: 1.Complex comminuted fracture of the mid to distal right tibia. There are multiple fracture fragments at the distal diaphysis with mild displacement. Multiple longitudinal fracture lines  also extend through the more distal portion of the tibia extending through the distal metaphysis and epiphysis. There are nondisplaced fracture lines which extend through the articular surface at the anterior lateral aspect of the distal tibia. There is also a nondisplaced fracture line which extends through the medial malleolus. 2.Comminuted fractures are also noted involving the proximal and distal diaphyses of the fibula with associated mild displacement. 3.There is hemorrhage throughout the margins of the fracture involving the distal tibia as well as within the surrounding soft tissues. There is also evidence for soft tissue trauma at the anteromedial aspect of the distal leg. Air or gas is seen throughout the soft tissues of the mid distal leg related to the trauma. Electronically Signed: Mandeep Foreman MD 2/10/2022 9:13 EST                          Test Results Pending at Discharge        Procedures Performed           Consults:   Consults     Date and Time Order Name Status Description    2/19/2022  5:47 AM Inpatient Orthopedic Surgery Consult Completed             Discharge Details        Discharge Medications      New Medications      Instructions Start Date   cyclobenzaprine 10 MG tablet  Commonly known as: FLEXERIL   10 mg, Oral, Every 8 Hours Scheduled      oxyCODONE 10 MG tablet  Commonly known as: ROXICODONE   10 mg, Oral, Every 4 Hours PRN      pregabalin 75 MG capsule  Commonly known as: LYRICA   75 mg, Oral, Every 12 Hours Scheduled         Continue These Medications      Instructions Start Date   albuterol sulfate  (90 Base) MCG/ACT inhaler  Commonly known as: PROVENTIL HFA;VENTOLIN HFA;PROAIR HFA   2 puffs, Inhalation, Every 4 Hours PRN      budesonide-formoterol 160-4.5 MCG/ACT inhaler  Commonly known as: SYMBICORT   2 puffs, Inhalation, 2 Times Daily      clindamycin 300 MG capsule  Commonly known as: Cleocin   300 mg, Oral, 3 Times Daily      Eliquis 2.5 MG tablet tablet  Generic drug:  apixaban   2.5 mg, Oral, Every 12 Hours Scheduled      nicotine 21 MG/24HR patch  Commonly known as: NICODERM CQ   1 patch, Transdermal, Every 24 Hours Scheduled      ondansetron ODT 4 MG disintegrating tablet  Commonly known as: Zofran ODT   4 mg, Translingual, Every 8 Hours PRN         Stop These Medications    methocarbamol 750 MG tablet  Commonly known as: ROBAXIN     oxyCODONE-acetaminophen 7.5-325 MG per tablet  Commonly known as: PERCOCET            Allergies   Allergen Reactions   • Oxycodone Shortness Of Breath and Itching   • Penicillins Anaphylaxis and Swelling   • Codeine Hives and Itching   • Buprenorphine Rash         Discharge Disposition:  Home or Self Care    Diet:  Hospital:  Diet Order   Procedures   • Diet Regular         Discharge Activity: as tolerated and per recommendation from physical therapy        CODE STATUS:    Code Status and Medical Interventions:   Ordered at: 02/19/22 1308     Level Of Support Discussed With:    Patient     Code Status (Patient has no pulse and is not breathing):    CPR (Attempt to Resuscitate)     Medical Interventions (Patient has pulse or is breathing):    Full Support         Follow-up Appointments  No future appointments.          Condition on Discharge:      Stable      This patient has been examined wearing appropriate Personal Protective Equipment. 02/19/22      Electronically signed by GUI Ramesh, 02/19/22, 2:00 PM EST.      Time: I spent  60  minutes on this discharge activity which included face-to-face encounter with the patient/reviewing the data in the system/coordination of the care with the nursing staff as well as consultants/documentation/entering orders.      Electronically signed by Muriel Boggs APRN at 02/19/22 5146

## 2022-02-25 DIAGNOSIS — G89.18 POSTOPERATIVE PAIN: ICD-10-CM

## 2022-02-25 DIAGNOSIS — M79.604 RIGHT LEG PAIN: ICD-10-CM

## 2022-02-25 RX ORDER — OXYCODONE HYDROCHLORIDE 10 MG/1
10 TABLET ORAL EVERY 4 HOURS PRN
Qty: 20 EACH | Refills: 0 | Status: CANCELLED | OUTPATIENT
Start: 2022-02-25 | End: 2022-03-04

## 2022-02-28 DIAGNOSIS — M79.604 RIGHT LEG PAIN: ICD-10-CM

## 2022-02-28 DIAGNOSIS — G89.18 POSTOPERATIVE PAIN: ICD-10-CM

## 2022-02-28 RX ORDER — OXYCODONE HYDROCHLORIDE 10 MG/1
10 TABLET ORAL EVERY 4 HOURS PRN
Qty: 20 EACH | Refills: 0 | Status: CANCELLED | OUTPATIENT
Start: 2022-02-25 | End: 2022-03-04

## 2022-08-17 ENCOUNTER — HOSPITAL ENCOUNTER (OUTPATIENT)
Facility: HOSPITAL | Age: 47
Setting detail: HOSPITAL OUTPATIENT SURGERY
Discharge: HOME | End: 2022-08-17
Attending: ORTHOPAEDIC SURGERY | Admitting: ORTHOPAEDIC SURGERY

## 2022-08-17 VITALS — HEIGHT: 65 IN | BODY MASS INDEX: 21.66 KG/M2 | WEIGHT: 130 LBS

## 2022-08-18 ENCOUNTER — HOSPITAL ENCOUNTER (OUTPATIENT)
Dept: GENERAL RADIOLOGY | Facility: HOSPITAL | Age: 47
Discharge: HOME | End: 2022-08-18

## 2022-08-18 ENCOUNTER — LAB (OUTPATIENT)
Dept: LAB | Facility: HOSPITAL | Age: 47
End: 2022-08-18

## 2022-08-18 ENCOUNTER — HOSPITAL ENCOUNTER (OUTPATIENT)
Dept: CARDIOLOGY | Facility: HOSPITAL | Age: 47
Discharge: HOME | End: 2022-08-18

## 2022-08-18 LAB
ALBUMIN SERPL-MCNC: 4.4 G/DL (ref 3.5–5.2)
ALBUMIN/GLOB SERPL: 1.9 G/DL
ALP SERPL-CCNC: 91 U/L (ref 39–117)
ALT SERPL W P-5'-P-CCNC: 7 U/L (ref 1–33)
ANION GAP SERPL CALCULATED.3IONS-SCNC: 9 MMOL/L (ref 5–15)
AST SERPL-CCNC: 14 U/L (ref 1–32)
BASOPHILS # BLD AUTO: 0.02 10*3/MM3 (ref 0–0.2)
BASOPHILS NFR BLD AUTO: 0.5 % (ref 0–1.5)
BILIRUB SERPL-MCNC: 0.3 MG/DL (ref 0–1.2)
BUN SERPL-MCNC: 9 MG/DL (ref 6–20)
BUN/CREAT SERPL: 11.4 (ref 7–25)
CALCIUM SPEC-SCNC: 9.6 MG/DL (ref 8.6–10.5)
CHLORIDE SERPL-SCNC: 102 MMOL/L (ref 98–107)
CO2 SERPL-SCNC: 28 MMOL/L (ref 22–29)
CREAT SERPL-MCNC: 0.79 MG/DL (ref 0.57–1)
DEPRECATED RDW RBC AUTO: 40.3 FL (ref 37–54)
EGFRCR SERPLBLD CKD-EPI 2021: 93 ML/MIN/1.73
EOSINOPHIL # BLD AUTO: 0.04 10*3/MM3 (ref 0–0.4)
EOSINOPHIL NFR BLD AUTO: 1.1 % (ref 0.3–6.2)
ERYTHROCYTE [DISTWIDTH] IN BLOOD BY AUTOMATED COUNT: 12.4 % (ref 12.3–15.4)
GLOBULIN UR ELPH-MCNC: 2.3 GM/DL
GLUCOSE SERPL-MCNC: 94 MG/DL (ref 65–99)
HCT VFR BLD AUTO: 42.2 % (ref 34–46.6)
HGB BLD-MCNC: 14.4 G/DL (ref 12–15.9)
IMM GRANULOCYTES # BLD AUTO: 0.01 10*3/MM3 (ref 0–0.05)
IMM GRANULOCYTES NFR BLD AUTO: 0.3 % (ref 0–0.5)
LYMPHOCYTES # BLD AUTO: 1.41 10*3/MM3 (ref 0.7–3.1)
LYMPHOCYTES NFR BLD AUTO: 37.8 % (ref 19.6–45.3)
MCH RBC QN AUTO: 31 PG (ref 26.6–33)
MCHC RBC AUTO-ENTMCNC: 34.1 G/DL (ref 31.5–35.7)
MCV RBC AUTO: 90.8 FL (ref 79–97)
MONOCYTES # BLD AUTO: 0.24 10*3/MM3 (ref 0.1–0.9)
MONOCYTES NFR BLD AUTO: 6.4 % (ref 5–12)
NEUTROPHILS NFR BLD AUTO: 2.01 10*3/MM3 (ref 1.7–7)
NEUTROPHILS NFR BLD AUTO: 53.9 % (ref 42.7–76)
NRBC BLD AUTO-RTO: 0 /100 WBC (ref 0–0.2)
PLATELET # BLD AUTO: 244 10*3/MM3 (ref 140–450)
PMV BLD AUTO: 10.3 FL (ref 6–12)
POTASSIUM SERPL-SCNC: 4.6 MMOL/L (ref 3.5–5.2)
PROT SERPL-MCNC: 6.7 G/DL (ref 6–8.5)
QT INTERVAL: 388 MS
RBC # BLD AUTO: 4.65 10*6/MM3 (ref 3.77–5.28)
SODIUM SERPL-SCNC: 139 MMOL/L (ref 136–145)
WBC NRBC COR # BLD: 3.73 10*3/MM3 (ref 3.4–10.8)

## 2022-08-18 PROCEDURE — 85025 COMPLETE CBC W/AUTO DIFF WBC: CPT | Performed by: ORTHOPAEDIC SURGERY

## 2022-08-18 PROCEDURE — 93005 ELECTROCARDIOGRAM TRACING: CPT | Performed by: ORTHOPAEDIC SURGERY

## 2022-08-18 PROCEDURE — 71046 X-RAY EXAM CHEST 2 VIEWS: CPT

## 2022-08-18 PROCEDURE — 36415 COLL VENOUS BLD VENIPUNCTURE: CPT | Performed by: ORTHOPAEDIC SURGERY

## 2022-08-18 PROCEDURE — 93010 ELECTROCARDIOGRAM REPORT: CPT | Performed by: INTERNAL MEDICINE

## 2022-08-18 PROCEDURE — 80053 COMPREHEN METABOLIC PANEL: CPT

## 2022-08-23 ENCOUNTER — LAB (OUTPATIENT)
Dept: LAB | Facility: HOSPITAL | Age: 47
End: 2022-08-23

## 2022-08-23 PROCEDURE — C9803 HOPD COVID-19 SPEC COLLECT: HCPCS

## 2022-08-23 PROCEDURE — U0004 COV-19 TEST NON-CDC HGH THRU: HCPCS

## 2022-08-24 LAB — SARS-COV-2 ORF1AB RESP QL NAA+PROBE: NOT DETECTED

## 2022-08-24 RX ORDER — SODIUM CHLORIDE 0.9 % (FLUSH) 0.9 %
10 SYRINGE (ML) INJECTION AS NEEDED
Status: CANCELLED | OUTPATIENT
Start: 2022-08-24

## 2022-08-24 RX ORDER — SODIUM CHLORIDE, SODIUM LACTATE, POTASSIUM CHLORIDE, CALCIUM CHLORIDE 600; 310; 30; 20 MG/100ML; MG/100ML; MG/100ML; MG/100ML
9 INJECTION, SOLUTION INTRAVENOUS CONTINUOUS PRN
Status: CANCELLED | OUTPATIENT
Start: 2022-08-24

## 2022-08-24 RX ORDER — SODIUM CHLORIDE 0.9 % (FLUSH) 0.9 %
10 SYRINGE (ML) INJECTION EVERY 12 HOURS SCHEDULED
Status: CANCELLED | OUTPATIENT
Start: 2022-08-24

## 2022-08-25 ENCOUNTER — TRANSCRIBE ORDERS (OUTPATIENT)
Dept: ADMINISTRATIVE | Facility: HOSPITAL | Age: 47
End: 2022-08-25

## 2022-08-25 DIAGNOSIS — Z01.818 OTHER SPECIFIED PRE-OPERATIVE EXAMINATION: Primary | ICD-10-CM

## 2022-08-26 ENCOUNTER — LAB (OUTPATIENT)
Dept: LAB | Facility: HOSPITAL | Age: 47
End: 2022-08-26

## 2022-08-26 ENCOUNTER — APPOINTMENT (OUTPATIENT)
Dept: LAB | Facility: HOSPITAL | Age: 47
End: 2022-08-26

## 2022-08-26 DIAGNOSIS — Z01.818 OTHER SPECIFIED PRE-OPERATIVE EXAMINATION: ICD-10-CM

## 2022-08-26 LAB — SARS-COV-2 ORF1AB RESP QL NAA+PROBE: NOT DETECTED

## 2022-08-26 PROCEDURE — U0004 COV-19 TEST NON-CDC HGH THRU: HCPCS

## 2022-08-26 RX ORDER — PREDNISONE 1 MG/1
1 TABLET ORAL DAILY
COMMUNITY
End: 2022-08-30 | Stop reason: HOSPADM

## 2022-08-29 ENCOUNTER — ANESTHESIA (OUTPATIENT)
Dept: PERIOP | Facility: HOSPITAL | Age: 47
End: 2022-08-29

## 2022-08-29 ENCOUNTER — HOSPITAL ENCOUNTER (INPATIENT)
Facility: HOSPITAL | Age: 47
LOS: 1 days | Discharge: HOME | End: 2022-08-30
Attending: ORTHOPAEDIC SURGERY | Admitting: ORTHOPAEDIC SURGERY

## 2022-08-29 ENCOUNTER — ANESTHESIA EVENT (OUTPATIENT)
Dept: PERIOP | Facility: HOSPITAL | Age: 47
End: 2022-08-29

## 2022-08-29 ENCOUNTER — APPOINTMENT (OUTPATIENT)
Dept: GENERAL RADIOLOGY | Facility: HOSPITAL | Age: 47
End: 2022-08-29

## 2022-08-29 DIAGNOSIS — S82.209A TIBIAL FRACTURE: ICD-10-CM

## 2022-08-29 DIAGNOSIS — S82.202K TIBIA/FIBULA FRACTURE, SHAFT, LEFT, CLOSED, WITH NONUNION, SUBSEQUENT ENCOUNTER: Primary | ICD-10-CM

## 2022-08-29 DIAGNOSIS — S82.402K TIBIA/FIBULA FRACTURE, SHAFT, LEFT, CLOSED, WITH NONUNION, SUBSEQUENT ENCOUNTER: Primary | ICD-10-CM

## 2022-08-29 PROCEDURE — 25010000002 FENTANYL CITRATE (PF) 50 MCG/ML SOLUTION: Performed by: NURSE ANESTHETIST, CERTIFIED REGISTERED

## 2022-08-29 PROCEDURE — C1713 ANCHOR/SCREW BN/BN,TIS/BN: HCPCS | Performed by: ORTHOPAEDIC SURGERY

## 2022-08-29 PROCEDURE — 87205 SMEAR GRAM STAIN: CPT | Performed by: ORTHOPAEDIC SURGERY

## 2022-08-29 PROCEDURE — 25010000002 HYDROMORPHONE PER 4 MG: Performed by: NURSE ANESTHETIST, CERTIFIED REGISTERED

## 2022-08-29 PROCEDURE — 25010000002 PROPOFOL 10 MG/ML EMULSION: Performed by: NURSE ANESTHETIST, CERTIFIED REGISTERED

## 2022-08-29 PROCEDURE — 94664 DEMO&/EVAL PT USE INHALER: CPT

## 2022-08-29 PROCEDURE — 25010000002 DEXAMETHASONE PER 1 MG: Performed by: NURSE ANESTHETIST, CERTIFIED REGISTERED

## 2022-08-29 PROCEDURE — 25010000002 HYDROMORPHONE 1 MG/ML SOLUTION: Performed by: ORTHOPAEDIC SURGERY

## 2022-08-29 PROCEDURE — 25010000002 ONDANSETRON PER 1 MG: Performed by: ANESTHESIOLOGY

## 2022-08-29 PROCEDURE — 73590 X-RAY EXAM OF LOWER LEG: CPT | Performed by: ORTHOPAEDIC SURGERY

## 2022-08-29 PROCEDURE — 76000 FLUOROSCOPY <1 HR PHYS/QHP: CPT | Performed by: ORTHOPAEDIC SURGERY

## 2022-08-29 PROCEDURE — 63710000001 PROMETHAZINE PER 25 MG: Performed by: NURSE ANESTHETIST, CERTIFIED REGISTERED

## 2022-08-29 PROCEDURE — 94640 AIRWAY INHALATION TREATMENT: CPT

## 2022-08-29 PROCEDURE — 25010000002 CEFAZOLIN IN DEXTROSE 2-4 GM/100ML-% SOLUTION: Performed by: ORTHOPAEDIC SURGERY

## 2022-08-29 PROCEDURE — 87176 TISSUE HOMOGENIZATION CULTR: CPT | Performed by: ORTHOPAEDIC SURGERY

## 2022-08-29 PROCEDURE — 25010000002 DIPHENHYDRAMINE PER 50 MG: Performed by: NURSE ANESTHETIST, CERTIFIED REGISTERED

## 2022-08-29 PROCEDURE — 94799 UNLISTED PULMONARY SVC/PX: CPT

## 2022-08-29 PROCEDURE — 87206 SMEAR FLUORESCENT/ACID STAI: CPT | Performed by: ORTHOPAEDIC SURGERY

## 2022-08-29 PROCEDURE — C1769 GUIDE WIRE: HCPCS | Performed by: ORTHOPAEDIC SURGERY

## 2022-08-29 PROCEDURE — 94760 N-INVAS EAR/PLS OXIMETRY 1: CPT

## 2022-08-29 PROCEDURE — 87116 MYCOBACTERIA CULTURE: CPT | Performed by: ORTHOPAEDIC SURGERY

## 2022-08-29 PROCEDURE — 63710000001 DIPHENHYDRAMINE PER 50 MG: Performed by: ORTHOPAEDIC SURGERY

## 2022-08-29 PROCEDURE — 87070 CULTURE OTHR SPECIMN AEROBIC: CPT | Performed by: ORTHOPAEDIC SURGERY

## 2022-08-29 PROCEDURE — 94761 N-INVAS EAR/PLS OXIMETRY MLT: CPT

## 2022-08-29 DEVICE — TRIGEN LOW PROFILE SCREW 5.0MM X 32.5MM
Type: IMPLANTABLE DEVICE | Site: TIBIA | Status: FUNCTIONAL
Brand: TRIGEN

## 2022-08-29 DEVICE — IMPLANTABLE DEVICE
Type: IMPLANTABLE DEVICE | Site: TIBIA | Status: FUNCTIONAL
Brand: CERAMENT™BONE VOID FILLER

## 2022-08-29 DEVICE — TRIGEN LOW PROFILE SCREW 5.0MM X 25MM
Type: IMPLANTABLE DEVICE | Site: TIBIA | Status: FUNCTIONAL
Brand: TRIGEN

## 2022-08-29 DEVICE — TRIGEN META TIBIAL NAIL 11.5MM X 32CM
Type: IMPLANTABLE DEVICE | Site: TIBIA | Status: FUNCTIONAL
Brand: TRIGEN

## 2022-08-29 DEVICE — TRIGEN LOW PROFILE SCREW 5.0MM X 35MM
Type: IMPLANTABLE DEVICE | Site: TIBIA | Status: FUNCTIONAL
Brand: TRIGEN

## 2022-08-29 DEVICE — TRIGEN LOW PROFILE SCREW 5.0MM X 37.5MM
Type: IMPLANTABLE DEVICE | Site: TIBIA | Status: FUNCTIONAL
Brand: TRIGEN

## 2022-08-29 RX ORDER — HYDROMORPHONE HCL 110MG/55ML
PATIENT CONTROLLED ANALGESIA SYRINGE INTRAVENOUS AS NEEDED
Status: DISCONTINUED | OUTPATIENT
Start: 2022-08-29 | End: 2022-08-29 | Stop reason: SURG

## 2022-08-29 RX ORDER — MIDAZOLAM HYDROCHLORIDE 1 MG/ML
1 INJECTION INTRAMUSCULAR; INTRAVENOUS
Status: DISCONTINUED | OUTPATIENT
Start: 2022-08-29 | End: 2022-08-29 | Stop reason: HOSPADM

## 2022-08-29 RX ORDER — BISACODYL 5 MG/1
5 TABLET, DELAYED RELEASE ORAL DAILY PRN
Status: DISCONTINUED | OUTPATIENT
Start: 2022-08-29 | End: 2022-08-30 | Stop reason: HOSPADM

## 2022-08-29 RX ORDER — DIPHENHYDRAMINE HCL 25 MG
25 CAPSULE ORAL
Status: DISCONTINUED | OUTPATIENT
Start: 2022-08-29 | End: 2022-08-29 | Stop reason: HOSPADM

## 2022-08-29 RX ORDER — OXYCODONE AND ACETAMINOPHEN 7.5; 325 MG/1; MG/1
2 TABLET ORAL EVERY 4 HOURS PRN
Status: DISCONTINUED | OUTPATIENT
Start: 2022-08-29 | End: 2022-08-30 | Stop reason: HOSPADM

## 2022-08-29 RX ORDER — SODIUM CHLORIDE 9 MG/ML
100 INJECTION, SOLUTION INTRAVENOUS CONTINUOUS
Status: DISCONTINUED | OUTPATIENT
Start: 2022-08-29 | End: 2022-08-30 | Stop reason: HOSPADM

## 2022-08-29 RX ORDER — HYDROMORPHONE HYDROCHLORIDE 1 MG/ML
0.5 INJECTION, SOLUTION INTRAMUSCULAR; INTRAVENOUS; SUBCUTANEOUS
Status: DISCONTINUED | OUTPATIENT
Start: 2022-08-29 | End: 2022-08-29 | Stop reason: HOSPADM

## 2022-08-29 RX ORDER — SODIUM CHLORIDE 0.9 % (FLUSH) 0.9 %
3 SYRINGE (ML) INJECTION EVERY 12 HOURS SCHEDULED
Status: DISCONTINUED | OUTPATIENT
Start: 2022-08-29 | End: 2022-08-29 | Stop reason: HOSPADM

## 2022-08-29 RX ORDER — LABETALOL HYDROCHLORIDE 5 MG/ML
5 INJECTION, SOLUTION INTRAVENOUS
Status: DISCONTINUED | OUTPATIENT
Start: 2022-08-29 | End: 2022-08-29 | Stop reason: HOSPADM

## 2022-08-29 RX ORDER — POLYETHYLENE GLYCOL 3350 17 G/17G
17 POWDER, FOR SOLUTION ORAL DAILY PRN
Status: DISCONTINUED | OUTPATIENT
Start: 2022-08-29 | End: 2022-08-30 | Stop reason: HOSPADM

## 2022-08-29 RX ORDER — CEFAZOLIN SODIUM 2 G/100ML
2 INJECTION, SOLUTION INTRAVENOUS ONCE
Status: COMPLETED | OUTPATIENT
Start: 2022-08-29 | End: 2022-08-29

## 2022-08-29 RX ORDER — ACETAMINOPHEN 500 MG
1000 TABLET ORAL ONCE
Status: COMPLETED | OUTPATIENT
Start: 2022-08-29 | End: 2022-08-29

## 2022-08-29 RX ORDER — IBUPROFEN 600 MG/1
600 TABLET ORAL ONCE AS NEEDED
Status: DISCONTINUED | OUTPATIENT
Start: 2022-08-29 | End: 2022-08-29 | Stop reason: HOSPADM

## 2022-08-29 RX ORDER — ONDANSETRON 2 MG/ML
4 INJECTION INTRAMUSCULAR; INTRAVENOUS ONCE AS NEEDED
Status: DISCONTINUED | OUTPATIENT
Start: 2022-08-29 | End: 2022-08-29 | Stop reason: HOSPADM

## 2022-08-29 RX ORDER — NALOXONE HCL 0.4 MG/ML
0.4 VIAL (ML) INJECTION
Status: DISCONTINUED | OUTPATIENT
Start: 2022-08-29 | End: 2022-08-30 | Stop reason: HOSPADM

## 2022-08-29 RX ORDER — CYCLOBENZAPRINE HCL 10 MG
10 TABLET ORAL EVERY 8 HOURS SCHEDULED
Status: DISCONTINUED | OUTPATIENT
Start: 2022-08-29 | End: 2022-08-30 | Stop reason: HOSPADM

## 2022-08-29 RX ORDER — OXYCODONE AND ACETAMINOPHEN 7.5; 325 MG/1; MG/1
1 TABLET ORAL EVERY 4 HOURS PRN
Status: DISCONTINUED | OUTPATIENT
Start: 2022-08-29 | End: 2022-08-30 | Stop reason: HOSPADM

## 2022-08-29 RX ORDER — SODIUM CHLORIDE 0.9 % (FLUSH) 0.9 %
3-10 SYRINGE (ML) INJECTION AS NEEDED
Status: DISCONTINUED | OUTPATIENT
Start: 2022-08-29 | End: 2022-08-29 | Stop reason: HOSPADM

## 2022-08-29 RX ORDER — DIPHENHYDRAMINE HCL 25 MG
25 CAPSULE ORAL EVERY 6 HOURS PRN
Status: DISCONTINUED | OUTPATIENT
Start: 2022-08-29 | End: 2022-08-30 | Stop reason: HOSPADM

## 2022-08-29 RX ORDER — SODIUM CHLORIDE 0.9 % (FLUSH) 0.9 %
10 SYRINGE (ML) INJECTION AS NEEDED
Status: DISCONTINUED | OUTPATIENT
Start: 2022-08-29 | End: 2022-08-29 | Stop reason: HOSPADM

## 2022-08-29 RX ORDER — SODIUM CHLORIDE, SODIUM LACTATE, POTASSIUM CHLORIDE, CALCIUM CHLORIDE 600; 310; 30; 20 MG/100ML; MG/100ML; MG/100ML; MG/100ML
9 INJECTION, SOLUTION INTRAVENOUS CONTINUOUS PRN
Status: DISCONTINUED | OUTPATIENT
Start: 2022-08-29 | End: 2022-08-29 | Stop reason: HOSPADM

## 2022-08-29 RX ORDER — BISACODYL 10 MG
10 SUPPOSITORY, RECTAL RECTAL DAILY PRN
Status: DISCONTINUED | OUTPATIENT
Start: 2022-08-29 | End: 2022-08-30 | Stop reason: HOSPADM

## 2022-08-29 RX ORDER — LIDOCAINE HYDROCHLORIDE 10 MG/ML
0.5 INJECTION, SOLUTION EPIDURAL; INFILTRATION; INTRACAUDAL; PERINEURAL ONCE AS NEEDED
Status: COMPLETED | OUTPATIENT
Start: 2022-08-29 | End: 2022-08-29

## 2022-08-29 RX ORDER — AMOXICILLIN 250 MG
2 CAPSULE ORAL 2 TIMES DAILY
Status: DISCONTINUED | OUTPATIENT
Start: 2022-08-29 | End: 2022-08-30 | Stop reason: HOSPADM

## 2022-08-29 RX ORDER — FLUMAZENIL 0.1 MG/ML
0.2 INJECTION INTRAVENOUS AS NEEDED
Status: DISCONTINUED | OUTPATIENT
Start: 2022-08-29 | End: 2022-08-29 | Stop reason: HOSPADM

## 2022-08-29 RX ORDER — DEXAMETHASONE SODIUM PHOSPHATE 10 MG/ML
INJECTION INTRAMUSCULAR; INTRAVENOUS AS NEEDED
Status: DISCONTINUED | OUTPATIENT
Start: 2022-08-29 | End: 2022-08-29 | Stop reason: SURG

## 2022-08-29 RX ORDER — PROMETHAZINE HYDROCHLORIDE 25 MG/1
25 SUPPOSITORY RECTAL ONCE AS NEEDED
Status: COMPLETED | OUTPATIENT
Start: 2022-08-29 | End: 2022-08-29

## 2022-08-29 RX ORDER — ONDANSETRON 2 MG/ML
INJECTION INTRAMUSCULAR; INTRAVENOUS AS NEEDED
Status: DISCONTINUED | OUTPATIENT
Start: 2022-08-29 | End: 2022-08-29 | Stop reason: SURG

## 2022-08-29 RX ORDER — SODIUM CHLORIDE, SODIUM LACTATE, POTASSIUM CHLORIDE, CALCIUM CHLORIDE 600; 310; 30; 20 MG/100ML; MG/100ML; MG/100ML; MG/100ML
9 INJECTION, SOLUTION INTRAVENOUS CONTINUOUS
Status: DISCONTINUED | OUTPATIENT
Start: 2022-08-29 | End: 2022-08-30 | Stop reason: HOSPADM

## 2022-08-29 RX ORDER — IPRATROPIUM BROMIDE AND ALBUTEROL SULFATE 2.5; .5 MG/3ML; MG/3ML
3 SOLUTION RESPIRATORY (INHALATION) ONCE
Status: COMPLETED | OUTPATIENT
Start: 2022-08-29 | End: 2022-08-29

## 2022-08-29 RX ORDER — SODIUM CHLORIDE 0.9 % (FLUSH) 0.9 %
10 SYRINGE (ML) INJECTION EVERY 12 HOURS SCHEDULED
Status: DISCONTINUED | OUTPATIENT
Start: 2022-08-29 | End: 2022-08-30 | Stop reason: HOSPADM

## 2022-08-29 RX ORDER — FENTANYL CITRATE 50 UG/ML
50 INJECTION, SOLUTION INTRAMUSCULAR; INTRAVENOUS
Status: DISCONTINUED | OUTPATIENT
Start: 2022-08-29 | End: 2022-08-29 | Stop reason: HOSPADM

## 2022-08-29 RX ORDER — FAMOTIDINE 20 MG/1
40 TABLET, FILM COATED ORAL DAILY
Status: DISCONTINUED | OUTPATIENT
Start: 2022-08-30 | End: 2022-08-30 | Stop reason: HOSPADM

## 2022-08-29 RX ORDER — DIPHENHYDRAMINE HYDROCHLORIDE 50 MG/ML
12.5 INJECTION INTRAMUSCULAR; INTRAVENOUS
Status: COMPLETED | OUTPATIENT
Start: 2022-08-29 | End: 2022-08-29

## 2022-08-29 RX ORDER — HYDROCODONE BITARTRATE AND ACETAMINOPHEN 7.5; 325 MG/1; MG/1
2 TABLET ORAL EVERY 4 HOURS PRN
Status: DISCONTINUED | OUTPATIENT
Start: 2022-08-29 | End: 2022-08-29 | Stop reason: HOSPADM

## 2022-08-29 RX ORDER — ROCURONIUM BROMIDE 10 MG/ML
INJECTION, SOLUTION INTRAVENOUS AS NEEDED
Status: DISCONTINUED | OUTPATIENT
Start: 2022-08-29 | End: 2022-08-29 | Stop reason: SURG

## 2022-08-29 RX ORDER — FENTANYL CITRATE 50 UG/ML
INJECTION, SOLUTION INTRAMUSCULAR; INTRAVENOUS AS NEEDED
Status: DISCONTINUED | OUTPATIENT
Start: 2022-08-29 | End: 2022-08-29 | Stop reason: SURG

## 2022-08-29 RX ORDER — ASPIRIN 81 MG/1
81 TABLET, CHEWABLE ORAL 2 TIMES DAILY
Status: DISCONTINUED | OUTPATIENT
Start: 2022-08-29 | End: 2022-08-30 | Stop reason: HOSPADM

## 2022-08-29 RX ORDER — PROPOFOL 10 MG/ML
VIAL (ML) INTRAVENOUS AS NEEDED
Status: DISCONTINUED | OUTPATIENT
Start: 2022-08-29 | End: 2022-08-29 | Stop reason: SURG

## 2022-08-29 RX ORDER — SODIUM CHLORIDE 0.9 % (FLUSH) 0.9 %
1-10 SYRINGE (ML) INJECTION AS NEEDED
Status: DISCONTINUED | OUTPATIENT
Start: 2022-08-29 | End: 2022-08-30 | Stop reason: HOSPADM

## 2022-08-29 RX ORDER — PROMETHAZINE HYDROCHLORIDE 25 MG/1
25 TABLET ORAL ONCE AS NEEDED
Status: COMPLETED | OUTPATIENT
Start: 2022-08-29 | End: 2022-08-29

## 2022-08-29 RX ORDER — HYDRALAZINE HYDROCHLORIDE 20 MG/ML
5 INJECTION INTRAMUSCULAR; INTRAVENOUS
Status: DISCONTINUED | OUTPATIENT
Start: 2022-08-29 | End: 2022-08-29 | Stop reason: HOSPADM

## 2022-08-29 RX ORDER — ONDANSETRON 4 MG/1
4 TABLET, FILM COATED ORAL EVERY 6 HOURS PRN
Status: DISCONTINUED | OUTPATIENT
Start: 2022-08-29 | End: 2022-08-30 | Stop reason: HOSPADM

## 2022-08-29 RX ORDER — SODIUM CHLORIDE 0.9 % (FLUSH) 0.9 %
10 SYRINGE (ML) INJECTION EVERY 12 HOURS SCHEDULED
Status: DISCONTINUED | OUTPATIENT
Start: 2022-08-29 | End: 2022-08-29 | Stop reason: HOSPADM

## 2022-08-29 RX ORDER — EPHEDRINE SULFATE 50 MG/ML
5 INJECTION, SOLUTION INTRAVENOUS ONCE AS NEEDED
Status: DISCONTINUED | OUTPATIENT
Start: 2022-08-29 | End: 2022-08-29 | Stop reason: HOSPADM

## 2022-08-29 RX ORDER — NALOXONE HCL 0.4 MG/ML
0.2 VIAL (ML) INJECTION AS NEEDED
Status: DISCONTINUED | OUTPATIENT
Start: 2022-08-29 | End: 2022-08-29 | Stop reason: HOSPADM

## 2022-08-29 RX ADMIN — ACETAMINOPHEN 1000 MG: 500 TABLET, FILM COATED ORAL at 12:33

## 2022-08-29 RX ADMIN — PROPOFOL 150 MG: 10 INJECTION, EMULSION INTRAVENOUS at 15:05

## 2022-08-29 RX ADMIN — DIPHENHYDRAMINE HYDROCHLORIDE 25 MG: 25 CAPSULE ORAL at 21:33

## 2022-08-29 RX ADMIN — IPRATROPIUM BROMIDE AND ALBUTEROL SULFATE 3 ML: .5; 3 SOLUTION RESPIRATORY (INHALATION) at 17:38

## 2022-08-29 RX ADMIN — FENTANYL CITRATE 50 MCG: 50 INJECTION INTRAMUSCULAR; INTRAVENOUS at 16:49

## 2022-08-29 RX ADMIN — FENTANYL CITRATE 25 MCG: 0.05 INJECTION, SOLUTION INTRAMUSCULAR; INTRAVENOUS at 16:33

## 2022-08-29 RX ADMIN — HYDROMORPHONE HYDROCHLORIDE 1 MG: 1 INJECTION, SOLUTION INTRAMUSCULAR; INTRAVENOUS; SUBCUTANEOUS at 22:55

## 2022-08-29 RX ADMIN — HYDROMORPHONE HYDROCHLORIDE 0.5 MG: 1 INJECTION, SOLUTION INTRAMUSCULAR; INTRAVENOUS; SUBCUTANEOUS at 17:00

## 2022-08-29 RX ADMIN — FENTANYL CITRATE 25 MCG: 0.05 INJECTION, SOLUTION INTRAMUSCULAR; INTRAVENOUS at 16:19

## 2022-08-29 RX ADMIN — SODIUM CHLORIDE, POTASSIUM CHLORIDE, SODIUM LACTATE AND CALCIUM CHLORIDE 9 ML/HR: 600; 310; 30; 20 INJECTION, SOLUTION INTRAVENOUS at 13:05

## 2022-08-29 RX ADMIN — FENTANYL CITRATE 100 MCG: 0.05 INJECTION, SOLUTION INTRAMUSCULAR; INTRAVENOUS at 15:00

## 2022-08-29 RX ADMIN — HYDROMORPHONE HYDROCHLORIDE 0.5 MG: 1 INJECTION, SOLUTION INTRAMUSCULAR; INTRAVENOUS; SUBCUTANEOUS at 17:45

## 2022-08-29 RX ADMIN — FENTANYL CITRATE 25 MCG: 0.05 INJECTION, SOLUTION INTRAMUSCULAR; INTRAVENOUS at 16:31

## 2022-08-29 RX ADMIN — SUGAMMADEX 200 MG: 100 INJECTION, SOLUTION INTRAVENOUS at 16:15

## 2022-08-29 RX ADMIN — ONDANSETRON 4 MG: 2 INJECTION INTRAMUSCULAR; INTRAVENOUS at 16:15

## 2022-08-29 RX ADMIN — FENTANYL CITRATE 25 MCG: 0.05 INJECTION, SOLUTION INTRAMUSCULAR; INTRAVENOUS at 16:24

## 2022-08-29 RX ADMIN — CEFAZOLIN SODIUM 2 G: 2 INJECTION, SOLUTION INTRAVENOUS at 14:50

## 2022-08-29 RX ADMIN — ROCURONIUM BROMIDE 40 MG: 50 INJECTION INTRAVENOUS at 15:05

## 2022-08-29 RX ADMIN — ROCURONIUM BROMIDE 10 MG: 50 INJECTION INTRAVENOUS at 15:47

## 2022-08-29 RX ADMIN — DEXAMETHASONE SODIUM PHOSPHATE 8 MG: 10 INJECTION INTRAMUSCULAR; INTRAVENOUS at 15:10

## 2022-08-29 RX ADMIN — HYDROMORPHONE HYDROCHLORIDE 0.5 MG: 1 INJECTION, SOLUTION INTRAMUSCULAR; INTRAVENOUS; SUBCUTANEOUS at 17:10

## 2022-08-29 RX ADMIN — DIPHENHYDRAMINE HYDROCHLORIDE 12.5 MG: 50 INJECTION, SOLUTION INTRAMUSCULAR; INTRAVENOUS at 17:35

## 2022-08-29 RX ADMIN — HYDROCODONE BITARTRATE AND ACETAMINOPHEN 2 TABLET: 7.5; 325 TABLET ORAL at 17:16

## 2022-08-29 RX ADMIN — DOCUSATE SODIUM 50MG AND SENNOSIDES 8.6MG 2 TABLET: 8.6; 5 TABLET, FILM COATED ORAL at 21:33

## 2022-08-29 RX ADMIN — HYDROMORPHONE HYDROCHLORIDE 0.5 MG: 2 INJECTION, SOLUTION INTRAMUSCULAR; INTRAVENOUS; SUBCUTANEOUS at 15:20

## 2022-08-29 RX ADMIN — PROMETHAZINE HYDROCHLORIDE 25 MG: 25 TABLET ORAL at 17:37

## 2022-08-29 RX ADMIN — OXYCODONE HYDROCHLORIDE AND ACETAMINOPHEN 1 TABLET: 7.5; 325 TABLET ORAL at 21:33

## 2022-08-29 RX ADMIN — LIDOCAINE HYDROCHLORIDE 0.5 ML: 10 INJECTION, SOLUTION EPIDURAL; INFILTRATION; INTRACAUDAL; PERINEURAL at 13:05

## 2022-08-29 RX ADMIN — CYCLOBENZAPRINE 10 MG: 10 TABLET, FILM COATED ORAL at 21:33

## 2022-08-29 RX ADMIN — FENTANYL CITRATE 50 MCG: 50 INJECTION INTRAMUSCULAR; INTRAVENOUS at 17:52

## 2022-08-29 RX ADMIN — DIPHENHYDRAMINE HYDROCHLORIDE 12.5 MG: 50 INJECTION, SOLUTION INTRAMUSCULAR; INTRAVENOUS at 18:21

## 2022-08-29 RX ADMIN — SODIUM CHLORIDE 100 ML/HR: 9 INJECTION, SOLUTION INTRAVENOUS at 21:00

## 2022-08-29 NOTE — ANESTHESIA POSTPROCEDURE EVALUATION
Patient: Jesus Ovalles    Procedure Summary     Date: 08/29/22 Room / Location: [facility] OR [address] [place] [address] [facility]    Anesthesia Start: 1455 Anesthesia Stop: 1650    Procedure: REVISION RIGHT TIBIAL NAILING WITH BONE GRAFTING (Right Leg Lower) Diagnosis:     Surgeons: Maurice Pugh II, MD Provider: Susan Obrien MD    Anesthesia Type: general ASA Status: 2          Anesthesia Type: general    Vitals  Vitals Value Taken Time   /84 08/29/22 1715   Temp 36.8 °C (98.2 °F) 08/29/22 1645   Pulse 94 08/29/22 1725   Resp 16 08/29/22 1700   SpO2 98 % 08/29/22 1725   Vitals shown include unvalidated device data.        Post Anesthesia Care and Evaluation    Patient participation: complete - patient participated  Level of consciousness: awake and alert  Pain management: adequate    Airway patency: patent  Anesthetic complications: No anesthetic complications  PONV Status: controlled  Cardiovascular status: acceptable  Respiratory status: acceptable  Hydration status: acceptable    Comments: /95   Pulse 115   Temp 36.8 °C (98.2 °F) (Oral)   Resp 16   SpO2 93%

## 2022-08-29 NOTE — ANESTHESIA PROCEDURE NOTES
Airway  Urgency: elective    Date/Time: 8/29/2022 3:07 PM    General Information and Staff    Patient location during procedure: OR  Anesthesiologist: Susan Obrien MD  CRNA/CAA: Rebekah Jolly CRNA    Indications and Patient Condition  Indications for airway management: airway protection    Preoxygenated: yes  MILS not maintained throughout  Mask difficulty assessment: 1 - vent by mask    Final Airway Details  Final airway type: endotracheal airway      Successful airway: ETT  Cuffed: yes   Successful intubation technique: direct laryngoscopy  Endotracheal tube insertion site: oral  Blade: Yoandy  Blade size: 3  ETT size (mm): 7.0  Cormack-Lehane Classification: grade IIa - partial view of glottis  Placement verified by: chest auscultation and capnometry   Cuff volume (mL): 7  Measured from: lips  ETT/EBT  to lips (cm): 21  Number of attempts at approach: 1  Assessment: lips, teeth, and gum same as pre-op and atraumatic intubation    Additional Comments  Pt preoxygenated prior to induction, easy mask airway, atraumatic intubation,+ ETCO2, + bs bilat,  ETT secured and connected to ventilator.

## 2022-08-29 NOTE — ANESTHESIA PREPROCEDURE EVALUATION
Anesthesia Evaluation     Patient summary reviewed and Nursing notes reviewed   no history of anesthetic complications:  NPO Solid Status: > 8 hours  NPO Liquid Status: > 2 hours           Airway   Mallampati: II  TM distance: >3 FB  Neck ROM: full  Dental      Pulmonary    (+) a smoker, COPD,   Cardiovascular     ECG reviewed    (+) hypertension, hyperlipidemia,       Neuro/Psych  (+) psychiatric history Bipolar,    GI/Hepatic/Renal/Endo      Musculoskeletal     Abdominal    Substance History      OB/GYN          Other                        Anesthesia Plan    ASA 2     general     intravenous induction     Anesthetic plan, risks, benefits, and alternatives have been provided, discussed and informed consent has been obtained with: patient.        CODE STATUS:

## 2022-08-30 VITALS
WEIGHT: 130 LBS | BODY MASS INDEX: 21.66 KG/M2 | DIASTOLIC BLOOD PRESSURE: 64 MMHG | TEMPERATURE: 98.5 F | OXYGEN SATURATION: 95 % | HEART RATE: 85 BPM | RESPIRATION RATE: 16 BRPM | HEIGHT: 65 IN | SYSTOLIC BLOOD PRESSURE: 97 MMHG

## 2022-08-30 LAB
ABO GROUP BLD: NORMAL
ANION GAP SERPL CALCULATED.3IONS-SCNC: 8.4 MMOL/L (ref 5–15)
BLD GP AB SCN SERPL QL: NEGATIVE
BUN SERPL-MCNC: 11 MG/DL (ref 6–20)
BUN/CREAT SERPL: 14.9 (ref 7–25)
CALCIUM SPEC-SCNC: 8.1 MG/DL (ref 8.6–10.5)
CHLORIDE SERPL-SCNC: 102 MMOL/L (ref 98–107)
CO2 SERPL-SCNC: 26.6 MMOL/L (ref 22–29)
CREAT SERPL-MCNC: 0.74 MG/DL (ref 0.57–1)
EGFRCR SERPLBLD CKD-EPI 2021: 100.6 ML/MIN/1.73
GLUCOSE SERPL-MCNC: 130 MG/DL (ref 65–99)
HCT VFR BLD AUTO: 35.7 % (ref 34–46.6)
HGB BLD-MCNC: 12 G/DL (ref 12–15.9)
POTASSIUM SERPL-SCNC: 3.5 MMOL/L (ref 3.5–5.2)
RH BLD: POSITIVE
SODIUM SERPL-SCNC: 137 MMOL/L (ref 136–145)
T&S EXPIRATION DATE: NORMAL

## 2022-08-30 PROCEDURE — 85018 HEMOGLOBIN: CPT | Performed by: ORTHOPAEDIC SURGERY

## 2022-08-30 PROCEDURE — 0QUG07Z SUPPLEMENT RIGHT TIBIA WITH AUTOLOGOUS TISSUE SUBSTITUTE, OPEN APPROACH: ICD-10-PCS | Performed by: ORTHOPAEDIC SURGERY

## 2022-08-30 PROCEDURE — 97530 THERAPEUTIC ACTIVITIES: CPT

## 2022-08-30 PROCEDURE — 80048 BASIC METABOLIC PNL TOTAL CA: CPT | Performed by: ORTHOPAEDIC SURGERY

## 2022-08-30 PROCEDURE — 86901 BLOOD TYPING SEROLOGIC RH(D): CPT | Performed by: ORTHOPAEDIC SURGERY

## 2022-08-30 PROCEDURE — 94799 UNLISTED PULMONARY SVC/PX: CPT

## 2022-08-30 PROCEDURE — 97161 PT EVAL LOW COMPLEX 20 MIN: CPT

## 2022-08-30 PROCEDURE — 0QPG04Z REMOVAL OF INTERNAL FIXATION DEVICE FROM RIGHT TIBIA, OPEN APPROACH: ICD-10-PCS | Performed by: ORTHOPAEDIC SURGERY

## 2022-08-30 PROCEDURE — 0QHG06Z INSERTION OF INTRAMEDULLARY INTERNAL FIXATION DEVICE INTO RIGHT TIBIA, OPEN APPROACH: ICD-10-PCS | Performed by: ORTHOPAEDIC SURGERY

## 2022-08-30 PROCEDURE — 85014 HEMATOCRIT: CPT | Performed by: ORTHOPAEDIC SURGERY

## 2022-08-30 PROCEDURE — 25010000002 HYDROMORPHONE 1 MG/ML SOLUTION: Performed by: ORTHOPAEDIC SURGERY

## 2022-08-30 PROCEDURE — 86850 RBC ANTIBODY SCREEN: CPT | Performed by: ORTHOPAEDIC SURGERY

## 2022-08-30 PROCEDURE — 63710000001 DIPHENHYDRAMINE PER 50 MG: Performed by: ORTHOPAEDIC SURGERY

## 2022-08-30 PROCEDURE — 94664 DEMO&/EVAL PT USE INHALER: CPT

## 2022-08-30 PROCEDURE — 86900 BLOOD TYPING SEROLOGIC ABO: CPT | Performed by: ORTHOPAEDIC SURGERY

## 2022-08-30 RX ORDER — CEPHALEXIN 500 MG/1
500 CAPSULE ORAL EVERY 12 HOURS
Qty: 20 CAPSULE | Refills: 0 | Status: SHIPPED | OUTPATIENT
Start: 2022-08-30 | End: 2022-09-10 | Stop reason: SDUPTHER

## 2022-08-30 RX ORDER — ASPIRIN 81 MG/1
81 TABLET ORAL EVERY 12 HOURS
Qty: 60 TABLET | Refills: 0 | Status: SHIPPED | OUTPATIENT
Start: 2022-08-30 | End: 2022-09-29

## 2022-08-30 RX ORDER — ONDANSETRON 4 MG/1
4 TABLET, FILM COATED ORAL EVERY 6 HOURS PRN
Qty: 30 TABLET | Refills: 0 | Status: SHIPPED | OUTPATIENT
Start: 2022-08-30 | End: 2023-01-22 | Stop reason: SINTOL

## 2022-08-30 RX ORDER — IPRATROPIUM BROMIDE AND ALBUTEROL SULFATE 2.5; .5 MG/3ML; MG/3ML
3 SOLUTION RESPIRATORY (INHALATION) EVERY 4 HOURS PRN
Status: DISCONTINUED | OUTPATIENT
Start: 2022-08-30 | End: 2022-08-30 | Stop reason: HOSPADM

## 2022-08-30 RX ORDER — OXYCODONE AND ACETAMINOPHEN 7.5; 325 MG/1; MG/1
1 TABLET ORAL EVERY 4 HOURS PRN
Qty: 50 TABLET | Refills: 0 | Status: SHIPPED | OUTPATIENT
Start: 2022-08-30 | End: 2022-09-08

## 2022-08-30 RX ADMIN — HYDROMORPHONE HYDROCHLORIDE 1 MG: 1 INJECTION, SOLUTION INTRAMUSCULAR; INTRAVENOUS; SUBCUTANEOUS at 08:32

## 2022-08-30 RX ADMIN — Medication 10 ML: at 08:34

## 2022-08-30 RX ADMIN — CYCLOBENZAPRINE 10 MG: 10 TABLET, FILM COATED ORAL at 05:44

## 2022-08-30 RX ADMIN — DOCUSATE SODIUM 50MG AND SENNOSIDES 8.6MG 2 TABLET: 8.6; 5 TABLET, FILM COATED ORAL at 08:33

## 2022-08-30 RX ADMIN — IPRATROPIUM BROMIDE AND ALBUTEROL SULFATE 3 ML: .5; 3 SOLUTION RESPIRATORY (INHALATION) at 12:48

## 2022-08-30 RX ADMIN — OXYCODONE HYDROCHLORIDE AND ACETAMINOPHEN 2 TABLET: 7.5; 325 TABLET ORAL at 13:04

## 2022-08-30 RX ADMIN — FAMOTIDINE 40 MG: 20 TABLET ORAL at 08:34

## 2022-08-30 RX ADMIN — ASPIRIN 81 MG: 81 TABLET, CHEWABLE ORAL at 08:33

## 2022-08-30 RX ADMIN — OXYCODONE HYDROCHLORIDE AND ACETAMINOPHEN 2 TABLET: 7.5; 325 TABLET ORAL at 02:38

## 2022-08-30 RX ADMIN — DIPHENHYDRAMINE HYDROCHLORIDE 25 MG: 25 CAPSULE ORAL at 09:35

## 2022-08-30 RX ADMIN — OXYCODONE HYDROCHLORIDE AND ACETAMINOPHEN 2 TABLET: 7.5; 325 TABLET ORAL at 09:34

## 2022-08-30 RX ADMIN — CYCLOBENZAPRINE 10 MG: 10 TABLET, FILM COATED ORAL at 13:04

## 2022-09-01 LAB
BACTERIA SPEC AEROBE CULT: NORMAL
GRAM STN SPEC: NORMAL

## 2022-09-22 ENCOUNTER — LAB (OUTPATIENT)
Dept: LAB | Facility: HOSPITAL | Age: 47
End: 2022-09-22

## 2022-09-22 ENCOUNTER — TRANSCRIBE ORDERS (OUTPATIENT)
Dept: ADMINISTRATIVE | Facility: HOSPITAL | Age: 47
End: 2022-09-22

## 2022-09-22 DIAGNOSIS — B99.9 INFECTION: Primary | ICD-10-CM

## 2022-09-22 DIAGNOSIS — B99.9 INFECTION: ICD-10-CM

## 2022-09-22 PROCEDURE — 87205 SMEAR GRAM STAIN: CPT

## 2022-09-22 PROCEDURE — 87077 CULTURE AEROBIC IDENTIFY: CPT

## 2022-09-22 PROCEDURE — 87186 SC STD MICRODIL/AGAR DIL: CPT

## 2022-09-22 PROCEDURE — 87070 CULTURE OTHR SPECIMN AEROBIC: CPT

## 2022-09-25 LAB
BACTERIA SPEC AEROBE CULT: ABNORMAL
BACTERIA SPEC AEROBE CULT: ABNORMAL
GRAM STN SPEC: ABNORMAL

## 2022-10-10 LAB
MYCOBACTERIUM SPEC CULT: NORMAL
NIGHT BLUE STAIN TISS: NORMAL

## 2022-10-11 ENCOUNTER — APPOINTMENT (OUTPATIENT)
Dept: GENERAL RADIOLOGY | Facility: HOSPITAL | Age: 47
End: 2022-10-11

## 2022-10-11 ENCOUNTER — HOSPITAL ENCOUNTER (INPATIENT)
Facility: HOSPITAL | Age: 47
LOS: 1 days | Discharge: HOME | End: 2022-10-15
Attending: EMERGENCY MEDICINE | Admitting: HOSPITALIST

## 2022-10-11 DIAGNOSIS — J96.01 ACUTE HYPOXEMIC RESPIRATORY FAILURE: ICD-10-CM

## 2022-10-11 DIAGNOSIS — B34.8 INFECTION DUE TO PARAINFLUENZA VIRUS 4: Primary | ICD-10-CM

## 2022-10-11 DIAGNOSIS — J44.1 ACUTE EXACERBATION OF CHRONIC OBSTRUCTIVE PULMONARY DISEASE (COPD): ICD-10-CM

## 2022-10-11 PROCEDURE — 93005 ELECTROCARDIOGRAM TRACING: CPT

## 2022-10-11 PROCEDURE — 99285 EMERGENCY DEPT VISIT HI MDM: CPT

## 2022-10-11 PROCEDURE — 84484 ASSAY OF TROPONIN QUANT: CPT | Performed by: EMERGENCY MEDICINE

## 2022-10-11 PROCEDURE — 80053 COMPREHEN METABOLIC PANEL: CPT | Performed by: EMERGENCY MEDICINE

## 2022-10-11 PROCEDURE — 71046 X-RAY EXAM CHEST 2 VIEWS: CPT

## 2022-10-11 PROCEDURE — 93005 ELECTROCARDIOGRAM TRACING: CPT | Performed by: EMERGENCY MEDICINE

## 2022-10-11 PROCEDURE — 93010 ELECTROCARDIOGRAM REPORT: CPT | Performed by: INTERNAL MEDICINE

## 2022-10-11 PROCEDURE — 85025 COMPLETE CBC W/AUTO DIFF WBC: CPT

## 2022-10-11 PROCEDURE — 83880 ASSAY OF NATRIURETIC PEPTIDE: CPT | Performed by: EMERGENCY MEDICINE

## 2022-10-11 PROCEDURE — 84145 PROCALCITONIN (PCT): CPT | Performed by: EMERGENCY MEDICINE

## 2022-10-11 RX ORDER — SODIUM CHLORIDE 0.9 % (FLUSH) 0.9 %
10 SYRINGE (ML) INJECTION AS NEEDED
Status: DISCONTINUED | OUTPATIENT
Start: 2022-10-11 | End: 2022-10-15 | Stop reason: HOSPADM

## 2022-10-11 RX ADMIN — Medication 10 ML: at 23:55

## 2022-10-12 ENCOUNTER — APPOINTMENT (OUTPATIENT)
Dept: CT IMAGING | Facility: HOSPITAL | Age: 47
End: 2022-10-12

## 2022-10-12 PROBLEM — J44.1 COPD EXACERBATION (HCC): Status: ACTIVE | Noted: 2022-10-12

## 2022-10-12 PROBLEM — B34.8 INFECTION DUE TO PARAINFLUENZA VIRUS 4: Status: ACTIVE | Noted: 2022-10-12

## 2022-10-12 LAB
ALBUMIN SERPL-MCNC: 4.8 G/DL (ref 3.5–5.2)
ALBUMIN/GLOB SERPL: 2.1 G/DL
ALP SERPL-CCNC: 74 U/L (ref 39–117)
ALT SERPL W P-5'-P-CCNC: 11 U/L (ref 1–33)
ANION GAP SERPL CALCULATED.3IONS-SCNC: 14.7 MMOL/L (ref 5–15)
AST SERPL-CCNC: 17 U/L (ref 1–32)
B PARAPERT DNA SPEC QL NAA+PROBE: NOT DETECTED
B PERT DNA SPEC QL NAA+PROBE: NOT DETECTED
BASOPHILS # BLD AUTO: 0.01 10*3/MM3 (ref 0–0.2)
BASOPHILS NFR BLD AUTO: 0.1 % (ref 0–1.5)
BILIRUB SERPL-MCNC: <0.2 MG/DL (ref 0–1.2)
BUN SERPL-MCNC: 14 MG/DL (ref 6–20)
BUN/CREAT SERPL: 15.6 (ref 7–25)
C PNEUM DNA NPH QL NAA+NON-PROBE: NOT DETECTED
CALCIUM SPEC-SCNC: 10.3 MG/DL (ref 8.6–10.5)
CHLORIDE SERPL-SCNC: 101 MMOL/L (ref 98–107)
CO2 SERPL-SCNC: 24.3 MMOL/L (ref 22–29)
CREAT SERPL-MCNC: 0.9 MG/DL (ref 0.57–1)
DEPRECATED RDW RBC AUTO: 38.9 FL (ref 37–54)
EGFRCR SERPLBLD CKD-EPI 2021: 79.5 ML/MIN/1.73
EOSINOPHIL # BLD AUTO: 0 10*3/MM3 (ref 0–0.4)
EOSINOPHIL NFR BLD AUTO: 0 % (ref 0.3–6.2)
ERYTHROCYTE [DISTWIDTH] IN BLOOD BY AUTOMATED COUNT: 12.1 % (ref 12.3–15.4)
FLUAV SUBTYP SPEC NAA+PROBE: NOT DETECTED
FLUBV RNA ISLT QL NAA+PROBE: NOT DETECTED
GLOBULIN UR ELPH-MCNC: 2.3 GM/DL
GLUCOSE SERPL-MCNC: 112 MG/DL (ref 65–99)
HADV DNA SPEC NAA+PROBE: NOT DETECTED
HCOV 229E RNA SPEC QL NAA+PROBE: NOT DETECTED
HCOV HKU1 RNA SPEC QL NAA+PROBE: NOT DETECTED
HCOV NL63 RNA SPEC QL NAA+PROBE: NOT DETECTED
HCOV OC43 RNA SPEC QL NAA+PROBE: NOT DETECTED
HCT VFR BLD AUTO: 40.5 % (ref 34–46.6)
HGB BLD-MCNC: 13.5 G/DL (ref 12–15.9)
HMPV RNA NPH QL NAA+NON-PROBE: NOT DETECTED
HOLD SPECIMEN: NORMAL
HOLD SPECIMEN: NORMAL
HPIV1 RNA ISLT QL NAA+PROBE: NOT DETECTED
HPIV2 RNA SPEC QL NAA+PROBE: NOT DETECTED
HPIV3 RNA NPH QL NAA+PROBE: NOT DETECTED
HPIV4 P GENE NPH QL NAA+PROBE: DETECTED
IMM GRANULOCYTES # BLD AUTO: 0.04 10*3/MM3 (ref 0–0.05)
IMM GRANULOCYTES NFR BLD AUTO: 0.6 % (ref 0–0.5)
LYMPHOCYTES # BLD AUTO: 1.49 10*3/MM3 (ref 0.7–3.1)
LYMPHOCYTES NFR BLD AUTO: 20.9 % (ref 19.6–45.3)
M PNEUMO IGG SER IA-ACNC: NOT DETECTED
MCH RBC QN AUTO: 30 PG (ref 26.6–33)
MCHC RBC AUTO-ENTMCNC: 33.3 G/DL (ref 31.5–35.7)
MCV RBC AUTO: 90 FL (ref 79–97)
MONOCYTES # BLD AUTO: 0.52 10*3/MM3 (ref 0.1–0.9)
MONOCYTES NFR BLD AUTO: 7.3 % (ref 5–12)
NEUTROPHILS NFR BLD AUTO: 5.07 10*3/MM3 (ref 1.7–7)
NEUTROPHILS NFR BLD AUTO: 71.1 % (ref 42.7–76)
NRBC BLD AUTO-RTO: 0 /100 WBC (ref 0–0.2)
NT-PROBNP SERPL-MCNC: 332 PG/ML (ref 0–450)
PLATELET # BLD AUTO: 293 10*3/MM3 (ref 140–450)
PMV BLD AUTO: 9.3 FL (ref 6–12)
POTASSIUM SERPL-SCNC: 3.8 MMOL/L (ref 3.5–5.2)
PROCALCITONIN SERPL-MCNC: 0.04 NG/ML (ref 0–0.25)
PROT SERPL-MCNC: 7.1 G/DL (ref 6–8.5)
QT INTERVAL: 375 MS
RBC # BLD AUTO: 4.5 10*6/MM3 (ref 3.77–5.28)
RHINOVIRUS RNA SPEC NAA+PROBE: NOT DETECTED
RSV RNA NPH QL NAA+NON-PROBE: NOT DETECTED
SARS-COV-2 RNA NPH QL NAA+NON-PROBE: NOT DETECTED
SODIUM SERPL-SCNC: 140 MMOL/L (ref 136–145)
TROPONIN T SERPL-MCNC: <0.01 NG/ML (ref 0–0.03)
WBC NRBC COR # BLD: 7.13 10*3/MM3 (ref 3.4–10.8)
WHOLE BLOOD HOLD COAG: NORMAL
WHOLE BLOOD HOLD SPECIMEN: NORMAL

## 2022-10-12 PROCEDURE — 0 IOPAMIDOL PER 1 ML: Performed by: EMERGENCY MEDICINE

## 2022-10-12 PROCEDURE — 25010000002 MORPHINE PER 10 MG: Performed by: EMERGENCY MEDICINE

## 2022-10-12 PROCEDURE — 94799 UNLISTED PULMONARY SVC/PX: CPT

## 2022-10-12 PROCEDURE — G0378 HOSPITAL OBSERVATION PER HR: HCPCS

## 2022-10-12 PROCEDURE — 25010000002 ONDANSETRON PER 1 MG: Performed by: PHYSICIAN ASSISTANT

## 2022-10-12 PROCEDURE — 94640 AIRWAY INHALATION TREATMENT: CPT

## 2022-10-12 PROCEDURE — 94761 N-INVAS EAR/PLS OXIMETRY MLT: CPT

## 2022-10-12 PROCEDURE — 0202U NFCT DS 22 TRGT SARS-COV-2: CPT | Performed by: PHYSICIAN ASSISTANT

## 2022-10-12 PROCEDURE — 71275 CT ANGIOGRAPHY CHEST: CPT

## 2022-10-12 PROCEDURE — 25010000002 METHYLPREDNISOLONE PER 125 MG: Performed by: HOSPITALIST

## 2022-10-12 PROCEDURE — 94664 DEMO&/EVAL PT USE INHALER: CPT

## 2022-10-12 PROCEDURE — 25010000002 ENOXAPARIN PER 10 MG: Performed by: HOSPITALIST

## 2022-10-12 PROCEDURE — 25010000002 METHYLPREDNISOLONE PER 125 MG: Performed by: PHYSICIAN ASSISTANT

## 2022-10-12 RX ORDER — CALCIUM CARBONATE 200(500)MG
2 TABLET,CHEWABLE ORAL 2 TIMES DAILY PRN
Status: DISCONTINUED | OUTPATIENT
Start: 2022-10-12 | End: 2022-10-15 | Stop reason: HOSPADM

## 2022-10-12 RX ORDER — IPRATROPIUM BROMIDE AND ALBUTEROL SULFATE 2.5; .5 MG/3ML; MG/3ML
3 SOLUTION RESPIRATORY (INHALATION) EVERY 4 HOURS PRN
Status: DISCONTINUED | OUTPATIENT
Start: 2022-10-12 | End: 2022-10-15 | Stop reason: HOSPADM

## 2022-10-12 RX ORDER — OXYCODONE HYDROCHLORIDE AND ACETAMINOPHEN 5; 325 MG/1; MG/1
1 TABLET ORAL ONCE
Status: COMPLETED | OUTPATIENT
Start: 2022-10-12 | End: 2022-10-12

## 2022-10-12 RX ORDER — BUDESONIDE 1 MG/2ML
1 INHALANT ORAL
Status: DISCONTINUED | OUTPATIENT
Start: 2022-10-12 | End: 2022-10-15 | Stop reason: HOSPADM

## 2022-10-12 RX ORDER — SODIUM CHLORIDE 0.9 % (FLUSH) 0.9 %
10 SYRINGE (ML) INJECTION EVERY 12 HOURS SCHEDULED
Status: DISCONTINUED | OUTPATIENT
Start: 2022-10-12 | End: 2022-10-15 | Stop reason: HOSPADM

## 2022-10-12 RX ORDER — SODIUM CHLORIDE 0.9 % (FLUSH) 0.9 %
10 SYRINGE (ML) INJECTION AS NEEDED
Status: DISCONTINUED | OUTPATIENT
Start: 2022-10-12 | End: 2022-10-15 | Stop reason: HOSPADM

## 2022-10-12 RX ORDER — FAMOTIDINE 20 MG/1
20 TABLET, FILM COATED ORAL
Status: DISCONTINUED | OUTPATIENT
Start: 2022-10-12 | End: 2022-10-15 | Stop reason: HOSPADM

## 2022-10-12 RX ORDER — ONDANSETRON 2 MG/ML
4 INJECTION INTRAMUSCULAR; INTRAVENOUS ONCE
Status: COMPLETED | OUTPATIENT
Start: 2022-10-12 | End: 2022-10-12

## 2022-10-12 RX ORDER — LEVOFLOXACIN 750 MG/1
750 TABLET ORAL EVERY 24 HOURS
Status: DISCONTINUED | OUTPATIENT
Start: 2022-10-12 | End: 2022-10-15 | Stop reason: HOSPADM

## 2022-10-12 RX ORDER — METHYLPREDNISOLONE SODIUM SUCCINATE 125 MG/2ML
60 INJECTION, POWDER, LYOPHILIZED, FOR SOLUTION INTRAMUSCULAR; INTRAVENOUS EVERY 12 HOURS
Status: DISCONTINUED | OUTPATIENT
Start: 2022-10-12 | End: 2022-10-12

## 2022-10-12 RX ORDER — ACETAMINOPHEN 325 MG/1
650 TABLET ORAL EVERY 4 HOURS PRN
Status: DISCONTINUED | OUTPATIENT
Start: 2022-10-12 | End: 2022-10-15 | Stop reason: HOSPADM

## 2022-10-12 RX ORDER — ACETAMINOPHEN 650 MG/1
650 SUPPOSITORY RECTAL EVERY 4 HOURS PRN
Status: DISCONTINUED | OUTPATIENT
Start: 2022-10-12 | End: 2022-10-15 | Stop reason: HOSPADM

## 2022-10-12 RX ORDER — METHYLPREDNISOLONE SODIUM SUCCINATE 125 MG/2ML
60 INJECTION, POWDER, LYOPHILIZED, FOR SOLUTION INTRAMUSCULAR; INTRAVENOUS EVERY 8 HOURS
Status: DISCONTINUED | OUTPATIENT
Start: 2022-10-12 | End: 2022-10-13

## 2022-10-12 RX ORDER — ARFORMOTEROL TARTRATE 15 UG/2ML
15 SOLUTION RESPIRATORY (INHALATION)
Status: DISCONTINUED | OUTPATIENT
Start: 2022-10-12 | End: 2022-10-15 | Stop reason: HOSPADM

## 2022-10-12 RX ORDER — ENOXAPARIN SODIUM 100 MG/ML
40 INJECTION SUBCUTANEOUS EVERY 24 HOURS
Status: DISCONTINUED | OUTPATIENT
Start: 2022-10-12 | End: 2022-10-15 | Stop reason: HOSPADM

## 2022-10-12 RX ORDER — ACETAMINOPHEN 160 MG/5ML
650 SOLUTION ORAL EVERY 4 HOURS PRN
Status: DISCONTINUED | OUTPATIENT
Start: 2022-10-12 | End: 2022-10-15 | Stop reason: HOSPADM

## 2022-10-12 RX ORDER — ONDANSETRON 4 MG/1
4 TABLET, FILM COATED ORAL EVERY 6 HOURS PRN
Status: DISCONTINUED | OUTPATIENT
Start: 2022-10-12 | End: 2022-10-15 | Stop reason: HOSPADM

## 2022-10-12 RX ORDER — ONDANSETRON 2 MG/ML
4 INJECTION INTRAMUSCULAR; INTRAVENOUS EVERY 6 HOURS PRN
Status: DISCONTINUED | OUTPATIENT
Start: 2022-10-12 | End: 2022-10-15 | Stop reason: HOSPADM

## 2022-10-12 RX ORDER — IPRATROPIUM BROMIDE AND ALBUTEROL SULFATE 2.5; .5 MG/3ML; MG/3ML
3 SOLUTION RESPIRATORY (INHALATION) ONCE
Status: COMPLETED | OUTPATIENT
Start: 2022-10-12 | End: 2022-10-12

## 2022-10-12 RX ORDER — CLOTRIMAZOLE 10 MG/1
10 LOZENGE ORAL; TOPICAL
COMMUNITY
End: 2023-01-22

## 2022-10-12 RX ORDER — METHYLPREDNISOLONE SODIUM SUCCINATE 125 MG/2ML
125 INJECTION, POWDER, LYOPHILIZED, FOR SOLUTION INTRAMUSCULAR; INTRAVENOUS ONCE
Status: COMPLETED | OUTPATIENT
Start: 2022-10-12 | End: 2022-10-12

## 2022-10-12 RX ORDER — IPRATROPIUM BROMIDE AND ALBUTEROL SULFATE 2.5; .5 MG/3ML; MG/3ML
3 SOLUTION RESPIRATORY (INHALATION)
Status: DISCONTINUED | OUTPATIENT
Start: 2022-10-12 | End: 2022-10-15 | Stop reason: HOSPADM

## 2022-10-12 RX ORDER — CLOTRIMAZOLE 10 MG/1
10 LOZENGE ORAL; TOPICAL
Status: DISCONTINUED | OUTPATIENT
Start: 2022-10-12 | End: 2022-10-15 | Stop reason: HOSPADM

## 2022-10-12 RX ORDER — OXYCODONE AND ACETAMINOPHEN 7.5; 325 MG/1; MG/1
1 TABLET ORAL EVERY 4 HOURS PRN
Status: DISCONTINUED | OUTPATIENT
Start: 2022-10-12 | End: 2022-10-15 | Stop reason: HOSPADM

## 2022-10-12 RX ORDER — MORPHINE SULFATE 2 MG/ML
4 INJECTION, SOLUTION INTRAMUSCULAR; INTRAVENOUS ONCE
Status: COMPLETED | OUTPATIENT
Start: 2022-10-12 | End: 2022-10-12

## 2022-10-12 RX ADMIN — CLOTRIMAZOLE 10 MG: 10 LOZENGE ORAL at 10:00

## 2022-10-12 RX ADMIN — BUDESONIDE 1 MG: 1 INHALANT ORAL at 11:46

## 2022-10-12 RX ADMIN — OXYCODONE HYDROCHLORIDE AND ACETAMINOPHEN 1 TABLET: 7.5; 325 TABLET ORAL at 09:59

## 2022-10-12 RX ADMIN — OXYCODONE AND ACETAMINOPHEN 1 TABLET: 5; 325 TABLET ORAL at 04:31

## 2022-10-12 RX ADMIN — OXYCODONE HYDROCHLORIDE AND ACETAMINOPHEN 1 TABLET: 7.5; 325 TABLET ORAL at 14:13

## 2022-10-12 RX ADMIN — IPRATROPIUM BROMIDE AND ALBUTEROL SULFATE 3 ML: 2.5; .5 SOLUTION RESPIRATORY (INHALATION) at 11:46

## 2022-10-12 RX ADMIN — IPRATROPIUM BROMIDE AND ALBUTEROL SULFATE 3 ML: .5; 3 SOLUTION RESPIRATORY (INHALATION) at 00:40

## 2022-10-12 RX ADMIN — ARFORMOTEROL TARTRATE 15 MCG: 15 SOLUTION RESPIRATORY (INHALATION) at 20:42

## 2022-10-12 RX ADMIN — IPRATROPIUM BROMIDE AND ALBUTEROL SULFATE 3 ML: 2.5; .5 SOLUTION RESPIRATORY (INHALATION) at 20:39

## 2022-10-12 RX ADMIN — IPRATROPIUM BROMIDE AND ALBUTEROL SULFATE 3 ML: 2.5; .5 SOLUTION RESPIRATORY (INHALATION) at 04:42

## 2022-10-12 RX ADMIN — OXYCODONE HYDROCHLORIDE AND ACETAMINOPHEN 1 TABLET: 7.5; 325 TABLET ORAL at 22:28

## 2022-10-12 RX ADMIN — OXYCODONE HYDROCHLORIDE AND ACETAMINOPHEN 1 TABLET: 7.5; 325 TABLET ORAL at 18:45

## 2022-10-12 RX ADMIN — FAMOTIDINE 20 MG: 20 TABLET ORAL at 17:23

## 2022-10-12 RX ADMIN — Medication 10 ML: at 21:32

## 2022-10-12 RX ADMIN — FAMOTIDINE 20 MG: 20 TABLET ORAL at 10:02

## 2022-10-12 RX ADMIN — MORPHINE SULFATE 4 MG: 2 INJECTION, SOLUTION INTRAMUSCULAR; INTRAVENOUS at 01:02

## 2022-10-12 RX ADMIN — ARFORMOTEROL TARTRATE 15 MCG: 15 SOLUTION RESPIRATORY (INHALATION) at 11:47

## 2022-10-12 RX ADMIN — Medication 10 ML: at 09:58

## 2022-10-12 RX ADMIN — METHYLPREDNISOLONE SODIUM SUCCINATE 125 MG: 125 INJECTION, POWDER, FOR SOLUTION INTRAMUSCULAR; INTRAVENOUS at 00:28

## 2022-10-12 RX ADMIN — ONDANSETRON 4 MG: 2 INJECTION INTRAMUSCULAR; INTRAVENOUS at 01:02

## 2022-10-12 RX ADMIN — LEVOFLOXACIN 750 MG: 750 TABLET, FILM COATED ORAL at 10:00

## 2022-10-12 RX ADMIN — CLOTRIMAZOLE 10 MG: 10 LOZENGE ORAL at 17:23

## 2022-10-12 RX ADMIN — METHYLPREDNISOLONE SODIUM SUCCINATE 60 MG: 125 INJECTION, POWDER, FOR SOLUTION INTRAMUSCULAR; INTRAVENOUS at 10:00

## 2022-10-12 RX ADMIN — IOPAMIDOL 95 ML: 755 INJECTION, SOLUTION INTRAVENOUS at 01:32

## 2022-10-12 RX ADMIN — CLOTRIMAZOLE 10 MG: 10 LOZENGE ORAL at 21:32

## 2022-10-12 RX ADMIN — METHYLPREDNISOLONE SODIUM SUCCINATE 60 MG: 125 INJECTION, POWDER, FOR SOLUTION INTRAMUSCULAR; INTRAVENOUS at 17:23

## 2022-10-12 RX ADMIN — IPRATROPIUM BROMIDE AND ALBUTEROL SULFATE 3 ML: 2.5; .5 SOLUTION RESPIRATORY (INHALATION) at 15:07

## 2022-10-12 RX ADMIN — ENOXAPARIN SODIUM 40 MG: 100 INJECTION SUBCUTANEOUS at 10:00

## 2022-10-12 RX ADMIN — IPRATROPIUM BROMIDE AND ALBUTEROL SULFATE 3 ML: 2.5; .5 SOLUTION RESPIRATORY (INHALATION) at 07:57

## 2022-10-12 RX ADMIN — SODIUM CHLORIDE 1000 ML: 9 INJECTION, SOLUTION INTRAVENOUS at 00:27

## 2022-10-12 RX ADMIN — CLOTRIMAZOLE 10 MG: 10 LOZENGE ORAL at 13:26

## 2022-10-12 NOTE — ED NOTES
Pt c/o burning and dryness in nares from O2. RT asked to add humidifier to O2. Antibiotic ointment given to pt to apply inside her nares for comfort.

## 2022-10-12 NOTE — PLAN OF CARE
Problem: Adult Inpatient Plan of Care  Goal: Plan of Care Review  10/12/2022 0637 by Julian De Luna RN  Outcome: Ongoing, Progressing  Flowsheets  Taken 10/12/2022 0637  Plan of Care Reviewed With: patient  Taken 10/12/2022 0636  Progress: no change  10/12/2022 0636 by Julian De Luna RN  Outcome: Ongoing, Progressing  Flowsheets (Taken 10/12/2022 0636)  Progress: no change  Plan of Care Reviewed With: patient  Goal: Patient-Specific Goal (Individualized)  10/12/2022 0637 by Julian De Luna RN  Outcome: Ongoing, Progressing  10/12/2022 0636 by Julian De Luna RN  Outcome: Ongoing, Progressing  Goal: Absence of Hospital-Acquired Illness or Injury  10/12/2022 0637 by Julian De Luna RN  Outcome: Ongoing, Progressing  10/12/2022 0636 by Julian De Luna RN  Outcome: Ongoing, Progressing  Goal: Optimal Comfort and Wellbeing  10/12/2022 0637 by Julian De Luna RN  Outcome: Ongoing, Progressing  10/12/2022 0636 by Julian De Luna RN  Outcome: Ongoing, Progressing  Goal: Readiness for Transition of Care  10/12/2022 0637 by Julian De Luna RN  Outcome: Ongoing, Progressing  10/12/2022 0636 by Julian De Luna RN  Outcome: Ongoing, Progressing   Goal Outcome Evaluation:  Plan of Care Reviewed With: patient        Progress: no change

## 2022-10-12 NOTE — ED NOTES
Pt via PV from home with c/o SOA, cough, fever since Friday.  Pt has R foot surgery at the end of last month.    All triage performed with this RN wearing appropriate PPE.  Pt placed in mask upon arrival to ED.

## 2022-10-12 NOTE — H&P
HISTORY AND PHYSICAL   [facility]        Date of Admission: 10/11/2022  Patient Identification:  Name: Jesus Ovalles  Age: 47 y.o.  Sex: female  :  1975  MRN: [id number]                     Primary Care Physician: Aminta Quezada PA-C    Chief Complaint: Shortness of breath    History of Present Illness:   Mrs. Ovalles is a 47-year-old female who utilizes tobacco and has past medical history is of asthma and COPD.  She had recent surgical intervention on her ankle per Dr. Maurice Pugh.  She states she was placed on Levaquin as a result due to concerns for cellulitis.  She claims that treatment dose regimen supposed be 21 days.  She denies any fever or chills.  She admits to shortness of breath and cough which is mainly dry.  She does utilize tobacco.  She had been prescribed a Z-Cristofer along the way and ultimately came to the hospital for further recommendations as the additional p.o. prednisone was not achieving efficacy as well and patient was found to have parainfluenza bronchitis.  She denies any nausea vomiting diarrhea.  No issues with altered mentation.  Again she does not really admit to any fever or chills.  Her cough is mainly dry denies any hemoptysis.  Shortness of breath is worse with exertion and walking.  She has been receiving Solu-Medrol as well as nebulized meds since admission and is noting some clinical improvement.  Current oxygenation is 95% on 4 L via nasal cannula and her vital signs are currently stable as well as afebrile.  Thus far steroids are not causing any affect or changes to her mental stability given her past history of bipolar/PTSD with past history of suicidal ideation.    Past Medical History:  Past Medical History:   Diagnosis Date   • Asthma    • Bipolar affective (HCC)    • COPD (chronic obstructive pulmonary disease) (HCC)    • DDD (degenerative disc disease), cervical    • History of tachycardia     pt flat lined with suicidal attempt- years ago   •  "Hypertension     pt states with agitation   • PTSD (post-traumatic stress disorder)    • Suicidal risk     pt states \"years ago\"     Past Surgical History:  Past Surgical History:   Procedure Laterality Date   • ANKLE OPEN REDUCTION INTERNAL FIXATION Right 02/11/2022    Procedure: ANKLE OPEN REDUCTION INTERNAL FIXATION;  Surgeon: Maurice Pugh II, MD;  Location: [facility] MAIN OR;  Service: Orthopedics;  Laterality: Right;   • CARPAL TUNNEL RELEASE     • DILATATION AND CURETTAGE     • HYSTERECTOMY     • JOINT REPLACEMENT     • SHOULDER ARTHROSCOPY W/ ROTATOR CUFF REPAIR     • TIBIA IM NAILING/RODDING Right 02/11/2022    Procedure: TIBIA INTRAMEDULLARY NAIL/LAURI INSERTION WITH IRRIGATION AND DEBRIDEMENT;  Surgeon: Maurice Pugh II, MD;  Location: [facility] MAIN OR;  Service: Orthopedics;  Laterality: Right;   • TIBIA IM NAILING/RODDING Right 08/29/2022    Procedure: REVISION RIGHT TIBIAL NAILING WITH BONE GRAFTING;  Surgeon: Maurice Pugh II, MD;  Location: [facility];  Service: Orthopedics;  Laterality: Right;      Home Meds:  Medications Prior to Admission   Medication Sig Dispense Refill Last Dose   • Acetylcysteine (NAC) capsule capsule Take 1 capsule by mouth 2 (Two) Times a Day. 20 capsule 0    • azithromycin (ZITHROMAX) 250 MG tablet Take 2 tablets by mouth on day 1, then 1 tablet by mouth on days 2-5. 6 tablet 0 10/12/2022   • budesonide-formoterol (Symbicort) 160-4.5 MCG/ACT inhaler Inhale 2 puffs 2 (Two) Times a Day. 10.2 g 0    • ipratropium-albuterol (DUO-NEB) 0.5-2.5 mg/3 ml nebulizer Take 3 mL (1 vial) by nebulization 4 (Four) Times a Day As Needed. 90 mL 0    • levoFLOXacin (LEVAQUIN) 750 MG tablet Take 1 tablet by mouth daily for 21 days. 21 tablet 0    • oxyCODONE-acetaminophen (Percocet) 7.5-325 MG per tablet Take 1 tablet by mouth every 4-6 hours as needed. 40 tablet 0    • predniSONE (DELTASONE) 10 MG tablet Take 4 tablets by mouth Daily for 3 days, THEN 3 tablets " Daily for 3 days, THEN 2 tablets Daily for 3 days, THEN 1 tablet Daily for 3 days. 30 tablet 0 10/12/2022   • cephalexin (KEFLEX) 500 MG capsule Take 1 capsule by mouth Every 12 (Twelve) Hours. 14 capsule 0    • clotrimazole (MYCELEX) 10 MG alanis Take 1 tablet by mouth 5 (Five) Times a Day.      • doxycycline (MONODOX) 100 MG capsule Take 1 capsule twice a day by oral route for 7 days. 14 capsule 0    • ondansetron (Zofran) 4 MG tablet Take 1 tablet by mouth Every 6 (Six) Hours As Needed for Nausea or Vomiting. 30 tablet 0    • oxyCODONE-acetaminophen (Percocet) 7.5-325 MG per tablet Take 1 tablet by mouth Every 4-6 Hours As Needed. 40 tablet 0        Allergies:  Allergies   Allergen Reactions   • Penicillins Anaphylaxis and Swelling   • Codeine Hives and Itching   • Lyrica [Pregabalin] Swelling   • Buprenorphine Rash     Immunizations:  Immunization History   Administered Date(s) Administered   • Flu Vaccine Split Quad 10/02/2013   • Influenza, Unspecified 09/28/2015   • Pneumococcal Polysaccharide (PPSV23) 09/28/2015   • Tdap 05/21/2013     Social History:   Social History     Social History Narrative   • Not on file     Social History     Socioeconomic History   • Marital status:    Tobacco Use   • Smoking status: Every Day     Packs/day: 1.00     Types: Cigarettes   • Smokeless tobacco: Never   Vaping Use   • Vaping Use: Never used   Substance and Sexual Activity   • Alcohol use: Yes     Comment: RARE   • Drug use: No   • Sexual activity: Defer       Family History:  Family History   Problem Relation Age of Onset   • Heart disease Mother    • Malig Hyperthermia Neg Hx         Review of Systems  See history of present illness and past medical history.  Patient denies fever chills night sweats.  Denies any headache dizziness or loss of consciousness.  Admits to fatigue but denies any nausea vomiting abdominal pain or diarrhea.  Denies any chest pain or palpitations but admits to dry cough and shortness of  "breath.  Denies any bruising bleeding loss of consciousness and/or loss of function.  No dysuria remainder of ROS is negative.    Objective:  T Max 24 hrs: Temp (24hrs), Av.9 °F (36.6 °C), Min:97.7 °F (36.5 °C), Max:98.1 °F (36.7 °C)    Vitals Ranges:   Temp:  [97.7 °F (36.5 °C)-98.1 °F (36.7 °C)] 97.7 °F (36.5 °C)  Heart Rate:  [] 74  Resp:  [16-22] 16  BP: (114-128)/(69-85) 121/73      Exam:  /73 (BP Location: Left arm, Patient Position: Sitting)   Pulse 74   Temp 97.7 °F (36.5 °C) (Oral)   Resp 16   Ht 165.1 cm (65\")   Wt 59.8 kg (131 lb 13.4 oz)   SpO2 95%   BMI 21.94 kg/m²     General Appearance:    Alert, cooperative, alert and oriented x3, nontoxic, no family at bedside, conversational and pleasant   Head:    Normocephalic, without obvious abnormality, atraumatic   Eyes:    PERRL, conjunctivae/corneas clear, EOM's intact, both eyes   Ears:    Normal external ear canals, both ears   Nose:   Nares normal, septum midline, mucosa normal, no drainage    or sinus tenderness   Throat:   Lips, mucosa, and tongue normal.  Poor dentition   Neck:   Supple, no rigidity or JVD       Lungs:    Good air entry even to the bases with diffuse wheeze and rhonchi to auscultation bilaterally, respirations unlabored and no use of accessory muscles at this time   Chest Wall:    No tenderness or deformity    Heart:    Regular rate and rhythm, S1 and S2 normal   Abdomen:     Soft, nontender, bowel sounds active all four quadrants,        Extremities:  No edema or volume overload, surgical ankle shows no cellulitis with a little bit of wound dehiscence at the bottom of her surgical incision but no drainage present   Pulses:   2+ and symmetric all extremities           Neurologic:   CNII-XII intact, moving all with no focal deficit      .    Data Review:  Labs in chart were reviewed.             Imaging Results (All)     Procedure Component Value Units Date/Time    CT Angiogram Chest [[id number]] Collected: " 10/12/22 0204     Updated: 10/12/22 0204    Narrative:        Patient: СЕРГЕЙ MOREL  Time Out: 02:04  Exam(s): CTA CHEST     EXAM:    CT Angiography Chest With Intravenous Contrast    CLINICAL HISTORY:     Reason for exam: shortness of breath, PE protocol.    TECHNIQUE:    Axial computed tomographic angiography images of the chest with   intravenous contrast.  CTDI is 42.1 mGy and DLP is 275 mGy-cm.  This CT   exam was performed according to the principle of ALARA (As Low As   Reasonably Achievable) by using one or more of the following dose   reduction techniques: automated exposure control, adjustment of the mA   and or kV according to patient size, and or use of iterative   reconstruction technique.    MIP reconstructed images were created and reviewed.    COMPARISON:    Noncontrasted CT chest dated 9 20 2021    FINDINGS:    Trachea:  The trachea and mainstem bronchi are patent bilaterally.    Pulmonary arteries:  No evidence for pulmonary embolism.    Aorta:  No acute findings.  No thoracic aortic aneurysm.    Lungs:  There is extensive endobronchial opacification involving the   segmental and subsegmental branches of the bilateral lower lobes and   subsegmental branches of the left upper lobe and right anterior apical   segment.  Curvilinear subsegmental changes in the medial right middle   lobe and inferior lingular segments noted.    Pleural space:  Subtle micronodular changes in a tree-in-by pattern in   the left posterior costophrenic margin is noted.  No significant effusion.    No pneumothorax.    Heart:  Unremarkable.  No cardiomegaly.  No significant pericardial   effusion.    Bones joints:  No acute fracture.  No dislocation.    Soft tissues:  Unremarkable.    Lymph nodes:  Unremarkable.  No enlarged lymph nodes.    IMPRESSION:       1.  No evidence for pulmonary embolism.  2.  There is extensive endobronchial opacification involving the   segmental and subsegmental branches of the bilateral lower lobes  and   subsegmental branches of the left upper lobe and right anterior apical   segment.  Differential consideration includes bronchitis or subtle   aspiration.  3.  Subtle micronodular changes in a tree-in-by pattern in the left   posterior costophrenic margin is noted.  Findings are consistent with   distal small airways disease and possible mucoid impaction.  No lobar   consolidation.  No pleural effusion or pneumothorax.      Impression:          Electronically signed by Ze Prado MD on 10-12-22 at 0204    XR Chest 2 View [[id number]] Collected: 10/11/22 2331     Updated: 10/11/22 2336    Narrative:      XR CHEST 2 VW-     Clinical: Shortness of breath     COMPARISON CT chest 9/20/2021     FINDINGS: Cardiac size within normal limits. There is hyperaeration with  flattening of the diaphragms as before. No pleural effusion or vascular  congestion nor acute airspace disease. No suspicious pulmonary lesion  seen.     CONCLUSION: Hyperaeration, no acute pulmonary process has developed.     This report was finalized on 10/11/2022 11:33 PM by Dr. Anibal Ambriz M.D.               Assessment:  Active Hospital Problems    Diagnosis  POA   • **Infection due to parainfluenza virus 4 [B34.8]  Yes   • COPD exacerbation (HCC) [J44.1]  Yes   • EMMA (generalized anxiety disorder) [F41.1]  Yes   • Bipolar 1 disorder (HCC) [F31.9]  Yes   • HLD (hyperlipidemia) [E78.5]  Yes   • Chronic pain [G89.29]  Yes   • Tobacco abuse [Z72.0]  Yes   • Depression [F32.A]  Yes      Resolved Hospital Problems   No resolved problems to display.       Plan:    Parainfluenza bronchitis with subsequent COPD exacerbation compounded by tobacco use   -Continue IV Solu-Medrol 60 mg every 8   -Split Symbicort into nebulized portions and continue with scheduled DuoNebs   -Okay with NicoDerm    Hopefully steroids do not exacerbate any issues with bipolar.  Currently she is calm conversational and pleasant.  We will see how clinical course  unfolds    Recent surgical intervention on ankle.  Surgeon was Dr. Maurice Pugh.  Patient on Levaquin at 750 mg due to concerns for infection but there is no active cellulitis and the small area of wound dehiscence at the bottom does not show any purulent drainage at this time.  We will continue medications as prescribed per orthopedics and patient has outpatient follow-up    Add Lovenox for DVT prophylaxis    Add Pepcid for GI prophylaxis    Further recommendations to follow as clinical course unfolds    Observation admission at this time    Winston Rodriguez MD  10/12/2022  08:11 EDT

## 2022-10-12 NOTE — ED NOTES
Patient was placed in face mask in first look. Patient was wearing facemask when I entered the room and throughout our encounter. I wore full protective equipment throughout this patient encounter including a face mask, and gloves. Hand hygiene was performed before donning protective equipment and after removal when leaving the room.

## 2022-10-12 NOTE — PROGRESS NOTES
Discharge Planning Assessment  [facility]     Patient Name: Jesus Ovalles  MRN: [id number]  Today's Date: 10/12/2022    Admit Date: 10/11/2022    Plan: Home with family, follow for O2 need   Discharge Needs Assessment     Row Name 10/12/22 1630       Living Environment    People in Home spouse;grandchild(natalie);child(natalie), adult    Name(s) of People in Home  (Mat Ovalles), son, son in law, daughter in law, grandchildren    Current Living Arrangements home    Primary Care Provided by self    Provides Primary Care For no one    Family Caregiver if Needed spouse    Quality of Family Relationships helpful;involved;supportive    Able to Return to Prior Arrangements yes       Resource/Environmental Concerns    Resource/Environmental Concerns none       Transition Planning    Patient/Family Anticipates Transition to home with family    Patient/Family Anticipated Services at Transition durable medical equipment    Transportation Anticipated family or friend will provide       Discharge Needs Assessment    Equipment Currently Used at Home nebulizer    Concerns to be Addressed discharge planning    Equipment Needed After Discharge oxygen    Discharge Coordination/Progress Home               Discharge Plan     Row Name 10/12/22 6822       Plan    Plan Home with family, follow for O2 need    Patient/Family in Agreement with Plan yes    Plan Comments CCP met with pt at bedside to discuss d/c planning. Pt resides with her , son, son in law, daughter in law and grandchildren in a home with no accessibility concerns, uses nebulizer, and has no h/o PT/HH/SNF. Pt uses [facility] pharmacy regularly and is enrolled in [facility]. If O2 is indicated at d/c, pt prefers [facility] to supply. CCP to follow. Carlee Zepeda LCSW              Continued Care and Services - Admitted Since 10/11/2022    Coordination has not been started for this encounter.       Expected Discharge Date and Time     Expected Discharge Date Expected  Discharge Time    Oct 14, 2022          Demographic Summary     Row Name 10/12/22 1630       General Information    Admission Type observation    Arrived From home    Required Notices Provided Observation Status Notice    Referral Source admission list    Reason for Consult discharge planning    Preferred Language English               Functional Status     Row Name 10/12/22 8106       Functional Status    Usual Activity Tolerance good    Current Activity Tolerance good       Functional Status, IADL    Medications independent    Meal Preparation independent    Housekeeping independent    Laundry independent    Shopping independent       Mental Status Summary    Recent Changes in Mental Status/Cognitive Functioning no changes               Psychosocial    No documentation.                Abuse/Neglect    No documentation.                Legal    No documentation.                Substance Abuse    No documentation.                Patient Forms    No documentation.                   Eunice Zepeda LCSW

## 2022-10-12 NOTE — PAYOR COMM NOTE
"Jesus Ovalles (47 y.o. Female)     PLEASE SEE ATTACHED FOR OBS AUTH.    PT ID [id number]    PLEASE CALL VALDEZ @ [phone] OR [phone]    THANK YOU   VALDEZ JI RN/CCP    Date of Birth   1975    Social Security Number       Address   [address] [place] [address]    Home Phone       MRN   [id number]       Rastafarian   None    Marital Status                               Admission Date   10/11/22    Admission Type   Emergency    Admitting Provider   Winston Rodriguez MD    Attending Provider   Winston Rodriguez MD    Department, Room/Bed   [facility] [address] [place], P582/1       Discharge Date       Discharge Disposition       Discharge Destination                               Attending Provider: Winston Rodriguez MD    Allergies: Penicillins, Codeine, Lyrica [Pregabalin], Buprenorphine    Isolation: None   Infection: None   Code Status: CPR    Ht: 165.1 cm (65\")   Wt: 59.8 kg (131 lb 13.4 oz)    Admission Cmt: None   Principal Problem: Infection due to parainfluenza virus 4 [B34.8]                 Active Insurance as of 10/11/2022     Primary Coverage     Payor Plan Insurance Group Employer/Plan Group    [facility] MEDICARE REPLACEMENT [employer] MEDICARE REPLACEMENT [id number]     Payor Plan Address Payor Plan Phone Number Payor Plan Fax Number Effective Dates    [address] [address] [phone]  2/1/2022 - None Entered    [facility] [address]       Subscriber Name Subscriber Birth Date Member ID       JESUS OVALLES 1975 [id number]                 Emergency Contacts      (Rel.) Home Phone Work Phone Mobile Phone    Mat Ovalles (Spouse) -- -- [phone]    GrahamJanneth doran (Daughter) -- -- [phone]            [place]: NPI [id number]  Tax ID [id number]     History & Physical      Winston Rodriguez MD at 10/12/22 0811          HISTORY AND PHYSICAL   [facility]        Date of Admission: 10/11/2022  Patient " Identification:  Name: Jesus Ovalles  Age: 47 y.o.  Sex: female  :  1975  MRN: [id number]                     Primary Care Physician: Aminta Quezada PA-C    Chief Complaint: Shortness of breath    History of Present Illness:   Mrs. Ovalles is a 47-year-old female who utilizes tobacco and has past medical history is of asthma and COPD.  She had recent surgical intervention on her ankle per Dr. Maurice Pugh.  She states she was placed on Levaquin as a result due to concerns for cellulitis.  She claims that treatment dose regimen supposed be 21 days.  She denies any fever or chills.  She admits to shortness of breath and cough which is mainly dry.  She does utilize tobacco.  She had been prescribed a Z-Cristofer along the way and ultimately came to the hospital for further recommendations as the additional p.o. prednisone was not achieving efficacy as well and patient was found to have parainfluenza bronchitis.  She denies any nausea vomiting diarrhea.  No issues with altered mentation.  Again she does not really admit to any fever or chills.  Her cough is mainly dry denies any hemoptysis.  Shortness of breath is worse with exertion and walking.  She has been receiving Solu-Medrol as well as nebulized meds since admission and is noting some clinical improvement.  Current oxygenation is 95% on 4 L via nasal cannula and her vital signs are currently stable as well as afebrile.  Thus far steroids are not causing any affect or changes to her mental stability given her past history of bipolar/PTSD with past history of suicidal ideation.    Past Medical History:  Past Medical History:   Diagnosis Date   • Asthma    • Bipolar affective (HCC)    • COPD (chronic obstructive pulmonary disease) (HCC)    • DDD (degenerative disc disease), cervical    • History of tachycardia     pt flat lined with suicidal attempt- years ago   • Hypertension     pt states with agitation   • PTSD (post-traumatic stress disorder)    • Suicidal  "risk     pt states \"years ago\"     Past Surgical History:  Past Surgical History:   Procedure Laterality Date   • ANKLE OPEN REDUCTION INTERNAL FIXATION Right 02/11/2022    Procedure: ANKLE OPEN REDUCTION INTERNAL FIXATION;  Surgeon: Maurice Pugh II, MD;  Location: [facility] OR;  Service: Orthopedics;  Laterality: Right;   • CARPAL TUNNEL RELEASE     • DILATATION AND CURETTAGE     • HYSTERECTOMY     • JOINT REPLACEMENT     • SHOULDER ARTHROSCOPY W/ ROTATOR CUFF REPAIR     • TIBIA IM NAILING/RODDING Right 02/11/2022    Procedure: TIBIA INTRAMEDULLARY NAIL/LAURI INSERTION WITH IRRIGATION AND DEBRIDEMENT;  Surgeon: Maurice Pugh II, MD;  Location: [facility] MAIN OR;  Service: Orthopedics;  Laterality: Right;   • TIBIA IM NAILING/RODDING Right 08/29/2022    Procedure: REVISION RIGHT TIBIAL NAILING WITH BONE GRAFTING;  Surgeon: Maurice Pugh II, MD;  Location: [facility];  Service: Orthopedics;  Laterality: Right;      Home Meds:  Medications Prior to Admission   Medication Sig Dispense Refill Last Dose   • Acetylcysteine (NAC) capsule capsule Take 1 capsule by mouth 2 (Two) Times a Day. 20 capsule 0    • azithromycin (ZITHROMAX) 250 MG tablet Take 2 tablets by mouth on day 1, then 1 tablet by mouth on days 2-5. 6 tablet 0 10/12/2022   • budesonide-formoterol (Symbicort) 160-4.5 MCG/ACT inhaler Inhale 2 puffs 2 (Two) Times a Day. 10.2 g 0    • ipratropium-albuterol (DUO-NEB) 0.5-2.5 mg/3 ml nebulizer Take 3 mL (1 vial) by nebulization 4 (Four) Times a Day As Needed. 90 mL 0    • levoFLOXacin (LEVAQUIN) 750 MG tablet Take 1 tablet by mouth daily for 21 days. 21 tablet 0    • oxyCODONE-acetaminophen (Percocet) 7.5-325 MG per tablet Take 1 tablet by mouth every 4-6 hours as needed. 40 tablet 0    • predniSONE (DELTASONE) 10 MG tablet Take 4 tablets by mouth Daily for 3 days, THEN 3 tablets Daily for 3 days, THEN 2 tablets Daily for 3 days, THEN 1 tablet Daily for 3 days. 30 tablet 0 " 10/12/2022   • cephalexin (KEFLEX) 500 MG capsule Take 1 capsule by mouth Every 12 (Twelve) Hours. 14 capsule 0    • clotrimazole (MYCELEX) 10 MG alanis Take 1 tablet by mouth 5 (Five) Times a Day.      • doxycycline (MONODOX) 100 MG capsule Take 1 capsule twice a day by oral route for 7 days. 14 capsule 0    • ondansetron (Zofran) 4 MG tablet Take 1 tablet by mouth Every 6 (Six) Hours As Needed for Nausea or Vomiting. 30 tablet 0    • oxyCODONE-acetaminophen (Percocet) 7.5-325 MG per tablet Take 1 tablet by mouth Every 4-6 Hours As Needed. 40 tablet 0        Allergies:  Allergies   Allergen Reactions   • Penicillins Anaphylaxis and Swelling   • Codeine Hives and Itching   • Lyrica [Pregabalin] Swelling   • Buprenorphine Rash     Immunizations:  Immunization History   Administered Date(s) Administered   • Flu Vaccine Split Quad 10/02/2013   • Influenza, Unspecified 09/28/2015   • Pneumococcal Polysaccharide (PPSV23) 09/28/2015   • Tdap 05/21/2013     Social History:   Social History     Social History Narrative   • Not on file     Social History     Socioeconomic History   • Marital status:    Tobacco Use   • Smoking status: Every Day     Packs/day: 1.00     Types: Cigarettes   • Smokeless tobacco: Never   Vaping Use   • Vaping Use: Never used   Substance and Sexual Activity   • Alcohol use: Yes     Comment: RARE   • Drug use: No   • Sexual activity: Defer       Family History:  Family History   Problem Relation Age of Onset   • Heart disease Mother    • Malig Hyperthermia Neg Hx         Review of Systems  See history of present illness and past medical history.  Patient denies fever chills night sweats.  Denies any headache dizziness or loss of consciousness.  Admits to fatigue but denies any nausea vomiting abdominal pain or diarrhea.  Denies any chest pain or palpitations but admits to dry cough and shortness of breath.  Denies any bruising bleeding loss of consciousness and/or loss of function.  No  "dysuria remainder of ROS is negative.    Objective:  T Max 24 hrs: Temp (24hrs), Av.9 °F (36.6 °C), Min:97.7 °F (36.5 °C), Max:98.1 °F (36.7 °C)    Vitals Ranges:   Temp:  [97.7 °F (36.5 °C)-98.1 °F (36.7 °C)] 97.7 °F (36.5 °C)  Heart Rate:  [] 74  Resp:  [16-22] 16  BP: (114-128)/(69-85) 121/73      Exam:  /73 (BP Location: Left arm, Patient Position: Sitting)   Pulse 74   Temp 97.7 °F (36.5 °C) (Oral)   Resp 16   Ht 165.1 cm (65\")   Wt 59.8 kg (131 lb 13.4 oz)   SpO2 95%   BMI 21.94 kg/m²     General Appearance:    Alert, cooperative, alert and oriented x3, nontoxic, no family at bedside, conversational and pleasant   Head:    Normocephalic, without obvious abnormality, atraumatic   Eyes:    PERRL, conjunctivae/corneas clear, EOM's intact, both eyes   Ears:    Normal external ear canals, both ears   Nose:   Nares normal, septum midline, mucosa normal, no drainage    or sinus tenderness   Throat:   Lips, mucosa, and tongue normal.  Poor dentition   Neck:   Supple, no rigidity or JVD       Lungs:    Good air entry even to the bases with diffuse wheeze and rhonchi to auscultation bilaterally, respirations unlabored and no use of accessory muscles at this time   Chest Wall:    No tenderness or deformity    Heart:    Regular rate and rhythm, S1 and S2 normal   Abdomen:     Soft, nontender, bowel sounds active all four quadrants,        Extremities:  No edema or volume overload, surgical ankle shows no cellulitis with a little bit of wound dehiscence at the bottom of her surgical incision but no drainage present   Pulses:   2+ and symmetric all extremities           Neurologic:   CNII-XII intact, moving all with no focal deficit      .    Data Review:  Labs in chart were reviewed.             Imaging Results (All)     Procedure Component Value Units Date/Time    CT Angiogram Chest [[id number]] Collected: 10/12/22 0204     Updated: 10/12/22 0204    Narrative:        Patient: MARIA TERESA СЕРГЕЙ  Time Out: " 02:04  Exam(s): CTA CHEST     EXAM:    CT Angiography Chest With Intravenous Contrast    CLINICAL HISTORY:     Reason for exam: shortness of breath, PE protocol.    TECHNIQUE:    Axial computed tomographic angiography images of the chest with   intravenous contrast.  CTDI is 42.1 mGy and DLP is 275 mGy-cm.  This CT   exam was performed according to the principle of ALARA (As Low As   Reasonably Achievable) by using one or more of the following dose   reduction techniques: automated exposure control, adjustment of the mA   and or kV according to patient size, and or use of iterative   reconstruction technique.    MIP reconstructed images were created and reviewed.    COMPARISON:    Noncontrasted CT chest dated 9 20 2021    FINDINGS:    Trachea:  The trachea and mainstem bronchi are patent bilaterally.    Pulmonary arteries:  No evidence for pulmonary embolism.    Aorta:  No acute findings.  No thoracic aortic aneurysm.    Lungs:  There is extensive endobronchial opacification involving the   segmental and subsegmental branches of the bilateral lower lobes and   subsegmental branches of the left upper lobe and right anterior apical   segment.  Curvilinear subsegmental changes in the medial right middle   lobe and inferior lingular segments noted.    Pleural space:  Subtle micronodular changes in a tree-in-by pattern in   the left posterior costophrenic margin is noted.  No significant effusion.    No pneumothorax.    Heart:  Unremarkable.  No cardiomegaly.  No significant pericardial   effusion.    Bones joints:  No acute fracture.  No dislocation.    Soft tissues:  Unremarkable.    Lymph nodes:  Unremarkable.  No enlarged lymph nodes.    IMPRESSION:       1.  No evidence for pulmonary embolism.  2.  There is extensive endobronchial opacification involving the   segmental and subsegmental branches of the bilateral lower lobes and   subsegmental branches of the left upper lobe and right anterior apical   segment.   Differential consideration includes bronchitis or subtle   aspiration.  3.  Subtle micronodular changes in a tree-in-by pattern in the left   posterior costophrenic margin is noted.  Findings are consistent with   distal small airways disease and possible mucoid impaction.  No lobar   consolidation.  No pleural effusion or pneumothorax.      Impression:          Electronically signed by Ze Prado MD on 10-12-22 at 0204    XR Chest 2 View [[id number]] Collected: 10/11/22 2331     Updated: 10/11/22 2336    Narrative:      XR CHEST 2 VW-     Clinical: Shortness of breath     COMPARISON CT chest 9/20/2021     FINDINGS: Cardiac size within normal limits. There is hyperaeration with  flattening of the diaphragms as before. No pleural effusion or vascular  congestion nor acute airspace disease. No suspicious pulmonary lesion  seen.     CONCLUSION: Hyperaeration, no acute pulmonary process has developed.     This report was finalized on 10/11/2022 11:33 PM by Dr. Anibal Ambriz M.D.               Assessment:  Active Hospital Problems    Diagnosis  POA   • **Infection due to parainfluenza virus 4 [B34.8]  Yes   • COPD exacerbation (HCC) [J44.1]  Yes   • EMMA (generalized anxiety disorder) [F41.1]  Yes   • Bipolar 1 disorder (HCC) [F31.9]  Yes   • HLD (hyperlipidemia) [E78.5]  Yes   • Chronic pain [G89.29]  Yes   • Tobacco abuse [Z72.0]  Yes   • Depression [F32.A]  Yes      Resolved Hospital Problems   No resolved problems to display.       Plan:    Parainfluenza bronchitis with subsequent COPD exacerbation compounded by tobacco use   -Continue IV Solu-Medrol 60 mg every 8   -Split Symbicort into nebulized portions and continue with scheduled DuoNebs   -Okay with NicoDerm    Hopefully steroids do not exacerbate any issues with bipolar.  Currently she is calm conversational and pleasant.  We will see how clinical course unfolds    Recent surgical intervention on ankle.  Surgeon was Dr. Maurice Pugh.  Patient on Levaquin  at 750 mg due to concerns for infection but there is no active cellulitis and the small area of wound dehiscence at the bottom does not show any purulent drainage at this time.  We will continue medications as prescribed per orthopedics and patient has outpatient follow-up    Add Lovenox for DVT prophylaxis    Add Pepcid for GI prophylaxis    Further recommendations to follow as clinical course unfolds    Observation admission at this time    Winston Rodriguez MD  10/12/2022  08:11 EDT    Electronically signed by Winston Rodriguez MD at 10/12/22 0987          Emergency Department Notes      Jennifer Zaman, RN at 10/11/22 2305        Pt via PV from home with c/o SOA, cough, fever since Friday.  Pt has R foot surgery at the end of last month.    All triage performed with this RN wearing appropriate PPE.  Pt placed in mask upon arrival to ED.      Electronically signed by Jennifer Zaman RN at 10/11/22 230     Flower Rodriguez RN at 10/11/22 5009        Patient was placed in face mask in first look. Patient was wearing facemask when I entered the room and throughout our encounter. I wore full protective equipment throughout this patient encounter including a face mask, and gloves. Hand hygiene was performed before donning protective equipment and after removal when leaving the room.     Electronically signed by Flower Rodriguez RN at 10/12/22 0021     Harshil Patel PA at 10/11/22 2342     Attestation signed by Lei Yun MD at 10/12/22 2855    MD ATTESTATION NOTE    The CRUZ and I have discussed this patient's history, physical exam, and treatment plan.  I have reviewed the documentation and personally had a face to face interaction with the patient. I affirm the documentation and agree with the treatment and plan.  The separate note describes my personal findings.    I agree with the PA's or NP's findings and plan.  I reviewed the PA's or NP's note.  Lei Yun MD                  EMERGENCY DEPARTMENT ENCOUNTER    Room Number:  05/05  Date of encounter:  10/12/2022  PCP: Aminta Quezada PA-C  Historian: Patient      HPI:  Chief Complaint: Shortness of breath   A complete HPI/ROS/PMH/PSH/SH/FH are unobtainable due to: N/A    Context: Jesus Ovalles is a 47 y.o. female with past medical history of asthma, bipolar disorder, and recent surgery for an ankle injury who presents to the ED c/o shortness of breath.  Patient states that she started develop symptoms of an upper respiratory infection on Thursday after exposure to some sick contacts at home and has had dry cough, wheezing, fever and chills, runny nose, and headache.  Patient denies any sore throat or chest pain.  Patient states that she has been taking Levaquin for an infection related to her ankle surgery, but was recently seen at the [facility] started on a Z-Cristofer, steroids, and N-acetylcysteine for her cough.  Patient took the last dose of her Z-Cristofer today but is still on a prednisone taper.      PAST MEDICAL HISTORY  Active Ambulatory Problems     Diagnosis Date Noted   • Asthma 03/02/2018   • Chronic obstructive pulmonary disease (HCC) 03/02/2018   • Chronic pain 09/27/2015   • Depression 03/24/2015   • EMMA (generalized anxiety disorder) 02/10/2022   • Hx of suicide attempt 09/27/2015   • Tobacco abuse 09/27/2015   • Bipolar 1 disorder (HCC) 02/10/2022   • PTSD (post-traumatic stress disorder) 02/10/2022   • HLD (hyperlipidemia) 02/10/2022   • Type I or II open fracture of right tibia and fibula 02/10/2022   • Right leg pain 02/19/2022   • Tibia/fibula fracture, shaft, left, closed, with nonunion, subsequent encounter 08/29/2022   • Tibial fracture 08/29/2022     Resolved Ambulatory Problems     Diagnosis Date Noted   • No Resolved Ambulatory Problems     Past Medical History:   Diagnosis Date   • Bipolar affective (HCC)    • COPD (chronic obstructive pulmonary disease) (HCC)    • DDD (degenerative disc disease), cervical    •  History of tachycardia    • Hypertension    • Suicidal risk          PAST SURGICAL HISTORY  Past Surgical History:   Procedure Laterality Date   • ANKLE OPEN REDUCTION INTERNAL FIXATION Right 02/11/2022    Procedure: ANKLE OPEN REDUCTION INTERNAL FIXATION;  Surgeon: Maurice Pugh II, MD;  Location: [facility] OR;  Service: Orthopedics;  Laterality: Right;   • CARPAL TUNNEL RELEASE     • DILATATION AND CURETTAGE     • HYSTERECTOMY     • SHOULDER ARTHROSCOPY W/ ROTATOR CUFF REPAIR     • TIBIA IM NAILING/RODDING Right 02/11/2022    Procedure: TIBIA INTRAMEDULLARY NAIL/LAURI INSERTION WITH IRRIGATION AND DEBRIDEMENT;  Surgeon: Maurice Pugh II, MD;  Location: [facility] MAIN OR;  Service: Orthopedics;  Laterality: Right;   • TIBIA IM NAILING/RODDING Right 8/29/2022    Procedure: REVISION RIGHT TIBIAL NAILING WITH BONE GRAFTING;  Surgeon: Maurice Pugh II, MD;  Location: [facility];  Service: Orthopedics;  Laterality: Right;         FAMILY HISTORY  Family History   Problem Relation Age of Onset   • Heart disease Mother    • Malig Hyperthermia Neg Hx          SOCIAL HISTORY  Social History     Socioeconomic History   • Marital status:    Tobacco Use   • Smoking status: Every Day     Packs/day: 1.00     Types: Cigarettes   • Smokeless tobacco: Never   Vaping Use   • Vaping Use: Never used   Substance and Sexual Activity   • Alcohol use: Yes     Comment: RARE   • Drug use: No   • Sexual activity: Defer         ALLERGIES  Penicillins, Codeine, Lyrica [pregabalin], and Buprenorphine        REVIEW OF SYSTEMS  Review of Systems     All systems reviewed and negative except for those discussed in HPI.       PHYSICAL EXAM    I have reviewed the triage vital signs and nursing notes.    ED Triage Vitals [10/11/22 2309]   Temp Heart Rate Resp BP SpO2   98.1 °F (36.7 °C) 92 22 -- 91 %      Temp src Heart Rate Source Patient Position BP Location FiO2 (%)   Tympanic -- -- -- --       Physical  Exam  GENERAL: Alert and oriented x3, ill-appearing, not distressed  HENT: moist mucous membranes, no pharyngeal erythema or tonsillar exudate  EYES: no scleral icterus  CV: regular rhythm, regular rate  RESPIRATORY: normal effort, bilateral expiratory wheezes  ABDOMEN: soft/nontender  MUSCULOSKELETAL: no deformity, no calf tenderness  NEURO: alert, moves all extremities, follows commands  SKIN: warm, dry, no rashes        LAB RESULTS  Recent Results (from the past 24 hour(s))   Comprehensive Metabolic Panel    Collection Time: 10/11/22 11:55 PM    Specimen: Blood   Result Value Ref Range    Glucose 112 (H) 65 - 99 mg/dL    BUN 14 6 - 20 mg/dL    Creatinine 0.90 0.57 - 1.00 mg/dL    Sodium 140 136 - 145 mmol/L    Potassium 3.8 3.5 - 5.2 mmol/L    Chloride 101 98 - 107 mmol/L    CO2 24.3 22.0 - 29.0 mmol/L    Calcium 10.3 8.6 - 10.5 mg/dL    Total Protein 7.1 6.0 - 8.5 g/dL    Albumin 4.80 3.50 - 5.20 g/dL    ALT (SGPT) 11 1 - 33 U/L    AST (SGOT) 17 1 - 32 U/L    Alkaline Phosphatase 74 39 - 117 U/L    Total Bilirubin <0.2 0.0 - 1.2 mg/dL    Globulin 2.3 gm/dL    A/G Ratio 2.1 g/dL    BUN/Creatinine Ratio 15.6 7.0 - 25.0    Anion Gap 14.7 5.0 - 15.0 mmol/L    eGFR 79.5 >60.0 mL/min/1.73   BNP    Collection Time: 10/11/22 11:55 PM    Specimen: Blood   Result Value Ref Range    proBNP 332.0 0.0 - 450.0 pg/mL   Troponin    Collection Time: 10/11/22 11:55 PM    Specimen: Blood   Result Value Ref Range    Troponin T <0.010 0.000 - 0.030 ng/mL   Green Top (Gel)    Collection Time: 10/11/22 11:55 PM   Result Value Ref Range    Extra Tube Hold for add-ons.    Lavender Top    Collection Time: 10/11/22 11:55 PM   Result Value Ref Range    Extra Tube hold for add-on    Gold Top - SST    Collection Time: 10/11/22 11:55 PM   Result Value Ref Range    Extra Tube Hold for add-ons.    Light Blue Top    Collection Time: 10/11/22 11:55 PM   Result Value Ref Range    Extra Tube Hold for add-ons.    CBC Auto Differential    Collection  Time: 10/11/22 11:55 PM    Specimen: Blood   Result Value Ref Range    WBC 7.13 3.40 - 10.80 10*3/mm3    RBC 4.50 3.77 - 5.28 10*6/mm3    Hemoglobin 13.5 12.0 - 15.9 g/dL    Hematocrit 40.5 34.0 - 46.6 %    MCV 90.0 79.0 - 97.0 fL    MCH 30.0 26.6 - 33.0 pg    MCHC 33.3 31.5 - 35.7 g/dL    RDW 12.1 (L) 12.3 - 15.4 %    RDW-SD 38.9 37.0 - 54.0 fl    MPV 9.3 6.0 - 12.0 fL    Platelets 293 140 - 450 10*3/mm3    Neutrophil % 71.1 42.7 - 76.0 %    Lymphocyte % 20.9 19.6 - 45.3 %    Monocyte % 7.3 5.0 - 12.0 %    Eosinophil % 0.0 (L) 0.3 - 6.2 %    Basophil % 0.1 0.0 - 1.5 %    Immature Grans % 0.6 (H) 0.0 - 0.5 %    Neutrophils, Absolute 5.07 1.70 - 7.00 10*3/mm3    Lymphocytes, Absolute 1.49 0.70 - 3.10 10*3/mm3    Monocytes, Absolute 0.52 0.10 - 0.90 10*3/mm3    Eosinophils, Absolute 0.00 0.00 - 0.40 10*3/mm3    Basophils, Absolute 0.01 0.00 - 0.20 10*3/mm3    Immature Grans, Absolute 0.04 0.00 - 0.05 10*3/mm3    nRBC 0.0 0.0 - 0.2 /100 WBC   Procalcitonin    Collection Time: 10/11/22 11:55 PM    Specimen: Blood   Result Value Ref Range    Procalcitonin 0.04 0.00 - 0.25 ng/mL   ECG 12 Lead    Collection Time: 10/11/22 11:58 PM   Result Value Ref Range    QT Interval 375 ms   Respiratory Panel PCR w/COVID-19(SARS-CoV-2) GREGORY/MAYELA/CHARO/PAD/COR/MAD/JEREMY In-House, NP Swab in UTM/VTM, 3-4 HR TAT - Swab, Nasopharynx    Collection Time: 10/12/22 12:30 AM    Specimen: Nasopharynx; Swab   Result Value Ref Range    ADENOVIRUS, PCR Not Detected Not Detected    Coronavirus 229E Not Detected Not Detected    Coronavirus HKU1 Not Detected Not Detected    Coronavirus NL63 Not Detected Not Detected    Coronavirus OC43 Not Detected Not Detected    COVID19 Not Detected Not Detected - Ref. Range    Human Metapneumovirus Not Detected Not Detected    Human Rhinovirus/Enterovirus Not Detected Not Detected    Influenza A PCR Not Detected Not Detected    Influenza B PCR Not Detected Not Detected    Parainfluenza Virus 1 Not Detected Not Detected     Parainfluenza Virus 2 Not Detected Not Detected    Parainfluenza Virus 3 Not Detected Not Detected    Parainfluenza Virus 4 Detected (A) Not Detected    RSV, PCR Not Detected Not Detected    Bordetella pertussis pcr Not Detected Not Detected    Bordetella parapertussis PCR Not Detected Not Detected    Chlamydophila pneumoniae PCR Not Detected Not Detected    Mycoplasma pneumo by PCR Not Detected Not Detected       Ordered the above labs and independently reviewed the results.        RADIOLOGY  XR Chest 2 View    Result Date: 10/11/2022  XR CHEST 2 VW-  Clinical: Shortness of breath  COMPARISON CT chest 9/20/2021  FINDINGS: Cardiac size within normal limits. There is hyperaeration with flattening of the diaphragms as before. No pleural effusion or vascular congestion nor acute airspace disease. No suspicious pulmonary lesion seen.  CONCLUSION: Hyperaeration, no acute pulmonary process has developed.  This report was finalized on 10/11/2022 11:33 PM by Dr. Anibal Ambriz M.D.      CT Angiogram Chest    Result Date: 10/12/2022  Patient: СЕРГЕЙ MOREL  Time Out: 02:04 Exam(s): CTA CHEST EXAM:   CT Angiography Chest With Intravenous Contrast CLINICAL HISTORY:    Reason for exam: shortness of breath, PE protocol. TECHNIQUE:   Axial computed tomographic angiography images of the chest with intravenous contrast.  CTDI is 42.1 mGy and DLP is 275 mGy-cm.  This CT exam was performed according to the principle of ALARA (As Low As Reasonably Achievable) by using one or more of the following dose reduction techniques: automated exposure control, adjustment of the mA and or kV according to patient size, and or use of iterative reconstruction technique.   MIP reconstructed images were created and reviewed. COMPARISON:   Noncontrasted CT chest dated 9 20 2021 FINDINGS:   Trachea:  The trachea and mainstem bronchi are patent bilaterally.   Pulmonary arteries:  No evidence for pulmonary embolism.   Aorta:  No acute findings.  No  thoracic aortic aneurysm.   Lungs:  There is extensive endobronchial opacification involving the segmental and subsegmental branches of the bilateral lower lobes and subsegmental branches of the left upper lobe and right anterior apical segment.  Curvilinear subsegmental changes in the medial right middle lobe and inferior lingular segments noted.   Pleural space:  Subtle micronodular changes in a tree-in-by pattern in the left posterior costophrenic margin is noted.  No significant effusion.   No pneumothorax.   Heart:  Unremarkable.  No cardiomegaly.  No significant pericardial effusion.   Bones joints:  No acute fracture.  No dislocation.   Soft tissues:  Unremarkable.   Lymph nodes:  Unremarkable.  No enlarged lymph nodes. IMPRESSION:     1.  No evidence for pulmonary embolism. 2.  There is extensive endobronchial opacification involving the segmental and subsegmental branches of the bilateral lower lobes and subsegmental branches of the left upper lobe and right anterior apical segment.  Differential consideration includes bronchitis or subtle aspiration. 3.  Subtle micronodular changes in a tree-in-by pattern in the left posterior costophrenic margin is noted.  Findings are consistent with distal small airways disease and possible mucoid impaction.  No lobar consolidation.  No pleural effusion or pneumothorax.     Electronically signed by Ze Prado MD on 10-12-22 at 0204      I ordered the above noted radiological studies. Reviewed by me and discussed with radiologist.  See dictation for official radiology interpretation.      PROCEDURES    Procedures      MEDICATIONS GIVEN IN ER    Medications   sodium chloride 0.9 % flush 10 mL (10 mL Intravenous Given 10/11/22 8685)   sodium chloride 0.9 % bolus 1,000 mL (1,000 mL Intravenous New Bag 10/12/22 0027)   methylPREDNISolone sodium succinate (SOLU-Medrol) injection 125 mg (125 mg Intravenous Given 10/12/22 0028)   ipratropium-albuterol (DUO-NEB) nebulizer  solution 3 mL (3 mL Nebulization Given 10/12/22 0040)   ondansetron (ZOFRAN) injection 4 mg (4 mg Intravenous Given 10/12/22 0102)   morphine injection 4 mg (4 mg Intravenous Given 10/12/22 0102)   iopamidol (ISOVUE-370) 76 % injection 100 mL (95 mL Intravenous Given 10/12/22 0132)         PROGRESS, DATA ANALYSIS, CONSULTS, AND MEDICAL DECISION MAKING    All labs have been independently reviewed by me.  All radiology studies have been reviewed by me and discussed with radiologist dictating the report.   EKG's independently viewed and interpreted by me.  Discussion below represents my analysis of pertinent findings related to patient's condition, differential diagnosis, treatment plan and final disposition.    DDx: Includes but not limited to asthma exacerbation, COPD, pneumonia, URI, COVID, influenza, parainfluenza, PE    ED Course as of 10/12/22 0416   Wed Oct 12, 2022   0040 EKG          EKG time: 2358  Rhythm/Rate: Sinus rhythm at 76 bpm  P waves and RI: Probable LAE  QRS, axis: Borderline RAD  ST and T waves: No acute ST/T wave changes    Interpreted Contemporaneously by me, independently viewed  Unchanged compared to prior EKG of 8/18/2022   [DANN]   0041 WBC: 7.13 [DANN]   0041 Hemoglobin: 13.5 [DANN]   0041 Hematocrit: 40.5 [DANN]   0041 Platelets: 293 [DANN]   0109 Glucose(!): 112 [DANN]   0109 BUN: 14 [DANN]   0109 Creatinine: 0.90 [DANN]   0109 Sodium: 140 [DANN]   0109 Potassium: 3.8 [DANN]   0109 Chloride: 101 [DANN]   0109 CO2: 24.3 [DANN]   0109 Troponin T: <0.010 [DANN]   0109 proBNP: 332.0 [DANN]   0206 Procalcitonin: 0.04 [DANN]   0218 Parainfluenza Virus 4(!): Detected [DANN]   0237 Patient rechecked, discussed results of blood work, swab, and CT scan showing parainfluenza virus infection and bronchitis.  Patient still requiring supplemental oxygen to maintain her O2 sats above 90%.  Discussed plan for possible admission, patient expressed understanding and agrees with plan. [DANN]   0329 Patient history, ER presentation and  evaluation discussed with Charmaine MESSER, will place in observation to a telemetry bed per Dr. Rodriguez. [DANN]      ED Course User Index  [DANN] Harshil Patel PA       MDM: Patient will be admitted for parainfluenza infection, COPD exacerbation and hypoxic respiratory failure.    PPE: The patient wore a surgical mask throughout the entire patient encounter. I wore an N95.    AS OF 04:16 EDT VITALS:    BP - 114/69  HR - 79  TEMP - 98.1 °F (36.7 °C) (Tympanic)  O2 SATS - 93%        DIAGNOSIS  Final diagnoses:   Infection due to parainfluenza virus 4   Acute hypoxemic respiratory failure (HCC)   Acute exacerbation of chronic obstructive pulmonary disease (COPD) (HCC)         DISPOSITION  ADMISSION    Discussed treatment plan and reason for admission with pt/family and admitting physician.  Pt/family voiced understanding of the plan for admission for further testing/treatment as needed.         Note Disclaimer: At [facility], we believe that sharing information builds trust and better relationships. You are receiving this note because you recently visited [facility]. It is possible you will see health information before a provider has talked with you about it. This kind of information can be easy to misunderstand. To help you fully understand what it means for your health, we urge you to discuss this note with your provider.       Harshil Patel PA  10/12/22 0416      Electronically signed by Lei Yun MD at 10/12/22 0458     Flower Rodriguez, RN at 10/12/22 0030        Pt c/o burning and dryness in nares from O2. RT asked to add humidifier to O2. Antibiotic ointment given to pt to apply inside her nares for comfort.    Electronically signed by Flower Rodriguez, RN at 10/12/22 0107     Flower Rodriguez, RN at 10/12/22 0037        RT at bedside. Pt requesting something for pain. Provider notified, awaiting further orders.    Electronically signed by Flower Rodriguez, RN at 10/12/22 0037      Flower Rodriguez, RN at 10/12/22 0045        RT at bedside.    Electronically signed by Flower Rodriguez RN at 10/12/22 0045     Flower Rodriguez RN at 10/12/22 0112        Chemistry called, spoke to Olena in lab. States she will add procal to existing specimens.    Electronically signed by Flower Rodriguez RN at 10/12/22 0112     Lei Yun MD at 10/12/22 0219          MD ATTESTATION NOTE    The CRUZ and I have discussed this patient's history, physical exam, and treatment plan.  I have reviewed the documentation and personally had a face to face interaction with the patient. I affirm the documentation and agree with the treatment and plan.  The attached note describes my personal findings.      I provided a substantive portion of the care of the patient.  I personally performed the physical exam in its entirety, and below are my findings.  For this patient encounter, the patient wore surgical mask, I wore full protective PPE including N95 and eye protection.      Brief HPI: This patient is a 47-year-old female with a history of asthma presenting to the emergency room today with shortness of breath.  The patient states that she was recently on Levaquin secondary to a leg infection but also on Z-Cristofer secondary to an upper respiratory infection.  She continues to have cough as well as associated fevers and chills.    PHYSICAL EXAM  ED Triage Vitals   Temp Heart Rate Resp BP SpO2   10/11/22 2309 10/11/22 2309 10/11/22 2309 10/11/22 2338 10/11/22 2309   98.1 °F (36.7 °C) 92 22 128/84 91 %      Temp src Heart Rate Source Patient Position BP Location FiO2 (%)   10/11/22 2309 10/12/22 0040 -- -- --   Tympanic Monitor            GENERAL: In mild distress, nontoxic in appearance  HENT: nares patent  EYES: no scleral icterus  CV: regular rhythm, normal rate, no M/R/G  RESPIRATORY: normal effort, mild expiratory wheezes present throughout, no distress  ABDOMEN: soft, nontender, no rebound or  guarding  MUSCULOSKELETAL: no deformity, no edema  NEURO: alert, moves all extremities, follows commands  PSYCH:  calm, cooperative  SKIN: warm, dry    Vital signs and nursing notes reviewed.        Plan: We will obtain an EKG, labs, as well as a chest x-ray in the ED as we treat with steroids and duo nebulizer treatments.  Will monitor closely and reassess following.       Lei Yun MD  10/12/22 0229      Electronically signed by Lei Yun MD at 10/12/22 0229     Flower Rodriguez, RN at 10/12/22 0457          Nursing report ED to floor  Jesus Ovalles  47 y.o.  female    HPI :   Chief Complaint   Patient presents with   • Shortness of Breath       Admitting doctor:   Winston Rodriguez MD    Admitting diagnosis:   The primary encounter diagnosis was Infection due to parainfluenza virus 4. Diagnoses of Acute hypoxemic respiratory failure (HCC) and Acute exacerbation of chronic obstructive pulmonary disease (COPD) (HCC) were also pertinent to this visit.    Code status:   Current Code Status       Date Active Code Status Order ID Comments User Context       10/12/2022 0427 CPR (Attempt to Resuscitate) [id number]  Charmaine Branham APRN ED        Question Answer    Code Status (Patient has no pulse and is not breathing) CPR (Attempt to Resuscitate)    Medical Interventions (Patient has pulse or is breathing) Full Support                    Allergies:   Penicillins, Codeine, Lyrica [pregabalin], and Buprenorphine    Isolation:   No active isolations    Intake and Output  No intake or output data in the 24 hours ending 10/12/22 0457    Weight:       10/12/22  0425   Weight: 68 kg (150 lb)       Most recent vitals:   Vitals:    10/12/22 0429 10/12/22 0431 10/12/22 0442 10/12/22 0449   BP: 117/69 115/75     Pulse: 80 75 73 78   Resp:   18 18   Temp:       TempSrc:       SpO2: 93% 94% 94% 99%   Weight:       Height:           Active LDAs/IV Access:   Lines, Drains & Airways       Active LDAs        Name Placement date Placement time Site Days    Peripheral IV 10/11/22 2354 Left Antecubital 10/11/22  2354  Antecubital  less than 1                    Labs (abnormal labs have a star):   Labs Reviewed   RESPIRATORY PANEL PCR W/ COVID-19 (SARS-COV-2) GREGORY/MAYELA/CHARO/PAD/COR/MAD/JEREMY IN-HOUSE, NP SWAB IN UTM/VTP, 3-4 HR TAT - Abnormal; Notable for the following components:       Result Value    Parainfluenza Virus 4 Detected (*)     All other components within normal limits    Narrative:     In the setting of a positive respiratory panel with a viral infection PLUS a negative procalcitonin without other underlying concern for bacterial infection, consider observing off antibiotics or discontinuation of antibiotics and continue supportive care. If the respiratory panel is positive for atypical bacterial infection (Bordetella pertussis, Chlamydophila pneumoniae, or Mycoplasma pneumoniae), consider antibiotic de-escalation to target atypical bacterial infection.   COMPREHENSIVE METABOLIC PANEL - Abnormal; Notable for the following components:    Glucose 112 (*)     All other components within normal limits    Narrative:     GFR Normal >60  Chronic Kidney Disease <60  Kidney Failure <15     CBC WITH AUTO DIFFERENTIAL - Abnormal; Notable for the following components:    RDW 12.1 (*)     Eosinophil % 0.0 (*)     Immature Grans % 0.6 (*)     All other components within normal limits   BNP (IN-HOUSE) - Normal    Narrative:     Among patients with dyspnea, NT-proBNP is highly sensitive for the detection of acute congestive heart failure. In addition NT-proBNP of <300 pg/ml effectively rules out acute congestive heart failure with 99% negative predictive value.    Results may be falsely decreased if patient taking Biotin.     TROPONIN (IN-HOUSE) - Normal    Narrative:     Troponin T Reference Range:  <= 0.03 ng/mL-   Negative for AMI  >0.03 ng/mL-     Abnormal for myocardial necrosis.  Clinicians would have to utilize clinical  "acumen, EKG, Troponin and serial changes to determine if it is an Acute Myocardial Infarction or myocardial injury due to an underlying chronic condition.       Results may be falsely decreased if patient taking Biotin.     PROCALCITONIN - Normal    Narrative:     As a Marker for Sepsis (Non-Neonates):    1. <0.5 ng/mL represents a low risk of severe sepsis and/or septic shock.  2. >2 ng/mL represents a high risk of severe sepsis and/or septic shock.    As a Marker for Lower Respiratory Tract Infections that require antibiotic therapy:    PCT on Admission    Antibiotic Therapy       6-12 Hrs later    >0.5                Strongly Recommended  >0.25 - <0.5        Recommended   0.1 - 0.25          Discouraged              Remeasure/reassess PCT  <0.1                Strongly Discouraged     Remeasure/reassess PCT    As 28 day mortality risk marker: \"Change in Procalcitonin Result\" (>80% or <=80%) if Day 0 (or Day 1) and Day 4 values are available. Refer to [email or web address]    Change in PCT <=80%  A decrease of PCT levels below or equal to 80% defines a positive change in PCT test result representing a higher risk for 28-day all-cause mortality of patients diagnosed with severe sepsis for septic shock.    Change in PCT >80%  A decrease of PCT levels of more than 80% defines a negative change in PCT result representing a lower risk for 28-day all-cause mortality of patients diagnosed with severe sepsis or septic shock.      RAINBOW DRAW    Narrative:     The following orders were created for panel order [place] Draw.  Procedure                               Abnormality         Status                     ---------                               -----------         ------                     Green Top (Gel)[[id number]]                                  Final result               Lavender Top[[id number]]                                     Final result               Gold Top - SST[[id number]]                   "                 Final result               Light Blue Top[[id number]]                                   Final result                 Please view results for these tests on the individual orders.   BASIC METABOLIC PANEL   CBC (NO DIFF)   CBC AND DIFFERENTIAL    Narrative:     The following orders were created for panel order CBC & Differential.  Procedure                               Abnormality         Status                     ---------                               -----------         ------                     CBC Auto Differential[[id number]]        Abnormal            Final result                 Please view results for these tests on the individual orders.   GREEN TOP   LAVENDER TOP   GOLD TOP - SST   LIGHT BLUE TOP       EKG:   ECG 12 Lead   Preliminary Result   HEART RATE= 76  bpm   RR Interval= 789  ms   AR Interval= 122  ms   P Horizontal Axis= -6  deg   P Front Axis= 83  deg   QRSD Interval= 93  ms   QT Interval= 375  ms   QRS Axis= 90  deg   T Wave Axis= 71  deg   - BORDERLINE ECG -   Sinus rhythm   Probable left atrial enlargement   Borderline right axis deviation   Baseline wander in lead(s) V2,V3,V4,V5   Electronically Signed By:    Date and Time of Study: 2022-10-11 23:58:39          Meds given in ED:   Medications   sodium chloride 0.9 % flush 10 mL (10 mL Intravenous Given 10/11/22 2782)   sodium chloride 0.9 % flush 10 mL (has no administration in time range)   sodium chloride 0.9 % flush 10 mL (has no administration in time range)   acetaminophen (TYLENOL) tablet 650 mg (has no administration in time range)     Or   acetaminophen (TYLENOL) 160 MG/5ML solution 650 mg (has no administration in time range)     Or   acetaminophen (TYLENOL) suppository 650 mg (has no administration in time range)   ondansetron (ZOFRAN) tablet 4 mg (has no administration in time range)     Or   ondansetron (ZOFRAN) injection 4 mg (has no administration in time range)   calcium carbonate (TUMS) chewable tablet 500 mg  (200 mg elemental) (has no administration in time range)   methylPREDNISolone sodium succinate (SOLU-Medrol) injection 60 mg (has no administration in time range)   ipratropium-albuterol (DUO-NEB) nebulizer solution 3 mL (3 mL Nebulization Given 10/12/22 0442)   ipratropium-albuterol (DUO-NEB) nebulizer solution 3 mL (has no administration in time range)   sodium chloride 0.9 % bolus 1,000 mL (1,000 mL Intravenous New Bag 10/12/22 0027)   methylPREDNISolone sodium succinate (SOLU-Medrol) injection 125 mg (125 mg Intravenous Given 10/12/22 0028)   ipratropium-albuterol (DUO-NEB) nebulizer solution 3 mL (3 mL Nebulization Given 10/12/22 0040)   ondansetron (ZOFRAN) injection 4 mg (4 mg Intravenous Given 10/12/22 0102)   morphine injection 4 mg (4 mg Intravenous Given 10/12/22 0102)   iopamidol (ISOVUE-370) 76 % injection 100 mL (95 mL Intravenous Given 10/12/22 0132)   oxyCODONE-acetaminophen (PERCOCET) 5-325 MG per tablet 1 tablet (1 tablet Oral Given 10/12/22 0431)       Imaging results:  CT Angiogram Chest    Result Date: 10/12/2022  Electronically signed by Ze Prado MD on 10-12-22 at 0204     Ambulatory status:   - up ad linda    Social issues:   Social History     Socioeconomic History   • Marital status:    Tobacco Use   • Smoking status: Every Day     Packs/day: 1.00     Types: Cigarettes   • Smokeless tobacco: Never   Vaping Use   • Vaping Use: Never used   Substance and Sexual Activity   • Alcohol use: Yes     Comment: RARE   • Drug use: No   • Sexual activity: Defer       NIH Stroke Scale:         Flower Rodriguez RN  10/12/22 04:57 EDT         Electronically signed by Flower Rodriguez RN at 10/12/22 9374

## 2022-10-12 NOTE — ED PROVIDER NOTES
MD ATTESTATION NOTE    The CRUZ and I have discussed this patient's history, physical exam, and treatment plan.  I have reviewed the documentation and personally had a face to face interaction with the patient. I affirm the documentation and agree with the treatment and plan.  The attached note describes my personal findings.      I provided a substantive portion of the care of the patient.  I personally performed the physical exam in its entirety, and below are my findings.  For this patient encounter, the patient wore surgical mask, I wore full protective PPE including N95 and eye protection.      Brief HPI: This patient is a 47-year-old female with a history of asthma presenting to the emergency room today with shortness of breath.  The patient states that she was recently on Levaquin secondary to a leg infection but also on Z-Cristofer secondary to an upper respiratory infection.  She continues to have cough as well as associated fevers and chills.    PHYSICAL EXAM  ED Triage Vitals   Temp Heart Rate Resp BP SpO2   10/11/22 2309 10/11/22 2309 10/11/22 2309 10/11/22 2338 10/11/22 2309   98.1 °F (36.7 °C) 92 22 128/84 91 %      Temp src Heart Rate Source Patient Position BP Location FiO2 (%)   10/11/22 2309 10/12/22 0040 -- -- --   Tympanic Monitor            GENERAL: In mild distress, nontoxic in appearance  HENT: nares patent  EYES: no scleral icterus  CV: regular rhythm, normal rate, no M/R/G  RESPIRATORY: normal effort, mild expiratory wheezes present throughout, no distress  ABDOMEN: soft, nontender, no rebound or guarding  MUSCULOSKELETAL: no deformity, no edema  NEURO: alert, moves all extremities, follows commands  PSYCH:  calm, cooperative  SKIN: warm, dry    Vital signs and nursing notes reviewed.        Plan: We will obtain an EKG, labs, as well as a chest x-ray in the ED as we treat with steroids and duo nebulizer treatments.  Will monitor closely and reassess following.       Lei Yun,  MD  10/12/22 0226

## 2022-10-12 NOTE — ED PROVIDER NOTES
EMERGENCY DEPARTMENT ENCOUNTER    Room Number:  05/05  Date of encounter:  10/12/2022  PCP: Aminta Quezada PA-C  Historian: Patient      HPI:  Chief Complaint: Shortness of breath   A complete HPI/ROS/PMH/PSH/SH/FH are unobtainable due to: N/A    Context: Jesus Ovalles is a 47 y.o. female with past medical history of asthma, bipolar disorder, and recent surgery for an ankle injury who presents to the ED c/o shortness of breath.  Patient states that she started develop symptoms of an upper respiratory infection on Thursday after exposure to some sick contacts at home and has had dry cough, wheezing, fever and chills, runny nose, and headache.  Patient denies any sore throat or chest pain.  Patient states that she has been taking Levaquin for an infection related to her ankle surgery, but was recently seen at the [facility] started on a Z-Cristofer, steroids, and N-acetylcysteine for her cough.  Patient took the last dose of her Z-Cristofer today but is still on a prednisone taper.      PAST MEDICAL HISTORY  Active Ambulatory Problems     Diagnosis Date Noted   • Asthma 03/02/2018   • Chronic obstructive pulmonary disease (HCC) 03/02/2018   • Chronic pain 09/27/2015   • Depression 03/24/2015   • EMMA (generalized anxiety disorder) 02/10/2022   • Hx of suicide attempt 09/27/2015   • Tobacco abuse 09/27/2015   • Bipolar 1 disorder (HCC) 02/10/2022   • PTSD (post-traumatic stress disorder) 02/10/2022   • HLD (hyperlipidemia) 02/10/2022   • Type I or II open fracture of right tibia and fibula 02/10/2022   • Right leg pain 02/19/2022   • Tibia/fibula fracture, shaft, left, closed, with nonunion, subsequent encounter 08/29/2022   • Tibial fracture 08/29/2022     Resolved Ambulatory Problems     Diagnosis Date Noted   • No Resolved Ambulatory Problems     Past Medical History:   Diagnosis Date   • Bipolar affective (HCC)    • COPD (chronic obstructive pulmonary disease) (HCC)    • DDD (degenerative disc disease), cervical    •  History of tachycardia    • Hypertension    • Suicidal risk          PAST SURGICAL HISTORY  Past Surgical History:   Procedure Laterality Date   • ANKLE OPEN REDUCTION INTERNAL FIXATION Right 02/11/2022    Procedure: ANKLE OPEN REDUCTION INTERNAL FIXATION;  Surgeon: Maurice Pugh II, MD;  Location: [facility] OR;  Service: Orthopedics;  Laterality: Right;   • CARPAL TUNNEL RELEASE     • DILATATION AND CURETTAGE     • HYSTERECTOMY     • SHOULDER ARTHROSCOPY W/ ROTATOR CUFF REPAIR     • TIBIA IM NAILING/RODDING Right 02/11/2022    Procedure: TIBIA INTRAMEDULLARY NAIL/LAURI INSERTION WITH IRRIGATION AND DEBRIDEMENT;  Surgeon: Maurice Pugh II, MD;  Location: [facility] MAIN OR;  Service: Orthopedics;  Laterality: Right;   • TIBIA IM NAILING/RODDING Right 8/29/2022    Procedure: REVISION RIGHT TIBIAL NAILING WITH BONE GRAFTING;  Surgeon: Maurice Pugh II, MD;  Location: [facility];  Service: Orthopedics;  Laterality: Right;         FAMILY HISTORY  Family History   Problem Relation Age of Onset   • Heart disease Mother    • Malig Hyperthermia Neg Hx          SOCIAL HISTORY  Social History     Socioeconomic History   • Marital status:    Tobacco Use   • Smoking status: Every Day     Packs/day: 1.00     Types: Cigarettes   • Smokeless tobacco: Never   Vaping Use   • Vaping Use: Never used   Substance and Sexual Activity   • Alcohol use: Yes     Comment: RARE   • Drug use: No   • Sexual activity: Defer         ALLERGIES  Penicillins, Codeine, Lyrica [pregabalin], and Buprenorphine        REVIEW OF SYSTEMS  Review of Systems     All systems reviewed and negative except for those discussed in HPI.       PHYSICAL EXAM    I have reviewed the triage vital signs and nursing notes.    ED Triage Vitals [10/11/22 2309]   Temp Heart Rate Resp BP SpO2   98.1 °F (36.7 °C) 92 22 -- 91 %      Temp src Heart Rate Source Patient Position BP Location FiO2 (%)   Tympanic -- -- -- --       Physical  Exam  GENERAL: Alert and oriented x3, ill-appearing, not distressed  HENT: moist mucous membranes, no pharyngeal erythema or tonsillar exudate  EYES: no scleral icterus  CV: regular rhythm, regular rate  RESPIRATORY: normal effort, bilateral expiratory wheezes  ABDOMEN: soft/nontender  MUSCULOSKELETAL: no deformity, no calf tenderness  NEURO: alert, moves all extremities, follows commands  SKIN: warm, dry, no rashes        LAB RESULTS  Recent Results (from the past 24 hour(s))   Comprehensive Metabolic Panel    Collection Time: 10/11/22 11:55 PM    Specimen: Blood   Result Value Ref Range    Glucose 112 (H) 65 - 99 mg/dL    BUN 14 6 - 20 mg/dL    Creatinine 0.90 0.57 - 1.00 mg/dL    Sodium 140 136 - 145 mmol/L    Potassium 3.8 3.5 - 5.2 mmol/L    Chloride 101 98 - 107 mmol/L    CO2 24.3 22.0 - 29.0 mmol/L    Calcium 10.3 8.6 - 10.5 mg/dL    Total Protein 7.1 6.0 - 8.5 g/dL    Albumin 4.80 3.50 - 5.20 g/dL    ALT (SGPT) 11 1 - 33 U/L    AST (SGOT) 17 1 - 32 U/L    Alkaline Phosphatase 74 39 - 117 U/L    Total Bilirubin <0.2 0.0 - 1.2 mg/dL    Globulin 2.3 gm/dL    A/G Ratio 2.1 g/dL    BUN/Creatinine Ratio 15.6 7.0 - 25.0    Anion Gap 14.7 5.0 - 15.0 mmol/L    eGFR 79.5 >60.0 mL/min/1.73   BNP    Collection Time: 10/11/22 11:55 PM    Specimen: Blood   Result Value Ref Range    proBNP 332.0 0.0 - 450.0 pg/mL   Troponin    Collection Time: 10/11/22 11:55 PM    Specimen: Blood   Result Value Ref Range    Troponin T <0.010 0.000 - 0.030 ng/mL   Green Top (Gel)    Collection Time: 10/11/22 11:55 PM   Result Value Ref Range    Extra Tube Hold for add-ons.    Lavender Top    Collection Time: 10/11/22 11:55 PM   Result Value Ref Range    Extra Tube hold for add-on    Gold Top - SST    Collection Time: 10/11/22 11:55 PM   Result Value Ref Range    Extra Tube Hold for add-ons.    Light Blue Top    Collection Time: 10/11/22 11:55 PM   Result Value Ref Range    Extra Tube Hold for add-ons.    CBC Auto Differential    Collection  Time: 10/11/22 11:55 PM    Specimen: Blood   Result Value Ref Range    WBC 7.13 3.40 - 10.80 10*3/mm3    RBC 4.50 3.77 - 5.28 10*6/mm3    Hemoglobin 13.5 12.0 - 15.9 g/dL    Hematocrit 40.5 34.0 - 46.6 %    MCV 90.0 79.0 - 97.0 fL    MCH 30.0 26.6 - 33.0 pg    MCHC 33.3 31.5 - 35.7 g/dL    RDW 12.1 (L) 12.3 - 15.4 %    RDW-SD 38.9 37.0 - 54.0 fl    MPV 9.3 6.0 - 12.0 fL    Platelets 293 140 - 450 10*3/mm3    Neutrophil % 71.1 42.7 - 76.0 %    Lymphocyte % 20.9 19.6 - 45.3 %    Monocyte % 7.3 5.0 - 12.0 %    Eosinophil % 0.0 (L) 0.3 - 6.2 %    Basophil % 0.1 0.0 - 1.5 %    Immature Grans % 0.6 (H) 0.0 - 0.5 %    Neutrophils, Absolute 5.07 1.70 - 7.00 10*3/mm3    Lymphocytes, Absolute 1.49 0.70 - 3.10 10*3/mm3    Monocytes, Absolute 0.52 0.10 - 0.90 10*3/mm3    Eosinophils, Absolute 0.00 0.00 - 0.40 10*3/mm3    Basophils, Absolute 0.01 0.00 - 0.20 10*3/mm3    Immature Grans, Absolute 0.04 0.00 - 0.05 10*3/mm3    nRBC 0.0 0.0 - 0.2 /100 WBC   Procalcitonin    Collection Time: 10/11/22 11:55 PM    Specimen: Blood   Result Value Ref Range    Procalcitonin 0.04 0.00 - 0.25 ng/mL   ECG 12 Lead    Collection Time: 10/11/22 11:58 PM   Result Value Ref Range    QT Interval 375 ms   Respiratory Panel PCR w/COVID-19(SARS-CoV-2) GREGORY/MAYELA/CHARO/PAD/COR/MAD/JEREMY In-House, NP Swab in UTM/VTM, 3-4 HR TAT - Swab, Nasopharynx    Collection Time: 10/12/22 12:30 AM    Specimen: Nasopharynx; Swab   Result Value Ref Range    ADENOVIRUS, PCR Not Detected Not Detected    Coronavirus 229E Not Detected Not Detected    Coronavirus HKU1 Not Detected Not Detected    Coronavirus NL63 Not Detected Not Detected    Coronavirus OC43 Not Detected Not Detected    COVID19 Not Detected Not Detected - Ref. Range    Human Metapneumovirus Not Detected Not Detected    Human Rhinovirus/Enterovirus Not Detected Not Detected    Influenza A PCR Not Detected Not Detected    Influenza B PCR Not Detected Not Detected    Parainfluenza Virus 1 Not Detected Not Detected     Parainfluenza Virus 2 Not Detected Not Detected    Parainfluenza Virus 3 Not Detected Not Detected    Parainfluenza Virus 4 Detected (A) Not Detected    RSV, PCR Not Detected Not Detected    Bordetella pertussis pcr Not Detected Not Detected    Bordetella parapertussis PCR Not Detected Not Detected    Chlamydophila pneumoniae PCR Not Detected Not Detected    Mycoplasma pneumo by PCR Not Detected Not Detected       Ordered the above labs and independently reviewed the results.        RADIOLOGY  XR Chest 2 View    Result Date: 10/11/2022  XR CHEST 2 VW-  Clinical: Shortness of breath  COMPARISON CT chest 9/20/2021  FINDINGS: Cardiac size within normal limits. There is hyperaeration with flattening of the diaphragms as before. No pleural effusion or vascular congestion nor acute airspace disease. No suspicious pulmonary lesion seen.  CONCLUSION: Hyperaeration, no acute pulmonary process has developed.  This report was finalized on 10/11/2022 11:33 PM by Dr. Anibal Ambriz M.D.      CT Angiogram Chest    Result Date: 10/12/2022  Patient: СЕРГЕЙ MOREL  Time Out: 02:04 Exam(s): CTA CHEST EXAM:   CT Angiography Chest With Intravenous Contrast CLINICAL HISTORY:    Reason for exam: shortness of breath, PE protocol. TECHNIQUE:   Axial computed tomographic angiography images of the chest with intravenous contrast.  CTDI is 42.1 mGy and DLP is 275 mGy-cm.  This CT exam was performed according to the principle of ALARA (As Low As Reasonably Achievable) by using one or more of the following dose reduction techniques: automated exposure control, adjustment of the mA and or kV according to patient size, and or use of iterative reconstruction technique.   MIP reconstructed images were created and reviewed. COMPARISON:   Noncontrasted CT chest dated 9 20 2021 FINDINGS:   Trachea:  The trachea and mainstem bronchi are patent bilaterally.   Pulmonary arteries:  No evidence for pulmonary embolism.   Aorta:  No acute findings.  No  thoracic aortic aneurysm.   Lungs:  There is extensive endobronchial opacification involving the segmental and subsegmental branches of the bilateral lower lobes and subsegmental branches of the left upper lobe and right anterior apical segment.  Curvilinear subsegmental changes in the medial right middle lobe and inferior lingular segments noted.   Pleural space:  Subtle micronodular changes in a tree-in-by pattern in the left posterior costophrenic margin is noted.  No significant effusion.   No pneumothorax.   Heart:  Unremarkable.  No cardiomegaly.  No significant pericardial effusion.   Bones joints:  No acute fracture.  No dislocation.   Soft tissues:  Unremarkable.   Lymph nodes:  Unremarkable.  No enlarged lymph nodes. IMPRESSION:     1.  No evidence for pulmonary embolism. 2.  There is extensive endobronchial opacification involving the segmental and subsegmental branches of the bilateral lower lobes and subsegmental branches of the left upper lobe and right anterior apical segment.  Differential consideration includes bronchitis or subtle aspiration. 3.  Subtle micronodular changes in a tree-in-by pattern in the left posterior costophrenic margin is noted.  Findings are consistent with distal small airways disease and possible mucoid impaction.  No lobar consolidation.  No pleural effusion or pneumothorax.     Electronically signed by Ze Prado MD on 10-12-22 at 0204      I ordered the above noted radiological studies. Reviewed by me and discussed with radiologist.  See dictation for official radiology interpretation.      PROCEDURES    Procedures      MEDICATIONS GIVEN IN ER    Medications   sodium chloride 0.9 % flush 10 mL (10 mL Intravenous Given 10/11/22 6295)   sodium chloride 0.9 % bolus 1,000 mL (1,000 mL Intravenous New Bag 10/12/22 0027)   methylPREDNISolone sodium succinate (SOLU-Medrol) injection 125 mg (125 mg Intravenous Given 10/12/22 0028)   ipratropium-albuterol (DUO-NEB) nebulizer  solution 3 mL (3 mL Nebulization Given 10/12/22 0040)   ondansetron (ZOFRAN) injection 4 mg (4 mg Intravenous Given 10/12/22 0102)   morphine injection 4 mg (4 mg Intravenous Given 10/12/22 0102)   iopamidol (ISOVUE-370) 76 % injection 100 mL (95 mL Intravenous Given 10/12/22 0132)         PROGRESS, DATA ANALYSIS, CONSULTS, AND MEDICAL DECISION MAKING    All labs have been independently reviewed by me.  All radiology studies have been reviewed by me and discussed with radiologist dictating the report.   EKG's independently viewed and interpreted by me.  Discussion below represents my analysis of pertinent findings related to patient's condition, differential diagnosis, treatment plan and final disposition.    DDx: Includes but not limited to asthma exacerbation, COPD, pneumonia, URI, COVID, influenza, parainfluenza, PE    ED Course as of 10/12/22 0416   Wed Oct 12, 2022   0040 EKG          EKG time: 2358  Rhythm/Rate: Sinus rhythm at 76 bpm  P waves and [place]: Probable LAE  QRS, axis: Borderline RAD  ST and T waves: No acute ST/T wave changes    Interpreted Contemporaneously by me, independently viewed  Unchanged compared to prior EKG of 8/18/2022   [DANN]   0041 WBC: 7.13 [DANN]   0041 Hemoglobin: 13.5 [DANN]   0041 Hematocrit: 40.5 [DANN]   0041 Platelets: 293 [DANN]   0109 Glucose(!): 112 [DANN]   0109 BUN: 14 [DNAN]   0109 Creatinine: 0.90 [DANN]   0109 Sodium: 140 [DANN]   0109 Potassium: 3.8 [DANN]   0109 Chloride: 101 [DANN]   0109 CO2: 24.3 [DANN]   0109 Troponin T: <0.010 [DANN]   0109 proBNP: 332.0 [DANN]   0206 Procalcitonin: 0.04 [DANN]   0218 Parainfluenza Virus 4(!): Detected [DANN]   0237 Patient rechecked, discussed results of blood work, swab, and CT scan showing parainfluenza virus infection and bronchitis.  Patient still requiring supplemental oxygen to maintain her O2 sats above 90%.  Discussed plan for possible admission, patient expressed understanding and agrees with plan. [DANN]   0329 Patient history, ER presentation and  evaluation discussed with Charmaine MESSER, will place in observation to a telemetry bed per Dr. Rodriguez. [DANN]      ED Course User Index  [DANN] Harshil Patel PA       MDM: Patient will be admitted for parainfluenza infection, COPD exacerbation and hypoxic respiratory failure.    PPE: The patient wore a surgical mask throughout the entire patient encounter. I wore an N95.    AS OF 04:16 EDT VITALS:    BP - 114/69  HR - 79  TEMP - 98.1 °F (36.7 °C) (Tympanic)  O2 SATS - 93%        DIAGNOSIS  Final diagnoses:   Infection due to parainfluenza virus 4   Acute hypoxemic respiratory failure (HCC)   Acute exacerbation of chronic obstructive pulmonary disease (COPD) (HCC)         DISPOSITION  ADMISSION    Discussed treatment plan and reason for admission with pt/family and admitting physician.  Pt/family voiced understanding of the plan for admission for further testing/treatment as needed.         Note Disclaimer: At [facility], we believe that sharing information builds trust and better relationships. You are receiving this note because you recently visited [facility]. It is possible you will see health information before a provider has talked with you about it. This kind of information can be easy to misunderstand. To help you fully understand what it means for your health, we urge you to discuss this note with your provider.       Harshil Patel PA  10/12/22 0416

## 2022-10-12 NOTE — PLAN OF CARE
Problem: Pain Acute  Goal: Acceptable Pain Control and Functional Ability  Outcome: Ongoing, Progressing   Goal Outcome Evaluation:

## 2022-10-12 NOTE — ED NOTES
Nursing report ED to floor  Jesus Ovalles  47 y.o.  female    HPI :   Chief Complaint   Patient presents with    Shortness of Breath       Admitting doctor:   Winston Rodriguez MD    Admitting diagnosis:   The primary encounter diagnosis was Infection due to parainfluenza virus 4. Diagnoses of Acute hypoxemic respiratory failure (HCC) and Acute exacerbation of chronic obstructive pulmonary disease (COPD) (HCC) were also pertinent to this visit.    Code status:   Current Code Status       Date Active Code Status Order ID Comments User Context       10/12/2022 0427 CPR (Attempt to Resuscitate) [id number]  Charmaine Branham APRN ED        Question Answer    Code Status (Patient has no pulse and is not breathing) CPR (Attempt to Resuscitate)    Medical Interventions (Patient has pulse or is breathing) Full Support                    Allergies:   Penicillins, Codeine, Lyrica [pregabalin], and Buprenorphine    Isolation:   No active isolations    Intake and Output  No intake or output data in the 24 hours ending 10/12/22 0457    Weight:       10/12/22  0425   Weight: 68 kg (150 lb)       Most recent vitals:   Vitals:    10/12/22 0429 10/12/22 0431 10/12/22 0442 10/12/22 0449   BP: 117/69 115/75     Pulse: 80 75 73 78   Resp:   18 18   Temp:       TempSrc:       SpO2: 93% 94% 94% 99%   Weight:       Height:           Active LDAs/IV Access:   Lines, Drains & Airways       Active LDAs       Name Placement date Placement time Site Days    Peripheral IV 10/11/22 2354 Left Antecubital 10/11/22  2354  Antecubital  less than 1                    Labs (abnormal labs have a star):   Labs Reviewed   RESPIRATORY PANEL PCR W/ COVID-19 (SARS-COV-2) GREGORY/MAYELA/CHARO/PAD/COR/MAD/JEREMY IN-HOUSE, NP SWAB IN UTM/VTP, 3-4 HR TAT - Abnormal; Notable for the following components:       Result Value    Parainfluenza Virus 4 Detected (*)     All other components within normal limits    Narrative:     In the setting of a positive respiratory panel  with a viral infection PLUS a negative procalcitonin without other underlying concern for bacterial infection, consider observing off antibiotics or discontinuation of antibiotics and continue supportive care. If the respiratory panel is positive for atypical bacterial infection (Bordetella pertussis, Chlamydophila pneumoniae, or Mycoplasma pneumoniae), consider antibiotic de-escalation to target atypical bacterial infection.   COMPREHENSIVE METABOLIC PANEL - Abnormal; Notable for the following components:    Glucose 112 (*)     All other components within normal limits    Narrative:     GFR Normal >60  Chronic Kidney Disease <60  Kidney Failure <15     CBC WITH AUTO DIFFERENTIAL - Abnormal; Notable for the following components:    RDW 12.1 (*)     Eosinophil % 0.0 (*)     Immature Grans % 0.6 (*)     All other components within normal limits   BNP (IN-HOUSE) - Normal    Narrative:     Among patients with dyspnea, NT-proBNP is highly sensitive for the detection of acute congestive heart failure. In addition NT-proBNP of <300 pg/ml effectively rules out acute congestive heart failure with 99% negative predictive value.    Results may be falsely decreased if patient taking Biotin.     TROPONIN (IN-HOUSE) - Normal    Narrative:     Troponin T Reference Range:  <= 0.03 ng/mL-   Negative for AMI  >0.03 ng/mL-     Abnormal for myocardial necrosis.  Clinicians would have to utilize clinical acumen, EKG, Troponin and serial changes to determine if it is an Acute Myocardial Infarction or myocardial injury due to an underlying chronic condition.       Results may be falsely decreased if patient taking Biotin.     PROCALCITONIN - Normal    Narrative:     As a Marker for Sepsis (Non-Neonates):    1. <0.5 ng/mL represents a low risk of severe sepsis and/or septic shock.  2. >2 ng/mL represents a high risk of severe sepsis and/or septic shock.    As a Marker for Lower Respiratory Tract Infections that require antibiotic  "therapy:    PCT on Admission    Antibiotic Therapy       6-12 Hrs later    >0.5                Strongly Recommended  >0.25 - <0.5        Recommended   0.1 - 0.25          Discouraged              Remeasure/reassess PCT  <0.1                Strongly Discouraged     Remeasure/reassess PCT    As 28 day mortality risk marker: \"Change in Procalcitonin Result\" (>80% or <=80%) if Day 0 (or Day 1) and Day 4 values are available. Refer to http://www.[email or web address]-pct-calculator.com    Change in PCT <=80%  A decrease of PCT levels below or equal to 80% defines a positive change in PCT test result representing a higher risk for 28-day all-cause mortality of patients diagnosed with severe sepsis for septic shock.    Change in PCT >80%  A decrease of PCT levels of more than 80% defines a negative change in PCT result representing a lower risk for 28-day all-cause mortality of patients diagnosed with severe sepsis or septic shock.      RAINBOW DRAW    Narrative:     The following orders were created for panel order [place] Draw.  Procedure                               Abnormality         Status                     ---------                               -----------         ------                     Green Top (Gel)[[id number]]                                  Final result               Lavender Top[[id number]]                                     Final result               Gold Top - SST[[id number]]                                   Final result               Light Blue Top[[id number]]                                   Final result                 Please view results for these tests on the individual orders.   BASIC METABOLIC PANEL   CBC (NO DIFF)   CBC AND DIFFERENTIAL    Narrative:     The following orders were created for panel order CBC & Differential.  Procedure                               Abnormality         Status                     ---------                               -----------         ------                     CBC Auto " Differential[[id number]]        Abnormal            Final result                 Please view results for these tests on the individual orders.   GREEN TOP   LAVENDER TOP   GOLD TOP - SST   LIGHT BLUE TOP       EKG:   ECG 12 Lead   Preliminary Result   HEART RATE= 76  bpm   RR Interval= 789  ms   WY Interval= 122  ms   P Horizontal Axis= -6  deg   P Front Axis= 83  deg   QRSD Interval= 93  ms   QT Interval= 375  ms   QRS Axis= 90  deg   T Wave Axis= 71  deg   - BORDERLINE ECG -   Sinus rhythm   Probable left atrial enlargement   Borderline right axis deviation   Baseline wander in lead(s) V2,V3,V4,V5   Electronically Signed By:    Date and Time of Study: 2022-10-11 23:58:39          Meds given in ED:   Medications   sodium chloride 0.9 % flush 10 mL (10 mL Intravenous Given 10/11/22 7523)   sodium chloride 0.9 % flush 10 mL (has no administration in time range)   sodium chloride 0.9 % flush 10 mL (has no administration in time range)   acetaminophen (TYLENOL) tablet 650 mg (has no administration in time range)     Or   acetaminophen (TYLENOL) 160 MG/5ML solution 650 mg (has no administration in time range)     Or   acetaminophen (TYLENOL) suppository 650 mg (has no administration in time range)   ondansetron (ZOFRAN) tablet 4 mg (has no administration in time range)     Or   ondansetron (ZOFRAN) injection 4 mg (has no administration in time range)   calcium carbonate (TUMS) chewable tablet 500 mg (200 mg elemental) (has no administration in time range)   methylPREDNISolone sodium succinate (SOLU-Medrol) injection 60 mg (has no administration in time range)   ipratropium-albuterol (DUO-NEB) nebulizer solution 3 mL (3 mL Nebulization Given 10/12/22 0442)   ipratropium-albuterol (DUO-NEB) nebulizer solution 3 mL (has no administration in time range)   sodium chloride 0.9 % bolus 1,000 mL (1,000 mL Intravenous New Bag 10/12/22 0027)   methylPREDNISolone sodium succinate (SOLU-Medrol) injection 125 mg (125 mg Intravenous  Given 10/12/22 0028)   ipratropium-albuterol (DUO-NEB) nebulizer solution 3 mL (3 mL Nebulization Given 10/12/22 0040)   ondansetron (ZOFRAN) injection 4 mg (4 mg Intravenous Given 10/12/22 0102)   morphine injection 4 mg (4 mg Intravenous Given 10/12/22 0102)   iopamidol (ISOVUE-370) 76 % injection 100 mL (95 mL Intravenous Given 10/12/22 0132)   oxyCODONE-acetaminophen (PERCOCET) 5-325 MG per tablet 1 tablet (1 tablet Oral Given 10/12/22 0431)       Imaging results:  CT Angiogram Chest    Result Date: 10/12/2022  Electronically signed by Ze Prado MD on 10-12-22 at 0204     Ambulatory status:   - up ad linda    Social issues:   Social History     Socioeconomic History    Marital status:    Tobacco Use    Smoking status: Every Day     Packs/day: 1.00     Types: Cigarettes    Smokeless tobacco: Never   Vaping Use    Vaping Use: Never used   Substance and Sexual Activity    Alcohol use: Yes     Comment: RARE    Drug use: No    Sexual activity: Defer       NIH Stroke Scale:         Flower Rodriguez RN  10/12/22 04:57 EDT

## 2022-10-13 PROBLEM — J20.8 ACUTE BRONCHITIS DUE TO OTHER SPECIFIED ORGANISMS: Status: ACTIVE | Noted: 2022-10-13

## 2022-10-13 PROCEDURE — G0378 HOSPITAL OBSERVATION PER HR: HCPCS

## 2022-10-13 PROCEDURE — 94799 UNLISTED PULMONARY SVC/PX: CPT

## 2022-10-13 PROCEDURE — 25010000002 ENOXAPARIN PER 10 MG: Performed by: HOSPITALIST

## 2022-10-13 PROCEDURE — 25010000002 METHYLPREDNISOLONE PER 125 MG: Performed by: HOSPITALIST

## 2022-10-13 PROCEDURE — 94761 N-INVAS EAR/PLS OXIMETRY MLT: CPT

## 2022-10-13 PROCEDURE — 94664 DEMO&/EVAL PT USE INHALER: CPT

## 2022-10-13 PROCEDURE — 94760 N-INVAS EAR/PLS OXIMETRY 1: CPT

## 2022-10-13 PROCEDURE — 25010000002 METHYLPREDNISOLONE PER 40 MG: Performed by: INTERNAL MEDICINE

## 2022-10-13 RX ORDER — PREDNISONE 20 MG/1
40 TABLET ORAL
Status: DISCONTINUED | OUTPATIENT
Start: 2022-10-14 | End: 2022-10-15 | Stop reason: HOSPADM

## 2022-10-13 RX ORDER — ACETYLCYSTEINE 200 MG/ML
3 SOLUTION ORAL; RESPIRATORY (INHALATION)
Status: DISCONTINUED | OUTPATIENT
Start: 2022-10-13 | End: 2022-10-15 | Stop reason: HOSPADM

## 2022-10-13 RX ORDER — ALPRAZOLAM 0.5 MG/1
0.5 TABLET ORAL 3 TIMES DAILY PRN
Status: DISCONTINUED | OUTPATIENT
Start: 2022-10-13 | End: 2022-10-15 | Stop reason: HOSPADM

## 2022-10-13 RX ORDER — METHYLPREDNISOLONE SODIUM SUCCINATE 40 MG/ML
40 INJECTION, POWDER, LYOPHILIZED, FOR SOLUTION INTRAMUSCULAR; INTRAVENOUS EVERY 12 HOURS
Status: COMPLETED | OUTPATIENT
Start: 2022-10-13 | End: 2022-10-13

## 2022-10-13 RX ADMIN — CLOTRIMAZOLE 10 MG: 10 LOZENGE ORAL at 14:03

## 2022-10-13 RX ADMIN — ARFORMOTEROL TARTRATE 15 MCG: 15 SOLUTION RESPIRATORY (INHALATION) at 23:34

## 2022-10-13 RX ADMIN — BUDESONIDE 1 MG: 1 INHALANT ORAL at 23:29

## 2022-10-13 RX ADMIN — IPRATROPIUM BROMIDE AND ALBUTEROL SULFATE 3 ML: 2.5; .5 SOLUTION RESPIRATORY (INHALATION) at 19:31

## 2022-10-13 RX ADMIN — OXYCODONE HYDROCHLORIDE AND ACETAMINOPHEN 1 TABLET: 7.5; 325 TABLET ORAL at 03:54

## 2022-10-13 RX ADMIN — BUDESONIDE 1 MG: 1 INHALANT ORAL at 07:23

## 2022-10-13 RX ADMIN — LEVOFLOXACIN 750 MG: 750 TABLET, FILM COATED ORAL at 09:07

## 2022-10-13 RX ADMIN — CLOTRIMAZOLE 10 MG: 10 LOZENGE ORAL at 18:02

## 2022-10-13 RX ADMIN — METHYLPREDNISOLONE SODIUM SUCCINATE 60 MG: 125 INJECTION, POWDER, FOR SOLUTION INTRAMUSCULAR; INTRAVENOUS at 09:08

## 2022-10-13 RX ADMIN — METHYLPREDNISOLONE SODIUM SUCCINATE 60 MG: 125 INJECTION, POWDER, FOR SOLUTION INTRAMUSCULAR; INTRAVENOUS at 00:31

## 2022-10-13 RX ADMIN — FAMOTIDINE 20 MG: 20 TABLET ORAL at 18:02

## 2022-10-13 RX ADMIN — ALPRAZOLAM 0.5 MG: 0.5 TABLET ORAL at 15:52

## 2022-10-13 RX ADMIN — IPRATROPIUM BROMIDE AND ALBUTEROL SULFATE 3 ML: 2.5; .5 SOLUTION RESPIRATORY (INHALATION) at 03:17

## 2022-10-13 RX ADMIN — ALPRAZOLAM 0.5 MG: 0.5 TABLET ORAL at 23:49

## 2022-10-13 RX ADMIN — ARFORMOTEROL TARTRATE 15 MCG: 15 SOLUTION RESPIRATORY (INHALATION) at 07:23

## 2022-10-13 RX ADMIN — ENOXAPARIN SODIUM 40 MG: 100 INJECTION SUBCUTANEOUS at 09:07

## 2022-10-13 RX ADMIN — OXYCODONE HYDROCHLORIDE AND ACETAMINOPHEN 1 TABLET: 7.5; 325 TABLET ORAL at 21:03

## 2022-10-13 RX ADMIN — Medication 10 ML: at 09:07

## 2022-10-13 RX ADMIN — OXYCODONE HYDROCHLORIDE AND ACETAMINOPHEN 1 TABLET: 7.5; 325 TABLET ORAL at 10:19

## 2022-10-13 RX ADMIN — Medication 10 ML: at 21:07

## 2022-10-13 RX ADMIN — CLOTRIMAZOLE 10 MG: 10 LOZENGE ORAL at 09:07

## 2022-10-13 RX ADMIN — IPRATROPIUM BROMIDE AND ALBUTEROL SULFATE 3 ML: 2.5; .5 SOLUTION RESPIRATORY (INHALATION) at 10:54

## 2022-10-13 RX ADMIN — METHYLPREDNISOLONE SODIUM SUCCINATE 40 MG: 40 INJECTION, POWDER, FOR SOLUTION INTRAMUSCULAR; INTRAVENOUS at 21:07

## 2022-10-13 RX ADMIN — CLOTRIMAZOLE 10 MG: 10 LOZENGE ORAL at 11:54

## 2022-10-13 RX ADMIN — FAMOTIDINE 20 MG: 20 TABLET ORAL at 09:07

## 2022-10-13 RX ADMIN — ACETYLCYSTEINE 3 ML: 200 SOLUTION ORAL; RESPIRATORY (INHALATION) at 19:35

## 2022-10-13 RX ADMIN — IPRATROPIUM BROMIDE AND ALBUTEROL SULFATE 3 ML: 2.5; .5 SOLUTION RESPIRATORY (INHALATION) at 15:40

## 2022-10-13 RX ADMIN — OXYCODONE HYDROCHLORIDE AND ACETAMINOPHEN 1 TABLET: 7.5; 325 TABLET ORAL at 16:37

## 2022-10-13 NOTE — PROGRESS NOTES
Name: Jesus Ovalles ADMIT: 10/11/2022   : 1975  PCP: Aminta Quezada PA-C    MRN: [id number] LOS: 0 days   AGE/SEX: 47 y.o. female  ROOM: [facility]     Subjective   Subjective   CC: shortness of breath  No acute events. Patient overall is feeling better in terms of her dyspnea. She is having a non-productive cough. Taking PO. No CP/f/c/n/v/d. She reports some increased anxiety.   Objective   Objective   Vital Signs  Temp:  [98.1 °F (36.7 °C)-98.4 °F (36.9 °C)] 98.4 °F (36.9 °C)  Heart Rate:  [] 103  Resp:  [16-20] 18  BP: (114-133)/(66-90) 128/90  SpO2:  [85 %-97 %] 93 %  on  Flow (L/min):  [2-3.5] 2;   Device (Oxygen Therapy): nasal cannula;humidified  Body mass index is 21.94 kg/m².  Physical Exam  Vitals and nursing note reviewed.   Constitutional:       General: She is not in acute distress.     Appearance: She is not toxic-appearing or diaphoretic.   HENT:      Head: Normocephalic and atraumatic.      Nose: Nose normal.      Mouth/Throat:      Mouth: Mucous membranes are moist.      Pharynx: Oropharynx is clear.   Eyes:      Conjunctiva/sclera: Conjunctivae normal.      Pupils: Pupils are equal, round, and reactive to light.   Cardiovascular:      Rate and Rhythm: Normal rate and regular rhythm.      Pulses: Normal pulses.   Pulmonary:      Effort: Pulmonary effort is normal.      Breath sounds: Examination of the right-lower field reveals decreased breath sounds. Examination of the left-lower field reveals decreased breath sounds. Decreased breath sounds, wheezing (scattered) and rhonchi present.   Abdominal:      General: Bowel sounds are normal.      Palpations: Abdomen is soft.      Tenderness: There is no abdominal tenderness.   Musculoskeletal:         General: No swelling or tenderness.      Cervical back: Normal range of motion and neck supple.      Comments: RLE wound dressed, c/d/i   Skin:     General: Skin is warm and dry.      Capillary Refill: Capillary refill takes less than 2  seconds.   Neurological:      General: No focal deficit present.      Mental Status: She is alert and oriented to person, place, and time.   Psychiatric:         Mood and Affect: Mood normal.         Behavior: Behavior normal.     Results Review     I reviewed the patient's new clinical results.  I reviewed the patient's telemetry.  I reviewed the patient's CT angiogram of the chest  Results from last 7 days   Lab Units 10/11/22  2355   WBC 10*3/mm3 7.13   HEMOGLOBIN g/dL 13.5   PLATELETS 10*3/mm3 293     Results from last 7 days   Lab Units 10/11/22  2355   SODIUM mmol/L 140   POTASSIUM mmol/L 3.8   CHLORIDE mmol/L 101   CO2 mmol/L 24.3   BUN mg/dL 14   CREATININE mg/dL 0.90   GLUCOSE mg/dL 112*   EGFR mL/min/1.73 79.5     Results from last 7 days   Lab Units 10/11/22  2355   ALBUMIN g/dL 4.80   BILIRUBIN mg/dL <0.2   ALK PHOS U/L 74   AST (SGOT) U/L 17   ALT (SGPT) U/L 11     Results from last 7 days   Lab Units 10/11/22  2355   CALCIUM mg/dL 10.3   ALBUMIN g/dL 4.80     Results from last 7 days   Lab Units 10/11/22  2355   PROCALCITONIN ng/mL 0.04     No results found for: HGBA1C, POCGLU    CT Angiogram Chest    Result Date: 10/12/2022  Electronically signed by Ze Prado MD on 10-12-22 at 0204    Scheduled Medications  arformoterol, 15 mcg, Nebulization, BID - RT  budesonide, 1 mg, Nebulization, BID - RT  clotrimazole, 10 mg, Oral, 5x Daily  enoxaparin, 40 mg, Subcutaneous, Q24H  famotidine, 20 mg, Oral, BID AC  ipratropium-albuterol, 3 mL, Nebulization, Q4H - RT  levoFLOXacin, 750 mg, Oral, Q24H  methylPREDNISolone sodium succinate, 60 mg, Intravenous, Q8H  sodium chloride, 10 mL, Intravenous, Q12H    Infusions   Diet  Diet Regular       Assessment/Plan     Active Hospital Problems    Diagnosis  POA   • **Infection due to parainfluenza virus 4 [B34.8]  Yes   • COPD exacerbation (HCC) [J44.1]  Yes   • EMMA (generalized anxiety disorder) [F41.1]  Yes   • Bipolar 1 disorder (HCC) [F31.9]  Yes   • HLD  (hyperlipidemia) [E78.5]  Yes   • Chronic pain [G89.29]  Yes   • Tobacco abuse [Z72.0]  Yes   • Depression [F32.A]  Yes      Resolved Hospital Problems   No resolved problems to display.   Acute Parainfluenzavirus Bronchitis with COPD Exacerbation  - no evidence of bacterial pneumonia  - doing better today, will give another dose of IV steroids tonight and transition to PO prednisone tomorrow  - continue pulmicort/brovana nebs  - continue duo-nebs  - add mucomyst nebs and flutter valve  - encourage pulmonary toilet and wean oxygen as tolerated    Bipolar 1 disorder/EMMA  - exacerbated by steroids  - will add PRN xanax while in the hospital    Tobacco Use  - nicotine patch offered, using lozenges instead    Recent open Right Tibial Shaft fracture and right lateral mallelleolus fracture  - s/p I&D and  IM nail of right tibial shaft fracture with ORIF of right lateral malleolus fracture 2/11/22  - complicated by non-union of the tibial shaft fracture s/p IM nail revision 8/29/22-there was concern for wound infection and she is currently on antibiotics  - she has scheduled follow up with Dr. Pugh tomorrow at 1400    Lovenox 40 mg SC daily for DVT prophylaxis.  Full code.  Discussed with patient, spouse and nursing staff.  Anticipate discharge home in 1-2 days.      Ze Mike MD  [facility]  10/13/22  14:28 EDT

## 2022-10-13 NOTE — PLAN OF CARE
Problem: Adult Inpatient Plan of Care  Goal: Plan of Care Review  Outcome: Ongoing, Progressing  Flowsheets (Taken 10/13/2022 0639)  Progress: no change  Plan of Care Reviewed With: patient  Outcome Evaluation: remains on 2.5-3L NC overnight. continue RT tx and IV steroids per order. medicated for pain with prn meds x2 this shift-see mar. encourage deep breathing exercises when anxious. up with assist x1 to bathroom. encourage rest. spouse at bedside overnight.  Goal: Patient-Specific Goal (Individualized)  Outcome: Ongoing, Progressing  Goal: Absence of Hospital-Acquired Illness or Injury  Outcome: Ongoing, Progressing  Intervention: Identify and Manage Fall Risk  Recent Flowsheet Documentation  Taken 10/13/2022 0609 by Lexii Bailon RN  Safety Promotion/Fall Prevention:   safety round/check completed   room organization consistent  Taken 10/13/2022 0416 by Lexii Bailon RN  Safety Promotion/Fall Prevention: safety round/check completed  Taken 10/13/2022 0031 by Lexii Bailon RN  Safety Promotion/Fall Prevention: safety round/check completed  Taken 10/12/2022 2228 by Lexii Bailon RN  Safety Promotion/Fall Prevention:   safety round/check completed   room organization consistent  Taken 10/12/2022 2132 by Lexii Bailon RN  Safety Promotion/Fall Prevention:   room organization consistent   safety round/check completed  Taken 10/12/2022 2005 by Lexii Bailon RN  Safety Promotion/Fall Prevention: room organization consistent  Intervention: Prevent Skin Injury  Recent Flowsheet Documentation  Taken 10/13/2022 0609 by Lexii Bailon RN  Body Position:   position changed independently   supine  Taken 10/13/2022 0416 by Lexii Bailon RN  Body Position:   position changed independently   sitting up in bed  Taken 10/13/2022 0241 by Lexii Bailon RN  Body Position:   position changed independently   sitting up in bed  Taken 10/13/2022 0031 by Uvaldo  Lexii Ramirez RN  Body Position:   position changed independently   sitting up in bed  Taken 10/12/2022 2228 by Lexii Bailon RN  Body Position:   position changed independently   supine   right   lower extremity elevated  Taken 10/12/2022 2132 by Lexii Bailon RN  Body Position:   position changed independently   lower extremity elevated   right  Taken 10/12/2022 2005 by Lexii Bailon RN  Body Position:   position changed independently   sitting up in bed  Taken 10/12/2022 1950 by Lexii Bailon RN  Body Position:   position changed independently   sitting up in bed  Intervention: Prevent and Manage VTE (Venous Thromboembolism) Risk  Recent Flowsheet Documentation  Taken 10/13/2022 0609 by Lexii Bailon RN  Activity Management: activity adjusted per tolerance  Taken 10/13/2022 0416 by Lexii Bailon RN  Activity Management: activity adjusted per tolerance  Taken 10/13/2022 0241 by Lexii Bailon, RN  Activity Management: activity adjusted per tolerance  Taken 10/13/2022 0031 by Lexii Bailon RN  Activity Management:   activity adjusted per tolerance   ambulated to bathroom  Taken 10/12/2022 2228 by Lexii Bailon RN  Activity Management: activity adjusted per tolerance  Taken 10/12/2022 2132 by Lexii Bailon RN  Activity Management: activity adjusted per tolerance  VTE Prevention/Management:   left   sequential compression devices on  Taken 10/12/2022 2005 by Lexii Bailon, RN  Activity Management: activity adjusted per tolerance  Taken 10/12/2022 1950 by Lexii Bailon, RN  Activity Management: activity adjusted per tolerance  Intervention: Prevent Infection  Recent Flowsheet Documentation  Taken 10/13/2022 0609 by Lexii Bailon RN  Infection Prevention: rest/sleep promoted  Taken 10/13/2022 0416 by Lexii Bailon RN  Infection Prevention: rest/sleep promoted  Taken 10/13/2022 0241 by Lexii Bailon,  RN  Infection Prevention: rest/sleep promoted  Taken 10/13/2022 0031 by Lexii Bailon RN  Infection Prevention: rest/sleep promoted  Taken 10/12/2022 2228 by Lexii Bailon RN  Infection Prevention: rest/sleep promoted  Taken 10/12/2022 2132 by Lexii Bailon RN  Infection Prevention: rest/sleep promoted  Taken 10/12/2022 2005 by Lexii Bailon RN  Infection Prevention: rest/sleep promoted  Goal: Optimal Comfort and Wellbeing  Outcome: Ongoing, Progressing  Intervention: Monitor Pain and Promote Comfort  Recent Flowsheet Documentation  Taken 10/13/2022 0354 by Lexii Bailon RN  Pain Management Interventions:   see MAR   quiet environment facilitated   pain management plan reviewed with patient/caregiver   pillow support provided   position adjusted  Taken 10/13/2022 0031 by Lexii Bailon RN  Pain Management Interventions: pain management plan reviewed with patient/caregiver  Taken 10/12/2022 2228 by Lexii Bailon RN  Pain Management Interventions:   see MAR   quiet environment facilitated   pain management plan reviewed with patient/caregiver   pillow support provided   position adjusted  Taken 10/12/2022 2132 by Lexii Bailon RN  Pain Management Interventions: (elevated on pillow)   pain management plan reviewed with patient/caregiver   pillow support provided   position adjusted   quiet environment facilitated   other (see comments)  Intervention: Provide Person-Centered Care  Recent Flowsheet Documentation  Taken 10/13/2022 0416 by Lexii Bailon RN  Trust Relationship/Rapport:   care explained   emotional support provided   thoughts/feelings acknowledged   reassurance provided   questions encouraged   questions answered  Taken 10/12/2022 2132 by Lexii Bailon RN  Trust Relationship/Rapport:   care explained   emotional support provided   thoughts/feelings acknowledged   reassurance provided   questions encouraged   questions  answered  Goal: Readiness for Transition of Care  Outcome: Ongoing, Progressing     Problem: Asthma Comorbidity  Goal: Maintenance of Asthma Control  Outcome: Ongoing, Progressing  Intervention: Maintain Asthma Symptom Control  Recent Flowsheet Documentation  Taken 10/13/2022 0609 by Lexii Bailon RN  Medication Review/Management: medications reviewed  Taken 10/13/2022 0416 by Lexii Bailon RN  Medication Review/Management: medications reviewed  Taken 10/13/2022 0031 by Lexii Bailon RN  Medication Review/Management: medications reviewed  Taken 10/12/2022 2228 by Lexii Bailon RN  Medication Review/Management: medications reviewed  Taken 10/12/2022 2132 by Lexii Bailon RN  Medication Review/Management: medications reviewed  Taken 10/12/2022 2005 by Lexii Bailon RN  Medication Review/Management: medications reviewed     Problem: COPD (Chronic Obstructive Pulmonary Disease) Comorbidity  Goal: Maintenance of COPD Symptom Control  Outcome: Ongoing, Progressing  Intervention: Maintain COPD-Symptom Control  Recent Flowsheet Documentation  Taken 10/13/2022 0609 by Lexii Bailon RN  Medication Review/Management: medications reviewed  Taken 10/13/2022 0416 by Lexii Balion RN  Medication Review/Management: medications reviewed  Taken 10/13/2022 0031 by Lexii Bailon RN  Medication Review/Management: medications reviewed  Taken 10/12/2022 2228 by Lexii Bailon RN  Medication Review/Management: medications reviewed  Taken 10/12/2022 2132 by Lexii Bailon RN  Supportive Measures: active listening utilized  Medication Review/Management: medications reviewed  Taken 10/12/2022 2005 by Lexii Bailon RN  Medication Review/Management: medications reviewed     Problem: Pain Chronic (Persistent) (Comorbidity Management)  Goal: Acceptable Pain Control and Functional Ability  Outcome: Ongoing, Progressing  Intervention: Manage Persistent  Pain  Recent Flowsheet Documentation  Taken 10/13/2022 0609 by Lexii Bailon RN  Medication Review/Management: medications reviewed  Taken 10/13/2022 0416 by Lexii Bailon RN  Medication Review/Management: medications reviewed  Taken 10/13/2022 0031 by Lexii Bailon RN  Medication Review/Management: medications reviewed  Taken 10/12/2022 2228 by Lexii Bailon RN  Medication Review/Management: medications reviewed  Taken 10/12/2022 2132 by Lexii Bailon RN  Medication Review/Management: medications reviewed  Taken 10/12/2022 2005 by Lexii Baioln RN  Medication Review/Management: medications reviewed  Intervention: Develop Pain Management Plan  Recent Flowsheet Documentation  Taken 10/13/2022 0354 by Lexii Bailon RN  Pain Management Interventions:   see MAR   quiet environment facilitated   pain management plan reviewed with patient/caregiver   pillow support provided   position adjusted  Taken 10/13/2022 0031 by Lexii Bailon RN  Pain Management Interventions: pain management plan reviewed with patient/caregiver  Taken 10/12/2022 2228 by Lexii Bailon RN  Pain Management Interventions:   see MAR   quiet environment facilitated   pain management plan reviewed with patient/caregiver   pillow support provided   position adjusted  Taken 10/12/2022 2132 by Lexii Bailon RN  Pain Management Interventions: (elevated on pillow)   pain management plan reviewed with patient/caregiver   pillow support provided   position adjusted   quiet environment facilitated   other (see comments)  Intervention: Optimize Psychosocial Wellbeing  Recent Flowsheet Documentation  Taken 10/12/2022 2132 by Lexii Bailon RN  Supportive Measures: active listening utilized  Diversional Activities: television     Problem: Pain Acute  Goal: Acceptable Pain Control and Functional Ability  Outcome: Ongoing, Progressing  Intervention: Prevent or Manage Pain  Recent  Flowsheet Documentation  Taken 10/13/2022 0609 by Lexii Bailon RN  Medication Review/Management: medications reviewed  Taken 10/13/2022 0416 by Lxeii Bailon RN  Medication Review/Management: medications reviewed  Taken 10/13/2022 0031 by Lexii Bailon RN  Medication Review/Management: medications reviewed  Taken 10/12/2022 2228 by Lexii Bailon RN  Medication Review/Management: medications reviewed  Taken 10/12/2022 2132 by Lexii Bailon RN  Sensory Stimulation Regulation:   care clustered   lighting decreased  Medication Review/Management: medications reviewed  Taken 10/12/2022 2005 by Lexii Bailon RN  Medication Review/Management: medications reviewed  Intervention: Develop Pain Management Plan  Recent Flowsheet Documentation  Taken 10/13/2022 0354 by Lexii Bailon RN  Pain Management Interventions:   see MAR   quiet environment facilitated   pain management plan reviewed with patient/caregiver   pillow support provided   position adjusted  Taken 10/13/2022 0031 by Lexii Bailon RN  Pain Management Interventions: pain management plan reviewed with patient/caregiver  Taken 10/12/2022 2228 by Lexii Bailon RN  Pain Management Interventions:   see MAR   quiet environment facilitated   pain management plan reviewed with patient/caregiver   pillow support provided   position adjusted  Taken 10/12/2022 2132 by Lexii Bailon RN  Pain Management Interventions: (elevated on pillow)   pain management plan reviewed with patient/caregiver   pillow support provided   position adjusted   quiet environment facilitated   other (see comments)  Intervention: Optimize Psychosocial Wellbeing  Recent Flowsheet Documentation  Taken 10/12/2022 2132 by Lexii Bailon RN  Supportive Measures: active listening utilized  Diversional Activities: television   Goal Outcome Evaluation:  Plan of Care Reviewed With: patient        Progress: no  change  Outcome Evaluation: remains on 2.5-3L NC overnight. continue RT tx and IV steroids per order. medicated for pain with prn meds x2 this shift-see mar. encourage deep breathing exercises when anxious. up with assist x1 to bathroom. encourage rest. spouse at bedside overnight.

## 2022-10-13 NOTE — NURSING NOTE
Attempting to wean  Pt off oxygen. Oxygen lowered to 1 l/m, down from 2 l/m. Sats running 89 - low 90's. Will continue to monitor

## 2022-10-14 ENCOUNTER — APPOINTMENT (OUTPATIENT)
Dept: GENERAL RADIOLOGY | Facility: HOSPITAL | Age: 47
End: 2022-10-14

## 2022-10-14 LAB
ANION GAP SERPL CALCULATED.3IONS-SCNC: 8.3 MMOL/L (ref 5–15)
BASOPHILS # BLD AUTO: 0.01 10*3/MM3 (ref 0–0.2)
BASOPHILS NFR BLD AUTO: 0.2 % (ref 0–1.5)
BUN SERPL-MCNC: 15 MG/DL (ref 6–20)
BUN/CREAT SERPL: 20.3 (ref 7–25)
CALCIUM SPEC-SCNC: 9.3 MG/DL (ref 8.6–10.5)
CHLORIDE SERPL-SCNC: 100 MMOL/L (ref 98–107)
CO2 SERPL-SCNC: 30.7 MMOL/L (ref 22–29)
CREAT SERPL-MCNC: 0.74 MG/DL (ref 0.57–1)
DEPRECATED RDW RBC AUTO: 38.9 FL (ref 37–54)
EGFRCR SERPLBLD CKD-EPI 2021: 100.6 ML/MIN/1.73
EOSINOPHIL # BLD AUTO: 0 10*3/MM3 (ref 0–0.4)
EOSINOPHIL NFR BLD AUTO: 0 % (ref 0.3–6.2)
ERYTHROCYTE [DISTWIDTH] IN BLOOD BY AUTOMATED COUNT: 12.1 % (ref 12.3–15.4)
GLUCOSE SERPL-MCNC: 107 MG/DL (ref 65–99)
HCT VFR BLD AUTO: 39 % (ref 34–46.6)
HGB BLD-MCNC: 13.7 G/DL (ref 12–15.9)
IMM GRANULOCYTES # BLD AUTO: 0.06 10*3/MM3 (ref 0–0.05)
IMM GRANULOCYTES NFR BLD AUTO: 0.9 % (ref 0–0.5)
LYMPHOCYTES # BLD AUTO: 1.08 10*3/MM3 (ref 0.7–3.1)
LYMPHOCYTES NFR BLD AUTO: 16.6 % (ref 19.6–45.3)
MCH RBC QN AUTO: 30.4 PG (ref 26.6–33)
MCHC RBC AUTO-ENTMCNC: 35.1 G/DL (ref 31.5–35.7)
MCV RBC AUTO: 86.7 FL (ref 79–97)
MONOCYTES # BLD AUTO: 0.28 10*3/MM3 (ref 0.1–0.9)
MONOCYTES NFR BLD AUTO: 4.3 % (ref 5–12)
NEUTROPHILS NFR BLD AUTO: 5.08 10*3/MM3 (ref 1.7–7)
NEUTROPHILS NFR BLD AUTO: 78 % (ref 42.7–76)
NRBC BLD AUTO-RTO: 0 /100 WBC (ref 0–0.2)
PLATELET # BLD AUTO: 262 10*3/MM3 (ref 140–450)
PMV BLD AUTO: 9.2 FL (ref 6–12)
POTASSIUM SERPL-SCNC: 4.5 MMOL/L (ref 3.5–5.2)
RBC # BLD AUTO: 4.5 10*6/MM3 (ref 3.77–5.28)
SODIUM SERPL-SCNC: 139 MMOL/L (ref 136–145)
WBC NRBC COR # BLD: 6.51 10*3/MM3 (ref 3.4–10.8)

## 2022-10-14 PROCEDURE — 94799 UNLISTED PULMONARY SVC/PX: CPT

## 2022-10-14 PROCEDURE — 73590 X-RAY EXAM OF LOWER LEG: CPT

## 2022-10-14 PROCEDURE — 94760 N-INVAS EAR/PLS OXIMETRY 1: CPT

## 2022-10-14 PROCEDURE — 94664 DEMO&/EVAL PT USE INHALER: CPT

## 2022-10-14 PROCEDURE — 94761 N-INVAS EAR/PLS OXIMETRY MLT: CPT

## 2022-10-14 PROCEDURE — 25010000002 ENOXAPARIN PER 10 MG: Performed by: HOSPITALIST

## 2022-10-14 PROCEDURE — 85025 COMPLETE CBC W/AUTO DIFF WBC: CPT | Performed by: INTERNAL MEDICINE

## 2022-10-14 PROCEDURE — 63710000001 PREDNISONE PER 1 MG: Performed by: INTERNAL MEDICINE

## 2022-10-14 PROCEDURE — 80048 BASIC METABOLIC PNL TOTAL CA: CPT | Performed by: INTERNAL MEDICINE

## 2022-10-14 RX ADMIN — ALPRAZOLAM 0.5 MG: 0.5 TABLET ORAL at 18:02

## 2022-10-14 RX ADMIN — Medication 10 ML: at 08:49

## 2022-10-14 RX ADMIN — ACETYLCYSTEINE 3 ML: 200 SOLUTION ORAL; RESPIRATORY (INHALATION) at 07:15

## 2022-10-14 RX ADMIN — OXYCODONE HYDROCHLORIDE AND ACETAMINOPHEN 1 TABLET: 7.5; 325 TABLET ORAL at 21:17

## 2022-10-14 RX ADMIN — FAMOTIDINE 20 MG: 20 TABLET ORAL at 16:56

## 2022-10-14 RX ADMIN — LEVOFLOXACIN 750 MG: 750 TABLET, FILM COATED ORAL at 08:49

## 2022-10-14 RX ADMIN — FAMOTIDINE 20 MG: 20 TABLET ORAL at 06:55

## 2022-10-14 RX ADMIN — ENOXAPARIN SODIUM 40 MG: 100 INJECTION SUBCUTANEOUS at 08:49

## 2022-10-14 RX ADMIN — IPRATROPIUM BROMIDE AND ALBUTEROL SULFATE 3 ML: 2.5; .5 SOLUTION RESPIRATORY (INHALATION) at 03:09

## 2022-10-14 RX ADMIN — IPRATROPIUM BROMIDE AND ALBUTEROL SULFATE 3 ML: 2.5; .5 SOLUTION RESPIRATORY (INHALATION) at 07:13

## 2022-10-14 RX ADMIN — IPRATROPIUM BROMIDE AND ALBUTEROL SULFATE 3 ML: 2.5; .5 SOLUTION RESPIRATORY (INHALATION) at 19:57

## 2022-10-14 RX ADMIN — ALPRAZOLAM 0.5 MG: 0.5 TABLET ORAL at 08:49

## 2022-10-14 RX ADMIN — OXYCODONE HYDROCHLORIDE AND ACETAMINOPHEN 1 TABLET: 7.5; 325 TABLET ORAL at 12:13

## 2022-10-14 RX ADMIN — OXYCODONE HYDROCHLORIDE AND ACETAMINOPHEN 1 TABLET: 7.5; 325 TABLET ORAL at 16:56

## 2022-10-14 RX ADMIN — PREDNISONE 40 MG: 20 TABLET ORAL at 06:55

## 2022-10-14 RX ADMIN — OXYCODONE HYDROCHLORIDE AND ACETAMINOPHEN 1 TABLET: 7.5; 325 TABLET ORAL at 06:54

## 2022-10-14 RX ADMIN — IPRATROPIUM BROMIDE AND ALBUTEROL SULFATE 3 ML: 2.5; .5 SOLUTION RESPIRATORY (INHALATION) at 11:07

## 2022-10-14 RX ADMIN — Medication 10 ML: at 21:18

## 2022-10-14 RX ADMIN — ACETYLCYSTEINE 3 ML: 200 SOLUTION ORAL; RESPIRATORY (INHALATION) at 20:01

## 2022-10-14 NOTE — PLAN OF CARE
Goal Outcome Evaluation:  Plan of Care Reviewed With: patient        Progress: improving  Outcome Evaluation: vss, pt remains on 1L NC overnight. pain and anxiety controlled by prn pain meds. pt has rested well during shift. up w/ assist x1. spouse remains at bedside. will continue to monitor.

## 2022-10-14 NOTE — NURSING NOTE
Attempted to remove oxygen from pt, but was unsuccessful. Pt sats dropped to low 80's without oxygen. 1 liter per n/c placed back on pt.

## 2022-10-14 NOTE — PROGRESS NOTES
Name: Jesus Ovalles ADMIT: 10/11/2022   : 1975  PCP: Aminta Quezada PA-C    MRN: [id number] LOS: 0 days   AGE/SEX: 47 y.o. female  ROOM: [facility]     Subjective   Subjective   CC: shortness of breath  No acute events. Patient overall is feeling better in terms of her dyspnea but her cough continues-this has become occasionally productive. Taking PO. No CP/f/c/n/v/d.  Objective   Objective   Vital Signs  Temp:  [97.7 °F (36.5 °C)-98.8 °F (37.1 °C)] 98 °F (36.7 °C)  Heart Rate:  [] 98  Resp:  [18] 18  BP: (105-122)/(69-86) 110/69  SpO2:  [90 %-98 %] 90 %  on  Flow (L/min):  [1-1.5] 1.5;   Device (Oxygen Therapy): nasal cannula  Body mass index is 21.94 kg/m².  Physical Exam  Vitals and nursing note reviewed.   Constitutional:       General: She is not in acute distress.     Appearance: She is not toxic-appearing or diaphoretic.   HENT:      Head: Normocephalic and atraumatic.      Nose: Nose normal.      Mouth/Throat:      Mouth: Mucous membranes are moist.      Pharynx: Oropharynx is clear.   Eyes:      Conjunctiva/sclera: Conjunctivae normal.      Pupils: Pupils are equal, round, and reactive to light.   Cardiovascular:      Rate and Rhythm: Normal rate and regular rhythm.      Pulses: Normal pulses.   Pulmonary:      Effort: Pulmonary effort is normal.      Breath sounds: Examination of the right-lower field reveals decreased breath sounds. Examination of the left-lower field reveals decreased breath sounds. Decreased breath sounds and rhonchi present. No wheezing.   Abdominal:      General: Bowel sounds are normal.      Palpations: Abdomen is soft.      Tenderness: There is no abdominal tenderness.   Musculoskeletal:         General: No swelling or tenderness.      Cervical back: Normal range of motion and neck supple.      Comments: RLE wound dressed, c/d/i   Skin:     General: Skin is warm and dry.      Capillary Refill: Capillary refill takes less than 2 seconds.   Neurological:      General:  No focal deficit present.      Mental Status: She is alert and oriented to person, place, and time.   Psychiatric:         Mood and Affect: Mood normal.         Behavior: Behavior normal.     Results Review     I reviewed the patient's new clinical results.  I reviewed the patient's telemetry.  Results from last 7 days   Lab Units 10/14/22  0638 10/11/22  2355   WBC 10*3/mm3 6.51 7.13   HEMOGLOBIN g/dL 13.7 13.5   PLATELETS 10*3/mm3 262 293     Results from last 7 days   Lab Units 10/14/22  0638 10/11/22  2355   SODIUM mmol/L 139 140   POTASSIUM mmol/L 4.5 3.8   CHLORIDE mmol/L 100 101   CO2 mmol/L 30.7* 24.3   BUN mg/dL 15 14   CREATININE mg/dL 0.74 0.90   GLUCOSE mg/dL 107* 112*   EGFR mL/min/1.73 100.6 79.5     Results from last 7 days   Lab Units 10/11/22  2355   ALBUMIN g/dL 4.80   BILIRUBIN mg/dL <0.2   ALK PHOS U/L 74   AST (SGOT) U/L 17   ALT (SGPT) U/L 11     Results from last 7 days   Lab Units 10/14/22  0638 10/11/22  2355   CALCIUM mg/dL 9.3 10.3   ALBUMIN g/dL  --  4.80     Results from last 7 days   Lab Units 10/11/22  2355   PROCALCITONIN ng/mL 0.04     No results found for: HGBA1C, POCGLU    No radiology results for the last day  Scheduled Medications  acetylcysteine, 3 mL, Nebulization, BID - RT  arformoterol, 15 mcg, Nebulization, BID - RT  budesonide, 1 mg, Nebulization, BID - RT  clotrimazole, 10 mg, Oral, 5x Daily  enoxaparin, 40 mg, Subcutaneous, Q24H  famotidine, 20 mg, Oral, BID AC  ipratropium-albuterol, 3 mL, Nebulization, Q4H - RT  levoFLOXacin, 750 mg, Oral, Q24H  predniSONE, 40 mg, Oral, Daily With Breakfast  sodium chloride, 10 mL, Intravenous, Q12H    Infusions   Diet  Diet Regular       Assessment/Plan     Active Hospital Problems    Diagnosis  POA   • **Infection due to parainfluenza virus 4 [B34.8]  Yes   • Acute bronchitis due to other specified organisms [J20.8]  Yes   • COPD exacerbation (HCC) [J44.1]  Yes   • EMMA (generalized anxiety disorder) [F41.1]  Yes   • Bipolar 1 disorder  (HCC) [F31.9]  Yes   • HLD (hyperlipidemia) [E78.5]  Yes   • Chronic pain [G89.29]  Yes   • Tobacco abuse [Z72.0]  Yes   • Depression [F32.A]  Yes      Resolved Hospital Problems   No resolved problems to display.   Acute Parainfluenzavirus Bronchitis with COPD Exacerbation  - no evidence of bacterial pneumonia  - transitioned to PO prednisone-will continue as wheezing has improved  - continue pulmicort/brovana nebs  - continue duo-nebs  - continue mucomyst nebs and flutter valve  - encourage pulmonary toilet and wean oxygen as tolerated    Bipolar 1 disorder/EMMA  - exacerbated by steroids  - continue PRN xanax while in the hospital    Tobacco Use  - nicotine patch offered, using lozenges instead    Recent open Right Tibial Shaft fracture and right lateral mallelleolus fracture  - s/p I&D and  IM nail of right tibial shaft fracture with ORIF of right lateral malleolus fracture 2/11/22  - 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 Pugh today-given these issues and the fact that she has to remain in the hospital will consult    Lovenox 40 mg SC daily for DVT prophylaxis.  Full code.  Discussed with patient, spouse, family and nursing staff.  Anticipate discharge home in 1-2 days.      Ze Mike MD  [facility]  10/14/22  14:41 EDT

## 2022-10-15 ENCOUNTER — READMISSION MANAGEMENT (OUTPATIENT)
Dept: CALL CENTER | Facility: HOSPITAL | Age: 47
End: 2022-10-15

## 2022-10-15 VITALS
OXYGEN SATURATION: 92 % | BODY MASS INDEX: 21.97 KG/M2 | HEART RATE: 108 BPM | HEIGHT: 65 IN | RESPIRATION RATE: 18 BRPM | SYSTOLIC BLOOD PRESSURE: 103 MMHG | WEIGHT: 131.84 LBS | DIASTOLIC BLOOD PRESSURE: 69 MMHG | TEMPERATURE: 98.3 F

## 2022-10-15 LAB
ANION GAP SERPL CALCULATED.3IONS-SCNC: 8.3 MMOL/L (ref 5–15)
BASOPHILS # BLD AUTO: 0.01 10*3/MM3 (ref 0–0.2)
BASOPHILS NFR BLD AUTO: 0.2 % (ref 0–1.5)
BUN SERPL-MCNC: 16 MG/DL (ref 6–20)
BUN/CREAT SERPL: 19.3 (ref 7–25)
CALCIUM SPEC-SCNC: 8.7 MG/DL (ref 8.6–10.5)
CHLORIDE SERPL-SCNC: 99 MMOL/L (ref 98–107)
CO2 SERPL-SCNC: 30.7 MMOL/L (ref 22–29)
CREAT SERPL-MCNC: 0.83 MG/DL (ref 0.57–1)
DEPRECATED RDW RBC AUTO: 39.1 FL (ref 37–54)
EGFRCR SERPLBLD CKD-EPI 2021: 87.6 ML/MIN/1.73
EOSINOPHIL # BLD AUTO: 0.05 10*3/MM3 (ref 0–0.4)
EOSINOPHIL NFR BLD AUTO: 1.1 % (ref 0.3–6.2)
ERYTHROCYTE [DISTWIDTH] IN BLOOD BY AUTOMATED COUNT: 11.9 % (ref 12.3–15.4)
GLUCOSE SERPL-MCNC: 79 MG/DL (ref 65–99)
HCT VFR BLD AUTO: 42.6 % (ref 34–46.6)
HGB BLD-MCNC: 14.2 G/DL (ref 12–15.9)
IMM GRANULOCYTES # BLD AUTO: 0.06 10*3/MM3 (ref 0–0.05)
IMM GRANULOCYTES NFR BLD AUTO: 1.3 % (ref 0–0.5)
LYMPHOCYTES # BLD AUTO: 1.8 10*3/MM3 (ref 0.7–3.1)
LYMPHOCYTES NFR BLD AUTO: 39.9 % (ref 19.6–45.3)
MCH RBC QN AUTO: 30.1 PG (ref 26.6–33)
MCHC RBC AUTO-ENTMCNC: 33.3 G/DL (ref 31.5–35.7)
MCV RBC AUTO: 90.3 FL (ref 79–97)
MONOCYTES # BLD AUTO: 0.39 10*3/MM3 (ref 0.1–0.9)
MONOCYTES NFR BLD AUTO: 8.6 % (ref 5–12)
NEUTROPHILS NFR BLD AUTO: 2.2 10*3/MM3 (ref 1.7–7)
NEUTROPHILS NFR BLD AUTO: 48.9 % (ref 42.7–76)
NRBC BLD AUTO-RTO: 0 /100 WBC (ref 0–0.2)
PLATELET # BLD AUTO: 245 10*3/MM3 (ref 140–450)
PMV BLD AUTO: 9.2 FL (ref 6–12)
POTASSIUM SERPL-SCNC: 4.2 MMOL/L (ref 3.5–5.2)
RBC # BLD AUTO: 4.72 10*6/MM3 (ref 3.77–5.28)
SODIUM SERPL-SCNC: 138 MMOL/L (ref 136–145)
WBC NRBC COR # BLD: 4.51 10*3/MM3 (ref 3.4–10.8)

## 2022-10-15 PROCEDURE — 94799 UNLISTED PULMONARY SVC/PX: CPT

## 2022-10-15 PROCEDURE — 63710000001 PREDNISONE PER 1 MG: Performed by: INTERNAL MEDICINE

## 2022-10-15 PROCEDURE — 85025 COMPLETE CBC W/AUTO DIFF WBC: CPT | Performed by: INTERNAL MEDICINE

## 2022-10-15 PROCEDURE — 80048 BASIC METABOLIC PNL TOTAL CA: CPT | Performed by: INTERNAL MEDICINE

## 2022-10-15 PROCEDURE — 94664 DEMO&/EVAL PT USE INHALER: CPT

## 2022-10-15 PROCEDURE — 94761 N-INVAS EAR/PLS OXIMETRY MLT: CPT

## 2022-10-15 PROCEDURE — 25010000002 ENOXAPARIN PER 10 MG: Performed by: HOSPITALIST

## 2022-10-15 RX ORDER — IPRATROPIUM BROMIDE AND ALBUTEROL SULFATE 2.5; .5 MG/3ML; MG/3ML
3 SOLUTION RESPIRATORY (INHALATION)
Qty: 90 ML | Refills: 0
Start: 2022-10-15

## 2022-10-15 RX ORDER — FAMOTIDINE 20 MG/1
20 TABLET, FILM COATED ORAL
Qty: 40 TABLET | Refills: 0 | Status: SHIPPED | OUTPATIENT
Start: 2022-10-15 | End: 2022-11-04

## 2022-10-15 RX ORDER — METHYLPREDNISOLONE 4 MG/1
TABLET ORAL
Qty: 21 TABLET | Refills: 0 | Status: SHIPPED | OUTPATIENT
Start: 2022-10-15 | End: 2023-01-22

## 2022-10-15 RX ADMIN — Medication 10 ML: at 09:01

## 2022-10-15 RX ADMIN — IPRATROPIUM BROMIDE AND ALBUTEROL SULFATE 3 ML: 2.5; .5 SOLUTION RESPIRATORY (INHALATION) at 03:45

## 2022-10-15 RX ADMIN — ARFORMOTEROL TARTRATE 15 MCG: 15 SOLUTION RESPIRATORY (INHALATION) at 08:11

## 2022-10-15 RX ADMIN — FAMOTIDINE 20 MG: 20 TABLET ORAL at 09:00

## 2022-10-15 RX ADMIN — ARFORMOTEROL TARTRATE 15 MCG: 15 SOLUTION RESPIRATORY (INHALATION) at 00:14

## 2022-10-15 RX ADMIN — BUDESONIDE 1 MG: 1 INHALANT ORAL at 00:11

## 2022-10-15 RX ADMIN — LEVOFLOXACIN 750 MG: 750 TABLET, FILM COATED ORAL at 09:00

## 2022-10-15 RX ADMIN — OXYCODONE HYDROCHLORIDE AND ACETAMINOPHEN 1 TABLET: 7.5; 325 TABLET ORAL at 03:53

## 2022-10-15 RX ADMIN — ENOXAPARIN SODIUM 40 MG: 100 INJECTION SUBCUTANEOUS at 09:00

## 2022-10-15 RX ADMIN — ALPRAZOLAM 0.5 MG: 0.5 TABLET ORAL at 12:12

## 2022-10-15 RX ADMIN — ALPRAZOLAM 0.5 MG: 0.5 TABLET ORAL at 03:53

## 2022-10-15 RX ADMIN — BUDESONIDE 1 MG: 1 INHALANT ORAL at 08:11

## 2022-10-15 RX ADMIN — OXYCODONE HYDROCHLORIDE AND ACETAMINOPHEN 1 TABLET: 7.5; 325 TABLET ORAL at 09:58

## 2022-10-15 RX ADMIN — PREDNISONE 40 MG: 20 TABLET ORAL at 09:00

## 2022-10-15 NOTE — DISCHARGE SUMMARY
Date of Admission: 10/11/2022  Date of Discharge:  10/15/2022  Primary Care Physician: Aminta Quezada PA-C     Discharge Diagnosis:  Active Hospital Problems    Diagnosis  POA   • **Infection due to parainfluenza virus 4 [B34.8]  Yes   • Acute bronchitis due to other specified organisms [J20.8]  Yes   • COPD exacerbation (HCC) [J44.1]  Yes   • EMMA (generalized anxiety disorder) [F41.1]  Yes   • Bipolar 1 disorder (HCC) [F31.9]  Yes   • HLD (hyperlipidemia) [E78.5]  Yes   • Chronic pain [G89.29]  Yes   • Tobacco abuse [Z72.0]  Yes   • Depression [F32.A]  Yes      Resolved Hospital Problems   No resolved problems to display.       Presenting Problem/History of Present Illness:  Infection due to parainfluenza virus 4 [B34.8]  Acute exacerbation of chronic obstructive pulmonary disease (COPD) (HCC) [J44.1]  Acute hypoxemic respiratory failure (HCC) [J96.01]     Hospital Course:  The patient is a 47 y.o. 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 Please see admission H&P for further details. She was found to have acute bronchitis due to Parainfluenza virus 4. She had a resulting COPD exacerbation and hypoxemia. She was started on IV steroids, nebulized bronchodilators, and nebulized steroids. She was also given mucomyst nebs. Her respiratory status improved with this and today she is feeling much better and maintaining adequate oxygen saturations on room air. She will be discharged on a steroid taper. I of course advised her to stop smoking although she does not intend to do so at this time.   She did have an appointment with Dr. Pugh on 10/14 and they were consulted in the hospital. They ordered a follow up xray which appears stable and she can follow up with them in the outpatient setting as scheduled. In the mean time she can continue her current treatments.     She is  "medically stable and will be discharged home.     Exam Today:  Blood pressure 103/69, pulse 108, temperature 98.3 °F (36.8 °C), temperature source Oral, resp. rate 18, height 165.1 cm (65\"), weight 59.8 kg (131 lb 13.4 oz), SpO2 92 %, not currently breastfeeding.  Vitals and nursing note reviewed.   Constitutional:       General: She is not in acute distress.     Appearance: She is not toxic-appearing or diaphoretic.   HENT:      Head: Normocephalic and atraumatic.      Nose: Nose normal.      Mouth/Throat:      Mouth: Mucous membranes are moist.      Pharynx: Oropharynx is clear.   Eyes:      Conjunctiva/sclera: Conjunctivae normal.      Pupils: Pupils are equal, round, and reactive to light.   Cardiovascular:      Rate and Rhythm: Normal rate and regular rhythm.      Pulses: Normal pulses.   Pulmonary:      Effort: Pulmonary effort is normal.      Breath sounds: Markedly improved air movement. No rhonchi. Mild scattered wheezing.   Abdominal:      General: Bowel sounds are normal.      Palpations: Abdomen is soft.      Tenderness: There is no abdominal tenderness.   Musculoskeletal:         General: No swelling or tenderness.      Cervical back: Normal range of motion and neck supple.      Comments: RLE wound dressed, c/d/i   Skin:     General: Skin is warm and dry.      Capillary Refill: Capillary refill takes less than 2 seconds.   Neurological:      General: No focal deficit present.      Mental Status: She is alert and oriented to person, place, and time.   Psychiatric:         Mood and Affect: Mood normal.         Behavior: Behavior normal.     Procedures Performed:  CT Angiography Chest With Intravenous Contrast-10/12/22   CLINICAL HISTORY:     Reason for exam: shortness of breath, PE protocol.     TECHNIQUE:    Axial computed tomographic angiography images of the chest with   intravenous contrast.  CTDI is 42.1 mGy and DLP is 275 mGy-cm.  This CT   exam was performed according to the principle of BELLE (As " Low As   Reasonably Achievable) by using one or more of the following dose   reduction techniques: automated exposure control, adjustment of the mA   and or kV according to patient size, and or use of iterative   reconstruction technique.    MIP reconstructed images were created and reviewed.     COMPARISON:    Noncontrasted CT chest dated 9 20 2021     FINDINGS:    Trachea:  The trachea and mainstem bronchi are patent bilaterally.    Pulmonary arteries:  No evidence for pulmonary embolism.    Aorta:  No acute findings.  No thoracic aortic aneurysm.    Lungs:  There is extensive endobronchial opacification involving the   segmental and subsegmental branches of the bilateral lower lobes and   subsegmental branches of the left upper lobe and right anterior apical   segment.  Curvilinear subsegmental changes in the medial right middle   lobe and inferior lingular segments noted.    Pleural space:  Subtle micronodular changes in a tree-in-by pattern in   the left posterior costophrenic margin is noted.  No significant effusion.    No pneumothorax.    Heart:  Unremarkable.  No cardiomegaly.  No significant pericardial   effusion.    Bones joints:  No acute fracture.  No dislocation.    Soft tissues:  Unremarkable.    Lymph nodes:  Unremarkable.  No enlarged lymph nodes.     IMPRESSION:       1.  No evidence for pulmonary embolism.  2.  There is extensive endobronchial opacification involving the   segmental and subsegmental branches of the bilateral lower lobes and   subsegmental branches of the left upper lobe and right anterior apical   segment.  Differential consideration includes bronchitis or subtle   aspiration.  3.  Subtle micronodular changes in a tree-in-by pattern in the left   posterior costophrenic margin is noted.  Findings are consistent with   distal small airways disease and possible mucoid impaction.  No lobar   consolidation.  No pleural effusion or pneumothorax.    RIGHT LOWER LEG X-RAY-10/14/22    HISTORY: Evaluate healing of fractures.     TECHNIQUE: Four x-rays of the right lower leg are correlated with  operative x-rays 08/29/2022.     FINDINGS: There is antegrade intramedullary nail hardware at the tibia  with two proximal and three distal interlocking screws. Two additional  screws are observed in the distal tibia and there is screw and plate  hardware at the distal fibula. The hardware appears to be intact and  unchanged in position. 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 No soft tissue swelling or osteolytic change is present.     IMPRESSION:  Right tibia and fibula fracture repair hardware appears  intact. No x-ray evidence of nonunion.    Consults:   Consults     Date and Time Order Name Status Description    10/14/2022 11:52 AM Inpatient Orthopedic Surgery Consult      10/12/2022  2:37 AM [facility] (on-call MD unless specified) Details             Discharge Disposition:  Home or Self Care    Discharge Medications:     Discharge Medications      New Medications      Instructions Start Date   famotidine 20 MG tablet  Commonly known as: PEPCID   20 mg, Oral, 2 Times Daily Before Meals      methylPREDNISolone 4 MG dose pack  Commonly known as: MEDROL   Take as directed on package instructions.         Changes to Medications      Instructions Start Date   ipratropium-albuterol 0.5-2.5 mg/3 ml nebulizer  Commonly known as: DUO-NEB  What changed:   · when to take this  · reasons to take this   3 mL, Nebulization, 4 Times Daily - RT      oxyCODONE-acetaminophen 7.5-325 MG per tablet  Commonly known as: PERCOCET  What changed: Another medication with the same name was removed. Continue taking this medication, and follow the directions you see here.   Take 1 tablet by mouth Every 4-6 Hours As Needed.         Continue These Medications      Instructions Start Date   cephalexin 500 MG capsule  Commonly known as:  KEFLEX   500 mg, Oral, Every 12 Hours      clotrimazole 10 MG alanis  Commonly known as: MYCELEX   10 mg, Oral, 5 Times Daily      doxycycline 100 MG capsule  Commonly known as: MONODOX   Take 1 capsule twice a day by oral route for 7 days.      levoFLOXacin 750 MG tablet  Commonly known as: LEVAQUIN   Take 1 tablet by mouth daily for 21 days.      ondansetron 4 MG tablet  Commonly known as: Zofran   4 mg, Oral, Every 6 Hours PRN      Symbicort 160-4.5 MCG/ACT inhaler  Generic drug: budesonide-formoterol   2 puffs, Inhalation, 2 Times Daily         Stop These Medications    azithromycin 250 MG tablet  Commonly known as: ZITHROMAX     NAC capsule capsule  Generic drug: Acetylcysteine     predniSONE 10 MG tablet  Commonly known as: DELTASONE            Discharge Diet:   Diet Instructions     Diet: Regular; Thin      Discharge Diet: Regular    Fluid Consistency: Thin          Activity at Discharge:   Activity Instructions     Activity as Tolerated            Follow-up Appointments:  No future appointments.  Additional Instructions for the Follow-ups that You Need to Schedule     Discharge Follow-up with PCP   As directed       Currently Documented PCP:    Aminta Quezada PA-C    PCP Phone Number:    None     Follow Up Details: 1 week         Discharge Follow-up with Specified Provider: Dr. Pugh (Orthopedic Surgery)   As directed      To: Dr. Pugh (Orthopedic Surgery)    Follow Up Details: as scheduled                Ze Mike MD  10/15/22  11:50 EDT    Time Spent on Discharge Activities: Greater than 30 minutes.

## 2022-10-15 NOTE — PLAN OF CARE
Problem: COPD (Chronic Obstructive Pulmonary Disease) Comorbidity  Goal: Maintenance of COPD Symptom Control  Outcome: Ongoing, Progressing   Goal Outcome Evaluation:   Patient 91-92 on room air.

## 2022-10-15 NOTE — PLAN OF CARE
Goal Outcome Evaluation:  Plan of Care Reviewed With: patient, spouse        Progress: improving      VSS. No sign of distress during this shift, O2sat 90-92% on room air. Flu and Pneumonia shots offered, pt stated she will follow up with her PCP and get shots outpatient.

## 2022-10-16 NOTE — OUTREACH NOTE
Prep Survey    Flowsheet Row Responses   [facility] patient discharged from? [place]   Is LACE score < 7 ? No   Emergency Room discharge w/ pulse ox? No   Eligibility Readm Mgmt   Discharge diagnosis parainfluenza virus/COPD   Does the patient have one of the following disease processes/diagnoses(primary or secondary)? COPD   Does the patient have Home health ordered? No   Is there a DME ordered? No   Prep survey completed? Yes          ROOPA Santos Registered Nurse

## 2022-10-17 NOTE — CASE MANAGEMENT/SOCIAL WORK
Case Management Discharge Note      Final Note: home         Selected Continued Care - Discharged on 10/15/2022 Admission date: 10/11/2022 - Discharge disposition: Home or Self Care    Destination    No services have been selected for the patient.              Durable Medical Equipment    No services have been selected for the patient.              Dialysis/Infusion    No services have been selected for the patient.              Home Medical Care    No services have been selected for the patient.              Therapy    No services have been selected for the patient.              Community Resources    No services have been selected for the patient.              Community & DME    No services have been selected for the patient.                  Transportation Services  Private: Car    Final Discharge Disposition Code: 01 - home or self-care

## 2022-10-18 NOTE — PAYOR COMM NOTE
"Jesus Ovalles (47 y.o. Female)     PLEASE SEE ATTACHED FOR INPT AUTH    ID [id number]      PLEASE CALL VALDEZ @ [phone] OR [phone]    THANK YOU        Date of Birth   1975    Social Security Number       Address   [address] [place] [id number]    Home Phone       MRN   [id number]       Quaker   None    Marital Status                               Admission Date   10/11/22    Admission Type   Emergency    Admitting Provider   Winston Rodriguez MD    Attending Provider       Department, Room/Bed   [facility] [address] [place], P582/1       Discharge Date   10/15/2022    Discharge Disposition   Home or Self Care    Discharge Destination                               Attending Provider: (none)   Allergies: Penicillins, Codeine, Lyrica [Pregabalin], Buprenorphine    Isolation: None   Infection: None   Code Status: Prior    Ht: 165.1 cm (65\")   Wt: 59.8 kg (131 lb 13.4 oz)    Admission Cmt: None   Principal Problem: Infection due to parainfluenza virus 4 [B34.8]                 Active Insurance as of 10/11/2022     Primary Coverage     Payor Plan Insurance Group Employer/Plan Group    [facility] MEDICARE REPLACEMENT [employer] MEDICARE REPLACEMENT [id number]     Payor Plan Address Payor Plan Phone Number Payor Plan Fax Number Effective Dates    [address] [address] [phone]  2/1/2022 - None Entered    [facility] [id number]       Subscriber Name Subscriber Birth Date Member ID       JESUS OVALLES 1975 [id number]                 Emergency Contacts      (Rel.) Home Phone Work Phone Mobile Phone    CharletteaMt (Spouse) -- -- [phone]    GladysJanneth (Daughter) -- -- [phone]            [place]: NPI [id number]  Tax ID [id number]     History & Physical      Winston Rodriguez MD at 10/12/22 0811          HISTORY AND PHYSICAL   [facility]        Date of Admission: 10/11/2022  Patient Identification:  Name: Jesus Ovalles  Age: 47 " "y.o.  Sex: female  :  1975  MRN: [id number]                     Primary Care Physician: Aminta Quezada PA-C    Chief Complaint: Shortness of breath    History of Present Illness:   Mrs. Ovalles is a 47-year-old female who utilizes tobacco and has past medical history is of asthma and COPD.  She had recent surgical intervention on her ankle per Dr. Maurice Pugh.  She states she was placed on Levaquin as a result due to concerns for cellulitis.  She claims that treatment dose regimen supposed be 21 days.  She denies any fever or chills.  She admits to shortness of breath and cough which is mainly dry.  She does utilize tobacco.  She had been prescribed a Z-Cristofer along the way and ultimately came to the hospital for further recommendations as the additional p.o. prednisone was not achieving efficacy as well and patient was found to have parainfluenza bronchitis.  She denies any nausea vomiting diarrhea.  No issues with altered mentation.  Again she does not really admit to any fever or chills.  Her cough is mainly dry denies any hemoptysis.  Shortness of breath is worse with exertion and walking.  She has been receiving Solu-Medrol as well as nebulized meds since admission and is noting some clinical improvement.  Current oxygenation is 95% on 4 L via nasal cannula and her vital signs are currently stable as well as afebrile.  Thus far steroids are not causing any affect or changes to her mental stability given her past history of bipolar/PTSD with past history of suicidal ideation.    Past Medical History:  Past Medical History:   Diagnosis Date   • Asthma    • Bipolar affective (HCC)    • COPD (chronic obstructive pulmonary disease) (HCC)    • DDD (degenerative disc disease), cervical    • History of tachycardia     pt flat lined with suicidal attempt- years ago   • Hypertension     pt states with agitation   • PTSD (post-traumatic stress disorder)    • Suicidal risk     pt states \"years ago\"     Past " Surgical History:  Past Surgical History:   Procedure Laterality Date   • ANKLE OPEN REDUCTION INTERNAL FIXATION Right 02/11/2022    Procedure: ANKLE OPEN REDUCTION INTERNAL FIXATION;  Surgeon: Maurice Pugh II, MD;  Location: [facility] OR;  Service: Orthopedics;  Laterality: Right;   • CARPAL TUNNEL RELEASE     • DILATATION AND CURETTAGE     • HYSTERECTOMY     • JOINT REPLACEMENT     • SHOULDER ARTHROSCOPY W/ ROTATOR CUFF REPAIR     • TIBIA IM NAILING/RODDING Right 02/11/2022    Procedure: TIBIA INTRAMEDULLARY NAIL/LAURI INSERTION WITH IRRIGATION AND DEBRIDEMENT;  Surgeon: Maurice Pugh II, MD;  Location: [facility] MAIN OR;  Service: Orthopedics;  Laterality: Right;   • TIBIA IM NAILING/RODDING Right 08/29/2022    Procedure: REVISION RIGHT TIBIAL NAILING WITH BONE GRAFTING;  Surgeon: Maurice Pugh II, MD;  [place] [facility] [place] [facility]  Service: [place]opedics;  Laterality: Right;      Home Meds:  Medications Prior to Admission   Medication Sig Dispense Refill Last Dose   • Acetylcysteine (NAC) capsule capsule Take 1 capsule by mouth 2 (Two) Times a Day. 20 capsule 0    • azithromycin (ZITHROMAX) 250 MG tablet Take 2 tablets by mouth on day 1, then 1 tablet by mouth on days 2-5. 6 tablet 0 10/12/2022   • budesonide-formoterol (Symbicort) 160-4.5 MCG/ACT inhaler Inhale 2 puffs 2 (Two) Times a Day. 10.2 g 0    • ipratropium-albuterol (DUO-NEB) 0.5-2.5 mg/3 ml nebulizer Take 3 mL (1 vial) by nebulization 4 (Four) Times a Day As Needed. 90 mL 0    • levoFLOXacin (LEVAQUIN) 750 MG tablet Take 1 tablet by mouth daily for 21 days. 21 tablet 0    • oxyCODONE-acetaminophen (Percocet) 7.5-325 MG per tablet Take 1 tablet by mouth every 4-6 hours as needed. 40 tablet 0    • predniSONE (DELTASONE) 10 MG tablet Take 4 tablets by mouth Daily for 3 days, THEN 3 tablets Daily for 3 days, THEN 2 tablets Daily for 3 days, THEN 1 tablet Daily for 3 days. 30 tablet 0 10/12/2022   • cephalexin (KEFLEX) 500 MG  capsule Take 1 capsule by mouth Every 12 (Twelve) Hours. 14 capsule 0    • clotrimazole (MYCELEX) 10 MG alanis Take 1 tablet by mouth 5 (Five) Times a Day.      • doxycycline (MONODOX) 100 MG capsule Take 1 capsule twice a day by oral route for 7 days. 14 capsule 0    • ondansetron (Zofran) 4 MG tablet Take 1 tablet by mouth Every 6 (Six) Hours As Needed for Nausea or Vomiting. 30 tablet 0    • oxyCODONE-acetaminophen (Percocet) 7.5-325 MG per tablet Take 1 tablet by mouth Every 4-6 Hours As Needed. 40 tablet 0        Allergies:  Allergies   Allergen Reactions   • Penicillins Anaphylaxis and Swelling   • Codeine Hives and Itching   • Lyrica [Pregabalin] Swelling   • Buprenorphine Rash     Immunizations:  Immunization History   Administered Date(s) Administered   • Flu Vaccine Split Quad 10/02/2013   • Influenza, Unspecified 09/28/2015   • Pneumococcal Polysaccharide (PPSV23) 09/28/2015   • Tdap 05/21/2013     Social History:   Social History     Social History Narrative   • Not on file     Social History     Socioeconomic History   • Marital status:    Tobacco Use   • Smoking status: Every Day     Packs/day: 1.00     Types: Cigarettes   • Smokeless tobacco: Never   Vaping Use   • Vaping Use: Never used   Substance and Sexual Activity   • Alcohol use: Yes     Comment: RARE   • Drug use: No   • Sexual activity: Defer       Family History:  Family History   Problem Relation Age of Onset   • Heart disease Mother    • Malig Hyperthermia Neg Hx         Review of Systems  See history of present illness and past medical history.  Patient denies fever chills night sweats.  Denies any headache dizziness or loss of consciousness.  Admits to fatigue but denies any nausea vomiting abdominal pain or diarrhea.  Denies any chest pain or palpitations but admits to dry cough and shortness of breath.  Denies any bruising bleeding loss of consciousness and/or loss of function.  No dysuria remainder of ROS is  "negative.    Objective:  T Max 24 hrs: Temp (24hrs), Av.9 °F (36.6 °C), Min:97.7 °F (36.5 °C), Max:98.1 °F (36.7 °C)    Vitals Ranges:   Temp:  [97.7 °F (36.5 °C)-98.1 °F (36.7 °C)] 97.7 °F (36.5 °C)  Heart Rate:  [] 74  Resp:  [16-22] 16  BP: (114-128)/(69-85) 121/73      Exam:  /73 (BP Location: Left arm, Patient Position: Sitting)   Pulse 74   Temp 97.7 °F (36.5 °C) (Oral)   Resp 16   Ht 165.1 cm (65\")   Wt 59.8 kg (131 lb 13.4 oz)   SpO2 95%   BMI 21.94 kg/m²     General Appearance:    Alert, cooperative, alert and oriented x3, nontoxic, no family at bedside, conversational and pleasant   Head:    Normocephalic, without obvious abnormality, atraumatic   Eyes:    PERRL, conjunctivae/corneas clear, EOM's intact, both eyes   Ears:    Normal external ear canals, both ears   Nose:   Nares normal, septum midline, mucosa normal, no drainage    or sinus tenderness   Throat:   Lips, mucosa, and tongue normal.  Poor dentition   Neck:   Supple, no rigidity or JVD       Lungs:    Good air entry even to the bases with diffuse wheeze and rhonchi to auscultation bilaterally, respirations unlabored and no use of accessory muscles at this time   Chest Wall:    No tenderness or deformity    Heart:    Regular rate and rhythm, S1 and S2 normal   Abdomen:     Soft, nontender, bowel sounds active all four quadrants,        Extremities:  No edema or volume overload, surgical ankle shows no cellulitis with a little bit of wound dehiscence at the bottom of her surgical incision but no drainage present   Pulses:   2+ and symmetric all extremities           Neurologic:   CNII-XII intact, moving all with no focal deficit      .    Data Review:  Labs in chart were reviewed.             Imaging Results (All)     Procedure Component Value Units Date/Time    CT Angiogram Chest [[id number]] Collected: 10/12/22 0204     Updated: 10/12/22 0204    Narrative:        Patient: СЕРГЕЙ MOREL  Time Out: 02:04  Exam(s): CTA CHEST "     EXAM:    CT Angiography Chest With Intravenous Contrast    CLINICAL HISTORY:     Reason for exam: shortness of breath, PE protocol.    TECHNIQUE:    Axial computed tomographic angiography images of the chest with   intravenous contrast.  CTDI is 42.1 mGy and DLP is 275 mGy-cm.  This CT   exam was performed according to the principle of ALARA (As Low As   Reasonably Achievable) by using one or more of the following dose   reduction techniques: automated exposure control, adjustment of the mA   and or kV according to patient size, and or use of iterative   reconstruction technique.    MIP reconstructed images were created and reviewed.    COMPARISON:    Noncontrasted CT chest dated 9 20 2021    FINDINGS:    Trachea:  The trachea and mainstem bronchi are patent bilaterally.    Pulmonary arteries:  No evidence for pulmonary embolism.    Aorta:  No acute findings.  No thoracic aortic aneurysm.    Lungs:  There is extensive endobronchial opacification involving the   segmental and subsegmental branches of the bilateral lower lobes and   subsegmental branches of the left upper lobe and right anterior apical   segment.  Curvilinear subsegmental changes in the medial right middle   lobe and inferior lingular segments noted.    Pleural space:  Subtle micronodular changes in a tree-in-by pattern in   the left posterior costophrenic margin is noted.  No significant effusion.    No pneumothorax.    Heart:  Unremarkable.  No cardiomegaly.  No significant pericardial   effusion.    Bones joints:  No acute fracture.  No dislocation.    Soft tissues:  Unremarkable.    Lymph nodes:  Unremarkable.  No enlarged lymph nodes.    IMPRESSION:       1.  No evidence for pulmonary embolism.  2.  There is extensive endobronchial opacification involving the   segmental and subsegmental branches of the bilateral lower lobes and   subsegmental branches of the left upper lobe and right anterior apical   segment.  Differential consideration  includes bronchitis or subtle   aspiration.  3.  Subtle micronodular changes in a tree-in-by pattern in the left   posterior costophrenic margin is noted.  Findings are consistent with   distal small airways disease and possible mucoid impaction.  No lobar   consolidation.  No pleural effusion or pneumothorax.      Impression:          Electronically signed by Ze Prado MD on 10-12-22 at 0204    XR Chest 2 View [[id number]] Collected: 10/11/22 2331     Updated: 10/11/22 2336    Narrative:      XR CHEST 2 VW-     Clinical: Shortness of breath     COMPARISON CT chest 9/20/2021     FINDINGS: Cardiac size within normal limits. There is hyperaeration with  flattening of the diaphragms as before. No pleural effusion or vascular  congestion nor acute airspace disease. No suspicious pulmonary lesion  seen.     CONCLUSION: Hyperaeration, no acute pulmonary process has developed.     This report was finalized on 10/11/2022 11:33 PM by Dr. Anibal Ambriz M.D.               Assessment:  Active Hospital Problems    Diagnosis  POA   • **Infection due to parainfluenza virus 4 [B34.8]  Yes   • COPD exacerbation (HCC) [J44.1]  Yes   • EMMA (generalized anxiety disorder) [F41.1]  Yes   • Bipolar 1 disorder (HCC) [F31.9]  Yes   • HLD (hyperlipidemia) [E78.5]  Yes   • Chronic pain [G89.29]  Yes   • Tobacco abuse [Z72.0]  Yes   • Depression [F32.A]  Yes      Resolved Hospital Problems   No resolved problems to display.       Plan:    Parainfluenza bronchitis with subsequent COPD exacerbation compounded by tobacco use   -Continue IV Solu-Medrol 60 mg every 8   -Split Symbicort into nebulized portions and continue with scheduled DuoNebs   -Okay with NicoDerm    Hopefully steroids do not exacerbate any issues with bipolar.  Currently she is calm conversational and pleasant.  We will see how clinical course unfolds    Recent surgical intervention on ankle.  Surgeon was Dr. Maurice Pugh.  Patient on Levaquin at 750 mg due to concerns  for infection but there is no active cellulitis and the small area of wound dehiscence at the bottom does not show any purulent drainage at this time.  We will continue medications as prescribed per orthopedics and patient has outpatient follow-up    Add Lovenox for DVT prophylaxis    Add Pepcid for GI prophylaxis    Further recommendations to follow as clinical course unfolds    Observation admission at this time    Winston Rodriguez MD  10/12/2022  08:11 EDT    Electronically signed by Winston Rodriguez MD at 10/12/22 0908

## 2022-10-19 ENCOUNTER — READMISSION MANAGEMENT (OUTPATIENT)
Dept: CALL CENTER | Facility: HOSPITAL | Age: 47
End: 2022-10-19

## 2022-10-19 NOTE — OUTREACH NOTE
COPD/PN Week 1 Survey    Flowsheet Row Responses   [facility] patient discharged from? [place]   Does the patient have one of the following disease processes/diagnoses(primary or secondary)? COPD   Week 1 attempt successful? Yes   Call start time 1042   Call end time 1051   Discharge diagnosis parainfluenza virus/COPD   Meds reviewed with patient/caregiver? Yes   Does the patient have all medications ordered at discharge? Yes   Is the patient taking all medications as directed (includes completed medication regime)? No   What is preventing the patient from taking all medications as directed? Other  [Pt did not get her duo neb]   Nursing Interventions Nurse provided patient education  [Encouraged pt to call pharmacy for instructions]   Does the patient have a primary care provider?  Yes   Does the patient have an appointment with their PCP or specialist within 7 days of discharge? No   What is preventing the patient from scheduling follow up appointments within 7 days of discharge? Haven't had time   Nursing Interventions Advised patient to make appointment   Has the patient kept scheduled appointments due by today? N/A   Pulse Ox monitoring Intermittent   O2 Sat comments Not checked today   Psychosocial issues? No   Did the patient receive a copy of their discharge instructions? Yes   Nursing interventions Reviewed instructions with patient   What is the patient's perception of their health status since discharge? Improving  [Pt fell on her buttock as her walker went out from under her in her garage on 10/18/22. She hurt her left elbow and tailbone. ]   Are the patient's immunizations up to date?  Yes   If the patient is a current smoker, are they able to teach back resources for cessation? Smoking cessation medications  [Pt has not smoked since 10/16/22]   Is the patient able to teach back COPD zones? No   Nursing interventions Education provided on various zones   Patient reports what zone on this call?  Green Zone   Green Zone Reports doing well, Slept well last night, Appetite is good, Breathing without shortness of breath, Usual activity and exercise level   Green Zone interventions: Take daily medications, At all times avoid cigarette smoking, vaping and inhaled irritants   Week 1 call completed? Yes   Revoked No further contact(revokes)-requires comment   Graduated/Revoked comments Pt is doing well.           LOUISE RUIZ - Registered Nurse

## 2023-01-16 ENCOUNTER — HOSPITAL ENCOUNTER (EMERGENCY)
Facility: HOSPITAL | Age: 48
Discharge: HOME | End: 2023-01-16
Attending: EMERGENCY MEDICINE | Admitting: EMERGENCY MEDICINE
Payer: MEDICARE

## 2023-01-16 ENCOUNTER — APPOINTMENT (OUTPATIENT)
Dept: GENERAL RADIOLOGY | Facility: HOSPITAL | Age: 48
End: 2023-01-16
Payer: MEDICARE

## 2023-01-16 VITALS
HEIGHT: 65 IN | RESPIRATION RATE: 16 BRPM | TEMPERATURE: 97.3 F | BODY MASS INDEX: 21.33 KG/M2 | SYSTOLIC BLOOD PRESSURE: 96 MMHG | DIASTOLIC BLOOD PRESSURE: 62 MMHG | WEIGHT: 128 LBS | OXYGEN SATURATION: 97 % | HEART RATE: 76 BPM

## 2023-01-16 DIAGNOSIS — T81.41XA SUPERFICIAL INCISIONAL INFECTION OF SURGICAL SITE: Primary | ICD-10-CM

## 2023-01-16 LAB
ALBUMIN SERPL-MCNC: 4.5 G/DL (ref 3.5–5.2)
ALBUMIN/GLOB SERPL: 1.7 G/DL
ALP SERPL-CCNC: 84 U/L (ref 39–117)
ALT SERPL W P-5'-P-CCNC: 6 U/L (ref 1–33)
ANION GAP SERPL CALCULATED.3IONS-SCNC: 12 MMOL/L (ref 5–15)
AST SERPL-CCNC: 16 U/L (ref 1–32)
BASOPHILS # BLD AUTO: 0.02 10*3/MM3 (ref 0–0.2)
BASOPHILS NFR BLD AUTO: 0.5 % (ref 0–1.5)
BILIRUB SERPL-MCNC: 0.3 MG/DL (ref 0–1.2)
BUN SERPL-MCNC: 8 MG/DL (ref 6–20)
BUN/CREAT SERPL: 9.8 (ref 7–25)
CALCIUM SPEC-SCNC: 9.3 MG/DL (ref 8.6–10.5)
CHLORIDE SERPL-SCNC: 101 MMOL/L (ref 98–107)
CO2 SERPL-SCNC: 28 MMOL/L (ref 22–29)
CREAT SERPL-MCNC: 0.82 MG/DL (ref 0.57–1)
D-LACTATE SERPL-SCNC: 1.4 MMOL/L (ref 0.5–2)
DEPRECATED RDW RBC AUTO: 39.7 FL (ref 37–54)
EGFRCR SERPLBLD CKD-EPI 2021: 88.9 ML/MIN/1.73
EOSINOPHIL # BLD AUTO: 0.05 10*3/MM3 (ref 0–0.4)
EOSINOPHIL NFR BLD AUTO: 1.2 % (ref 0.3–6.2)
ERYTHROCYTE [DISTWIDTH] IN BLOOD BY AUTOMATED COUNT: 12.1 % (ref 12.3–15.4)
GLOBULIN UR ELPH-MCNC: 2.7 GM/DL
GLUCOSE SERPL-MCNC: 69 MG/DL (ref 65–99)
HCT VFR BLD AUTO: 39.2 % (ref 34–46.6)
HGB BLD-MCNC: 13.2 G/DL (ref 12–15.9)
IMM GRANULOCYTES # BLD AUTO: 0.01 10*3/MM3 (ref 0–0.05)
IMM GRANULOCYTES NFR BLD AUTO: 0.2 % (ref 0–0.5)
LYMPHOCYTES # BLD AUTO: 1.17 10*3/MM3 (ref 0.7–3.1)
LYMPHOCYTES NFR BLD AUTO: 28.7 % (ref 19.6–45.3)
MCH RBC QN AUTO: 30.3 PG (ref 26.6–33)
MCHC RBC AUTO-ENTMCNC: 33.7 G/DL (ref 31.5–35.7)
MCV RBC AUTO: 89.9 FL (ref 79–97)
MONOCYTES # BLD AUTO: 0.26 10*3/MM3 (ref 0.1–0.9)
MONOCYTES NFR BLD AUTO: 6.4 % (ref 5–12)
NEUTROPHILS NFR BLD AUTO: 2.57 10*3/MM3 (ref 1.7–7)
NEUTROPHILS NFR BLD AUTO: 63 % (ref 42.7–76)
NRBC BLD AUTO-RTO: 0 /100 WBC (ref 0–0.2)
PLATELET # BLD AUTO: 253 10*3/MM3 (ref 140–450)
PMV BLD AUTO: 9.9 FL (ref 6–12)
POTASSIUM SERPL-SCNC: 3.8 MMOL/L (ref 3.5–5.2)
PROT SERPL-MCNC: 7.2 G/DL (ref 6–8.5)
RBC # BLD AUTO: 4.36 10*6/MM3 (ref 3.77–5.28)
SODIUM SERPL-SCNC: 141 MMOL/L (ref 136–145)
WBC NRBC COR # BLD: 4.08 10*3/MM3 (ref 3.4–10.8)

## 2023-01-16 PROCEDURE — 80053 COMPREHEN METABOLIC PANEL: CPT | Performed by: NURSE PRACTITIONER

## 2023-01-16 PROCEDURE — 87070 CULTURE OTHR SPECIMN AEROBIC: CPT | Performed by: NURSE PRACTITIONER

## 2023-01-16 PROCEDURE — 36415 COLL VENOUS BLD VENIPUNCTURE: CPT

## 2023-01-16 PROCEDURE — 87205 SMEAR GRAM STAIN: CPT | Performed by: NURSE PRACTITIONER

## 2023-01-16 PROCEDURE — 83605 ASSAY OF LACTIC ACID: CPT | Performed by: NURSE PRACTITIONER

## 2023-01-16 PROCEDURE — 87040 BLOOD CULTURE FOR BACTERIA: CPT | Performed by: NURSE PRACTITIONER

## 2023-01-16 PROCEDURE — 63710000001 PROMETHAZINE PER 25 MG: Performed by: EMERGENCY MEDICINE

## 2023-01-16 PROCEDURE — 99283 EMERGENCY DEPT VISIT LOW MDM: CPT

## 2023-01-16 PROCEDURE — 85025 COMPLETE CBC W/AUTO DIFF WBC: CPT | Performed by: NURSE PRACTITIONER

## 2023-01-16 PROCEDURE — 73590 X-RAY EXAM OF LOWER LEG: CPT

## 2023-01-16 RX ORDER — PROMETHAZINE HYDROCHLORIDE 25 MG/1
25 TABLET ORAL ONCE
Status: COMPLETED | OUTPATIENT
Start: 2023-01-16 | End: 2023-01-16

## 2023-01-16 RX ORDER — OXYCODONE HYDROCHLORIDE AND ACETAMINOPHEN 5; 325 MG/1; MG/1
1 TABLET ORAL ONCE
Status: DISCONTINUED | OUTPATIENT
Start: 2023-01-16 | End: 2023-01-16

## 2023-01-16 RX ORDER — SODIUM CHLORIDE 0.9 % (FLUSH) 0.9 %
10 SYRINGE (ML) INJECTION AS NEEDED
Status: DISCONTINUED | OUTPATIENT
Start: 2023-01-16 | End: 2023-01-16 | Stop reason: HOSPADM

## 2023-01-16 RX ADMIN — PROMETHAZINE HYDROCHLORIDE 25 MG: 25 TABLET ORAL at 17:06

## 2023-01-16 NOTE — DISCHARGE INSTRUCTIONS
Follow-up with Dr. Pugh as soon as possible.  Return to the emergency department for persistent fever, persistent drainage, redness around incision, red streaks going up the leg, or other concern.

## 2023-01-16 NOTE — ED PROVIDER NOTES
MD ATTESTATION NOTE    The CRUZ and I have discussed this patient's history, physical exam, and treatment plan.  I have reviewed the documentation and personally had a face to face interaction with the patient. I affirm the documentation and agree with the treatment and plan.  The attached note describes my personal findings.      I provided a substantive portion of the care of the patient.  I personally performed the physical exam in its entirety, and below are my findings.  For this patient encounter, the patient wore surgical mask, I wore full protective PPE including N95 and eye protection.      Brief HPI: Patient complains of drainage from her surgical incision on her right lower leg since yesterday.  Also reports a fever of 101.9.  Denies redness or increased pain.  Denies shortness of breath, cough, vomiting, or diarrhea.  She had revision of a previous tibial fracture done in August 2022 by Dr. Pugh.    PHYSICAL EXAM  ED Triage Vitals   Temp Heart Rate Resp BP SpO2   01/16/23 1248 01/16/23 1248 01/16/23 1248 01/16/23 1253 01/16/23 1248   97.3 °F (36.3 °C) 105 16 119/70 99 %      Temp src Heart Rate Source Patient Position BP Location FiO2 (%)   01/16/23 1248 -- -- -- --   Tympanic             GENERAL: Awake, alert, oriented x3.  Well-developed, well-nourished and nontoxic-appearing female.  Resting comfortably in no acute distress  HENT: nares patent  EYES: no scleral icterus  CV: regular rhythm, normal rate, normal right pedal pulses  RESPIRATORY: normal effort, clear to auscultation bilateral  ABDOMEN: soft nontender  MUSCULOSKELETAL: No bony tenderness of the extremities.  There is mild right ankle swelling.  No calf tenderness.  NEURO: Normal strength and light touch sensation in all extremities  PSYCH:  calm, cooperative  SKIN: There is a healed surgical incision on the medial aspect of the right lower leg.  At the very distal aspect of the incision, there is a small wound with dried exudate.  There is  some minimal faint erythema around this area.  No lymphangitis.  No fluctuance.    Vital signs and nursing notes reviewed.        Plan: Obtain labs and x-rays.    White blood cell count and lactic acid are normal.  Blood cultures are pending.  X-rays are negative acute.    ED Course as of 01/16/23 1701   Mon Jan 16, 2023   1438 Patient is a 47-year-old who presents today for wound check of a right lower leg surgical incision that she noticed some thick yellow drainage from yesterday.  She did have fever on Thursday.  Plan is to do labs, wound culture and x-ray of the right tib-fib to rule out osteomyelitis. [MS]   1542 Pain medication was ordered for patient however she would prefer to take her home on Percocet because it is a 10 mg tablet. [MS]   1542 Reviewed pt's history and workup with Dr. Lopes.  After a bedside evaluation, he agrees with the plan of care.     [MS]   1614 I viewed the patient's right tib/fib imaging in PACS.  My interpretation is hardware intact, no fracture.  See dictation for official radiology interpretation.     [MS]   1620 Care transferred to Dr Lopes at this time pending call back from Dr Pugh and final disposition. [MS]   1624 Test results were discussed with the patient.  I also informed her of my discussion with Dr. Pugh and the need to follow-up with him as soon as possible.  Return precautions were discussed.  Patient is complaining of nausea and will be given a dose of Phenergan. [WH]   1640 Case discussed with Dr. Pugh.  He is very familiar with the patient.  Pertinent history, exam findings, test results, ED course, and diagnoses were discussed with him.  He does not recommend starting antibiotics at this time.  He will see the patient in the office.  He states the patient likely needs to have a washout with possible hardware removal. [WH]      ED Course User Index  [MS] Olimpia Barrera, GUI  [WH] Kaz Lopes MD      Diagnosis Plan   1. Superficial  incisional infection of surgical site          DISCHARGE    Patient discharged in stable condition.    Reviewed implications of results, diagnosis, meds, responsibility to follow up, warning signs and symptoms of possible worsening, potential complications and reasons to return to ER, including persistent fever, persistent drainage from wound, increased redness, red streaks going up the leg, or other concern.    Patient/Family voiced understanding of above instructions.    Discussed plan for discharge, as there is no emergent indication for admission. Patient referred to primary care provider for BP management due to today's BP. Pt/family is agreeable and understands need for follow up and repeat testing.  Pt is aware that discharge does not mean that nothing is wrong but it indicates no emergency is present that requires admission and they must continue care with follow-up as given below or physician of their choice.     FOLLOW-UP  Maurice Pugh II, MD  [address] [address]  [phone]    Schedule an appointment as soon as possible for a visit            Medication List      No changes were made to your prescriptions during this visit.            Kaz Lopes MD  01/16/23 1720

## 2023-01-16 NOTE — ED PROVIDER NOTES
EMERGENCY DEPARTMENT ENCOUNTER    Room Number:  03/03  Date of encounter:  1/17/2023  PCP: Aminta Quezada PA-C  Patient Care Team:  Aminta Quezada PA-C as PCP - General (Internal Medicine)   Orthopedic MD: Dr. Pugh  Independent Historians: Patient        PPE: Patient was placed in face mask in first look. Patient was wearing facemask when I entered the room and throughout our encounter. I wore full protective equipment throughout this patient encounter including a face mask, and gloves. Hand hygiene was performed before donning protective equipment and after removal when leaving the room.      HPI:  Chief Complaint: Wound check  A complete HPI/ROS/PMH/PSH/SH/FH are unobtainable due to: Nothing    Chronic or social conditions impacting patient care (social determinants of health): None    Context: Jesus Ovalles is a 47 y.o. female who arrives to the ED via private vehicle.  Patient presents with c/o possible infection to a surgical incision to her right lower leg.  Patient states that she had revision of a previous tibial fracture on August 29, 2022 by Dr. Pugh.  She states the part of the incision never completely healed.  She states that Thursday she started running a fever, T-max of 1-1.9 at home.  She states yesterday she noted thick yellow drainage coming from the incision.  She states she called Dr. Pugh's office today and they recommended that she come to the ER.  Patient also complains of increased pain to the right lower leg.  Patient states that after her surgery she was on antibiotics, she stopped taking them she says 2 to 3 months ago.  She states she did have leftover Levaquin and did restart that on Thursday.  Patient denies nausea, vomiting, chest pain, shortness of breath, recent injury.  Patient states that nothing makes the symptoms better and nothing worsens symptoms.      Review of prior external notes (non-ED): Medical records reviewed in [facility], patient was discharged August 30, 2022  after being admitted and having right revision intramedullary nailing of the tibial fracture with open treatment of tibial nonunion Dr. Pugh.    Review of prior external test results outside of this encounter: Right tib-fib x-ray 10/14/2022, showed fracture repair hardware intact.    PAST MEDICAL HISTORY  Active Ambulatory Problems     Diagnosis Date Noted   • Asthma 03/02/2018   • Chronic obstructive pulmonary disease (HCC) 03/02/2018   • Chronic pain 09/27/2015   • Depression 03/24/2015   • EMMA (generalized anxiety disorder) 02/10/2022   • Hx of suicide attempt 09/27/2015   • Tobacco abuse 09/27/2015   • Bipolar 1 disorder (HCC) 02/10/2022   • PTSD (post-traumatic stress disorder) 02/10/2022   • HLD (hyperlipidemia) 02/10/2022   • Type I or II open fracture of right tibia and fibula 02/10/2022   • Right leg pain 02/19/2022   • Tibia/fibula fracture, shaft, left, closed, with nonunion, subsequent encounter 08/29/2022   • Tibial fracture 08/29/2022   • Infection due to parainfluenza virus 4 10/12/2022   • COPD exacerbation ([facility]) 10/12/2022   • Acute bronchitis due to other specified organisms 10/13/2022     Resolved Ambulatory Problems     Diagnosis Date Noted   • No Resolved Ambulatory Problems     Past Medical History:   Diagnosis Date   • Bipolar affective ([facility])    • COPD (chronic obstructive pulmonary disease) ([facility])    • DDD (degenerative disc disease), cervical    • History of tachycardia    • Hypertension    • Suicidal risk        The patient qualifies to receive the vaccine, but they have not yet received it.    PAST SURGICAL HISTORY  Past Surgical History:   Procedure Laterality Date   • ANKLE OPEN REDUCTION INTERNAL FIXATION Right 02/11/2022    Procedure: ANKLE OPEN REDUCTION INTERNAL FIXATION;  Surgeon: Maurice Pugh II, MD;  Location: [facility] MAIN OR;  Service: Orthopedics;  Laterality: Right;   • CARPAL TUNNEL RELEASE     • DILATATION AND CURETTAGE     • HYSTERECTOMY     • JOINT REPLACEMENT      • SHOULDER ARTHROSCOPY W/ ROTATOR CUFF REPAIR     • TIBIA IM NAILING/RODDING Right 02/11/2022    Procedure: TIBIA INTRAMEDULLARY NAIL/LAURI INSERTION WITH IRRIGATION AND DEBRIDEMENT;  Surgeon: Maurice Pugh II, MD;  Location: [facility] MAIN OR;  Service: Orthopedics;  Laterality: Right;   • TIBIA IM NAILING/RODDING Right 08/29/2022    Procedure: REVISION RIGHT TIBIAL NAILING WITH BONE GRAFTING;  Surgeon: Maurice Pugh II, MD;  Location: [facility] OR [facility];  Service: Orthopedics;  Laterality: Right;         FAMILY HISTORY  Family History   Problem Relation Age of Onset   • Heart disease Mother    • Malig Hyperthermia Neg Hx          SOCIAL HISTORY  Social History     Socioeconomic History   • Marital status:    Tobacco Use   • Smoking status: Every Day     Packs/day: 1.00     Types: Cigarettes   • Smokeless tobacco: Never   Vaping Use   • Vaping Use: Never used   Substance and Sexual Activity   • Alcohol use: Yes     Comment: RARE   • Drug use: No   • Sexual activity: Defer         ALLERGIES  Penicillins, Codeine, Lyrica [pregabalin], and Buprenorphine        REVIEW OF SYSTEMS  Review of Systems     All systems reviewed and negative except for those discussed in HPI.       PHYSICAL EXAM    I have reviewed the triage vital signs and nursing notes.    ED Triage Vitals   Temp Heart Rate Resp BP SpO2   01/16/23 1248 01/16/23 1248 01/16/23 1248 01/16/23 1253 01/16/23 1248   97.3 °F (36.3 °C) 105 16 119/70 99 %       Physical Exam  Musculoskeletal:        Feet:        GENERAL: Well appearing, nontoxic appearing, not distressed  HENT: normocephalic, atraumatic  EYES: no scleral icterus, PERRL  CV: regular rhythm, regular rate, no murmur  RESPIRATORY: normal effort, CTAB  ABDOMEN: soft   MUSCULOSKELETAL: no deformity  NEURO: alert, moves all extremities, follows commands, mental status normal/baseline  SKIN: warm, dry, no rash   Psych: Appropriate mood and affect  Nursing notes and vital signs  reviewed          LAB RESULTS  Labs Reviewed   CBC WITH AUTO DIFFERENTIAL - Abnormal; Notable for the following components:       Result Value    RDW 12.1 (*)     All other components within normal limits   BLOOD CULTURE - Normal   BLOOD CULTURE - Normal   LACTIC ACID, PLASMA - Normal   WOUND CULTURE   COMPREHENSIVE METABOLIC PANEL    Narrative:     GFR Normal >60  Chronic Kidney Disease <60  Kidney Failure <15     CBC AND DIFFERENTIAL    Narrative:     The following orders were created for panel order CBC & Differential.  Procedure                               Abnormality         Status                     ---------                               -----------         ------                     CBC Auto Differential[[id number]]        Abnormal            Final result                 Please view results for these tests on the individual orders.         Ordered the above labs and independently reviewed the results.        RADIOLOGY  XR Tibia Fibula 2 View Right   Final Result   CONCLUSION: Fixation hardware is stable and satisfactory in appearance  and there is adequate callus formation about the distal tibia and  fibular fracture sites. No acute osseous abnormality. No soft tissue gas  or other abnormality seen.         I ordered the above noted radiological studies. Reviewed by me and discussed with radiologist.  See dictation for official radiology interpretation.      PROCEDURES    Procedures    DIFFERENTIAL DIAGNOSIS:  Differential diagnosis include but are not limited to the following: Osteomyelitis, cellulitis, infected surgical incision    PROGRESS, DATA ANALYSIS, CONSULTS, AND MEDICAL DECISION MAKING    All labs have been independently reviewed by me.  All radiology studies have been reviewed by me and discussed with radiologist dictating the report.   EKG's independently viewed and interpreted by me.  Discussion below represents my analysis of pertinent findings related to patient's condition, differential  diagnosis, treatment plan and final disposition.        ED Course as of 01/17/23 2304   Mon Jan 16, 2023   1438 Patient is a 47-year-old who presents today for wound check of a right lower leg surgical incision that she noticed some thick yellow drainage from yesterday.  She did have fever on Thursday.  Plan is to do labs, wound culture and x-ray of the right tib-fib to rule out osteomyelitis. [MS]   1542 Pain medication was ordered for patient however she would prefer to take her home on Percocet because it is a 10 mg tablet. [MS]   1542 Reviewed pt's history and workup with Dr. Lopes.  After a bedside evaluation, he agrees with the plan of care.     [MS]   1614 I viewed the patient's right tib/fib imaging in PACS.  My interpretation is hardware intact, no fracture.  See dictation for official radiology interpretation.     [MS]   1620 Care transferred to Dr Lopes at this time pending call back from Dr Pugh and final disposition. [MS]   1624 Test results were discussed with the patient.  I also informed her of my discussion with Dr. Pugh and the need to follow-up with him as soon as possible.  Return precautions were discussed.  Patient is complaining of nausea and will be given a dose of Phenergan. [WH]   1640 Case discussed with Dr. Pugh.  He is very familiar with the patient.  Pertinent history, exam findings, test results, ED course, and diagnoses were discussed with him.  He does not recommend starting antibiotics at this time.  He will see the patient in the office.  He states the patient likely needs to have a washout with possible hardware removal. [WH]      ED Course User Index  [MS] Olimpia Barrera, APRN  [WH] Kaz Lopes MD         DISPOSITION  ED Disposition     ED Disposition   Discharge    Condition   Stable    Comment   --               AS OF 23:04 EST VITALS:    BP - 96/62  HR - 76  TEMP - 97.3 °F (36.3 °C) (Tympanic)  O2 SATS - 97%      MEDICATIONS GIVEN IN ER    Medications    promethazine (PHENERGAN) tablet 25 mg (25 mg Oral Given 1/16/23 1706)                Note Disclaimer: At [facility], we believe that sharing information builds trust and better relationships. You are receiving this note because you recently visited [facility]. It is possible you will see health information before a provider has talked with you about it. This kind of information can be easy to misunderstand. To help you fully understand what it means for your health, we urge you to discuss this note with your provider.       Olimpia Barrera, APRN  01/18/23 0006

## 2023-01-16 NOTE — ED TRIAGE NOTES
From home with fever x 4 days, drainage from wound on RLE since yesterday.     Pt wearing mask on arrival.

## 2023-01-16 NOTE — ED TRIAGE NOTES
Pt had surgery in September 2022 to try and repair the original surgery in February for the compound fracture in pt's right leg. Pt has pus coming from the surgical site and also reports a fever. pt has been taking medications to control her fever and is afebrile at triage

## 2023-01-19 LAB
BACTERIA SPEC AEROBE CULT: NORMAL
GRAM STN SPEC: NORMAL

## 2023-01-21 LAB
BACTERIA SPEC AEROBE CULT: NORMAL
BACTERIA SPEC AEROBE CULT: NORMAL

## 2023-01-22 ENCOUNTER — HOSPITAL ENCOUNTER (INPATIENT)
Facility: HOSPITAL | Age: 48
LOS: 5 days | Discharge: HOME HEALTH | DRG: 494 | End: 2023-01-27
Attending: EMERGENCY MEDICINE | Admitting: INTERNAL MEDICINE
Payer: MEDICARE

## 2023-01-22 ENCOUNTER — APPOINTMENT (OUTPATIENT)
Dept: GENERAL RADIOLOGY | Facility: HOSPITAL | Age: 48
DRG: 494 | End: 2023-01-22
Payer: MEDICARE

## 2023-01-22 DIAGNOSIS — T14.8XXA WOUND INFECTION: Primary | ICD-10-CM

## 2023-01-22 DIAGNOSIS — L08.9 WOUND INFECTION: Primary | ICD-10-CM

## 2023-01-22 LAB
ALBUMIN SERPL-MCNC: 4.2 G/DL (ref 3.5–5.2)
ALBUMIN/GLOB SERPL: 1.6 G/DL
ALP SERPL-CCNC: 86 U/L (ref 39–117)
ALT SERPL W P-5'-P-CCNC: 7 U/L (ref 1–33)
ANION GAP SERPL CALCULATED.3IONS-SCNC: 8.7 MMOL/L (ref 5–15)
AST SERPL-CCNC: 11 U/L (ref 1–32)
B PARAPERT DNA SPEC QL NAA+PROBE: NOT DETECTED
B PERT DNA SPEC QL NAA+PROBE: NOT DETECTED
BASOPHILS # BLD AUTO: 0.03 10*3/MM3 (ref 0–0.2)
BASOPHILS NFR BLD AUTO: 0.8 % (ref 0–1.5)
BILIRUB SERPL-MCNC: <0.2 MG/DL (ref 0–1.2)
BUN SERPL-MCNC: 12 MG/DL (ref 6–20)
BUN/CREAT SERPL: 16 (ref 7–25)
C PNEUM DNA NPH QL NAA+NON-PROBE: NOT DETECTED
CALCIUM SPEC-SCNC: 9.3 MG/DL (ref 8.6–10.5)
CHLORIDE SERPL-SCNC: 102 MMOL/L (ref 98–107)
CO2 SERPL-SCNC: 27.3 MMOL/L (ref 22–29)
CREAT SERPL-MCNC: 0.75 MG/DL (ref 0.57–1)
CRP SERPL-MCNC: 0.67 MG/DL (ref 0–0.5)
D-LACTATE SERPL-SCNC: 0.7 MMOL/L (ref 0.5–2)
DEPRECATED RDW RBC AUTO: 41.4 FL (ref 37–54)
EGFRCR SERPLBLD CKD-EPI 2021: 99 ML/MIN/1.73
EOSINOPHIL # BLD AUTO: 0.09 10*3/MM3 (ref 0–0.4)
EOSINOPHIL NFR BLD AUTO: 2.3 % (ref 0.3–6.2)
ERYTHROCYTE [DISTWIDTH] IN BLOOD BY AUTOMATED COUNT: 12.6 % (ref 12.3–15.4)
ERYTHROCYTE [SEDIMENTATION RATE] IN BLOOD: 14 MM/HR (ref 0–20)
FLUAV SUBTYP SPEC NAA+PROBE: NOT DETECTED
FLUBV RNA ISLT QL NAA+PROBE: NOT DETECTED
GLOBULIN UR ELPH-MCNC: 2.7 GM/DL
GLUCOSE SERPL-MCNC: 102 MG/DL (ref 65–99)
HADV DNA SPEC NAA+PROBE: NOT DETECTED
HCOV 229E RNA SPEC QL NAA+PROBE: NOT DETECTED
HCOV HKU1 RNA SPEC QL NAA+PROBE: NOT DETECTED
HCOV NL63 RNA SPEC QL NAA+PROBE: NOT DETECTED
HCOV OC43 RNA SPEC QL NAA+PROBE: NOT DETECTED
HCT VFR BLD AUTO: 39.2 % (ref 34–46.6)
HGB BLD-MCNC: 13.1 G/DL (ref 12–15.9)
HMPV RNA NPH QL NAA+NON-PROBE: NOT DETECTED
HPIV1 RNA ISLT QL NAA+PROBE: NOT DETECTED
HPIV2 RNA SPEC QL NAA+PROBE: NOT DETECTED
HPIV3 RNA NPH QL NAA+PROBE: NOT DETECTED
HPIV4 P GENE NPH QL NAA+PROBE: NOT DETECTED
IMM GRANULOCYTES # BLD AUTO: 0 10*3/MM3 (ref 0–0.05)
IMM GRANULOCYTES NFR BLD AUTO: 0 % (ref 0–0.5)
LYMPHOCYTES # BLD AUTO: 1.27 10*3/MM3 (ref 0.7–3.1)
LYMPHOCYTES NFR BLD AUTO: 31.8 % (ref 19.6–45.3)
M PNEUMO IGG SER IA-ACNC: NOT DETECTED
MCH RBC QN AUTO: 30 PG (ref 26.6–33)
MCHC RBC AUTO-ENTMCNC: 33.4 G/DL (ref 31.5–35.7)
MCV RBC AUTO: 89.9 FL (ref 79–97)
MONOCYTES # BLD AUTO: 0.21 10*3/MM3 (ref 0.1–0.9)
MONOCYTES NFR BLD AUTO: 5.3 % (ref 5–12)
NEUTROPHILS NFR BLD AUTO: 2.4 10*3/MM3 (ref 1.7–7)
NEUTROPHILS NFR BLD AUTO: 59.8 % (ref 42.7–76)
NRBC BLD AUTO-RTO: 0 /100 WBC (ref 0–0.2)
PLATELET # BLD AUTO: 307 10*3/MM3 (ref 140–450)
PMV BLD AUTO: 9 FL (ref 6–12)
POTASSIUM SERPL-SCNC: 4.4 MMOL/L (ref 3.5–5.2)
PROT SERPL-MCNC: 6.9 G/DL (ref 6–8.5)
RBC # BLD AUTO: 4.36 10*6/MM3 (ref 3.77–5.28)
RHINOVIRUS RNA SPEC NAA+PROBE: NOT DETECTED
RSV RNA NPH QL NAA+NON-PROBE: NOT DETECTED
SARS-COV-2 RNA NPH QL NAA+NON-PROBE: NOT DETECTED
SODIUM SERPL-SCNC: 138 MMOL/L (ref 136–145)
WBC NRBC COR # BLD: 4 10*3/MM3 (ref 3.4–10.8)

## 2023-01-22 PROCEDURE — 73590 X-RAY EXAM OF LOWER LEG: CPT

## 2023-01-22 PROCEDURE — 63710000001 PROMETHAZINE PER 12.5 MG: Performed by: INTERNAL MEDICINE

## 2023-01-22 PROCEDURE — 85652 RBC SED RATE AUTOMATED: CPT | Performed by: EMERGENCY MEDICINE

## 2023-01-22 PROCEDURE — 25010000002 VANCOMYCIN 10 G RECONSTITUTED SOLUTION: Performed by: EMERGENCY MEDICINE

## 2023-01-22 PROCEDURE — 83605 ASSAY OF LACTIC ACID: CPT | Performed by: EMERGENCY MEDICINE

## 2023-01-22 PROCEDURE — 80053 COMPREHEN METABOLIC PANEL: CPT | Performed by: EMERGENCY MEDICINE

## 2023-01-22 PROCEDURE — 25010000002 DIPHENHYDRAMINE PER 50 MG: Performed by: NURSE PRACTITIONER

## 2023-01-22 PROCEDURE — 86140 C-REACTIVE PROTEIN: CPT | Performed by: EMERGENCY MEDICINE

## 2023-01-22 PROCEDURE — 87040 BLOOD CULTURE FOR BACTERIA: CPT | Performed by: EMERGENCY MEDICINE

## 2023-01-22 PROCEDURE — 25010000002 LEVOFLOXACIN PER 250 MG: Performed by: EMERGENCY MEDICINE

## 2023-01-22 PROCEDURE — 99285 EMERGENCY DEPT VISIT HI MDM: CPT

## 2023-01-22 PROCEDURE — 36415 COLL VENOUS BLD VENIPUNCTURE: CPT

## 2023-01-22 PROCEDURE — 85025 COMPLETE CBC W/AUTO DIFF WBC: CPT | Performed by: EMERGENCY MEDICINE

## 2023-01-22 PROCEDURE — 0202U NFCT DS 22 TRGT SARS-COV-2: CPT | Performed by: EMERGENCY MEDICINE

## 2023-01-22 PROCEDURE — 94640 AIRWAY INHALATION TREATMENT: CPT

## 2023-01-22 RX ORDER — ACETAMINOPHEN 650 MG/1
650 SUPPOSITORY RECTAL EVERY 4 HOURS PRN
Status: DISCONTINUED | OUTPATIENT
Start: 2023-01-22 | End: 2023-01-25

## 2023-01-22 RX ORDER — PROMETHAZINE HYDROCHLORIDE 12.5 MG/1
12.5 TABLET ORAL EVERY 6 HOURS PRN
Status: DISCONTINUED | OUTPATIENT
Start: 2023-01-22 | End: 2023-01-27 | Stop reason: HOSPADM

## 2023-01-22 RX ORDER — SODIUM CHLORIDE 0.9 % (FLUSH) 0.9 %
10 SYRINGE (ML) INJECTION AS NEEDED
Status: DISCONTINUED | OUTPATIENT
Start: 2023-01-22 | End: 2023-01-27 | Stop reason: HOSPADM

## 2023-01-22 RX ORDER — DIPHENHYDRAMINE HYDROCHLORIDE 50 MG/ML
25 INJECTION INTRAMUSCULAR; INTRAVENOUS EVERY 6 HOURS PRN
Status: DISCONTINUED | OUTPATIENT
Start: 2023-01-22 | End: 2023-01-27 | Stop reason: HOSPADM

## 2023-01-22 RX ORDER — HYDROCODONE BITARTRATE AND ACETAMINOPHEN 10; 325 MG/1; MG/1
1 TABLET ORAL EVERY 4 HOURS PRN
Status: DISCONTINUED | OUTPATIENT
Start: 2023-01-22 | End: 2023-01-26

## 2023-01-22 RX ORDER — LEVOFLOXACIN 5 MG/ML
750 INJECTION, SOLUTION INTRAVENOUS ONCE
Status: COMPLETED | OUTPATIENT
Start: 2023-01-22 | End: 2023-01-22

## 2023-01-22 RX ORDER — BUDESONIDE AND FORMOTEROL FUMARATE DIHYDRATE 160; 4.5 UG/1; UG/1
2 AEROSOL RESPIRATORY (INHALATION) 2 TIMES DAILY
Status: DISCONTINUED | OUTPATIENT
Start: 2023-01-22 | End: 2023-01-27 | Stop reason: HOSPADM

## 2023-01-22 RX ORDER — SODIUM CHLORIDE 9 MG/ML
75 INJECTION, SOLUTION INTRAVENOUS CONTINUOUS
Status: DISCONTINUED | OUTPATIENT
Start: 2023-01-22 | End: 2023-01-26

## 2023-01-22 RX ORDER — IPRATROPIUM BROMIDE AND ALBUTEROL SULFATE 2.5; .5 MG/3ML; MG/3ML
3 SOLUTION RESPIRATORY (INHALATION)
Status: DISCONTINUED | OUTPATIENT
Start: 2023-01-22 | End: 2023-01-24

## 2023-01-22 RX ORDER — LEVOFLOXACIN 5 MG/ML
500 INJECTION, SOLUTION INTRAVENOUS EVERY 24 HOURS
Status: DISCONTINUED | OUTPATIENT
Start: 2023-01-23 | End: 2023-01-23

## 2023-01-22 RX ORDER — TIZANIDINE 4 MG/1
4 TABLET ORAL EVERY 8 HOURS
Status: DISCONTINUED | OUTPATIENT
Start: 2023-01-22 | End: 2023-01-23

## 2023-01-22 RX ORDER — ONDANSETRON 2 MG/ML
4 INJECTION INTRAMUSCULAR; INTRAVENOUS EVERY 6 HOURS PRN
Status: DISCONTINUED | OUTPATIENT
Start: 2023-01-22 | End: 2023-01-27 | Stop reason: HOSPADM

## 2023-01-22 RX ORDER — ACETAMINOPHEN 325 MG/1
650 TABLET ORAL EVERY 4 HOURS PRN
Status: DISCONTINUED | OUTPATIENT
Start: 2023-01-22 | End: 2023-01-25

## 2023-01-22 RX ORDER — ACETAMINOPHEN 160 MG/5ML
650 SOLUTION ORAL EVERY 4 HOURS PRN
Status: DISCONTINUED | OUTPATIENT
Start: 2023-01-22 | End: 2023-01-25

## 2023-01-22 RX ORDER — VANCOMYCIN HYDROCHLORIDE 1 G/200ML
1000 INJECTION, SOLUTION INTRAVENOUS EVERY 12 HOURS
Status: DISCONTINUED | OUTPATIENT
Start: 2023-01-23 | End: 2023-01-22

## 2023-01-22 RX ADMIN — LEVOFLOXACIN 750 MG: 5 INJECTION, SOLUTION INTRAVENOUS at 18:51

## 2023-01-22 RX ADMIN — SODIUM CHLORIDE 125 ML/HR: 9 INJECTION, SOLUTION INTRAVENOUS at 17:15

## 2023-01-22 RX ADMIN — VANCOMYCIN HYDROCHLORIDE 1250 MG: 10 INJECTION, POWDER, LYOPHILIZED, FOR SOLUTION INTRAVENOUS at 21:23

## 2023-01-22 RX ADMIN — DIPHENHYDRAMINE HYDROCHLORIDE 25 MG: 50 INJECTION, SOLUTION INTRAMUSCULAR; INTRAVENOUS at 22:52

## 2023-01-22 RX ADMIN — PROMETHAZINE HYDROCHLORIDE 12.5 MG: 12.5 TABLET ORAL at 21:56

## 2023-01-22 RX ADMIN — SODIUM CHLORIDE, POTASSIUM CHLORIDE, SODIUM LACTATE AND CALCIUM CHLORIDE 1000 ML: 600; 310; 30; 20 INJECTION, SOLUTION INTRAVENOUS at 18:51

## 2023-01-22 RX ADMIN — IPRATROPIUM BROMIDE AND ALBUTEROL SULFATE 3 ML: 2.5; .5 SOLUTION RESPIRATORY (INHALATION) at 23:16

## 2023-01-23 LAB
ANION GAP SERPL CALCULATED.3IONS-SCNC: 3 MMOL/L (ref 5–15)
BASOPHILS # BLD AUTO: 0.02 10*3/MM3 (ref 0–0.2)
BASOPHILS NFR BLD AUTO: 0.7 % (ref 0–1.5)
BUN SERPL-MCNC: 9 MG/DL (ref 6–20)
BUN/CREAT SERPL: 11.8 (ref 7–25)
CALCIUM SPEC-SCNC: 8.5 MG/DL (ref 8.6–10.5)
CHLORIDE SERPL-SCNC: 107 MMOL/L (ref 98–107)
CO2 SERPL-SCNC: 29 MMOL/L (ref 22–29)
CREAT SERPL-MCNC: 0.76 MG/DL (ref 0.57–1)
DEPRECATED RDW RBC AUTO: 40.6 FL (ref 37–54)
EGFRCR SERPLBLD CKD-EPI 2021: 97.4 ML/MIN/1.73
EOSINOPHIL # BLD AUTO: 0.07 10*3/MM3 (ref 0–0.4)
EOSINOPHIL NFR BLD AUTO: 2.5 % (ref 0.3–6.2)
ERYTHROCYTE [DISTWIDTH] IN BLOOD BY AUTOMATED COUNT: 12.3 % (ref 12.3–15.4)
GLUCOSE SERPL-MCNC: 85 MG/DL (ref 65–99)
HCT VFR BLD AUTO: 33.7 % (ref 34–46.6)
HGB BLD-MCNC: 11.4 G/DL (ref 12–15.9)
IMM GRANULOCYTES # BLD AUTO: 0 10*3/MM3 (ref 0–0.05)
IMM GRANULOCYTES NFR BLD AUTO: 0 % (ref 0–0.5)
LYMPHOCYTES # BLD AUTO: 1.43 10*3/MM3 (ref 0.7–3.1)
LYMPHOCYTES NFR BLD AUTO: 50.7 % (ref 19.6–45.3)
MCH RBC QN AUTO: 30.5 PG (ref 26.6–33)
MCHC RBC AUTO-ENTMCNC: 33.8 G/DL (ref 31.5–35.7)
MCV RBC AUTO: 90.1 FL (ref 79–97)
MONOCYTES # BLD AUTO: 0.23 10*3/MM3 (ref 0.1–0.9)
MONOCYTES NFR BLD AUTO: 8.2 % (ref 5–12)
NEUTROPHILS NFR BLD AUTO: 1.07 10*3/MM3 (ref 1.7–7)
NEUTROPHILS NFR BLD AUTO: 37.9 % (ref 42.7–76)
NRBC BLD AUTO-RTO: 0 /100 WBC (ref 0–0.2)
PLATELET # BLD AUTO: 238 10*3/MM3 (ref 140–450)
PMV BLD AUTO: 8.8 FL (ref 6–12)
POTASSIUM SERPL-SCNC: 4.5 MMOL/L (ref 3.5–5.2)
RBC # BLD AUTO: 3.74 10*6/MM3 (ref 3.77–5.28)
SODIUM SERPL-SCNC: 139 MMOL/L (ref 136–145)
WBC NRBC COR # BLD: 2.82 10*3/MM3 (ref 3.4–10.8)

## 2023-01-23 PROCEDURE — 87205 SMEAR GRAM STAIN: CPT | Performed by: ORTHOPAEDIC SURGERY

## 2023-01-23 PROCEDURE — 94799 UNLISTED PULMONARY SVC/PX: CPT

## 2023-01-23 PROCEDURE — 94664 DEMO&/EVAL PT USE INHALER: CPT

## 2023-01-23 PROCEDURE — 87070 CULTURE OTHR SPECIMN AEROBIC: CPT | Performed by: ORTHOPAEDIC SURGERY

## 2023-01-23 PROCEDURE — 36415 COLL VENOUS BLD VENIPUNCTURE: CPT | Performed by: INTERNAL MEDICINE

## 2023-01-23 PROCEDURE — 80048 BASIC METABOLIC PNL TOTAL CA: CPT | Performed by: INTERNAL MEDICINE

## 2023-01-23 PROCEDURE — 99223 1ST HOSP IP/OBS HIGH 75: CPT | Performed by: INTERNAL MEDICINE

## 2023-01-23 PROCEDURE — 85025 COMPLETE CBC W/AUTO DIFF WBC: CPT | Performed by: INTERNAL MEDICINE

## 2023-01-23 PROCEDURE — 25010000002 CEFEPIME PER 500 MG: Performed by: INTERNAL MEDICINE

## 2023-01-23 PROCEDURE — 94760 N-INVAS EAR/PLS OXIMETRY 1: CPT

## 2023-01-23 PROCEDURE — 25010000002 DIPHENHYDRAMINE PER 50 MG: Performed by: NURSE PRACTITIONER

## 2023-01-23 RX ORDER — POLYETHYLENE GLYCOL 3350 17 G/17G
17 POWDER, FOR SOLUTION ORAL DAILY PRN
Status: DISCONTINUED | OUTPATIENT
Start: 2023-01-23 | End: 2023-01-27 | Stop reason: HOSPADM

## 2023-01-23 RX ORDER — AMOXICILLIN 250 MG
2 CAPSULE ORAL 2 TIMES DAILY
Status: DISCONTINUED | OUTPATIENT
Start: 2023-01-23 | End: 2023-01-27 | Stop reason: HOSPADM

## 2023-01-23 RX ORDER — BISACODYL 10 MG
10 SUPPOSITORY, RECTAL RECTAL DAILY PRN
Status: DISCONTINUED | OUTPATIENT
Start: 2023-01-23 | End: 2023-01-27 | Stop reason: HOSPADM

## 2023-01-23 RX ORDER — BISACODYL 5 MG/1
5 TABLET, DELAYED RELEASE ORAL DAILY PRN
Status: DISCONTINUED | OUTPATIENT
Start: 2023-01-23 | End: 2023-01-27 | Stop reason: HOSPADM

## 2023-01-23 RX ORDER — TIZANIDINE 4 MG/1
4 TABLET ORAL EVERY 8 HOURS PRN
Status: DISCONTINUED | OUTPATIENT
Start: 2023-01-23 | End: 2023-01-27 | Stop reason: HOSPADM

## 2023-01-23 RX ADMIN — DOCUSATE SODIUM 50MG AND SENNOSIDES 8.6MG 2 TABLET: 8.6; 5 TABLET, FILM COATED ORAL at 21:41

## 2023-01-23 RX ADMIN — HYDROCODONE BITARTRATE AND ACETAMINOPHEN 1 TABLET: 10; 325 TABLET ORAL at 11:56

## 2023-01-23 RX ADMIN — CEFEPIME 2 G: 2 INJECTION, POWDER, FOR SOLUTION INTRAVENOUS at 21:41

## 2023-01-23 RX ADMIN — BUDESONIDE AND FORMOTEROL FUMARATE DIHYDRATE 2 PUFF: 160; 4.5 AEROSOL RESPIRATORY (INHALATION) at 08:22

## 2023-01-23 RX ADMIN — SODIUM CHLORIDE 125 ML/HR: 9 INJECTION, SOLUTION INTRAVENOUS at 11:55

## 2023-01-23 RX ADMIN — SODIUM CHLORIDE 125 ML/HR: 9 INJECTION, SOLUTION INTRAVENOUS at 21:43

## 2023-01-23 RX ADMIN — HYDROCODONE BITARTRATE AND ACETAMINOPHEN 1 TABLET: 10; 325 TABLET ORAL at 17:56

## 2023-01-23 RX ADMIN — SODIUM CHLORIDE 125 ML/HR: 9 INJECTION, SOLUTION INTRAVENOUS at 04:44

## 2023-01-23 RX ADMIN — CEFEPIME 2 G: 2 INJECTION, POWDER, FOR SOLUTION INTRAVENOUS at 14:17

## 2023-01-23 RX ADMIN — NICOTINE POLACRILEX 2 MG: 2 GUM, CHEWING BUCCAL at 16:36

## 2023-01-23 RX ADMIN — HYDROCODONE BITARTRATE AND ACETAMINOPHEN 1 TABLET: 10; 325 TABLET ORAL at 21:41

## 2023-01-23 RX ADMIN — DIPHENHYDRAMINE HYDROCHLORIDE 25 MG: 50 INJECTION, SOLUTION INTRAMUSCULAR; INTRAVENOUS at 14:27

## 2023-01-23 RX ADMIN — DIPHENHYDRAMINE HYDROCHLORIDE 25 MG: 50 INJECTION, SOLUTION INTRAMUSCULAR; INTRAVENOUS at 04:42

## 2023-01-23 RX ADMIN — IPRATROPIUM BROMIDE AND ALBUTEROL SULFATE 3 ML: 2.5; .5 SOLUTION RESPIRATORY (INHALATION) at 14:56

## 2023-01-23 RX ADMIN — IPRATROPIUM BROMIDE AND ALBUTEROL SULFATE 3 ML: 2.5; .5 SOLUTION RESPIRATORY (INHALATION) at 11:17

## 2023-01-23 RX ADMIN — BUDESONIDE AND FORMOTEROL FUMARATE DIHYDRATE 2 PUFF: 160; 4.5 AEROSOL RESPIRATORY (INHALATION) at 21:04

## 2023-01-23 RX ADMIN — HYDROCODONE BITARTRATE AND ACETAMINOPHEN 1 TABLET: 10; 325 TABLET ORAL at 07:17

## 2023-01-23 RX ADMIN — HYDROCODONE BITARTRATE AND ACETAMINOPHEN 1 TABLET: 10; 325 TABLET ORAL at 01:10

## 2023-01-24 LAB
ANION GAP SERPL CALCULATED.3IONS-SCNC: 5 MMOL/L (ref 5–15)
BASOPHILS # BLD AUTO: 0.02 10*3/MM3 (ref 0–0.2)
BASOPHILS NFR BLD AUTO: 0.7 % (ref 0–1.5)
BUN SERPL-MCNC: 12 MG/DL (ref 6–20)
BUN/CREAT SERPL: 16 (ref 7–25)
CALCIUM SPEC-SCNC: 8.1 MG/DL (ref 8.6–10.5)
CHLORIDE SERPL-SCNC: 111 MMOL/L (ref 98–107)
CO2 SERPL-SCNC: 24 MMOL/L (ref 22–29)
CREAT SERPL-MCNC: 0.75 MG/DL (ref 0.57–1)
DEPRECATED RDW RBC AUTO: 41.3 FL (ref 37–54)
EGFRCR SERPLBLD CKD-EPI 2021: 99 ML/MIN/1.73
EOSINOPHIL # BLD AUTO: 0.09 10*3/MM3 (ref 0–0.4)
EOSINOPHIL NFR BLD AUTO: 3.1 % (ref 0.3–6.2)
ERYTHROCYTE [DISTWIDTH] IN BLOOD BY AUTOMATED COUNT: 12.2 % (ref 12.3–15.4)
GLUCOSE SERPL-MCNC: 88 MG/DL (ref 65–99)
HCT VFR BLD AUTO: 31.2 % (ref 34–46.6)
HGB BLD-MCNC: 10.3 G/DL (ref 12–15.9)
IMM GRANULOCYTES # BLD AUTO: 0 10*3/MM3 (ref 0–0.05)
IMM GRANULOCYTES NFR BLD AUTO: 0 % (ref 0–0.5)
LYMPHOCYTES # BLD AUTO: 1.3 10*3/MM3 (ref 0.7–3.1)
LYMPHOCYTES NFR BLD AUTO: 45.3 % (ref 19.6–45.3)
MCH RBC QN AUTO: 30.4 PG (ref 26.6–33)
MCHC RBC AUTO-ENTMCNC: 33 G/DL (ref 31.5–35.7)
MCV RBC AUTO: 92 FL (ref 79–97)
MONOCYTES # BLD AUTO: 0.19 10*3/MM3 (ref 0.1–0.9)
MONOCYTES NFR BLD AUTO: 6.6 % (ref 5–12)
NEUTROPHILS NFR BLD AUTO: 1.27 10*3/MM3 (ref 1.7–7)
NEUTROPHILS NFR BLD AUTO: 44.3 % (ref 42.7–76)
NRBC BLD AUTO-RTO: 0.3 /100 WBC (ref 0–0.2)
PLATELET # BLD AUTO: 210 10*3/MM3 (ref 140–450)
PMV BLD AUTO: 9.6 FL (ref 6–12)
POTASSIUM SERPL-SCNC: 4.2 MMOL/L (ref 3.5–5.2)
RBC # BLD AUTO: 3.39 10*6/MM3 (ref 3.77–5.28)
SODIUM SERPL-SCNC: 140 MMOL/L (ref 136–145)
WBC NRBC COR # BLD: 2.87 10*3/MM3 (ref 3.4–10.8)

## 2023-01-24 PROCEDURE — 80048 BASIC METABOLIC PNL TOTAL CA: CPT | Performed by: NURSE PRACTITIONER

## 2023-01-24 PROCEDURE — 94799 UNLISTED PULMONARY SVC/PX: CPT

## 2023-01-24 PROCEDURE — 25010000002 CEFEPIME PER 500 MG: Performed by: INTERNAL MEDICINE

## 2023-01-24 PROCEDURE — 94760 N-INVAS EAR/PLS OXIMETRY 1: CPT

## 2023-01-24 PROCEDURE — 85025 COMPLETE CBC W/AUTO DIFF WBC: CPT | Performed by: NURSE PRACTITIONER

## 2023-01-24 PROCEDURE — 99232 SBSQ HOSP IP/OBS MODERATE 35: CPT | Performed by: INTERNAL MEDICINE

## 2023-01-24 PROCEDURE — 25010000002 DIPHENHYDRAMINE PER 50 MG: Performed by: NURSE PRACTITIONER

## 2023-01-24 PROCEDURE — 94664 DEMO&/EVAL PT USE INHALER: CPT

## 2023-01-24 RX ORDER — IPRATROPIUM BROMIDE AND ALBUTEROL SULFATE 2.5; .5 MG/3ML; MG/3ML
3 SOLUTION RESPIRATORY (INHALATION) EVERY 6 HOURS PRN
Status: DISCONTINUED | OUTPATIENT
Start: 2023-01-24 | End: 2023-01-27 | Stop reason: HOSPADM

## 2023-01-24 RX ADMIN — NICOTINE POLACRILEX 2 MG: 2 GUM, CHEWING BUCCAL at 08:50

## 2023-01-24 RX ADMIN — CEFEPIME 2 G: 2 INJECTION, POWDER, FOR SOLUTION INTRAVENOUS at 22:06

## 2023-01-24 RX ADMIN — POLYETHYLENE GLYCOL 3350 17 G: 17 POWDER, FOR SOLUTION ORAL at 08:46

## 2023-01-24 RX ADMIN — HYDROCODONE BITARTRATE AND ACETAMINOPHEN 1 TABLET: 10; 325 TABLET ORAL at 01:04

## 2023-01-24 RX ADMIN — HYDROCODONE BITARTRATE AND ACETAMINOPHEN 1 TABLET: 10; 325 TABLET ORAL at 20:32

## 2023-01-24 RX ADMIN — DIPHENHYDRAMINE HYDROCHLORIDE 25 MG: 50 INJECTION, SOLUTION INTRAMUSCULAR; INTRAVENOUS at 20:40

## 2023-01-24 RX ADMIN — DIPHENHYDRAMINE HYDROCHLORIDE 25 MG: 50 INJECTION, SOLUTION INTRAMUSCULAR; INTRAVENOUS at 05:45

## 2023-01-24 RX ADMIN — CEFEPIME 2 G: 2 INJECTION, POWDER, FOR SOLUTION INTRAVENOUS at 05:44

## 2023-01-24 RX ADMIN — BUDESONIDE AND FORMOTEROL FUMARATE DIHYDRATE 2 PUFF: 160; 4.5 AEROSOL RESPIRATORY (INHALATION) at 21:34

## 2023-01-24 RX ADMIN — DOCUSATE SODIUM 50MG AND SENNOSIDES 8.6MG 2 TABLET: 8.6; 5 TABLET, FILM COATED ORAL at 08:46

## 2023-01-24 RX ADMIN — HYDROCODONE BITARTRATE AND ACETAMINOPHEN 1 TABLET: 10; 325 TABLET ORAL at 05:44

## 2023-01-24 RX ADMIN — HYDROCODONE BITARTRATE AND ACETAMINOPHEN 1 TABLET: 10; 325 TABLET ORAL at 13:10

## 2023-01-24 RX ADMIN — BUDESONIDE AND FORMOTEROL FUMARATE DIHYDRATE 2 PUFF: 160; 4.5 AEROSOL RESPIRATORY (INHALATION) at 07:16

## 2023-01-24 RX ADMIN — CEFEPIME 2 G: 2 INJECTION, POWDER, FOR SOLUTION INTRAVENOUS at 13:56

## 2023-01-24 RX ADMIN — SODIUM CHLORIDE 125 ML/HR: 9 INJECTION, SOLUTION INTRAVENOUS at 12:29

## 2023-01-25 ENCOUNTER — ANESTHESIA (OUTPATIENT)
Dept: PERIOP | Facility: HOSPITAL | Age: 48
DRG: 494 | End: 2023-01-25
Payer: MEDICARE

## 2023-01-25 ENCOUNTER — ANESTHESIA EVENT (OUTPATIENT)
Dept: PERIOP | Facility: HOSPITAL | Age: 48
DRG: 494 | End: 2023-01-25
Payer: MEDICARE

## 2023-01-25 ENCOUNTER — APPOINTMENT (OUTPATIENT)
Dept: GENERAL RADIOLOGY | Facility: HOSPITAL | Age: 48
DRG: 494 | End: 2023-01-25
Payer: MEDICARE

## 2023-01-25 PROCEDURE — 25010000002 ONDANSETRON PER 1 MG: Performed by: NURSE ANESTHETIST, CERTIFIED REGISTERED

## 2023-01-25 PROCEDURE — 25010000002 MIDAZOLAM PER 1 MG: Performed by: NURSE ANESTHETIST, CERTIFIED REGISTERED

## 2023-01-25 PROCEDURE — 25010000002 HYDROMORPHONE 1 MG/ML SOLUTION: Performed by: INTERNAL MEDICINE

## 2023-01-25 PROCEDURE — 25010000002 FENTANYL CITRATE (PF) 50 MCG/ML SOLUTION: Performed by: NURSE ANESTHETIST, CERTIFIED REGISTERED

## 2023-01-25 PROCEDURE — 73590 X-RAY EXAM OF LOWER LEG: CPT

## 2023-01-25 PROCEDURE — C1713 ANCHOR/SCREW BN/BN,TIS/BN: HCPCS | Performed by: ORTHOPAEDIC SURGERY

## 2023-01-25 PROCEDURE — 99232 SBSQ HOSP IP/OBS MODERATE 35: CPT | Performed by: INTERNAL MEDICINE

## 2023-01-25 PROCEDURE — 87070 CULTURE OTHR SPECIMN AEROBIC: CPT | Performed by: ORTHOPAEDIC SURGERY

## 2023-01-25 PROCEDURE — 25010000002 MAGNESIUM SULFATE PER 500 MG OF MAGNESIUM: Performed by: NURSE ANESTHETIST, CERTIFIED REGISTERED

## 2023-01-25 PROCEDURE — 25010000002 MIDAZOLAM PER 1 MG: Performed by: STUDENT IN AN ORGANIZED HEALTH CARE EDUCATION/TRAINING PROGRAM

## 2023-01-25 PROCEDURE — 0QBJ0ZZ EXCISION OF RIGHT FIBULA, OPEN APPROACH: ICD-10-PCS | Performed by: ORTHOPAEDIC SURGERY

## 2023-01-25 PROCEDURE — 76000 FLUOROSCOPY <1 HR PHYS/QHP: CPT

## 2023-01-25 PROCEDURE — 0QPG04Z REMOVAL OF INTERNAL FIXATION DEVICE FROM RIGHT TIBIA, OPEN APPROACH: ICD-10-PCS | Performed by: ORTHOPAEDIC SURGERY

## 2023-01-25 PROCEDURE — 0QBG0ZZ EXCISION OF RIGHT TIBIA, OPEN APPROACH: ICD-10-PCS | Performed by: ORTHOPAEDIC SURGERY

## 2023-01-25 PROCEDURE — 25010000002 DEXAMETHASONE SODIUM PHOSPHATE 20 MG/5ML SOLUTION: Performed by: NURSE ANESTHETIST, CERTIFIED REGISTERED

## 2023-01-25 PROCEDURE — 25010000002 CEFEPIME PER 500 MG: Performed by: ORTHOPAEDIC SURGERY

## 2023-01-25 PROCEDURE — 87205 SMEAR GRAM STAIN: CPT | Performed by: ORTHOPAEDIC SURGERY

## 2023-01-25 PROCEDURE — 25010000002 HYDROMORPHONE 1 MG/ML SOLUTION: Performed by: NURSE ANESTHETIST, CERTIFIED REGISTERED

## 2023-01-25 PROCEDURE — 0QPJ04Z REMOVAL OF INTERNAL FIXATION DEVICE FROM RIGHT FIBULA, OPEN APPROACH: ICD-10-PCS | Performed by: ORTHOPAEDIC SURGERY

## 2023-01-25 PROCEDURE — 25010000002 HYDROMORPHONE PER 4 MG: Performed by: NURSE ANESTHETIST, CERTIFIED REGISTERED

## 2023-01-25 PROCEDURE — 25010000002 HYDROMORPHONE PER 4 MG: Performed by: INTERNAL MEDICINE

## 2023-01-25 PROCEDURE — 87176 TISSUE HOMOGENIZATION CULTR: CPT | Performed by: ORTHOPAEDIC SURGERY

## 2023-01-25 PROCEDURE — 25010000002 DIPHENHYDRAMINE PER 50 MG: Performed by: NURSE PRACTITIONER

## 2023-01-25 PROCEDURE — 25010000002 CEFEPIME PER 500 MG: Performed by: INTERNAL MEDICINE

## 2023-01-25 PROCEDURE — 25010000002 PROPOFOL 10 MG/ML EMULSION: Performed by: NURSE ANESTHETIST, CERTIFIED REGISTERED

## 2023-01-25 PROCEDURE — 25010000002 KETOROLAC TROMETHAMINE PER 15 MG: Performed by: NURSE ANESTHETIST, CERTIFIED REGISTERED

## 2023-01-25 RX ORDER — DEXAMETHASONE SODIUM PHOSPHATE 4 MG/ML
INJECTION, SOLUTION INTRA-ARTICULAR; INTRALESIONAL; INTRAMUSCULAR; INTRAVENOUS; SOFT TISSUE AS NEEDED
Status: DISCONTINUED | OUTPATIENT
Start: 2023-01-25 | End: 2023-01-25 | Stop reason: SURG

## 2023-01-25 RX ORDER — EPHEDRINE SULFATE 50 MG/ML
5 INJECTION, SOLUTION INTRAVENOUS ONCE AS NEEDED
Status: DISCONTINUED | OUTPATIENT
Start: 2023-01-25 | End: 2023-01-25 | Stop reason: HOSPADM

## 2023-01-25 RX ORDER — PROPOFOL 10 MG/ML
VIAL (ML) INTRAVENOUS AS NEEDED
Status: DISCONTINUED | OUTPATIENT
Start: 2023-01-25 | End: 2023-01-25 | Stop reason: SURG

## 2023-01-25 RX ORDER — OXYCODONE AND ACETAMINOPHEN 7.5; 325 MG/1; MG/1
1 TABLET ORAL EVERY 4 HOURS PRN
Status: DISCONTINUED | OUTPATIENT
Start: 2023-01-25 | End: 2023-01-25 | Stop reason: HOSPADM

## 2023-01-25 RX ORDER — NALOXONE HCL 0.4 MG/ML
0.2 VIAL (ML) INJECTION AS NEEDED
Status: DISCONTINUED | OUTPATIENT
Start: 2023-01-25 | End: 2023-01-25 | Stop reason: HOSPADM

## 2023-01-25 RX ORDER — KETOROLAC TROMETHAMINE 30 MG/ML
INJECTION, SOLUTION INTRAMUSCULAR; INTRAVENOUS AS NEEDED
Status: DISCONTINUED | OUTPATIENT
Start: 2023-01-25 | End: 2023-01-25 | Stop reason: SURG

## 2023-01-25 RX ORDER — FLUMAZENIL 0.1 MG/ML
0.2 INJECTION INTRAVENOUS AS NEEDED
Status: DISCONTINUED | OUTPATIENT
Start: 2023-01-25 | End: 2023-01-25 | Stop reason: HOSPADM

## 2023-01-25 RX ORDER — PROMETHAZINE HYDROCHLORIDE 25 MG/1
25 SUPPOSITORY RECTAL ONCE AS NEEDED
Status: DISCONTINUED | OUTPATIENT
Start: 2023-01-25 | End: 2023-01-25 | Stop reason: HOSPADM

## 2023-01-25 RX ORDER — HYDROMORPHONE HYDROCHLORIDE 1 MG/ML
0.5 INJECTION, SOLUTION INTRAMUSCULAR; INTRAVENOUS; SUBCUTANEOUS
Status: DISCONTINUED | OUTPATIENT
Start: 2023-01-25 | End: 2023-01-25

## 2023-01-25 RX ORDER — KETAMINE HCL IN NACL, ISO-OSM 100MG/10ML
5 SYRINGE (ML) INJECTION
Status: DISCONTINUED | OUTPATIENT
Start: 2023-01-25 | End: 2023-01-25 | Stop reason: HOSPADM

## 2023-01-25 RX ORDER — ACETAMINOPHEN 500 MG
1000 TABLET ORAL EVERY 8 HOURS PRN
Status: DISCONTINUED | OUTPATIENT
Start: 2023-01-25 | End: 2023-01-27 | Stop reason: HOSPADM

## 2023-01-25 RX ORDER — LABETALOL HYDROCHLORIDE 5 MG/ML
5 INJECTION, SOLUTION INTRAVENOUS
Status: DISCONTINUED | OUTPATIENT
Start: 2023-01-25 | End: 2023-01-25 | Stop reason: HOSPADM

## 2023-01-25 RX ORDER — DIPHENHYDRAMINE HYDROCHLORIDE 50 MG/ML
12.5 INJECTION INTRAMUSCULAR; INTRAVENOUS
Status: DISCONTINUED | OUTPATIENT
Start: 2023-01-25 | End: 2023-01-25 | Stop reason: HOSPADM

## 2023-01-25 RX ORDER — MIDAZOLAM HYDROCHLORIDE 1 MG/ML
1 INJECTION INTRAMUSCULAR; INTRAVENOUS
Status: DISCONTINUED | OUTPATIENT
Start: 2023-01-25 | End: 2023-01-25 | Stop reason: HOSPADM

## 2023-01-25 RX ORDER — HYDROMORPHONE HYDROCHLORIDE 1 MG/ML
0.5 INJECTION, SOLUTION INTRAMUSCULAR; INTRAVENOUS; SUBCUTANEOUS
Status: DISCONTINUED | OUTPATIENT
Start: 2023-01-25 | End: 2023-01-25 | Stop reason: HOSPADM

## 2023-01-25 RX ORDER — FENTANYL CITRATE 50 UG/ML
INJECTION, SOLUTION INTRAMUSCULAR; INTRAVENOUS AS NEEDED
Status: DISCONTINUED | OUTPATIENT
Start: 2023-01-25 | End: 2023-01-25 | Stop reason: SURG

## 2023-01-25 RX ORDER — HYDROMORPHONE HYDROCHLORIDE 1 MG/ML
0.5 INJECTION, SOLUTION INTRAMUSCULAR; INTRAVENOUS; SUBCUTANEOUS ONCE
Status: COMPLETED | OUTPATIENT
Start: 2023-01-25 | End: 2023-01-25

## 2023-01-25 RX ORDER — ONDANSETRON 2 MG/ML
4 INJECTION INTRAMUSCULAR; INTRAVENOUS ONCE AS NEEDED
Status: COMPLETED | OUTPATIENT
Start: 2023-01-25 | End: 2023-01-25

## 2023-01-25 RX ORDER — FENTANYL CITRATE 50 UG/ML
50 INJECTION, SOLUTION INTRAMUSCULAR; INTRAVENOUS
Status: DISCONTINUED | OUTPATIENT
Start: 2023-01-25 | End: 2023-01-25 | Stop reason: HOSPADM

## 2023-01-25 RX ORDER — PROMETHAZINE HYDROCHLORIDE 25 MG/1
25 TABLET ORAL ONCE AS NEEDED
Status: DISCONTINUED | OUTPATIENT
Start: 2023-01-25 | End: 2023-01-25 | Stop reason: HOSPADM

## 2023-01-25 RX ORDER — MAGNESIUM SULFATE HEPTAHYDRATE 500 MG/ML
INJECTION, SOLUTION INTRAMUSCULAR; INTRAVENOUS AS NEEDED
Status: DISCONTINUED | OUTPATIENT
Start: 2023-01-25 | End: 2023-01-25 | Stop reason: SURG

## 2023-01-25 RX ORDER — ONDANSETRON 2 MG/ML
INJECTION INTRAMUSCULAR; INTRAVENOUS AS NEEDED
Status: DISCONTINUED | OUTPATIENT
Start: 2023-01-25 | End: 2023-01-25 | Stop reason: SURG

## 2023-01-25 RX ORDER — ACETAMINOPHEN 500 MG
1000 TABLET ORAL 3 TIMES DAILY
Status: DISCONTINUED | OUTPATIENT
Start: 2023-01-25 | End: 2023-01-25

## 2023-01-25 RX ORDER — HYDROCODONE BITARTRATE AND ACETAMINOPHEN 10; 325 MG/1; MG/1
1 TABLET ORAL ONCE AS NEEDED
Status: COMPLETED | OUTPATIENT
Start: 2023-01-25 | End: 2023-01-25

## 2023-01-25 RX ORDER — MIDAZOLAM HYDROCHLORIDE 2 MG/ML
2 SYRUP ORAL ONCE
Status: DISCONTINUED | OUTPATIENT
Start: 2023-01-25 | End: 2023-01-25

## 2023-01-25 RX ORDER — LIDOCAINE HYDROCHLORIDE 10 MG/ML
0.5 INJECTION, SOLUTION EPIDURAL; INFILTRATION; INTRACAUDAL; PERINEURAL ONCE AS NEEDED
Status: DISCONTINUED | OUTPATIENT
Start: 2023-01-25 | End: 2023-01-25 | Stop reason: HOSPADM

## 2023-01-25 RX ORDER — SODIUM CHLORIDE, SODIUM LACTATE, POTASSIUM CHLORIDE, CALCIUM CHLORIDE 600; 310; 30; 20 MG/100ML; MG/100ML; MG/100ML; MG/100ML
9 INJECTION, SOLUTION INTRAVENOUS CONTINUOUS
Status: DISCONTINUED | OUTPATIENT
Start: 2023-01-25 | End: 2023-01-26

## 2023-01-25 RX ORDER — SODIUM CHLORIDE 0.9 % (FLUSH) 0.9 %
3 SYRINGE (ML) INJECTION EVERY 12 HOURS SCHEDULED
Status: DISCONTINUED | OUTPATIENT
Start: 2023-01-25 | End: 2023-01-25 | Stop reason: HOSPADM

## 2023-01-25 RX ORDER — HYDROCODONE BITARTRATE AND ACETAMINOPHEN 7.5; 325 MG/1; MG/1
1 TABLET ORAL ONCE AS NEEDED
Status: DISCONTINUED | OUTPATIENT
Start: 2023-01-25 | End: 2023-01-25 | Stop reason: HOSPADM

## 2023-01-25 RX ORDER — HYDRALAZINE HYDROCHLORIDE 20 MG/ML
5 INJECTION INTRAMUSCULAR; INTRAVENOUS
Status: DISCONTINUED | OUTPATIENT
Start: 2023-01-25 | End: 2023-01-25 | Stop reason: HOSPADM

## 2023-01-25 RX ORDER — DIPHENHYDRAMINE HCL 25 MG
25 CAPSULE ORAL
Status: DISCONTINUED | OUTPATIENT
Start: 2023-01-25 | End: 2023-01-25 | Stop reason: HOSPADM

## 2023-01-25 RX ORDER — FAMOTIDINE 10 MG/ML
20 INJECTION, SOLUTION INTRAVENOUS ONCE
Status: COMPLETED | OUTPATIENT
Start: 2023-01-25 | End: 2023-01-25

## 2023-01-25 RX ORDER — SODIUM CHLORIDE 0.9 % (FLUSH) 0.9 %
3-10 SYRINGE (ML) INJECTION AS NEEDED
Status: DISCONTINUED | OUTPATIENT
Start: 2023-01-25 | End: 2023-01-25 | Stop reason: HOSPADM

## 2023-01-25 RX ORDER — LIDOCAINE HYDROCHLORIDE 20 MG/ML
INJECTION, SOLUTION INFILTRATION; PERINEURAL AS NEEDED
Status: DISCONTINUED | OUTPATIENT
Start: 2023-01-25 | End: 2023-01-25 | Stop reason: SURG

## 2023-01-25 RX ORDER — DEXMEDETOMIDINE HYDROCHLORIDE 100 UG/ML
INJECTION, SOLUTION INTRAVENOUS AS NEEDED
Status: DISCONTINUED | OUTPATIENT
Start: 2023-01-25 | End: 2023-01-25 | Stop reason: SURG

## 2023-01-25 RX ORDER — MIDAZOLAM HYDROCHLORIDE 1 MG/ML
1 INJECTION INTRAMUSCULAR; INTRAVENOUS ONCE AS NEEDED
Status: COMPLETED | OUTPATIENT
Start: 2023-01-25 | End: 2023-01-25

## 2023-01-25 RX ORDER — MAGNESIUM HYDROXIDE 1200 MG/15ML
LIQUID ORAL AS NEEDED
Status: DISCONTINUED | OUTPATIENT
Start: 2023-01-25 | End: 2023-01-25 | Stop reason: HOSPADM

## 2023-01-25 RX ADMIN — CEFEPIME 2 G: 2 INJECTION, POWDER, FOR SOLUTION INTRAVENOUS at 21:57

## 2023-01-25 RX ADMIN — FENTANYL CITRATE 50 MCG: 50 INJECTION, SOLUTION INTRAMUSCULAR; INTRAVENOUS at 07:30

## 2023-01-25 RX ADMIN — SODIUM CHLORIDE, POTASSIUM CHLORIDE, SODIUM LACTATE AND CALCIUM CHLORIDE 9 ML/HR: 600; 310; 30; 20 INJECTION, SOLUTION INTRAVENOUS at 06:39

## 2023-01-25 RX ADMIN — FENTANYL CITRATE 50 MCG: 50 INJECTION, SOLUTION INTRAMUSCULAR; INTRAVENOUS at 08:39

## 2023-01-25 RX ADMIN — HYDROCODONE BITARTRATE AND ACETAMINOPHEN 1 TABLET: 10; 325 TABLET ORAL at 16:22

## 2023-01-25 RX ADMIN — DEXMEDETOMIDINE HCL 10 MCG: 100 INJECTION INTRAVENOUS at 07:18

## 2023-01-25 RX ADMIN — ONDANSETRON 4 MG: 2 INJECTION INTRAMUSCULAR; INTRAVENOUS at 08:10

## 2023-01-25 RX ADMIN — CEFEPIME 2 G: 2 INJECTION, POWDER, FOR SOLUTION INTRAVENOUS at 15:38

## 2023-01-25 RX ADMIN — Medication 5 MG: at 09:02

## 2023-01-25 RX ADMIN — DEXMEDETOMIDINE HCL 10 MCG: 100 INJECTION INTRAVENOUS at 08:22

## 2023-01-25 RX ADMIN — DIPHENHYDRAMINE HYDROCHLORIDE 25 MG: 50 INJECTION, SOLUTION INTRAMUSCULAR; INTRAVENOUS at 01:53

## 2023-01-25 RX ADMIN — DEXAMETHASONE SODIUM PHOSPHATE 8 MG: 4 INJECTION, SOLUTION INTRAMUSCULAR; INTRAVENOUS at 07:13

## 2023-01-25 RX ADMIN — HYDROMORPHONE HYDROCHLORIDE 0.5 MG: 1 INJECTION, SOLUTION INTRAMUSCULAR; INTRAVENOUS; SUBCUTANEOUS at 08:21

## 2023-01-25 RX ADMIN — HYDROMORPHONE HYDROCHLORIDE 0.5 MG: 1 INJECTION, SOLUTION INTRAMUSCULAR; INTRAVENOUS; SUBCUTANEOUS at 09:46

## 2023-01-25 RX ADMIN — KETOROLAC TROMETHAMINE 30 MG: 30 INJECTION, SOLUTION INTRAMUSCULAR at 08:20

## 2023-01-25 RX ADMIN — ONDANSETRON 4 MG: 2 INJECTION INTRAMUSCULAR; INTRAVENOUS at 08:40

## 2023-01-25 RX ADMIN — HYDROMORPHONE HYDROCHLORIDE 0.5 MG: 1 INJECTION, SOLUTION INTRAMUSCULAR; INTRAVENOUS; SUBCUTANEOUS at 14:25

## 2023-01-25 RX ADMIN — HYDROMORPHONE HYDROCHLORIDE 1 MG: 1 INJECTION, SOLUTION INTRAMUSCULAR; INTRAVENOUS; SUBCUTANEOUS at 21:32

## 2023-01-25 RX ADMIN — HYDROMORPHONE HYDROCHLORIDE 0.5 MG: 1 INJECTION, SOLUTION INTRAMUSCULAR; INTRAVENOUS; SUBCUTANEOUS at 08:52

## 2023-01-25 RX ADMIN — MIDAZOLAM 1 MG: 1 INJECTION INTRAMUSCULAR; INTRAVENOUS at 08:58

## 2023-01-25 RX ADMIN — MAGNESIUM SULFATE HEPTAHYDRATE 2 G: 500 INJECTION, SOLUTION INTRAMUSCULAR; INTRAVENOUS at 06:58

## 2023-01-25 RX ADMIN — PROPOFOL 150 MG: 10 INJECTION, EMULSION INTRAVENOUS at 07:03

## 2023-01-25 RX ADMIN — HYDROMORPHONE HYDROCHLORIDE 0.5 MG: 1 INJECTION, SOLUTION INTRAMUSCULAR; INTRAVENOUS; SUBCUTANEOUS at 10:00

## 2023-01-25 RX ADMIN — LIDOCAINE HYDROCHLORIDE 100 MG: 20 INJECTION, SOLUTION INFILTRATION; PERINEURAL at 07:03

## 2023-01-25 RX ADMIN — HYDROMORPHONE HYDROCHLORIDE 0.5 MG: 1 INJECTION, SOLUTION INTRAMUSCULAR; INTRAVENOUS; SUBCUTANEOUS at 13:29

## 2023-01-25 RX ADMIN — HYDROCODONE BITARTRATE AND ACETAMINOPHEN 1 TABLET: 10; 325 TABLET ORAL at 20:28

## 2023-01-25 RX ADMIN — MIDAZOLAM 1 MG: 1 INJECTION INTRAMUSCULAR; INTRAVENOUS at 06:26

## 2023-01-25 RX ADMIN — FAMOTIDINE 20 MG: 10 INJECTION INTRAVENOUS at 06:26

## 2023-01-25 RX ADMIN — HYDROCODONE BITARTRATE AND ACETAMINOPHEN 1 TABLET: 10; 325 TABLET ORAL at 12:11

## 2023-01-25 RX ADMIN — HYDROCODONE BITARTRATE AND ACETAMINOPHEN 1 TABLET: 10; 325 TABLET ORAL at 06:38

## 2023-01-25 RX ADMIN — CEFEPIME 2 G: 2 INJECTION, POWDER, FOR SOLUTION INTRAVENOUS at 06:18

## 2023-01-25 RX ADMIN — HYDROMORPHONE HYDROCHLORIDE 1 MG: 1 INJECTION, SOLUTION INTRAMUSCULAR; INTRAVENOUS; SUBCUTANEOUS at 17:20

## 2023-01-25 RX ADMIN — Medication 5 MG: at 09:36

## 2023-01-25 RX ADMIN — HYDROCODONE BITARTRATE AND ACETAMINOPHEN 1 TABLET: 10; 325 TABLET ORAL at 00:04

## 2023-01-25 RX ADMIN — DEXMEDETOMIDINE HCL 10 MCG: 100 INJECTION INTRAVENOUS at 08:17

## 2023-01-25 RX ADMIN — PROPOFOL 30 MG: 10 INJECTION, EMULSION INTRAVENOUS at 07:32

## 2023-01-25 RX ADMIN — FENTANYL CITRATE 50 MCG: 50 INJECTION, SOLUTION INTRAMUSCULAR; INTRAVENOUS at 08:18

## 2023-01-25 NOTE — ANESTHESIA PROCEDURE NOTES
Airway  Urgency: elective    Date/Time: 1/25/2023 7:05 AM  Airway not difficult    General Information and Staff    Patient location during procedure: OR  Anesthesiologist: Ranjit Hazel MD  CRNA/CAA: Anu Baez CRNA    Indications and Patient Condition    Preoxygenated: yes  Mask difficulty assessment: 1 - vent by mask    Final Airway Details  Final airway type: supraglottic airway      Successful airway: classic  Size 4     Number of attempts at approach: 1  Assessment: lips, teeth, and gum same as pre-op    Additional Comments  Pt to OR and positioned self to OR table. Monitors applied. PreO2: SIVI and easy insertion LMA. ETCO2 +, Equal and bilateral chest rise

## 2023-01-25 NOTE — ANESTHESIA POSTPROCEDURE EVALUATION
Patient: Jesus Ovalles    Procedure Summary     Date: 01/25/23 Room / Location: [facility] OR  / [facility] MAIN OR    Anesthesia Start: 0654 Anesthesia Stop: 0836    Procedure: TIBIA  FIBULAR HARDWARE REMOVAL WITH IRRIGATION DEBRIDEMENT OF BONE (Right: Leg Lower) Diagnosis:       Wound infection      (Wound infection [T14.8XXA, L08.9])    Surgeons: Maurice Pugh II, MD Provider: Ranjit Hazel MD    Anesthesia Type: general ASA Status: 2          Anesthesia Type: general    Vitals  Vitals Value Taken Time   /70 01/25/23 1016   Temp 36.4 °C (97.5 °F) 01/25/23 1015   Pulse 79 01/25/23 1021   Resp 18 01/25/23 1015   SpO2 97 % 01/25/23 1022   Vitals shown include unvalidated device data.        Post Anesthesia Care and Evaluation    Patient location during evaluation: PACU  Patient participation: complete - patient participated  Level of consciousness: awake and alert  Pain management: adequate    Airway patency: patent  Anesthetic complications: No anesthetic complications    Cardiovascular status: acceptable  Respiratory status: acceptable  Hydration status: acceptable    Comments: ---------------------------               01/25/23                      1119         ---------------------------   BP:          106/68         Pulse:         72           Resp:          18           Temp:   36.6 °C (97.8 °F)   SpO2:          96%         ---------------------------

## 2023-01-25 NOTE — ANESTHESIA PREPROCEDURE EVALUATION
Anesthesia Evaluation     Patient summary reviewed and Nursing notes reviewed   no history of anesthetic complications:  NPO Solid Status: > 8 hours  NPO Liquid Status: > 2 hours           Airway   Mallampati: II  TM distance: >3 FB  Neck ROM: full  Dental      Pulmonary    (+) a smoker, COPD,   Cardiovascular     ECG reviewed    (+) hypertension, hyperlipidemia,       Neuro/Psych  (+) psychiatric history Bipolar,    GI/Hepatic/Renal/Endo      Musculoskeletal     Abdominal    Substance History      OB/GYN          Other                          Anesthesia Plan    ASA 2     general     (I have reviewed the patient's history with the patient and the chart, including all pertinent laboratory results and imaging. I have explained the risks of anesthesia including but not limited to dental damage, corneal abrasion, nerve injury, MI, stroke, and death. Questions asked and answered. Anesthetic plan discussed with patient and team as indicated. Patient expressed understanding of the above.    Received breathing tx this morning;will give regular dose of norco preop)  intravenous induction     Anesthetic plan, risks, benefits, and alternatives have been provided, discussed and informed consent has been obtained with: patient.        CODE STATUS:

## 2023-01-26 ENCOUNTER — HOME HEALTH ADMISSION (OUTPATIENT)
Dept: HOME HEALTH SERVICES | Facility: HOME HEALTHCARE | Age: 48
End: 2023-01-26
Payer: MEDICARE

## 2023-01-26 LAB
ALBUMIN SERPL-MCNC: 3.4 G/DL (ref 3.5–5.2)
ALBUMIN/GLOB SERPL: 1.6 G/DL
ALP SERPL-CCNC: 64 U/L (ref 39–117)
ALT SERPL W P-5'-P-CCNC: 15 U/L (ref 1–33)
ANION GAP SERPL CALCULATED.3IONS-SCNC: 4.4 MMOL/L (ref 5–15)
AST SERPL-CCNC: 15 U/L (ref 1–32)
BACTERIA SPEC AEROBE CULT: NORMAL
BASOPHILS # BLD AUTO: 0.02 10*3/MM3 (ref 0–0.2)
BASOPHILS NFR BLD AUTO: 0.4 % (ref 0–1.5)
BILIRUB SERPL-MCNC: 0.3 MG/DL (ref 0–1.2)
BUN SERPL-MCNC: 10 MG/DL (ref 6–20)
BUN/CREAT SERPL: 13.2 (ref 7–25)
CALCIUM SPEC-SCNC: 8.6 MG/DL (ref 8.6–10.5)
CHLORIDE SERPL-SCNC: 103 MMOL/L (ref 98–107)
CO2 SERPL-SCNC: 30.6 MMOL/L (ref 22–29)
CREAT SERPL-MCNC: 0.76 MG/DL (ref 0.57–1)
DEPRECATED RDW RBC AUTO: 40.6 FL (ref 37–54)
EGFRCR SERPLBLD CKD-EPI 2021: 97.4 ML/MIN/1.73
EOSINOPHIL # BLD AUTO: 0.11 10*3/MM3 (ref 0–0.4)
EOSINOPHIL NFR BLD AUTO: 2.1 % (ref 0.3–6.2)
ERYTHROCYTE [DISTWIDTH] IN BLOOD BY AUTOMATED COUNT: 12.2 % (ref 12.3–15.4)
GLOBULIN UR ELPH-MCNC: 2.1 GM/DL
GLUCOSE SERPL-MCNC: 90 MG/DL (ref 65–99)
GRAM STN SPEC: NORMAL
GRAM STN SPEC: NORMAL
HCT VFR BLD AUTO: 29.7 % (ref 34–46.6)
HGB BLD-MCNC: 10 G/DL (ref 12–15.9)
IMM GRANULOCYTES # BLD AUTO: 0.01 10*3/MM3 (ref 0–0.05)
IMM GRANULOCYTES NFR BLD AUTO: 0.2 % (ref 0–0.5)
LYMPHOCYTES # BLD AUTO: 1.05 10*3/MM3 (ref 0.7–3.1)
LYMPHOCYTES NFR BLD AUTO: 19.6 % (ref 19.6–45.3)
MCH RBC QN AUTO: 30.8 PG (ref 26.6–33)
MCHC RBC AUTO-ENTMCNC: 33.7 G/DL (ref 31.5–35.7)
MCV RBC AUTO: 91.4 FL (ref 79–97)
MONOCYTES # BLD AUTO: 0.35 10*3/MM3 (ref 0.1–0.9)
MONOCYTES NFR BLD AUTO: 6.5 % (ref 5–12)
NEUTROPHILS NFR BLD AUTO: 3.81 10*3/MM3 (ref 1.7–7)
NEUTROPHILS NFR BLD AUTO: 71.2 % (ref 42.7–76)
NRBC BLD AUTO-RTO: 0 /100 WBC (ref 0–0.2)
PLATELET # BLD AUTO: 199 10*3/MM3 (ref 140–450)
PMV BLD AUTO: 9.4 FL (ref 6–12)
POTASSIUM SERPL-SCNC: 4.2 MMOL/L (ref 3.5–5.2)
PROT SERPL-MCNC: 5.5 G/DL (ref 6–8.5)
RBC # BLD AUTO: 3.25 10*6/MM3 (ref 3.77–5.28)
SODIUM SERPL-SCNC: 138 MMOL/L (ref 136–145)
WBC NRBC COR # BLD: 5.35 10*3/MM3 (ref 3.4–10.8)

## 2023-01-26 PROCEDURE — 25010000002 CEFEPIME PER 500 MG: Performed by: ORTHOPAEDIC SURGERY

## 2023-01-26 PROCEDURE — C1751 CATH, INF, PER/CENT/MIDLINE: HCPCS

## 2023-01-26 PROCEDURE — 94799 UNLISTED PULMONARY SVC/PX: CPT

## 2023-01-26 PROCEDURE — 97162 PT EVAL MOD COMPLEX 30 MIN: CPT

## 2023-01-26 PROCEDURE — 80053 COMPREHEN METABOLIC PANEL: CPT | Performed by: INTERNAL MEDICINE

## 2023-01-26 PROCEDURE — 85025 COMPLETE CBC W/AUTO DIFF WBC: CPT | Performed by: INTERNAL MEDICINE

## 2023-01-26 PROCEDURE — 97110 THERAPEUTIC EXERCISES: CPT

## 2023-01-26 PROCEDURE — 94664 DEMO&/EVAL PT USE INHALER: CPT

## 2023-01-26 PROCEDURE — 25010000002 HYDROMORPHONE 1 MG/ML SOLUTION: Performed by: INTERNAL MEDICINE

## 2023-01-26 PROCEDURE — 99232 SBSQ HOSP IP/OBS MODERATE 35: CPT | Performed by: INTERNAL MEDICINE

## 2023-01-26 RX ORDER — OXYCODONE HYDROCHLORIDE AND ACETAMINOPHEN 5; 325 MG/1; MG/1
1 TABLET ORAL EVERY 4 HOURS PRN
Status: DISCONTINUED | OUTPATIENT
Start: 2023-01-26 | End: 2023-01-27 | Stop reason: HOSPADM

## 2023-01-26 RX ORDER — ALUMINA, MAGNESIA, AND SIMETHICONE 2400; 2400; 240 MG/30ML; MG/30ML; MG/30ML
15 SUSPENSION ORAL EVERY 6 HOURS PRN
Status: DISCONTINUED | OUTPATIENT
Start: 2023-01-26 | End: 2023-01-27 | Stop reason: HOSPADM

## 2023-01-26 RX ORDER — SODIUM CHLORIDE 0.9 % (FLUSH) 0.9 %
20 SYRINGE (ML) INJECTION AS NEEDED
Status: DISCONTINUED | OUTPATIENT
Start: 2023-01-26 | End: 2023-01-27 | Stop reason: HOSPADM

## 2023-01-26 RX ORDER — SODIUM CHLORIDE 0.9 % (FLUSH) 0.9 %
10 SYRINGE (ML) INJECTION AS NEEDED
Status: DISCONTINUED | OUTPATIENT
Start: 2023-01-26 | End: 2023-01-27 | Stop reason: HOSPADM

## 2023-01-26 RX ORDER — SODIUM CHLORIDE 9 MG/ML
40 INJECTION, SOLUTION INTRAVENOUS AS NEEDED
Status: DISCONTINUED | OUTPATIENT
Start: 2023-01-26 | End: 2023-01-27 | Stop reason: HOSPADM

## 2023-01-26 RX ORDER — OXYCODONE HYDROCHLORIDE AND ACETAMINOPHEN 5; 325 MG/1; MG/1
2 TABLET ORAL EVERY 4 HOURS PRN
Status: DISCONTINUED | OUTPATIENT
Start: 2023-01-26 | End: 2023-01-27 | Stop reason: HOSPADM

## 2023-01-26 RX ORDER — SODIUM CHLORIDE 0.9 % (FLUSH) 0.9 %
10 SYRINGE (ML) INJECTION EVERY 12 HOURS SCHEDULED
Status: DISCONTINUED | OUTPATIENT
Start: 2023-01-26 | End: 2023-01-27 | Stop reason: HOSPADM

## 2023-01-26 RX ADMIN — CEFEPIME 2 G: 2 INJECTION, POWDER, FOR SOLUTION INTRAVENOUS at 05:54

## 2023-01-26 RX ADMIN — DOCUSATE SODIUM 50MG AND SENNOSIDES 8.6MG 2 TABLET: 8.6; 5 TABLET, FILM COATED ORAL at 20:27

## 2023-01-26 RX ADMIN — HYDROCODONE BITARTRATE AND ACETAMINOPHEN 1 TABLET: 10; 325 TABLET ORAL at 08:55

## 2023-01-26 RX ADMIN — Medication 10 ML: at 21:16

## 2023-01-26 RX ADMIN — HYDROCODONE BITARTRATE AND ACETAMINOPHEN 1 TABLET: 10; 325 TABLET ORAL at 00:29

## 2023-01-26 RX ADMIN — BUDESONIDE AND FORMOTEROL FUMARATE DIHYDRATE 2 PUFF: 160; 4.5 AEROSOL RESPIRATORY (INHALATION) at 19:28

## 2023-01-26 RX ADMIN — HYDROCODONE BITARTRATE AND ACETAMINOPHEN 1 TABLET: 10; 325 TABLET ORAL at 04:47

## 2023-01-26 RX ADMIN — OXYCODONE AND ACETAMINOPHEN 2 TABLET: 5; 325 TABLET ORAL at 18:23

## 2023-01-26 RX ADMIN — BISACODYL 5 MG: 5 TABLET ORAL at 04:48

## 2023-01-26 RX ADMIN — HYDROMORPHONE HYDROCHLORIDE 1 MG: 1 INJECTION, SOLUTION INTRAMUSCULAR; INTRAVENOUS; SUBCUTANEOUS at 16:10

## 2023-01-26 RX ADMIN — OXYCODONE AND ACETAMINOPHEN 2 TABLET: 5; 325 TABLET ORAL at 12:58

## 2023-01-26 RX ADMIN — CEFEPIME 2 G: 2 INJECTION, POWDER, FOR SOLUTION INTRAVENOUS at 21:15

## 2023-01-26 RX ADMIN — HYDROMORPHONE HYDROCHLORIDE 1 MG: 1 INJECTION, SOLUTION INTRAMUSCULAR; INTRAVENOUS; SUBCUTANEOUS at 01:53

## 2023-01-26 RX ADMIN — CEFEPIME 2 G: 2 INJECTION, POWDER, FOR SOLUTION INTRAVENOUS at 14:32

## 2023-01-26 RX ADMIN — Medication 10 ML: at 16:03

## 2023-01-26 RX ADMIN — OXYCODONE AND ACETAMINOPHEN 2 TABLET: 5; 325 TABLET ORAL at 23:31

## 2023-01-26 RX ADMIN — BUDESONIDE AND FORMOTEROL FUMARATE DIHYDRATE 2 PUFF: 160; 4.5 AEROSOL RESPIRATORY (INHALATION) at 08:49

## 2023-01-26 RX ADMIN — DOCUSATE SODIUM 50MG AND SENNOSIDES 8.6MG 2 TABLET: 8.6; 5 TABLET, FILM COATED ORAL at 08:57

## 2023-01-26 RX ADMIN — HYDROMORPHONE HYDROCHLORIDE 1 MG: 1 INJECTION, SOLUTION INTRAMUSCULAR; INTRAVENOUS; SUBCUTANEOUS at 10:56

## 2023-01-26 RX ADMIN — HYDROMORPHONE HYDROCHLORIDE 1 MG: 1 INJECTION, SOLUTION INTRAMUSCULAR; INTRAVENOUS; SUBCUTANEOUS at 21:04

## 2023-01-26 RX ADMIN — HYDROMORPHONE HYDROCHLORIDE 1 MG: 1 INJECTION, SOLUTION INTRAMUSCULAR; INTRAVENOUS; SUBCUTANEOUS at 05:54

## 2023-01-27 ENCOUNTER — READMISSION MANAGEMENT (OUTPATIENT)
Dept: CALL CENTER | Facility: HOSPITAL | Age: 48
End: 2023-01-27
Payer: MEDICARE

## 2023-01-27 VITALS
HEART RATE: 90 BPM | SYSTOLIC BLOOD PRESSURE: 90 MMHG | RESPIRATION RATE: 16 BRPM | HEIGHT: 65 IN | TEMPERATURE: 98.4 F | WEIGHT: 131.39 LBS | OXYGEN SATURATION: 93 % | DIASTOLIC BLOOD PRESSURE: 57 MMHG | BODY MASS INDEX: 21.89 KG/M2

## 2023-01-27 LAB
BACTERIA SPEC AEROBE CULT: NORMAL
BACTERIA SPEC AEROBE CULT: NORMAL

## 2023-01-27 PROCEDURE — 94799 UNLISTED PULMONARY SVC/PX: CPT

## 2023-01-27 PROCEDURE — 94761 N-INVAS EAR/PLS OXIMETRY MLT: CPT

## 2023-01-27 PROCEDURE — 25010000002 CEFEPIME PER 500 MG: Performed by: ORTHOPAEDIC SURGERY

## 2023-01-27 PROCEDURE — 94664 DEMO&/EVAL PT USE INHALER: CPT

## 2023-01-27 PROCEDURE — 25010000002 HYDROMORPHONE 1 MG/ML SOLUTION: Performed by: INTERNAL MEDICINE

## 2023-01-27 RX ORDER — POLYETHYLENE GLYCOL 3350 17 G/17G
17 POWDER, FOR SOLUTION ORAL DAILY PRN
Start: 2023-01-27

## 2023-01-27 RX ORDER — PROMETHAZINE HYDROCHLORIDE 12.5 MG/1
12.5 TABLET ORAL EVERY 6 HOURS PRN
Qty: 15 TABLET | Refills: 0 | Status: SHIPPED | OUTPATIENT
Start: 2023-01-27

## 2023-01-27 RX ORDER — AMOXICILLIN 250 MG
2 CAPSULE ORAL 2 TIMES DAILY
Qty: 30 TABLET | Refills: 0 | Status: SHIPPED | OUTPATIENT
Start: 2023-01-27

## 2023-01-27 RX ADMIN — CEFEPIME 2 G: 2 INJECTION, POWDER, FOR SOLUTION INTRAVENOUS at 05:54

## 2023-01-27 RX ADMIN — OXYCODONE AND ACETAMINOPHEN 2 TABLET: 5; 325 TABLET ORAL at 09:56

## 2023-01-27 RX ADMIN — HYDROMORPHONE HYDROCHLORIDE 1 MG: 1 INJECTION, SOLUTION INTRAMUSCULAR; INTRAVENOUS; SUBCUTANEOUS at 08:09

## 2023-01-27 RX ADMIN — BUDESONIDE AND FORMOTEROL FUMARATE DIHYDRATE 2 PUFF: 160; 4.5 AEROSOL RESPIRATORY (INHALATION) at 08:51

## 2023-01-27 RX ADMIN — HYDROMORPHONE HYDROCHLORIDE 1 MG: 1 INJECTION, SOLUTION INTRAMUSCULAR; INTRAVENOUS; SUBCUTANEOUS at 02:10

## 2023-01-27 RX ADMIN — BISACODYL 10 MG: 10 SUPPOSITORY RECTAL at 08:52

## 2023-01-27 RX ADMIN — OXYCODONE AND ACETAMINOPHEN 2 TABLET: 5; 325 TABLET ORAL at 05:51

## 2023-01-28 LAB
BACTERIA SPEC AEROBE CULT: NORMAL
GRAM STN SPEC: NORMAL

## 2023-01-28 NOTE — OUTREACH NOTE
Prep Survey    Flowsheet Row Responses   [facility] patient discharged from? [facility]   Is LACE score < 7 ? No   Eligibility Readm Mgmt   Discharge diagnosis wound infection, Hx right tib/fib ORIF- hardware removal with I & D of bone on 1/25   Does the patient have one of the following disease processes/diagnoses(primary or secondary)? General Surgery   Does the patient have Home health ordered? Yes   What is the Home health agency?  [facility]   Is there a DME ordered? No   Prep survey completed? Yes          JAMAAL ADEN - Registered Nurse

## 2023-01-31 ENCOUNTER — READMISSION MANAGEMENT (OUTPATIENT)
Dept: CALL CENTER | Facility: HOSPITAL | Age: 48
End: 2023-01-31
Payer: MEDICARE

## 2023-01-31 NOTE — OUTREACH NOTE
"General Surgery Week 1 Survey    Flowsheet Row Responses   [facility] patient discharged from? [facility]   Does the patient have one of the following disease processes/diagnoses(primary or secondary)? General Surgery   Week 1 attempt successful? Yes   Call start time 1059   Call end time 1102   Discharge diagnosis wound infection, Hx right tib/fib ORIF- hardware removal with I & D of bone on 1/25   Person spoke with today (if not patient) and relationship Spouse   Medication alerts for this patient IV ATBs Cefepime   Meds reviewed with patient/caregiver? Yes   Is the patient having any side effects they believe may be caused by any medication additions or changes? Yes   Side effects comments   reports pt suspects she has a rash on her face caused by Cefepime--described as \"acne\" appearing. He states she has reported this am but uncertain of who this was reported to at time of call   Does the patient have all medications related to this admission filled (includes all antibiotics, pain medications, etc.) Yes   Is the patient taking all medications as directed (includes completed medication regime)? Yes   Does the patient have a follow up appointment scheduled with their surgeon? Yes   Has the patient kept scheduled appointments due by today? N/A   Comments  reports pt has 3 appts next week scheduled   What is the Home health agency?  [facility] for [facility] abx   Has home health visited the patient within 72 hours of discharge? Yes   Has all DME been delivered? No   Did the patient receive a copy of their discharge instructions? Yes   Nursing interventions Reviewed instructions with patient   What is the patient's perception of their health status since discharge? Improving  [ reports pt is doing well w/exception of rash to face as mentioned.  NO questions at time of call]   Nursing interventions Nurse provided patient education  [Routine post op care reviewed]   Is the " patient /caregiver able to teach back basic post-op care? Lifting as instructed by MD in discharge instructions, No tub bath, swimming, or hot tub until instructed by MD, Keep incision areas clean,dry and protected   Is the patient/caregiver able to teach back signs and symptoms of incisional infection? Increased redness, swelling or pain at the incisonal site, Increased drainage or bleeding, Incisional warmth, Pus or odor from incision, Fever  [No issues with incisions]   Is the patient/caregiver able to teach back steps to recovery at home? Rest and rebuild strength, gradually increase activity   Is the patient/caregiver able to teach back the hierarchy of who to call/visit for symptoms/problems? PCP, Specialist, Home health nurse, Urgent Care, ED, 911 Yes   Week 1 call completed? Yes          PALOMA Santos Registered Nurse

## 2023-02-01 ENCOUNTER — TELEPHONE (OUTPATIENT)
Dept: INFECTIOUS DISEASES | Facility: CLINIC | Age: 48
End: 2023-02-01
Payer: MEDICARE

## 2023-02-01 RX ORDER — FLUCONAZOLE 150 MG/1
150 TABLET ORAL ONCE
Qty: 1 TABLET | Refills: 3 | Status: SHIPPED | OUTPATIENT
Start: 2023-02-01 | End: 2023-04-02

## 2023-02-01 NOTE — TELEPHONE ENCOUNTER
PER MICHELLE: Patient calling c/o rash at IV site and rash over abdomen and back. Itchy and red x 2 days. Also burning with urination and night sweats. Wakes up with clothing wet. Please advise. WESYL, RN

## 2023-03-08 ENCOUNTER — LAB (OUTPATIENT)
Dept: LAB | Facility: HOSPITAL | Age: 48
End: 2023-03-08
Payer: COMMERCIAL

## 2023-03-08 ENCOUNTER — OFFICE VISIT (OUTPATIENT)
Dept: INFECTIOUS DISEASES | Facility: CLINIC | Age: 48
End: 2023-03-08
Payer: MEDICARE

## 2023-03-08 VITALS
BODY MASS INDEX: 22.33 KG/M2 | DIASTOLIC BLOOD PRESSURE: 77 MMHG | SYSTOLIC BLOOD PRESSURE: 118 MMHG | TEMPERATURE: 97.3 F | RESPIRATION RATE: 12 BRPM | HEART RATE: 64 BPM | WEIGHT: 134.2 LBS

## 2023-03-08 DIAGNOSIS — T84.50XD INFECTION OF PROSTHETIC JOINT, SUBSEQUENT ENCOUNTER: Primary | ICD-10-CM

## 2023-03-08 DIAGNOSIS — T84.50XD INFECTION OF PROSTHETIC JOINT, SUBSEQUENT ENCOUNTER: ICD-10-CM

## 2023-03-08 LAB
CRP SERPL-MCNC: <0.3 MG/DL (ref 0–0.5)
ERYTHROCYTE [SEDIMENTATION RATE] IN BLOOD: 2 MM/HR (ref 0–20)

## 2023-03-08 PROCEDURE — 99213 OFFICE O/P EST LOW 20 MIN: CPT | Performed by: INTERNAL MEDICINE

## 2023-03-08 PROCEDURE — 36415 COLL VENOUS BLD VENIPUNCTURE: CPT

## 2023-03-08 PROCEDURE — 86140 C-REACTIVE PROTEIN: CPT

## 2023-03-08 PROCEDURE — 85652 RBC SED RATE AUTOMATED: CPT

## 2023-03-08 NOTE — PROGRESS NOTES
"Reason for clinic visits: Follow up for right lower extremity infection involving hardware.    HPI: Jesus Ovalles is a 47 y.o. female who was last seen in the hospital on January 26.  Since that time she has done well.  Her incisions have healed well.  She reports some mild discomfort in her right ankle but the pain has significantly improved as compared to the time of her surgery.  The incision is well-healed and she denies any erythema swelling or drainage.  She denies any fevers chills or night sweats.  She has tolerated antibiotics without abdominal pain nausea vomiting or diarrhea.    Past Medical History:   Diagnosis Date   • Asthma    • Bipolar affective (HCC)    • COPD (chronic obstructive pulmonary disease) (HCC)    • DDD (degenerative disc disease), cervical    • History of tachycardia     pt flat lined with suicidal attempt- years ago   • Hypertension     pt states with agitation   • PTSD (post-traumatic stress disorder)    • Suicidal risk     pt states \"years ago\"       Past Surgical History:   Procedure Laterality Date   • ANKLE OPEN REDUCTION INTERNAL FIXATION Right 02/11/2022    Procedure: ANKLE OPEN REDUCTION INTERNAL FIXATION;  Surgeon: Maurice Pugh II, MD;  Location: [facility] OR;  Service: Orthopedics;  Laterality: Right;   • CARPAL TUNNEL RELEASE     • DILATATION AND CURETTAGE     • HYSTERECTOMY     • JOINT REPLACEMENT     • SHOULDER ARTHROSCOPY W/ ROTATOR CUFF REPAIR     • TIBIA IM NAILING/RODDING Right 02/11/2022    Procedure: TIBIA INTRAMEDULLARY NAIL/LAURI INSERTION WITH IRRIGATION AND DEBRIDEMENT;  Surgeon: Maurice Pugh II, MD;  Location: [facility] OR;  Service: Orthopedics;  Laterality: Right;   • TIBIA IM NAILING/RODDING Right 08/29/2022    Procedure: REVISION RIGHT TIBIAL NAILING WITH BONE GRAFTING;  Surgeon: Maurice Pugh II, MD;  Location: [facility];  Service: Orthopedics;  Laterality: Right;   • TIBIA IM NAILING/RODDING Right 1/25/2023    " Procedure: TIBIA  FIBULAR HARDWARE REMOVAL WITH IRRIGATION DEBRIDEMENT OF BONE;  Surgeon: Maurice Pugh II, MD;  Location: [facility];  Service: Orthopedics;  Laterality: Right;       Social History   reports that she has been smoking cigarettes. She has been smoking an average of 1 pack per day. She has never used smokeless tobacco. She reports that she does not drink alcohol and does not use drugs.    Family History  family history includes Heart disease in her mother.    Allergies   Allergen Reactions   • Penicillins Anaphylaxis and Swelling     Tolerates cephalosporins    • Vancomycin Rash     Generalized Rashes and severe itching   • Codeine Hives and Itching   • Lyrica [Pregabalin] Swelling   • Buprenorphine Rash       The medication list has been reviewed and updated.     Review of Systems  Pertinent items are noted in HPI, all other systems reviewed and negative    Vital Signs   Temp:  [97.3 °F (36.3 °C)] 97.3 °F (36.3 °C)  Heart Rate:  [64] 64  Resp:  [12] 12  BP: (118)/(77) 118/77    Physical Exam:   General: In no acute distress   Respiratory: Breathing comfortably on room air  Musculoskeletal: Right ankle surgical incisions healed well no erythema purulence or drainage  Skin: No rashes   Neurological: Alert and oriented, moving all 4 extremities  Psychiatric: Normal mood and affect     Lab Results   Component Value Date    WBC 5.35 01/26/2023    HGB 10.0 (L) 01/26/2023    HCT 29.7 (L) 01/26/2023    MCV 91.4 01/26/2023     01/26/2023       Lab Results   Component Value Date    GLUCOSE 90 01/26/2023    BUN 10 01/26/2023    CREATININE 0.76 01/26/2023    EGFRIFNONA 86 02/19/2022    BCR 13.2 01/26/2023    CO2 30.6 (H) 01/26/2023    CALCIUM 8.6 01/26/2023    ALBUMIN 3.4 (L) 01/26/2023    LABIL2 1.8 (H) 03/16/2019    AST 15 01/26/2023    ALT 15 01/26/2023       Lab Results   Component Value Date    SEDRATE 2 03/08/2023       Lab Results   Component Value Date    CRP <0.30 03/08/2023      1/25 OR cx Neg  1/23 R leg wound cx Neg  1/16 R leg wound cx neg     Assessment:  This is a 47 y.o. female who presents to clinic today for follow up of right leg infection involving hardware.  The patient had a right tib-fib fracture and underwent open reduction internal fixation that was complicated by nonunion requiring revision surgery in August 2022.  Unfortunately that was complicated by infection and she underwent hardware removal on January 25, 2022.  Prior cultures from September 2022 grew out Enterobacter and Acinetobacter.  Operative cultures from January were negative but these were done while the patient was on broad-spectrum antibiotics.  At this time the patient has completed a 6-week course of IV cefepime.  Her right leg has healed well without any ongoing signs of infection.  At this time we will pull her PICC line and discontinue IV antibiotics.  I will repeat inflammatory markers as long as those have decreased the patient will not need additional antibiotic therapy.    Plan:   1.  Patient has completed her 6-week course of IV antibiotics  2.  PICC line was pulled in the infectious disease clinic  3.  Obtain an ESR and CRP    Return to Infectious Disease clinic PRN

## 2023-03-13 ENCOUNTER — TELEPHONE (OUTPATIENT)
Dept: INFECTIOUS DISEASES | Facility: CLINIC | Age: 48
End: 2023-03-13
Payer: COMMERCIAL

## 2023-03-13 NOTE — TELEPHONE ENCOUNTER
Per Dr. Sandoval's request, I contacted patient and informed her that per Dr. Sandoval, her markers of inflammation ESR & CRP labs were normal and so no need for additional abx. Patient stated understanding and denied having any questions.    ----- Message from Any Sandoval MD sent at 3/13/2023  9:40 AM EDT -----  Can you please let the patient know that her markers of inflammation ESR and CRP are normal.  No need for additional antibiotics.  Thanks

## 2023-07-02 PROBLEM — B34.8 RHINOVIRUS: Status: ACTIVE | Noted: 2023-07-02

## 2023-07-07 PROBLEM — J18.9 PNEUMONIA: Status: ACTIVE | Noted: 2023-07-07

## 2023-09-11 ENCOUNTER — TRANSCRIBE ORDERS (OUTPATIENT)
Dept: ADMINISTRATIVE | Facility: HOSPITAL | Age: 48
End: 2023-09-11
Payer: MEDICARE

## 2023-09-11 DIAGNOSIS — R91.8 PULMONARY INFILTRATE: Primary | ICD-10-CM

## 2023-10-09 ENCOUNTER — HOSPITAL ENCOUNTER (OUTPATIENT)
Facility: HOSPITAL | Age: 48
Discharge: HOME | End: 2023-10-09
Admitting: INTERNAL MEDICINE
Payer: MEDICARE

## 2023-10-09 DIAGNOSIS — R91.8 PULMONARY INFILTRATE: ICD-10-CM

## 2023-10-09 PROCEDURE — 71250 CT THORAX DX C-: CPT

## 2023-10-16 ENCOUNTER — APPOINTMENT (OUTPATIENT)
Dept: GENERAL RADIOLOGY | Facility: HOSPITAL | Age: 48
DRG: 871 | End: 2023-10-16
Payer: MEDICARE

## 2023-10-16 ENCOUNTER — HOSPITAL ENCOUNTER (INPATIENT)
Facility: HOSPITAL | Age: 48
LOS: 2 days | Discharge: HOME | DRG: 871 | End: 2023-10-19
Attending: EMERGENCY MEDICINE | Admitting: INTERNAL MEDICINE
Payer: MEDICARE

## 2023-10-16 DIAGNOSIS — J18.9 PNEUMONIA OF LEFT LOWER LOBE DUE TO INFECTIOUS ORGANISM: Primary | ICD-10-CM

## 2023-10-16 DIAGNOSIS — A41.9 SEPSIS, DUE TO UNSPECIFIED ORGANISM, UNSPECIFIED WHETHER ACUTE ORGAN DYSFUNCTION PRESENT: ICD-10-CM

## 2023-10-16 LAB
ALBUMIN SERPL-MCNC: 4.4 G/DL (ref 3.5–5.2)
ALBUMIN/GLOB SERPL: 1.8 G/DL
ALP SERPL-CCNC: 83 U/L (ref 39–117)
ALT SERPL W P-5'-P-CCNC: 11 U/L (ref 1–33)
ANION GAP SERPL CALCULATED.3IONS-SCNC: 12 MMOL/L (ref 5–15)
AST SERPL-CCNC: 17 U/L (ref 1–32)
B PARAPERT DNA SPEC QL NAA+PROBE: NOT DETECTED
B PERT DNA SPEC QL NAA+PROBE: NOT DETECTED
BASOPHILS # BLD AUTO: 0.03 10*3/MM3 (ref 0–0.2)
BASOPHILS NFR BLD AUTO: 0.2 % (ref 0–1.5)
BILIRUB SERPL-MCNC: 0.3 MG/DL (ref 0–1.2)
BILIRUB UR QL STRIP: NEGATIVE
BUN SERPL-MCNC: 14 MG/DL (ref 6–20)
BUN/CREAT SERPL: 13.3 (ref 7–25)
C PNEUM DNA NPH QL NAA+NON-PROBE: NOT DETECTED
CALCIUM SPEC-SCNC: 9.5 MG/DL (ref 8.6–10.5)
CHLORIDE SERPL-SCNC: 97 MMOL/L (ref 98–107)
CLARITY UR: CLEAR
CO2 SERPL-SCNC: 25 MMOL/L (ref 22–29)
COLOR UR: YELLOW
CREAT SERPL-MCNC: 1.05 MG/DL (ref 0.57–1)
D-LACTATE SERPL-SCNC: 1 MMOL/L (ref 0.5–2)
DEPRECATED RDW RBC AUTO: 39.4 FL (ref 37–54)
EGFRCR SERPLBLD CKD-EPI 2021: 65.7 ML/MIN/1.73
EOSINOPHIL # BLD AUTO: 0.01 10*3/MM3 (ref 0–0.4)
EOSINOPHIL NFR BLD AUTO: 0.1 % (ref 0.3–6.2)
ERYTHROCYTE [DISTWIDTH] IN BLOOD BY AUTOMATED COUNT: 12.1 % (ref 12.3–15.4)
FLUAV SUBTYP SPEC NAA+PROBE: NOT DETECTED
FLUBV RNA ISLT QL NAA+PROBE: NOT DETECTED
GLOBULIN UR ELPH-MCNC: 2.4 GM/DL
GLUCOSE SERPL-MCNC: 110 MG/DL (ref 65–99)
GLUCOSE UR STRIP-MCNC: NEGATIVE MG/DL
HADV DNA SPEC NAA+PROBE: NOT DETECTED
HCOV 229E RNA SPEC QL NAA+PROBE: NOT DETECTED
HCOV HKU1 RNA SPEC QL NAA+PROBE: NOT DETECTED
HCOV NL63 RNA SPEC QL NAA+PROBE: NOT DETECTED
HCOV OC43 RNA SPEC QL NAA+PROBE: NOT DETECTED
HCT VFR BLD AUTO: 40.9 % (ref 34–46.6)
HGB BLD-MCNC: 14.1 G/DL (ref 12–15.9)
HGB UR QL STRIP.AUTO: NEGATIVE
HMPV RNA NPH QL NAA+NON-PROBE: NOT DETECTED
HPIV1 RNA ISLT QL NAA+PROBE: NOT DETECTED
HPIV2 RNA SPEC QL NAA+PROBE: NOT DETECTED
HPIV3 RNA NPH QL NAA+PROBE: NOT DETECTED
HPIV4 P GENE NPH QL NAA+PROBE: NOT DETECTED
IMM GRANULOCYTES # BLD AUTO: 0.05 10*3/MM3 (ref 0–0.05)
IMM GRANULOCYTES NFR BLD AUTO: 0.4 % (ref 0–0.5)
KETONES UR QL STRIP: NEGATIVE
LEUKOCYTE ESTERASE UR QL STRIP.AUTO: NEGATIVE
LIPASE SERPL-CCNC: 19 U/L (ref 13–60)
LYMPHOCYTES # BLD AUTO: 0.81 10*3/MM3 (ref 0.7–3.1)
LYMPHOCYTES NFR BLD AUTO: 6 % (ref 19.6–45.3)
M PNEUMO IGG SER IA-ACNC: NOT DETECTED
MCH RBC QN AUTO: 30.9 PG (ref 26.6–33)
MCHC RBC AUTO-ENTMCNC: 34.5 G/DL (ref 31.5–35.7)
MCV RBC AUTO: 89.7 FL (ref 79–97)
MONOCYTES # BLD AUTO: 0.52 10*3/MM3 (ref 0.1–0.9)
MONOCYTES NFR BLD AUTO: 3.9 % (ref 5–12)
NEUTROPHILS NFR BLD AUTO: 12.08 10*3/MM3 (ref 1.7–7)
NEUTROPHILS NFR BLD AUTO: 89.4 % (ref 42.7–76)
NITRITE UR QL STRIP: NEGATIVE
NRBC BLD AUTO-RTO: 0 /100 WBC (ref 0–0.2)
PH UR STRIP.AUTO: 8 [PH] (ref 5–8)
PLATELET # BLD AUTO: 264 10*3/MM3 (ref 140–450)
PMV BLD AUTO: 9.2 FL (ref 6–12)
POTASSIUM SERPL-SCNC: 3.9 MMOL/L (ref 3.5–5.2)
PROCALCITONIN SERPL-MCNC: 0.2 NG/ML (ref 0–0.25)
PROT SERPL-MCNC: 6.8 G/DL (ref 6–8.5)
PROT UR QL STRIP: NEGATIVE
RBC # BLD AUTO: 4.56 10*6/MM3 (ref 3.77–5.28)
RHINOVIRUS RNA SPEC NAA+PROBE: NOT DETECTED
RSV RNA NPH QL NAA+NON-PROBE: NOT DETECTED
SARS-COV-2 RNA NPH QL NAA+NON-PROBE: NOT DETECTED
SODIUM SERPL-SCNC: 134 MMOL/L (ref 136–145)
SP GR UR STRIP: 1.01 (ref 1–1.03)
UROBILINOGEN UR QL STRIP: NORMAL
WBC NRBC COR # BLD: 13.5 10*3/MM3 (ref 3.4–10.8)

## 2023-10-16 PROCEDURE — 0202U NFCT DS 22 TRGT SARS-COV-2: CPT | Performed by: EMERGENCY MEDICINE

## 2023-10-16 PROCEDURE — 85025 COMPLETE CBC W/AUTO DIFF WBC: CPT | Performed by: EMERGENCY MEDICINE

## 2023-10-16 PROCEDURE — 25010000002 ENOXAPARIN PER 10 MG: Performed by: INTERNAL MEDICINE

## 2023-10-16 PROCEDURE — 80053 COMPREHEN METABOLIC PANEL: CPT | Performed by: EMERGENCY MEDICINE

## 2023-10-16 PROCEDURE — 83605 ASSAY OF LACTIC ACID: CPT | Performed by: EMERGENCY MEDICINE

## 2023-10-16 PROCEDURE — 25010000002 KETOROLAC TROMETHAMINE PER 15 MG: Performed by: EMERGENCY MEDICINE

## 2023-10-16 PROCEDURE — 25010000002 CEFTRIAXONE PER 250 MG: Performed by: EMERGENCY MEDICINE

## 2023-10-16 PROCEDURE — 25010000002 ONDANSETRON PER 1 MG: Performed by: EMERGENCY MEDICINE

## 2023-10-16 PROCEDURE — 84145 PROCALCITONIN (PCT): CPT | Performed by: EMERGENCY MEDICINE

## 2023-10-16 PROCEDURE — 25810000003 SODIUM CHLORIDE 0.9 % SOLUTION: Performed by: EMERGENCY MEDICINE

## 2023-10-16 PROCEDURE — 83690 ASSAY OF LIPASE: CPT | Performed by: EMERGENCY MEDICINE

## 2023-10-16 PROCEDURE — 99285 EMERGENCY DEPT VISIT HI MDM: CPT

## 2023-10-16 PROCEDURE — G0378 HOSPITAL OBSERVATION PER HR: HCPCS

## 2023-10-16 PROCEDURE — 25810000003 SODIUM CHLORIDE 0.9 % SOLUTION 250 ML FLEX CONT: Performed by: INTERNAL MEDICINE

## 2023-10-16 PROCEDURE — 71045 X-RAY EXAM CHEST 1 VIEW: CPT

## 2023-10-16 PROCEDURE — 87040 BLOOD CULTURE FOR BACTERIA: CPT | Performed by: EMERGENCY MEDICINE

## 2023-10-16 PROCEDURE — 25010000002 AZITHROMYCIN PER 500 MG: Performed by: INTERNAL MEDICINE

## 2023-10-16 PROCEDURE — 81003 URINALYSIS AUTO W/O SCOPE: CPT | Performed by: EMERGENCY MEDICINE

## 2023-10-16 RX ORDER — BUDESONIDE AND FORMOTEROL FUMARATE DIHYDRATE 160; 4.5 UG/1; UG/1
2 AEROSOL RESPIRATORY (INHALATION) 2 TIMES DAILY
Status: DISCONTINUED | OUTPATIENT
Start: 2023-10-16 | End: 2023-10-19 | Stop reason: HOSPADM

## 2023-10-16 RX ORDER — ACETAMINOPHEN 500 MG
1000 TABLET ORAL ONCE
Status: COMPLETED | OUTPATIENT
Start: 2023-10-16 | End: 2023-10-16

## 2023-10-16 RX ORDER — ALBUTEROL SULFATE 2.5 MG/3ML
2.5 SOLUTION RESPIRATORY (INHALATION) EVERY 6 HOURS PRN
Status: DISCONTINUED | OUTPATIENT
Start: 2023-10-16 | End: 2023-10-19 | Stop reason: HOSPADM

## 2023-10-16 RX ORDER — BISACODYL 10 MG
10 SUPPOSITORY, RECTAL RECTAL DAILY PRN
Status: DISCONTINUED | OUTPATIENT
Start: 2023-10-16 | End: 2023-10-19 | Stop reason: HOSPADM

## 2023-10-16 RX ORDER — GABAPENTIN 300 MG/1
300 CAPSULE ORAL 3 TIMES DAILY
Status: DISCONTINUED | OUTPATIENT
Start: 2023-10-16 | End: 2023-10-19 | Stop reason: HOSPADM

## 2023-10-16 RX ORDER — SODIUM CHLORIDE 0.9 % (FLUSH) 0.9 %
10 SYRINGE (ML) INJECTION AS NEEDED
Status: DISCONTINUED | OUTPATIENT
Start: 2023-10-16 | End: 2023-10-19 | Stop reason: HOSPADM

## 2023-10-16 RX ORDER — AMOXICILLIN 250 MG
2 CAPSULE ORAL 2 TIMES DAILY
Status: DISCONTINUED | OUTPATIENT
Start: 2023-10-16 | End: 2023-10-19 | Stop reason: HOSPADM

## 2023-10-16 RX ORDER — ENOXAPARIN SODIUM 100 MG/ML
40 INJECTION SUBCUTANEOUS EVERY 24 HOURS
Status: DISCONTINUED | OUTPATIENT
Start: 2023-10-16 | End: 2023-10-17

## 2023-10-16 RX ORDER — ACETAMINOPHEN 325 MG/1
650 TABLET ORAL EVERY 4 HOURS PRN
Status: DISCONTINUED | OUTPATIENT
Start: 2023-10-16 | End: 2023-10-19 | Stop reason: HOSPADM

## 2023-10-16 RX ORDER — ONDANSETRON 4 MG/1
4 TABLET, FILM COATED ORAL EVERY 6 HOURS PRN
Status: DISCONTINUED | OUTPATIENT
Start: 2023-10-16 | End: 2023-10-19 | Stop reason: HOSPADM

## 2023-10-16 RX ORDER — POLYETHYLENE GLYCOL 3350 17 G/17G
17 POWDER, FOR SOLUTION ORAL DAILY PRN
Status: DISCONTINUED | OUTPATIENT
Start: 2023-10-16 | End: 2023-10-19 | Stop reason: HOSPADM

## 2023-10-16 RX ORDER — GABAPENTIN 300 MG/1
300 CAPSULE ORAL 3 TIMES DAILY
COMMUNITY
End: 2024-05-07

## 2023-10-16 RX ORDER — HYDROCODONE BITARTRATE AND ACETAMINOPHEN 10; 325 MG/1; MG/1
1 TABLET ORAL EVERY 6 HOURS
Status: DISCONTINUED | OUTPATIENT
Start: 2023-10-16 | End: 2023-10-16

## 2023-10-16 RX ORDER — HYDROCODONE BITARTRATE AND ACETAMINOPHEN 10; 325 MG/1; MG/1
1 TABLET ORAL EVERY 6 HOURS PRN
Status: DISCONTINUED | OUTPATIENT
Start: 2023-10-16 | End: 2023-10-19 | Stop reason: HOSPADM

## 2023-10-16 RX ORDER — KETOROLAC TROMETHAMINE 15 MG/ML
15 INJECTION, SOLUTION INTRAMUSCULAR; INTRAVENOUS ONCE
Status: COMPLETED | OUTPATIENT
Start: 2023-10-16 | End: 2023-10-16

## 2023-10-16 RX ORDER — BISACODYL 5 MG/1
5 TABLET, DELAYED RELEASE ORAL DAILY PRN
Status: DISCONTINUED | OUTPATIENT
Start: 2023-10-16 | End: 2023-10-19 | Stop reason: HOSPADM

## 2023-10-16 RX ORDER — GABAPENTIN 300 MG/1
300 CAPSULE ORAL ONCE
Status: COMPLETED | OUTPATIENT
Start: 2023-10-16 | End: 2023-10-16

## 2023-10-16 RX ORDER — ONDANSETRON 2 MG/ML
4 INJECTION INTRAMUSCULAR; INTRAVENOUS ONCE
Status: COMPLETED | OUTPATIENT
Start: 2023-10-16 | End: 2023-10-16

## 2023-10-16 RX ORDER — UREA 10 %
3 LOTION (ML) TOPICAL NIGHTLY PRN
Status: DISCONTINUED | OUTPATIENT
Start: 2023-10-16 | End: 2023-10-19 | Stop reason: HOSPADM

## 2023-10-16 RX ORDER — ONDANSETRON 2 MG/ML
4 INJECTION INTRAMUSCULAR; INTRAVENOUS EVERY 6 HOURS PRN
Status: DISCONTINUED | OUTPATIENT
Start: 2023-10-16 | End: 2023-10-19 | Stop reason: HOSPADM

## 2023-10-16 RX ORDER — IPRATROPIUM BROMIDE AND ALBUTEROL SULFATE 2.5; .5 MG/3ML; MG/3ML
3 SOLUTION RESPIRATORY (INHALATION)
Status: DISCONTINUED | OUTPATIENT
Start: 2023-10-17 | End: 2023-10-17

## 2023-10-16 RX ADMIN — SODIUM CHLORIDE 1000 ML: 9 INJECTION, SOLUTION INTRAVENOUS at 15:45

## 2023-10-16 RX ADMIN — AZITHROMYCIN MONOHYDRATE 500 MG: 500 INJECTION, POWDER, LYOPHILIZED, FOR SOLUTION INTRAVENOUS at 23:29

## 2023-10-16 RX ADMIN — ENOXAPARIN SODIUM 40 MG: 100 INJECTION SUBCUTANEOUS at 22:07

## 2023-10-16 RX ADMIN — SODIUM CHLORIDE 1000 ML: 9 INJECTION, SOLUTION INTRAVENOUS at 18:24

## 2023-10-16 RX ADMIN — KETOROLAC TROMETHAMINE 15 MG: 15 INJECTION, SOLUTION INTRAMUSCULAR; INTRAVENOUS at 15:53

## 2023-10-16 RX ADMIN — ONDANSETRON 4 MG: 2 INJECTION INTRAMUSCULAR; INTRAVENOUS at 15:53

## 2023-10-16 RX ADMIN — HYDROCODONE BITARTRATE AND ACETAMINOPHEN 1 TABLET: 10; 325 TABLET ORAL at 22:08

## 2023-10-16 RX ADMIN — CEFTRIAXONE 2000 MG: 2 INJECTION, POWDER, FOR SOLUTION INTRAMUSCULAR; INTRAVENOUS at 16:29

## 2023-10-16 RX ADMIN — GABAPENTIN 300 MG: 300 CAPSULE ORAL at 22:08

## 2023-10-16 RX ADMIN — ACETAMINOPHEN 1000 MG: 500 TABLET ORAL at 15:47

## 2023-10-16 RX ADMIN — ACETAMINOPHEN 650 MG: 325 TABLET, FILM COATED ORAL at 23:29

## 2023-10-16 NOTE — PROGRESS NOTES
"[facility] Clinical Pharmacy Services: Enoxaparin Consult    Jesus Ovalles has a pharmacy consult to dose prophylactic enoxaparin per Dr. Brooks's request.     Indication: VTE Prophylaxis  Home Anticoagulation: none     Relevant clinical data and objective history reviewed:  48 y.o. female 165.1 cm (65\") 60.8 kg (134 lb)   Body mass index is 22.3 kg/m².   Results from last 7 days   Lab Units 10/16/23  1542   PLATELETS 10*3/mm3 264     Estimated Creatinine Clearance: 62.9 mL/min (A) (by C-G formula based on SCr of 1.05 mg/dL (H)).    Assessment/Plan    Will start patient on 40mg subcutaneous every 24 hours, adjusted for renal function. Consult order will be discontinued but pharmacy will continue to follow.     Lupis Irene, [facility]  Clinical Pharmacist    "

## 2023-10-16 NOTE — ED NOTES
"Nursing report ED to floor  Jesus Ovalles  48 y.o.  female    HPI :   Chief Complaint   Patient presents with    Fever    Abdominal Pain    Altered Mental Status       Admitting doctor:   Jennifer Brooks MD    Admitting diagnosis:   The primary encounter diagnosis was Pneumonia of left lower lobe due to infectious organism. A diagnosis of Sepsis, due to unspecified organism, unspecified whether acute organ dysfunction present was also pertinent to this visit.    Code status:   Current Code Status       Date Active Code Status Order ID Comments User Context       10/16/2023 1823 CPR (Attempt to Resuscitate) [id number]  Jennifer Brooks MD ED        Question Answer    Code Status (Patient has no pulse and is not breathing) CPR (Attempt to Resuscitate)    Medical Interventions (Patient has pulse or is breathing) Full                    Allergies:   Penicillins, Vancomycin, Codeine, Lyrica [pregabalin], and Buprenorphine    Isolation:   No active isolations    Intake and Output  No intake or output data in the 24 hours ending 10/16/23 1824    Weight:       10/16/23  1549   Weight: 60.8 kg (134 lb)       Most recent vitals:   Vitals:    10/16/23 1533 10/16/23 1549 10/16/23 1801 10/16/23 1823   BP: 101/79  94/58    Pulse:   86 86   Resp:       Temp:       TempSrc:       SpO2:    93%   Weight:  60.8 kg (134 lb)     Height:  165.1 cm (65\")         Active LDAs/IV Access:   Lines, Drains & Airways       Active LDAs       Name Placement date Placement time Site Days    Peripheral IV 07/11/23 1223 Anterior;Proximal;Right Forearm 07/11/23  1223  Forearm  97    Peripheral IV 10/16/23 1541 Right Antecubital 10/16/23  1541  Antecubital  less than 1                    Labs (abnormal labs have a star):   Labs Reviewed   COMPREHENSIVE METABOLIC PANEL - Abnormal; Notable for the following components:       Result Value    Glucose 110 (*)     Creatinine 1.05 (*)     Sodium 134 (*)     Chloride 97 (*)     All other components " "within normal limits    Narrative:     GFR Normal >60  Chronic Kidney Disease <60  Kidney Failure <15     CBC WITH AUTO DIFFERENTIAL - Abnormal; Notable for the following components:    WBC 13.50 (*)     RDW 12.1 (*)     Neutrophil % 89.4 (*)     Lymphocyte % 6.0 (*)     Monocyte % 3.9 (*)     Eosinophil % 0.1 (*)     Neutrophils, Absolute 12.08 (*)     All other components within normal limits   RESPIRATORY PANEL PCR W/ COVID-19 (SARS-COV-2) GREGORY/MAYELA/CHARO/PAD/COR/MAD/JEREMY IN-HOUSE, NP SWAB IN UT/VTP, 3-4 HR TAT - Normal    Narrative:     In the setting of a positive respiratory panel with a viral infection PLUS a negative procalcitonin without other underlying concern for bacterial infection, consider observing off antibiotics or discontinuation of antibiotics and continue supportive care. If the respiratory panel is positive for atypical bacterial infection (Bordetella pertussis, Chlamydophila pneumoniae, or Mycoplasma pneumoniae), consider antibiotic de-escalation to target atypical bacterial infection.   LIPASE - Normal   URINALYSIS W/ MICROSCOPIC IF INDICATED (NO CULTURE) - Normal    Narrative:     Urine microscopic not indicated.   LACTIC ACID, PLASMA - Normal   PROCALCITONIN - Normal    Narrative:     As a Marker for Sepsis (Non-Neonates):    1. <0.5 ng/mL represents a low risk of severe sepsis and/or septic shock.  2. >2 ng/mL represents a high risk of severe sepsis and/or septic shock.    As a Marker for Lower Respiratory Tract Infections that require antibiotic therapy:    PCT on Admission    Antibiotic Therapy       6-12 Hrs later    >0.5                Strongly Recommended  >0.25 - <0.5        Recommended   0.1 - 0.25          Discouraged              Remeasure/reassess PCT  <0.1                Strongly Discouraged     Remeasure/reassess PCT    As 28 day mortality risk marker: \"Change in Procalcitonin Result\" (>80% or <=80%) if Day 0 (or Day 1) and Day 4 values are available. Refer to " http://www.[facility]-pct-calculator.com    Change in PCT <=80%  A decrease of PCT levels below or equal to 80% defines a positive change in PCT test result representing a higher risk for 28-day all-cause mortality of patients diagnosed with severe sepsis for septic shock.    Change in PCT >80%  A decrease of PCT levels of more than 80% defines a negative change in PCT result representing a lower risk for 28-day all-cause mortality of patients diagnosed with severe sepsis or septic shock.      BLOOD CULTURE   BLOOD CULTURE   CBC AND DIFFERENTIAL    Narrative:     The following orders were created for panel order CBC & Differential.  Procedure                               Abnormality         Status                     ---------                               -----------         ------                     CBC Auto Differential[[id number]]        Abnormal            Final result                 Please view results for these tests on the individual orders.       EKG:   No orders to display       Meds given in ED:   Medications   sodium chloride 0.9 % flush 10 mL (has no administration in time range)   sodium chloride 0.9 % bolus 1,000 mL (1,000 mL Intravenous New Bag 10/16/23 1824)   acetaminophen (TYLENOL) tablet 650 mg (has no administration in time range)   sennosides-docusate (PERICOLACE) 8.6-50 MG per tablet 2 tablet (has no administration in time range)     And   polyethylene glycol (MIRALAX) packet 17 g (has no administration in time range)     And   bisacodyl (DULCOLAX) EC tablet 5 mg (has no administration in time range)     And   bisacodyl (DULCOLAX) suppository 10 mg (has no administration in time range)   ondansetron (ZOFRAN) tablet 4 mg (has no administration in time range)     Or   ondansetron (ZOFRAN) injection 4 mg (has no administration in time range)   melatonin tablet 3 mg (has no administration in time range)   Pharmacy to Dose enoxaparin (LOVENOX) (has no administration in time range)   acetaminophen  (TYLENOL) tablet 1,000 mg (1,000 mg Oral Given 10/16/23 1547)   sodium chloride 0.9 % bolus 1,000 mL (0 mL Intravenous Stopped 10/16/23 1703)   ketorolac (TORADOL) injection 15 mg (15 mg Intravenous Given 10/16/23 1553)   ondansetron (ZOFRAN) injection 4 mg (4 mg Intravenous Given 10/16/23 1553)   cefTRIAXone (ROCEPHIN) 2,000 mg in sodium chloride 0.9 % 100 mL IVPB-VTB (0 mg Intravenous Stopped 10/16/23 1703)       Imaging results:  XR Chest 1 View    Result Date: 10/16/2023  1. Moderate persistent infiltrate in the left lung base is consistent with atelectasis and/or pneumonia. This is not seen on the 7/2/2023 study. 2. Please correlate with the clinical findings.   This report was finalized on 10/16/2023 4:16 PM by Dr. Adan Garcia M.D on Workstation: [id number]       Ambulatory status:   - Up ad linda    Social issues:   Social History     Socioeconomic History    Marital status:    Tobacco Use    Smoking status: Every Day     Packs/day: 1     Types: Cigarettes    Smokeless tobacco: Never   Vaping Use    Vaping Use: Never used   Substance and Sexual Activity    Alcohol use: Never    Drug use: No    Sexual activity: Defer       NIH Stroke Scale:       William Simmons RN  10/16/23 18:24 EDT

## 2023-10-16 NOTE — ED NOTES
"Pt via PV escorted by family with c/o fever, body aches, chills, abd pain, chest congestion x about a week.   Per  pt has been \"talking out of her head\"  "

## 2023-10-16 NOTE — ED PROVIDER NOTES
EMERGENCY DEPARTMENT ENCOUNTER    Room Number:  10/10  PCP: Aminta Quezada PA-C  Historian: Patient      HPI:  Chief Complaint: Fever, body wide pain  A complete HPI/ROS/PMH/PSH/SH/FH are unobtainable due to: Nothing  Context: Jesus Ovalles is a 48 y.o. female who presents to the ED c/o generalized malaise, fever, cough.  She reports that she started feeling bad 3 to 4 days ago.  Today was the first that she developed fever.  Her family states that when her fever spiked she was, talking out of her head a little bit.  She reports mild lower abdominal pain.  She denies dysuria.  No diarrhea.  She has had a prior hysterectomy.  She takes Norco 10 at home for chronic pain and she took that prior to arrival.  She does report mild headache.  She denies neck pain or neck stiffness.            PAST MEDICAL HISTORY  Active Ambulatory Problems     Diagnosis Date Noted    Asthma 03/02/2018    Chronic obstructive pulmonary disease 03/02/2018    Chronic pain 09/27/2015    Depression 03/24/2015    EMMA (generalized anxiety disorder) 02/10/2022    Hx of suicide attempt 09/27/2015    Tobacco abuse 09/27/2015    Bipolar 1 disorder 02/10/2022    PTSD (post-traumatic stress disorder) 02/10/2022    HLD (hyperlipidemia) 02/10/2022    Type I or II open fracture of right tibia and fibula 02/10/2022    Right leg pain 02/19/2022    Tibia/fibula fracture, shaft, left, closed, with nonunion, subsequent encounter 08/29/2022    Tibial fracture 08/29/2022    Infection due to parainfluenza virus 4 10/12/2022    COPD exacerbation 10/12/2022    Acute bronchitis due to other specified organisms 10/13/2022    Wound infection 01/22/2023    Rhinovirus 07/02/2023    Pneumonia 07/07/2023     Resolved Ambulatory Problems     Diagnosis Date Noted    No Resolved Ambulatory Problems     Past Medical History:   Diagnosis Date    Bipolar affective     COPD (chronic obstructive pulmonary disease)     DDD (degenerative disc disease), cervical     History of  tachycardia     Hypertension     Suicidal risk          PAST SURGICAL HISTORY  Past Surgical History:   Procedure Laterality Date    ANKLE OPEN REDUCTION INTERNAL FIXATION Right 02/11/2022    Procedure: ANKLE OPEN REDUCTION INTERNAL FIXATION;  Surgeon: Maurice Pugh II, MD;  Location: [facility];  Service: Orthopedics;  Laterality: Right;    CARPAL TUNNEL RELEASE      DILATATION AND CURETTAGE      HYSTERECTOMY      JOINT REPLACEMENT      SHOULDER ARTHROSCOPY W/ ROTATOR CUFF REPAIR      TIBIA IM NAILING/RODDING Right 02/11/2022    Procedure: TIBIA INTRAMEDULLARY NAIL/LAURI INSERTION WITH IRRIGATION AND DEBRIDEMENT;  Surgeon: Maurice Pugh II, MD;  Location: [facility] OR;  Service: Orthopedics;  Laterality: Right;    TIBIA IM NAILING/RODDING Right 08/29/2022    Procedure: REVISION RIGHT TIBIAL NAILING WITH BONE GRAFTING;  Surgeon: Maurice Pugh II, MD;  Location: [facility];  Service: Orthopedics;  Laterality: Right;    TIBIA IM NAILING/RODDING Right 1/25/2023    Procedure: TIBIA  FIBULAR HARDWARE REMOVAL WITH IRRIGATION DEBRIDEMENT OF BONE;  Surgeon: Maurice Pugh II, MD;  Location: [facility];  Service: Orthopedics;  Laterality: Right;         FAMILY HISTORY  Family History   Problem Relation Age of Onset    Heart disease Mother     Malig Hyperthermia Neg Hx          SOCIAL HISTORY  Social History     Socioeconomic History    Marital status:    Tobacco Use    Smoking status: Every Day     Packs/day: 1     Types: Cigarettes    Smokeless tobacco: Never   Vaping Use    Vaping Use: Never used   Substance and Sexual Activity    Alcohol use: Never    Drug use: No    Sexual activity: Defer         ALLERGIES  Penicillins, Vancomycin, Codeine, Lyrica [pregabalin], and Buprenorphine        REVIEW OF SYSTEMS  Review of Systems   Review of all 14 systems is negative other than stated in the HPI above.      PHYSICAL EXAM  ED Triage Vitals   Temp Heart Rate Resp BP  SpO2   10/16/23 1524 10/16/23 1524 10/16/23 1524 10/16/23 1533 10/16/23 1524   (!) 103.4 °F (39.7 °C) 112 22 101/79 91 %      Temp src Heart Rate Source Patient Position BP Location FiO2 (%)   10/16/23 1524 -- -- -- --   Tympanic           Physical Exam      GENERAL: Awake and alert, no acute distress  HENT: nares patent, oropharynx clear, neck supple without adenopathy, full range of motion of the neck  EYES: no scleral icterus, pupils 3 mm reactive bilaterally  CV: regular rhythm, normal rate  RESPIRATORY: normal effort, lungs clear to auscultation bilaterally  ABDOMEN: soft, nondistended, nontender throughout  MUSCULOSKELETAL: no deformity  NEURO: alert, moves all extremities, follows commands, cranial nerves II through XII grossly intact, speech fluent and clear  PSYCH:  calm, cooperative  SKIN: warm, dry, no rash    Vital signs and nursing notes reviewed.          LAB RESULTS  Recent Results (from the past 24 hour(s))   Comprehensive Metabolic Panel    Collection Time: 10/16/23  3:42 PM    Specimen: Blood   Result Value Ref Range    Glucose 110 (H) 65 - 99 mg/dL    BUN 14 6 - 20 mg/dL    Creatinine 1.05 (H) 0.57 - 1.00 mg/dL    Sodium 134 (L) 136 - 145 mmol/L    Potassium 3.9 3.5 - 5.2 mmol/L    Chloride 97 (L) 98 - 107 mmol/L    CO2 25.0 22.0 - 29.0 mmol/L    Calcium 9.5 8.6 - 10.5 mg/dL    Total Protein 6.8 6.0 - 8.5 g/dL    Albumin 4.4 3.5 - 5.2 g/dL    ALT (SGPT) 11 1 - 33 U/L    AST (SGOT) 17 1 - 32 U/L    Alkaline Phosphatase 83 39 - 117 U/L    Total Bilirubin 0.3 0.0 - 1.2 mg/dL    Globulin 2.4 gm/dL    A/G Ratio 1.8 g/dL    BUN/Creatinine Ratio 13.3 7.0 - 25.0    Anion Gap 12.0 5.0 - 15.0 mmol/L    eGFR 65.7 >60.0 mL/min/1.73   Lipase    Collection Time: 10/16/23  3:42 PM    Specimen: Blood   Result Value Ref Range    Lipase 19 13 - 60 U/L   CBC Auto Differential    Collection Time: 10/16/23  3:42 PM    Specimen: Blood   Result Value Ref Range    WBC 13.50 (H) 3.40 - 10.80 10*3/mm3    RBC 4.56 3.77 -  5.28 10*6/mm3    Hemoglobin 14.1 12.0 - 15.9 g/dL    Hematocrit 40.9 34.0 - 46.6 %    MCV 89.7 79.0 - 97.0 fL    MCH 30.9 26.6 - 33.0 pg    MCHC 34.5 31.5 - 35.7 g/dL    RDW 12.1 (L) 12.3 - 15.4 %    RDW-SD 39.4 37.0 - 54.0 fl    MPV 9.2 6.0 - 12.0 fL    Platelets 264 140 - 450 10*3/mm3    Neutrophil % 89.4 (H) 42.7 - 76.0 %    Lymphocyte % 6.0 (L) 19.6 - 45.3 %    Monocyte % 3.9 (L) 5.0 - 12.0 %    Eosinophil % 0.1 (L) 0.3 - 6.2 %    Basophil % 0.2 0.0 - 1.5 %    Immature Grans % 0.4 0.0 - 0.5 %    Neutrophils, Absolute 12.08 (H) 1.70 - 7.00 10*3/mm3    Lymphocytes, Absolute 0.81 0.70 - 3.10 10*3/mm3    Monocytes, Absolute 0.52 0.10 - 0.90 10*3/mm3    Eosinophils, Absolute 0.01 0.00 - 0.40 10*3/mm3    Basophils, Absolute 0.03 0.00 - 0.20 10*3/mm3    Immature Grans, Absolute 0.05 0.00 - 0.05 10*3/mm3    nRBC 0.0 0.0 - 0.2 /100 WBC   Procalcitonin    Collection Time: 10/16/23  3:42 PM    Specimen: Blood   Result Value Ref Range    Procalcitonin 0.20 0.00 - 0.25 ng/mL   Urinalysis With Microscopic If Indicated (No Culture) - Urine, Clean Catch    Collection Time: 10/16/23  3:43 PM    Specimen: Urine, Clean Catch   Result Value Ref Range    Color, UA Yellow Yellow, Straw    Appearance, UA Clear Clear    pH, UA 8.0 5.0 - 8.0    Specific Gravity, UA 1.015 1.005 - 1.030    Glucose, UA Negative Negative    Ketones, UA Negative Negative    Bilirubin, UA Negative Negative    Blood, UA Negative Negative    Protein, UA Negative Negative    Leuk Esterase, UA Negative Negative    Nitrite, UA Negative Negative    Urobilinogen, UA 0.2 E.U./dL 0.2 - 1.0 E.U./dL   Lactic Acid, Plasma    Collection Time: 10/16/23  3:55 PM    Specimen: Blood   Result Value Ref Range    Lactate 1.0 0.5 - 2.0 mmol/L   Respiratory Panel PCR w/COVID-19(SARS-CoV-2) GREGORY/MAYELA/CHARO/PAD/COR/MAD/JEREMY In-House, NP Swab in UTM/VTM, 3-4 HR TAT - Swab, Nasopharynx    Collection Time: 10/16/23  3:55 PM    Specimen: Nasopharynx; Swab   Result Value Ref Range     ADENOVIRUS, PCR Not Detected Not Detected    Coronavirus 229E Not Detected Not Detected    Coronavirus HKU1 Not Detected Not Detected    Coronavirus NL63 Not Detected Not Detected    Coronavirus OC43 Not Detected Not Detected    COVID19 Not Detected Not Detected - Ref. Range    Human Metapneumovirus Not Detected Not Detected    Human Rhinovirus/Enterovirus Not Detected Not Detected    Influenza A PCR Not Detected Not Detected    Influenza B PCR Not Detected Not Detected    Parainfluenza Virus 1 Not Detected Not Detected    Parainfluenza Virus 2 Not Detected Not Detected    Parainfluenza Virus 3 Not Detected Not Detected    Parainfluenza Virus 4 Not Detected Not Detected    RSV, PCR Not Detected Not Detected    Bordetella pertussis pcr Not Detected Not Detected    Bordetella parapertussis PCR Not Detected Not Detected    Chlamydophila pneumoniae PCR Not Detected Not Detected    Mycoplasma pneumo by PCR Not Detected Not Detected       Ordered the above labs and reviewed the results.        RADIOLOGY  XR Chest 1 View    Result Date: 10/16/2023  XR CHEST 1 VW-10/16/2023  HISTORY: Fever.  Heart size is within normal limits. There is moderate patchy increased density in the left lung base but this finding is also seen on the previous study of 7/7/2023 but not seen on the 7/2/2023 study. Left upper lung and right lung appear clear.      1. Moderate persistent infiltrate in the left lung base is consistent with atelectasis and/or pneumonia. This is not seen on the 7/2/2023 study. 2. Please correlate with the clinical findings.   This report was finalized on 10/16/2023 4:16 PM by Dr. Adan Garcia M.D on Workstation: [id number]       Ordered the above noted radiological studies. Reviewed by me in PACS.            PROCEDURES  Critical Care    Performed by: Lauri Sanchez MD  Authorized by: Lauri Sanchez MD    Critical care provider statement:     Critical care time (minutes):  33    Critical care time was  exclusive of:  Separately billable procedures and treating other patients    Critical care was necessary to treat or prevent imminent or life-threatening deterioration of the following conditions:  Sepsis    Critical care was time spent personally by me on the following activities:  Discussions with consultants, evaluation of patient's response to treatment, ordering and review of radiographic studies, ordering and review of laboratory studies, ordering and performing treatments and interventions, re-evaluation of patient's condition, review of old charts and examination of patient                MEDICATIONS GIVEN IN ER  Medications   sodium chloride 0.9 % flush 10 mL (has no administration in time range)   acetaminophen (TYLENOL) tablet 650 mg (has no administration in time range)   sennosides-docusate (PERICOLACE) 8.6-50 MG per tablet 2 tablet (has no administration in time range)     And   polyethylene glycol (MIRALAX) packet 17 g (has no administration in time range)     And   bisacodyl (DULCOLAX) EC tablet 5 mg (has no administration in time range)     And   bisacodyl (DULCOLAX) suppository 10 mg (has no administration in time range)   ondansetron (ZOFRAN) tablet 4 mg (has no administration in time range)     Or   ondansetron (ZOFRAN) injection 4 mg (has no administration in time range)   melatonin tablet 3 mg (has no administration in time range)   Pharmacy to Dose enoxaparin (LOVENOX) (has no administration in time range)   Enoxaparin Sodium (LOVENOX) syringe 40 mg (has no administration in time range)   acetaminophen (TYLENOL) tablet 1,000 mg (1,000 mg Oral Given 10/16/23 1547)   sodium chloride 0.9 % bolus 1,000 mL (0 mL Intravenous Stopped 10/16/23 1703)   ketorolac (TORADOL) injection 15 mg (15 mg Intravenous Given 10/16/23 1553)   ondansetron (ZOFRAN) injection 4 mg (4 mg Intravenous Given 10/16/23 1553)   cefTRIAXone (ROCEPHIN) 2,000 mg in sodium chloride 0.9 % 100 mL IVPB-VTB (0 mg Intravenous Stopped  10/16/23 1703)   sodium chloride 0.9 % bolus 1,000 mL ( Intravenous Currently Infusing 10/16/23 1951)                   MEDICAL DECISION MAKING, PROGRESS, and CONSULTS    All labs have been independently reviewed by me.  All radiology studies have been reviewed by me and I have also reviewed the radiology report.   EKG's independently viewed and interpreted by me.  Discussion below represents my analysis of pertinent findings related to patient's condition, differential diagnosis, treatment plan and final disposition.      Additional sources:  - Discussed/ obtained information from independent historians: N/A    - External (non-ED) record review: Prior discharge summary reviewed and summarized below    - Chronic or social conditions impacting care: N/A    - Shared decision making: Patient informed of plan for admission for continued management of pneumonia, sepsis      Orders placed during this visit:  Orders Placed This Encounter   Procedures    Blood Culture - Blood,    Blood Culture - Blood,    Respiratory Panel PCR w/COVID-19(SARS-CoV-2) GREGORY/MAYELA/CHARO/PAD/COR/MAD/JEREMY In-House, NP Swab in UTM/VTM, 3-4 HR TAT - Swab, Nasopharynx    XR Chest 1 View    Comprehensive Metabolic Panel    Lipase    Urinalysis With Microscopic If Indicated (No Culture) - Urine, Clean Catch    CBC Auto Differential    Lactic Acid, Plasma    Procalcitonin    Basic Metabolic Panel    CBC (No Diff)    Diet: Cardiac Diets; Healthy Heart (2-3 Na+); Texture: Regular Texture (IDDSI 7); Fluid Consistency: Thin (IDDSI 0)    Pulse Oximetry, Continuous    Monitor Blood Pressure    Vital Signs    Up with assistance    Daily Weights    Strict Intake & Output    Oral Care    Code Status and Medical Interventions:    LHA (on-call MD unless specified) Details    Insert Peripheral IV    Initiate Observation Status    CBC & Differential         Differential diagnosis includes but is not limited to:    Pneumonia  Cystitis  Pyelonephritis  Viral  syndrome  Sepsis        Independent interpretation of labs, radiology studies, and discussions with consultants:  ED Course as of 10/16/23 2011   Mon Oct 16, 2023   1526 Temp(!): 103.4 °F (39.7 °C) [JR]   1615 Creatinine(!): 1.05 [JR]   1615 Leukocytes, UA: Negative [JR]   1615 Nitrite, UA: Negative [JR]   1615 WBC(!): 13.50 [JR]   1616 Chest x-ray independently interpreted in PACS.  There appears to be patchy infiltrate in the left lung base. [JR]   1628 Procalcitonin: 0.20 [JR]   1759 COVID19: Not Detected [JR]   1807 Record review: I reviewed pulmonology progress note with Dr. Golden from 7/11/2023.  Patient reportedly had pneumonia thought to be secondary to rhinovirus and also haemophilus influenza.  She was to complete a course of cefdinir as outpatient and also azithromycin.  She was supposed to follow-up in 2 months for a CT scan to ensure resolution of dense infiltrate seen on CTA chest. [JR]   1808 Patient's blood pressures have been slightly low as low as the 80s and 90s systolic.  That is despite 1 L of IV fluids.  Have ordered additional liter of fluid.  I recommended admission for continued monitoring.  Patient states that she did have an outpatient CT of the chest last week at an imaging center.  I do not have those results for review.  She also states that she just completed a course of Levaquin about a week ago because she was having a cough. [JR]      ED Course User Index  [JR] Lauri Sanchez MD           DIAGNOSIS  Final diagnoses:   Pneumonia of left lower lobe due to infectious organism   Sepsis, due to unspecified organism, unspecified whether acute organ dysfunction present         DISPOSITION  Admit            Latest Documented Vital Signs:  As of 20:11 EDT  BP- 98/62 HR- 86 Temp- 100.3 °F (37.9 °C) (Tympanic) O2 sat- 96%              --    Please note that portions of this were completed with a voice recognition program.       Note Disclaimer: At [facility], we believe that  sharing information builds trust and better relationships. You are receiving this note because you are receiving care at [facility] or recently visited. It is possible you will see health information before a provider has talked with you about it. This kind of information can be easy to misunderstand. To help you fully understand what it means for your health, we urge you to discuss this note with your provider.             Lauri Sanchez MD  10/16/23 2013

## 2023-10-17 ENCOUNTER — APPOINTMENT (OUTPATIENT)
Dept: CT IMAGING | Facility: HOSPITAL | Age: 48
DRG: 871 | End: 2023-10-17
Payer: MEDICARE

## 2023-10-17 PROBLEM — A41.9 SEPSIS: Status: ACTIVE | Noted: 2023-10-17

## 2023-10-17 LAB
ANION GAP SERPL CALCULATED.3IONS-SCNC: 8.9 MMOL/L (ref 5–15)
BUN SERPL-MCNC: 10 MG/DL (ref 6–20)
BUN/CREAT SERPL: 10.3 (ref 7–25)
CALCIUM SPEC-SCNC: 8.4 MG/DL (ref 8.6–10.5)
CHLORIDE SERPL-SCNC: 102 MMOL/L (ref 98–107)
CO2 SERPL-SCNC: 26.1 MMOL/L (ref 22–29)
CREAT SERPL-MCNC: 0.97 MG/DL (ref 0.57–1)
DEPRECATED RDW RBC AUTO: 38.7 FL (ref 37–54)
EGFRCR SERPLBLD CKD-EPI 2021: 72.2 ML/MIN/1.73
ERYTHROCYTE [DISTWIDTH] IN BLOOD BY AUTOMATED COUNT: 11.9 % (ref 12.3–15.4)
GLUCOSE SERPL-MCNC: 107 MG/DL (ref 65–99)
HCT VFR BLD AUTO: 33.9 % (ref 34–46.6)
HGB BLD-MCNC: 11.4 G/DL (ref 12–15.9)
L PNEUMO1 AG UR QL IA: NEGATIVE
MCH RBC QN AUTO: 30.2 PG (ref 26.6–33)
MCHC RBC AUTO-ENTMCNC: 33.6 G/DL (ref 31.5–35.7)
MCV RBC AUTO: 89.9 FL (ref 79–97)
MRSA DNA SPEC QL NAA+PROBE: NORMAL
PLATELET # BLD AUTO: 210 10*3/MM3 (ref 140–450)
PMV BLD AUTO: 9.6 FL (ref 6–12)
POTASSIUM SERPL-SCNC: 4.1 MMOL/L (ref 3.5–5.2)
RBC # BLD AUTO: 3.77 10*6/MM3 (ref 3.77–5.28)
S PNEUM AG SPEC QL LA: NEGATIVE
SODIUM SERPL-SCNC: 137 MMOL/L (ref 136–145)
WBC NRBC COR # BLD: 10.91 10*3/MM3 (ref 3.4–10.8)

## 2023-10-17 PROCEDURE — 87449 NOS EACH ORGANISM AG IA: CPT | Performed by: INTERNAL MEDICINE

## 2023-10-17 PROCEDURE — 94799 UNLISTED PULMONARY SVC/PX: CPT

## 2023-10-17 PROCEDURE — 94761 N-INVAS EAR/PLS OXIMETRY MLT: CPT

## 2023-10-17 PROCEDURE — 25810000003 SODIUM CHLORIDE 0.9 % SOLUTION: Performed by: INTERNAL MEDICINE

## 2023-10-17 PROCEDURE — 87641 MR-STAPH DNA AMP PROBE: CPT | Performed by: INTERNAL MEDICINE

## 2023-10-17 PROCEDURE — 94640 AIRWAY INHALATION TREATMENT: CPT

## 2023-10-17 PROCEDURE — 36415 COLL VENOUS BLD VENIPUNCTURE: CPT | Performed by: INTERNAL MEDICINE

## 2023-10-17 PROCEDURE — 85027 COMPLETE CBC AUTOMATED: CPT | Performed by: INTERNAL MEDICINE

## 2023-10-17 PROCEDURE — 25010000002 CEFTRIAXONE PER 250 MG: Performed by: INTERNAL MEDICINE

## 2023-10-17 PROCEDURE — 25810000003 SODIUM CHLORIDE 0.9 % SOLUTION 250 ML FLEX CONT: Performed by: INTERNAL MEDICINE

## 2023-10-17 PROCEDURE — 71250 CT THORAX DX C-: CPT

## 2023-10-17 PROCEDURE — 80048 BASIC METABOLIC PNL TOTAL CA: CPT | Performed by: INTERNAL MEDICINE

## 2023-10-17 PROCEDURE — 25010000002 AZITHROMYCIN PER 500 MG: Performed by: INTERNAL MEDICINE

## 2023-10-17 PROCEDURE — 87899 AGENT NOS ASSAY W/OPTIC: CPT | Performed by: INTERNAL MEDICINE

## 2023-10-17 PROCEDURE — 25010000002 ENOXAPARIN PER 10 MG: Performed by: INTERNAL MEDICINE

## 2023-10-17 PROCEDURE — 94760 N-INVAS EAR/PLS OXIMETRY 1: CPT

## 2023-10-17 RX ORDER — IPRATROPIUM BROMIDE AND ALBUTEROL SULFATE 2.5; .5 MG/3ML; MG/3ML
3 SOLUTION RESPIRATORY (INHALATION)
Status: DISCONTINUED | OUTPATIENT
Start: 2023-10-17 | End: 2023-10-19 | Stop reason: HOSPADM

## 2023-10-17 RX ORDER — ACETYLCYSTEINE 200 MG/ML
4 SOLUTION ORAL; RESPIRATORY (INHALATION)
Status: DISCONTINUED | OUTPATIENT
Start: 2023-10-17 | End: 2023-10-19 | Stop reason: HOSPADM

## 2023-10-17 RX ORDER — SODIUM CHLORIDE 9 MG/ML
100 INJECTION, SOLUTION INTRAVENOUS CONTINUOUS
Status: DISCONTINUED | OUTPATIENT
Start: 2023-10-17 | End: 2023-10-18

## 2023-10-17 RX ADMIN — SODIUM CHLORIDE 500 ML: 9 INJECTION, SOLUTION INTRAVENOUS at 11:44

## 2023-10-17 RX ADMIN — IPRATROPIUM BROMIDE AND ALBUTEROL SULFATE 3 ML: 2.5; .5 SOLUTION RESPIRATORY (INHALATION) at 15:38

## 2023-10-17 RX ADMIN — ACETAMINOPHEN 650 MG: 325 TABLET, FILM COATED ORAL at 16:50

## 2023-10-17 RX ADMIN — SODIUM CHLORIDE 100 ML/HR: 9 INJECTION, SOLUTION INTRAVENOUS at 23:51

## 2023-10-17 RX ADMIN — ACETYLCYSTEINE 4 ML: 200 SOLUTION ORAL; RESPIRATORY (INHALATION) at 06:55

## 2023-10-17 RX ADMIN — IPRATROPIUM BROMIDE AND ALBUTEROL SULFATE 3 ML: 2.5; .5 SOLUTION RESPIRATORY (INHALATION) at 11:09

## 2023-10-17 RX ADMIN — GABAPENTIN 300 MG: 300 CAPSULE ORAL at 08:28

## 2023-10-17 RX ADMIN — AZITHROMYCIN MONOHYDRATE 500 MG: 500 INJECTION, POWDER, LYOPHILIZED, FOR SOLUTION INTRAVENOUS at 23:50

## 2023-10-17 RX ADMIN — IPRATROPIUM BROMIDE AND ALBUTEROL SULFATE 3 ML: 2.5; .5 SOLUTION RESPIRATORY (INHALATION) at 06:54

## 2023-10-17 RX ADMIN — BUDESONIDE AND FORMOTEROL FUMARATE DIHYDRATE 2 PUFF: 160; 4.5 AEROSOL RESPIRATORY (INHALATION) at 07:09

## 2023-10-17 RX ADMIN — IPRATROPIUM BROMIDE AND ALBUTEROL SULFATE 3 ML: 2.5; .5 SOLUTION RESPIRATORY (INHALATION) at 19:17

## 2023-10-17 RX ADMIN — HYDROCODONE BITARTRATE AND ACETAMINOPHEN 1 TABLET: 10; 325 TABLET ORAL at 05:19

## 2023-10-17 RX ADMIN — CEFTRIAXONE 2000 MG: 2 INJECTION, POWDER, FOR SOLUTION INTRAMUSCULAR; INTRAVENOUS at 16:17

## 2023-10-17 RX ADMIN — GABAPENTIN 300 MG: 300 CAPSULE ORAL at 21:08

## 2023-10-17 RX ADMIN — ACETYLCYSTEINE 4 ML: 200 SOLUTION ORAL; RESPIRATORY (INHALATION) at 11:09

## 2023-10-17 RX ADMIN — SODIUM CHLORIDE 100 ML/HR: 9 INJECTION, SOLUTION INTRAVENOUS at 07:53

## 2023-10-17 RX ADMIN — BUDESONIDE AND FORMOTEROL FUMARATE DIHYDRATE 2 PUFF: 160; 4.5 AEROSOL RESPIRATORY (INHALATION) at 19:17

## 2023-10-17 RX ADMIN — HYDROCODONE BITARTRATE AND ACETAMINOPHEN 1 TABLET: 10; 325 TABLET ORAL at 18:28

## 2023-10-17 RX ADMIN — ENOXAPARIN SODIUM 40 MG: 100 INJECTION SUBCUTANEOUS at 18:28

## 2023-10-17 RX ADMIN — GABAPENTIN 300 MG: 300 CAPSULE ORAL at 16:17

## 2023-10-17 RX ADMIN — HYDROCODONE BITARTRATE AND ACETAMINOPHEN 1 TABLET: 10; 325 TABLET ORAL at 11:44

## 2023-10-17 RX ADMIN — SODIUM CHLORIDE 1000 ML: 9 INJECTION, SOLUTION INTRAVENOUS at 06:37

## 2023-10-17 RX ADMIN — SODIUM CHLORIDE 100 ML/HR: 9 INJECTION, SOLUTION INTRAVENOUS at 14:47

## 2023-10-17 NOTE — PROGRESS NOTES
CT reviewed and new LLL infiltrate. Likely mucus plugging and may benefit from bronchoscopy.    I have dc Lovenox and made her NPO after midnight for possible bronchoscopy in AM.    Please check with Dr. Oracio Rhodes at 8AM on 10/18/2023 if plan to proceed with bronchoscopy.    Arash Roberts MD  10/17/23  19:32 EDT

## 2023-10-17 NOTE — PROGRESS NOTES
[facility] Medicine Services  PROGRESS NOTE    Patient Name: Jesus Ovalles  : 1975  MRN: [id number]    Date of Admission: 10/16/2023  Primary Care Physician: Aminta Quezada PA-C    Subjective   Subjective     CC:  Follow-up fever    Subjective:  Patient says she is feeling quite a bit better today.  She is now breathing on room air.  She did note some lightheadedness in the night that is improved with several boluses today.  She noted fevers overnight which have now resolved.  Cough present.    Review of Systems  No current fevers or chills  No current shortness of breath or cough  No current nausea, vomiting, or diarrhea  No current chest pain or palpitations      Objective   Objective     Vital Signs:   Temp:  [97.9 °F (36.6 °C)-103.4 °F (39.7 °C)] 98.4 °F (36.9 °C)  Heart Rate:  [] 80  Resp:  [18-24] 18  BP: ()/(53-79) 95/66        Physical Exam:  Constitutional:Awake, alert  HENT: NCAT, mucous membranes moist, neck supple  Respiratory: Cough is present, coarse sounds, no wheezes, borderline tachypnea  Cardiovascular: Pulse rate is normal, normal radial pulses  Gastrointestinal: soft, nontender, nondistended  Musculoskeletal: Normal musculature for age, no lower extremity edema, BMI 22  Psychiatric: Mildly anxious affect, cooperative, conversational  Neurologic: No slurred speech or facial droop, follows commands  Skin: No rashes or jaundice, warm      Results Reviewed:  Results from last 7 days   Lab Units 10/17/23  0639 10/16/23  1542   WBC 10*3/mm3 10.91* 13.50*   HEMOGLOBIN g/dL 11.4* 14.1   HEMATOCRIT % 33.9* 40.9   PLATELETS 10*3/mm3 210 264   PROCALCITONIN ng/mL  --  0.20     Results from last 7 days   Lab Units 10/17/23  0639 10/16/23  1542   SODIUM mmol/L 137 134*   POTASSIUM mmol/L 4.1 3.9   CHLORIDE mmol/L 102 97*   CO2 mmol/L 26.1 25.0   BUN mg/dL 10 14   CREATININE mg/dL 0.97 1.05*   GLUCOSE mg/dL 107* 110*   CALCIUM mg/dL 8.4* 9.5   ALK PHOS U/L  --  83   ALT (SGPT) U/L   --  11   AST (SGOT) U/L  --  17     Estimated Creatinine Clearance: 67.9 mL/min (by C-G formula based on SCr of 0.97 mg/dL).    Microbiology Results Abnormal       Procedure Component Value - Date/Time    MRSA Screen, PCR (Inpatient) - Swab, Nares [[id number]]  (Normal) Collected: 10/17/23 0819    Lab Status: Final result Specimen: Swab from Nares Updated: 10/17/23 0958     MRSA PCR No MRSA Detected    Narrative:      The negative predictive value of this diagnostic test is high and should only be used to consider de-escalating anti-MRSA therapy. A positive result may indicate colonization with MRSA and must be correlated clinically.    Legionella Antigen, Urine - Urine, Urine, Clean Catch [[id number]]  (Normal) Collected: 10/17/23 0819    Lab Status: Final result Specimen: Urine, Clean Catch Updated: 10/17/23 0913     LEGIONELLA ANTIGEN, URINE Negative    S. Pneumo Ag Urine or CSF - Urine, Urine, Clean Catch [[id number]]  (Normal) Collected: 10/17/23 0819    Lab Status: Final result Specimen: Urine, Clean Catch Updated: 10/17/23 0913     Strep Pneumo Ag Negative    Respiratory Panel PCR w/COVID-19(SARS-CoV-2) GREGORY/MAYELA/CHARO/PAD/COR/MAD/JEREMY In-House, NP Swab in UTM/VTM, 3-4 HR TAT - Swab, Nasopharynx [[id number]]  (Normal) Collected: 10/16/23 0735    Lab Status: Final result Specimen: Swab from Nasopharynx Updated: 10/16/23 1650     ADENOVIRUS, PCR Not Detected     Coronavirus 229E Not Detected     Coronavirus HKU1 Not Detected     Coronavirus NL63 Not Detected     Coronavirus OC43 Not Detected     COVID19 Not Detected     Human Metapneumovirus Not Detected     Human Rhinovirus/Enterovirus Not Detected     Influenza A PCR Not Detected     Influenza B PCR Not Detected     Parainfluenza Virus 1 Not Detected     Parainfluenza Virus 2 Not Detected     Parainfluenza Virus 3 Not Detected     Parainfluenza Virus 4 Not Detected     RSV, PCR Not Detected     Bordetella pertussis pcr Not Detected     Bordetella parapertussis PCR Not  Detected     Chlamydophila pneumoniae PCR Not Detected     Mycoplasma pneumo by PCR Not Detected    Narrative:      In the setting of a positive respiratory panel with a viral infection PLUS a negative procalcitonin without other underlying concern for bacterial infection, consider observing off antibiotics or discontinuation of antibiotics and continue supportive care. If the respiratory panel is positive for atypical bacterial infection (Bordetella pertussis, Chlamydophila pneumoniae, or Mycoplasma pneumoniae), consider antibiotic de-escalation to target atypical bacterial infection.            Imaging Results (Last 24 Hours)       Procedure Component Value Units Date/Time    XR Chest 1 View [[id number]] Collected: 10/16/23 1615     Updated: 10/16/23 1619    Narrative:      XR CHEST 1 VW-10/16/2023     HISTORY: Fever.     Heart size is within normal limits. There is moderate patchy increased  density in the left lung base but this finding is also seen on the  previous study of 7/7/2023 but not seen on the 7/2/2023 study. Left  upper lung and right lung appear clear.       Impression:      1. Moderate persistent infiltrate in the left lung base is consistent  with atelectasis and/or pneumonia. This is not seen on the 7/2/2023  study.  2. Please correlate with the clinical findings.        This report was finalized on 10/16/2023 4:16 PM by Dr. Adan Garcia M.D on Workstation: [id number]                   I have reviewed the medications:  Scheduled Meds:acetylcysteine, 4 mL, Nebulization, 4x Daily - RT  azithromycin, 500 mg, Intravenous, Q24H  budesonide-formoterol, 2 puff, Inhalation, BID  cefTRIAXone, 2,000 mg, Intravenous, Q24H  enoxaparin, 40 mg, Subcutaneous, Q24H  gabapentin, 300 mg, Oral, TID  ipratropium-albuterol, 3 mL, Nebulization, 4x Daily - RT  senna-docusate sodium, 2 tablet, Oral, BID  sodium chloride, 500 mL, Intravenous, Once      Continuous Infusions:sodium chloride, 100 mL/hr, Last Rate: 100  mL/hr (10/17/23 0753)      PRN Meds:.  acetaminophen    albuterol    senna-docusate sodium **AND** polyethylene glycol **AND** bisacodyl **AND** bisacodyl    HYDROcodone-acetaminophen    melatonin    ondansetron **OR** ondansetron    [COMPLETED] Insert Peripheral IV **AND** sodium chloride    Assessment & Plan   Assessment & Plan     Active Hospital Problems    Diagnosis  POA    **Pneumonia [J18.9]  Yes    Sepsis [A41.9]  Yes    EMMA (generalized anxiety disorder) [F41.1]  Yes    Bipolar 1 disorder [F31.9]  Yes    Chronic obstructive pulmonary disease [J44.9]  Yes    Asthma [J45.909]  Yes    Chronic pain [G89.29]  Yes    Tobacco use [Z72.0]  Yes      Resolved Hospital Problems   No resolved problems to display.        Brief Hospital Course to date:  Jesus Ovalles is a 48 y.o. female presents the hospital with sepsis secondary to pneumonia with significant hypotension and fevers.    Discussion/plan for today:  All medical issues are new under my management today, vitals, labs, and imaging reviewed.  Chest x-ray shows infiltrate concerning for pneumonia.    Patient has been hypotensive today.  I have given her multiple boluses for low blood pressures as low as the 70s overnight.  She has been in the systolic blood pressure range of the 80s this morning.  After further boluses she is now improved to 95.  Continue IV fluid at 100 mL/h normal saline.  Etiology of hypotension thought to be sepsis.    CT scan ordered for further evaluation of the lungs.  Patient has history of mucous plugging which may be contributing to her illness.  Unclear why patient is having so much issues with recurrent pneumonia.  I am personally concerned patient could be having aspiration pneumonias as she is on multiple sedating medications that could be contributing.    Continue gabapentin and Norco for chronic pain.  No current oversedation noted.  I counseled patient to try to minimize home pain medication is much as possible.  Bipolar  currently appears stable.  Not on current home medications.    Symbicort for COPD and reported history of asthma.  Nebulizers as needed.    Tobacco cessation counseled.  Patient says she is motivated to quit.    DVT Prophylaxis: Lovenox      Disposition: Home in 1 to 2 days.    CODE STATUS:   Code Status and Medical Interventions:   Ordered at: 10/16/23 1823     Code Status (Patient has no pulse and is not breathing):    CPR (Attempt to Resuscitate)     Medical Interventions (Patient has pulse or is breathing):    Full       Rolando Fajardo MD  10/17/23

## 2023-10-17 NOTE — CASE MANAGEMENT/SOCIAL WORK
Discharge Planning Assessment  [facility]     Patient Name: Jesus Ovalles  MRN: [id number]  Today's Date: 10/17/2023    Admit Date: 10/16/2023    Plan: Home with family   Discharge Needs Assessment       Row Name 10/17/23 1555       Living Environment    People in Home spouse    Name(s) of People in Home  and stepson and daughter in law (adults)    Current Living Arrangements home    Potentially Unsafe Housing Conditions none    Primary Care Provided by self    Family Caregiver if Needed child(natalie), adult;spouse    Quality of Family Relationships helpful;involved    Able to Return to Prior Arrangements yes       Resource/Environmental Concerns    Resource/Environmental Concerns none       Transition Planning    Patient/Family Anticipates Transition to home with family    Patient/Family Anticipated Services at Transition none    Transportation Anticipated family or friend will provide       Discharge Needs Assessment    Readmission Within the Last 30 Days no previous admission in last 30 days    Equipment Currently Used at Home none    Concerns to be Addressed no discharge needs identified    Anticipated Changes Related to Illness none    Equipment Needed After Discharge none                   Discharge Plan       Row Name 10/17/23 9478       Plan    Plan Home with family    Patient/Family in Agreement with Plan yes    Plan Comments Spoke with pt at bedside, introduced self and explained CCP role, face sheet and pharmacy verified. Pt lives with her , step-son and daughter in law in 1 level home with 2 steps to enter. She is IADL's, uses no medical equipment, and has no HH or SNF hx, she plans home with  to transport, she has no anticipated needs. CCP will follow - Jesenia S.                  Continued Care and Services - Admitted Since 10/16/2023    Coordination has not been started for this encounter.       Expected Discharge Date and Time       Expected Discharge Date Expected Discharge Time     Oct 20, 2023            Demographic Summary       Row Name 10/17/23 1555       General Information    Admission Type inpatient                   Functional Status       Row Name 10/17/23 1555       Functional Status    Usual Activity Tolerance excellent    Current Activity Tolerance excellent       Assessment of Health Literacy    Health Literacy Excellent       Functional Status, IADL    Medications independent    Meal Preparation independent    Housekeeping independent    Laundry independent    Shopping independent       Mental Status    General Appearance WDL WDL       Mental Status Summary    Recent Changes in Mental Status/Cognitive Functioning no changes                   Psychosocial    No documentation.                  Abuse/Neglect    No documentation.                  Legal       Row Name 10/17/23 1555       Financial/Legal    Who Manages Finances if Patient Unable                    Substance Abuse    No documentation.                  Patient Forms    No documentation.                     Jesenia Byrd RN

## 2023-10-17 NOTE — PLAN OF CARE
Goal Outcome Evaluation:  Plan of Care Reviewed With: patient, spouse        Progress: no change  Outcome Evaluation: Pt A&Ox4, room air- no soa, reports continued pain and headache, prn pain meds given, soft BP with additional bolus of fluids, spouse visited, CT completed this shift, cough continues- congested, MRSA negative, urine negative, iv fluids continuous, assist standby to toilet, iv antibiotics per orders, SR on monitor with vitals as charted

## 2023-10-17 NOTE — PLAN OF CARE
"  Problem: Adult Inpatient Plan of Care   Goal Outcome Evaluation:  Plan of Care Reviewed With: patient           Outcome Evaluation: Pt. presents to the floor with mild pain in midsection and patient states, \"it hurts all over\" at the moment. No shortness of air. Placed on 1L of O2 due to O2 Sats sustaining at 88%. BP's are slightly soft Systolic. Pt. is alert and oriented.  is at bedside and has remained there all night.         "

## 2023-10-17 NOTE — CONSULTS
[facility]  [phone]  Dr. Arash [facility]      Subjective   LOS: 0 days     Thank you for this consultation.  48-year-old female who comes in with cough, congestion, fever.  She is hypoxic on admission and requiring 4 L oxygen.  Treated in July for pneumonia and subsequent CT chest on October 9 showed marked improvement with minimal residual infiltrate in the right middle lobe for which a repeat CT in 3 months was recommended.  Current chest x-ray shows new infiltrate in the left lower lobe.  Patient has been a little confused according to her family but is presently awake alert not able to answer all my questions.  Her temperature was up to 103.1 in the ED.  She is a current smoker though she repeatedly states that she quit smoking on October 3 and cannot understand why she has developed a respiratory infection again.  Patient initially started feeling unwell a week and a half ago.  She went to her PCP and received Levaquin and prednisone.  She continued to have cough and congestion.  Last 2 days she has been feeling worse with persistent cough and now with some left lower rib pain.  Additionally fever as noted in the ED.    Jesus Ovalles  reports no history of alcohol use.,  reports that she has been smoking cigarettes. She has been smoking an average of 1 pack per day. She has never used smokeless tobacco.     Past Hx:  has a past medical history of Asthma, Bipolar affective, COPD (chronic obstructive pulmonary disease), DDD (degenerative disc disease), cervical, History of tachycardia, Hypertension, PTSD (post-traumatic stress disorder), and Suicidal risk.  Surg Hx:  has a past surgical history that includes Carpal tunnel release; Dilation and curettage of uterus; Hysterectomy; Tibia IM Nailing/Rodding (Right, 02/11/2022); Ankle fracture surgery (Right, 02/11/2022); Shoulder arthroscopy w/ rotator cuff repair; Tibia IM Nailing/Rodding (Right, 08/29/2022); Joint replacement; and Tibia IM  Nailing/Rodding (Right, 2023).  FH: family history includes Heart disease in her mother.  SH:  reports that she has been smoking cigarettes. She has been smoking an average of 1 pack per day. She has never used smokeless tobacco. She reports that she does not drink alcohol and does not use drugs.    Medications Prior to Admission   Medication Sig Dispense Refill Last Dose    gabapentin (NEURONTIN) 300 MG capsule Take 1 capsule by mouth 3 (Three) Times a Day.   10/16/2023    HYDROcodone-acetaminophen (Norco)  MG per tablet Take 1 tablet by mouth Every 6 (Six) Hours. 120 tablet 0 10/16/2023    predniSONE (DELTASONE) 20 MG tablet Take 2 tablets by mouth Daily for 2 days, THEN 1 tablet daily for 2 days, THEN 0.5 tablets daily for 2 days, THEN stop 8 tablet 0 10/16/2023    budesonide-formoterol (Symbicort) 160-4.5 MCG/ACT inhaler Inhale 2 puffs 2 (Two) Times a Day. 10.2 g 0      Allergies   Allergen Reactions    Penicillins Anaphylaxis and Swelling     Tolerates cephalosporins     Vancomycin Rash     Generalized Rashes and severe itching    Codeine Hives and Itching    Lyrica [Pregabalin] Swelling    Buprenorphine Rash       Review of Systems   Constitutional:  Positive for chills and fever.   HENT:  Positive for congestion. Negative for sore throat.    Respiratory:  Positive for cough and shortness of breath. Negative for wheezing.    Cardiovascular:  Negative for chest pain and leg swelling.   Gastrointestinal:  Negative for abdominal pain, nausea and vomiting.   Genitourinary:  Negative for dysuria and hematuria.   Musculoskeletal:  Negative for arthralgias and back pain.   Neurological:  Negative for seizures and headaches.   Psychiatric/Behavioral:  Negative for agitation and confusion.        Vital Signs past 24hrs  BP range: BP: ()/(53-79) 89/55  Pulse range: Heart Rate:  [] 99  Resp rate range: Resp:  [18-24] 18  Temp range: Temp (24hrs), Av °F (38.9 °C), Min:100.3 °F (37.9 °C),  Max:103.4 °F (39.7 °C)    Oxygen range: SpO2:  [91 %-96 %] 91 %;  ;   Device (Oxygen Therapy): room air  60.8 kg (134 lb); Body mass index is 22.3 kg/m².  Net IO Since Admission: No IO data has been entered for this period [10/17/23 0534]      Adult female who is sitting up in bed.  She is very upset that she is unwell yet again.  She is on supplemental oxygen.  Well-built well-nourished.  No acute distress.  Pupils equal and react light.  Oropharynx moist class II Mallampati airway.  No posterior pharyngeal discharge.  Nasopharynx without discharge septum midline.  JVP not elevated, trachea midline thyroid not enlarged.  Lungs reveal bilateral air entry.  Somewhat diminished breath sounds and coarse but I did not hear any wheezing.  I could not hear any rales even in the left lower lobe posteriorly.  Percussion note resonant chest expansion equal no chest wall deformity.  Mild tenderness in the left lower rib cage posteriorly.  Heart examination S1-S2 present rhythm regular no murmurs.  No edema lower extremities.  Abdomen is soft nontender bowel sounds present no liver spleen enlargement.  No peripheral cyanosis clubbing.  Moves all 4 extremities 3 motor intact no cervical, axillary, inguinal adenopathy.    Results Review:    I have reviewed the laboratory and imaging data from current admission. My annotations are as noted in assessment and plan.  Result Review:  I have personally reviewed the results from the time of this admission to 10/17/2023 05:34 EDT and agree with these findings:  [x]  Laboratory list / accordion  [x]  Microbiology  [x]  Radiology  [x]  EKG/Telemetry   [x]  Cardiology/Vascular   []  Pathology  [x]  Old records  []  Other:      Medication Review:  I have reviewed the current MAR. My annotations are as noted in assessment and plan.    Pharmacy to Dose enoxaparin (LOVENOX),       Lines, Drains & Airways       Active LDAs       Name Placement date Placement time Site Days    Peripheral IV  07/11/23 1223 Anterior;Proximal;Right Forearm 07/11/23  1223  Forearm  97    Peripheral IV 10/16/23 1541 Right Antecubital 10/16/23  1541  Antecubital  less than 1                  Isolation status: No active isolations    Dietary Orders (From admission, onward)       Start     Ordered    10/16/23 1824  Diet: Cardiac Diets; Healthy Heart (2-3 Na+); Texture: Regular Texture (IDDSI 7); Fluid Consistency: Thin (IDDSI 0)  Diet Effective Now        References:    Diet Order [place]   Question Answer Comment   Diets: Cardiac Diets    Cardiac Diet: Healthy Heart (2-3 Na+)    Texture: Regular Texture (IDDSI 7)    Fluid Consistency: Thin (IDDSI 0)        10/16/23 1823                    Isolation:  No active isolations    PCCM Problems  Acute hypoxic respiratory failure  New left lower lobe infiltrate  Recently treated pneumonia improved on most recent CT chest  COPD  Current cigarette smoker  Bipolar disorder  PTSD  Chronic pain      Plan of Treatment    Continue supplemental oxygen.    New left lower infiltrate noted on chest x-ray despite receiving Levaquin and prednisone outpatient.  Also recent CT chest October 9 showed marked improvement in imaging.  I wonder if she is having mucous plugging.  Her procalcitonin is normal.  I will obtain another CT chest now.  If evidence for mucous plugging then she should be considered for bronchoscopy.  In the meantime agree with treatment with biotics.  She is not wheezing.  Continue nebs and add acetylcysteine to help with clearance of secretions.    Patient states she quit smoking October 3 and has no plans to resume.  This would obviously be the best thing to do.    Patient is on hydrocodone at home for chronic pain.  This can worsen respiratory status.    Electronically signed by Arash Roberts MD, 10/17/23, 5:34 AM EDT.      Part of this note may be an electronic transcription/translation of spoken language to printed text using the Dragon Dictation System.

## 2023-10-18 PROBLEM — T17.500A MUCUS PLUGGING OF BRONCHI: Status: ACTIVE | Noted: 2023-10-18

## 2023-10-18 PROBLEM — T17.998A MUCUS PLUG IN RESPIRATORY TRACT: Status: ACTIVE | Noted: 2023-10-18

## 2023-10-18 LAB
ANION GAP SERPL CALCULATED.3IONS-SCNC: 6.3 MMOL/L (ref 5–15)
BUN SERPL-MCNC: 7 MG/DL (ref 6–20)
BUN/CREAT SERPL: 9.5 (ref 7–25)
CALCIUM SPEC-SCNC: 8.5 MG/DL (ref 8.6–10.5)
CHLORIDE SERPL-SCNC: 110 MMOL/L (ref 98–107)
CO2 SERPL-SCNC: 22.7 MMOL/L (ref 22–29)
CREAT SERPL-MCNC: 0.74 MG/DL (ref 0.57–1)
DEPRECATED RDW RBC AUTO: 37.5 FL (ref 37–54)
EGFRCR SERPLBLD CKD-EPI 2021: 99.9 ML/MIN/1.73
ERYTHROCYTE [DISTWIDTH] IN BLOOD BY AUTOMATED COUNT: 11.7 % (ref 12.3–15.4)
GLUCOSE SERPL-MCNC: 93 MG/DL (ref 65–99)
HCT VFR BLD AUTO: 30.1 % (ref 34–46.6)
HGB BLD-MCNC: 10.3 G/DL (ref 12–15.9)
MCH RBC QN AUTO: 30.8 PG (ref 26.6–33)
MCHC RBC AUTO-ENTMCNC: 34.2 G/DL (ref 31.5–35.7)
MCV RBC AUTO: 90.1 FL (ref 79–97)
PLATELET # BLD AUTO: 159 10*3/MM3 (ref 140–450)
PMV BLD AUTO: 9.7 FL (ref 6–12)
POTASSIUM SERPL-SCNC: 4.1 MMOL/L (ref 3.5–5.2)
RBC # BLD AUTO: 3.34 10*6/MM3 (ref 3.77–5.28)
SODIUM SERPL-SCNC: 139 MMOL/L (ref 136–145)
WBC NRBC COR # BLD: 4.35 10*3/MM3 (ref 3.4–10.8)

## 2023-10-18 PROCEDURE — 25010000002 AZITHROMYCIN PER 500 MG: Performed by: INTERNAL MEDICINE

## 2023-10-18 PROCEDURE — 94664 DEMO&/EVAL PT USE INHALER: CPT

## 2023-10-18 PROCEDURE — 63710000001 PROMETHAZINE PER 12.5 MG: Performed by: INTERNAL MEDICINE

## 2023-10-18 PROCEDURE — 25010000002 CEFTRIAXONE PER 250 MG: Performed by: INTERNAL MEDICINE

## 2023-10-18 PROCEDURE — 80048 BASIC METABOLIC PNL TOTAL CA: CPT | Performed by: INTERNAL MEDICINE

## 2023-10-18 PROCEDURE — 94760 N-INVAS EAR/PLS OXIMETRY 1: CPT

## 2023-10-18 PROCEDURE — 94799 UNLISTED PULMONARY SVC/PX: CPT

## 2023-10-18 PROCEDURE — 25810000003 SODIUM CHLORIDE 0.9 % SOLUTION 250 ML FLEX CONT: Performed by: INTERNAL MEDICINE

## 2023-10-18 PROCEDURE — 85027 COMPLETE CBC AUTOMATED: CPT | Performed by: INTERNAL MEDICINE

## 2023-10-18 PROCEDURE — 25810000003 SODIUM CHLORIDE 0.9 % SOLUTION: Performed by: INTERNAL MEDICINE

## 2023-10-18 PROCEDURE — 94761 N-INVAS EAR/PLS OXIMETRY MLT: CPT

## 2023-10-18 RX ORDER — PROMETHAZINE HYDROCHLORIDE 12.5 MG/1
12.5 TABLET ORAL EVERY 6 HOURS PRN
Status: DISCONTINUED | OUTPATIENT
Start: 2023-10-18 | End: 2023-10-19 | Stop reason: HOSPADM

## 2023-10-18 RX ORDER — SODIUM CHLORIDE 9 MG/ML
75 INJECTION, SOLUTION INTRAVENOUS CONTINUOUS
Status: DISCONTINUED | OUTPATIENT
Start: 2023-10-18 | End: 2023-10-18

## 2023-10-18 RX ORDER — GUAIFENESIN 600 MG/1
1200 TABLET, EXTENDED RELEASE ORAL EVERY 12 HOURS SCHEDULED
Status: DISCONTINUED | OUTPATIENT
Start: 2023-10-18 | End: 2023-10-19 | Stop reason: HOSPADM

## 2023-10-18 RX ADMIN — HYDROCODONE BITARTRATE AND ACETAMINOPHEN 1 TABLET: 10; 325 TABLET ORAL at 13:22

## 2023-10-18 RX ADMIN — BUDESONIDE AND FORMOTEROL FUMARATE DIHYDRATE 2 PUFF: 160; 4.5 AEROSOL RESPIRATORY (INHALATION) at 20:30

## 2023-10-18 RX ADMIN — GABAPENTIN 300 MG: 300 CAPSULE ORAL at 08:17

## 2023-10-18 RX ADMIN — PROMETHAZINE HYDROCHLORIDE 12.5 MG: 12.5 TABLET ORAL at 19:21

## 2023-10-18 RX ADMIN — ACETAMINOPHEN 325 MG: 325 TABLET, FILM COATED ORAL at 13:26

## 2023-10-18 RX ADMIN — HYDROCODONE BITARTRATE AND ACETAMINOPHEN 1 TABLET: 10; 325 TABLET ORAL at 19:21

## 2023-10-18 RX ADMIN — AZITHROMYCIN MONOHYDRATE 500 MG: 500 INJECTION, POWDER, LYOPHILIZED, FOR SOLUTION INTRAVENOUS at 23:55

## 2023-10-18 RX ADMIN — IPRATROPIUM BROMIDE AND ALBUTEROL SULFATE 3 ML: 2.5; .5 SOLUTION RESPIRATORY (INHALATION) at 20:29

## 2023-10-18 RX ADMIN — GABAPENTIN 300 MG: 300 CAPSULE ORAL at 16:41

## 2023-10-18 RX ADMIN — SODIUM CHLORIDE 75 ML/HR: 9 INJECTION, SOLUTION INTRAVENOUS at 09:40

## 2023-10-18 RX ADMIN — ACETAMINOPHEN 650 MG: 325 TABLET, FILM COATED ORAL at 20:17

## 2023-10-18 RX ADMIN — ACETYLCYSTEINE 4 ML: 200 SOLUTION ORAL; RESPIRATORY (INHALATION) at 11:01

## 2023-10-18 RX ADMIN — BUDESONIDE AND FORMOTEROL FUMARATE DIHYDRATE 2 PUFF: 160; 4.5 AEROSOL RESPIRATORY (INHALATION) at 06:23

## 2023-10-18 RX ADMIN — HYDROCODONE BITARTRATE AND ACETAMINOPHEN 1 TABLET: 10; 325 TABLET ORAL at 00:35

## 2023-10-18 RX ADMIN — ACETAMINOPHEN 650 MG: 325 TABLET, FILM COATED ORAL at 02:59

## 2023-10-18 RX ADMIN — GABAPENTIN 300 MG: 300 CAPSULE ORAL at 20:17

## 2023-10-18 RX ADMIN — IPRATROPIUM BROMIDE AND ALBUTEROL SULFATE 3 ML: 2.5; .5 SOLUTION RESPIRATORY (INHALATION) at 06:22

## 2023-10-18 RX ADMIN — CEFTRIAXONE 2000 MG: 2 INJECTION, POWDER, FOR SOLUTION INTRAMUSCULAR; INTRAVENOUS at 16:41

## 2023-10-18 RX ADMIN — HYDROCODONE BITARTRATE AND ACETAMINOPHEN 1 TABLET: 10; 325 TABLET ORAL at 07:32

## 2023-10-18 RX ADMIN — IPRATROPIUM BROMIDE AND ALBUTEROL SULFATE 3 ML: 2.5; .5 SOLUTION RESPIRATORY (INHALATION) at 11:00

## 2023-10-18 NOTE — PLAN OF CARE
Goal Outcome Evaluation:  Plan of Care Reviewed With: patient, spouse        Progress: no change  Outcome Evaluation: Pt A&Ox4, up with assist totoilet, room air, will have bronch tomorrow, NPO at midnight, SR BBB on monitor with vitals as charted, PRN pain meds given, continued with headache this shift, some nausea- prn meds , IV fluids stopped, coughing and reports worse rib pain and headache,

## 2023-10-18 NOTE — PROGRESS NOTES
"  PROGRESS NOTE  Patient Name: Jesus Ovalles  Age/Sex: 48 y.o. female  : 1975  MRN: [id number]    Date of Admission: 10/16/2023  Date of Encounter Visit: 10/18/23   LOS: 1 day   Patient Care Team:  Aminta Quezada PA-C as PCP - General (Internal Medicine)    Chief Complaint: Pneumonia, mucous plugs    Hospital course: Patient was feeling better but still having some cough and still having some musical wheezes.  She did quit smoking around 2 to 3 weeks ago but she was a heavy smoker 2 packs/day for most of her adult life.  She denies any chest pain  She does have some headache from the coughing  No fever or chills  Her labs were reviewed white count is down to normal, hemoglobin is down probably dilutional, chemistry is normal         REVIEW OF SYSTEMS:   CONSTITUTIONAL: no fever or chills  CARDIOVASCULAR: No chest pain, chest pressure or chest discomfort. No palpitations or edema.   RESPIRATORY: Less shortness of breath, on room air but ongoing cough.   GASTROINTESTINAL: No anorexia, nausea, vomiting or diarrhea. No abdominal pain or blood.   HEMATOLOGIC: No bleeding or bruising.     Ventilator/Non-Invasive Ventilation Settings (From admission, onward)      None              Vital Signs  Temp:  [97.9 °F (36.6 °C)-99 °F (37.2 °C)] 98.6 °F (37 °C)  Heart Rate:  [69-87] 80  Resp:  [18] 18  BP: ()/(55-85) 118/78  SpO2:  [92 %-100 %] 96 %  on    Device (Oxygen Therapy): room air    Intake/Output Summary (Last 24 hours) at 10/18/2023 1637  Last data filed at 10/18/2023 0817  Gross per 24 hour   Intake 290 ml   Output --   Net 290 ml     Flowsheet Rows      Flowsheet Row First Filed Value   Admission Height 165.1 cm (65\") Documented at 10/16/2023 1549   Admission Weight 60.8 kg (134 lb) Documented at 10/16/2023 1549          Body mass index is 22.38 kg/m².      10/16/23  1549 10/17/23  0550 10/18/23  0516   Weight: 60.8 kg (134 lb) 60.6 kg (133 lb 9.6 oz) 61 kg (134 lb 7.7 oz)       Physical Exam:  GEN:  No " "acute distress, alert, cooperative, well developed   EYES:   Sclerae clear. No icterus. PERRL. Normal EOM  ENT:   External ears/nose normal, no oral lesions, no thrush, mucous membranes moist  NECK:  Supple, midline trachea, no JVD  LUNGS: Normal chest on inspection, musical wheezing, with improvement post cough and attempting expectoration but did not resolve, slightly asymmetrical more so on the left. Respirations regular, even and unlabored.   CV:  Regular rhythm and rate. Normal S1/S2. No murmurs, gallops, or rubs noted.  ABD:  Soft, nontender and nondistended. Normal bowel sounds. No guarding  EXT:  Moves all extremities well. No cyanosis. No redness. No edema.   Skin: Dry, intact, no bleeding    Results Review:    Results From Last 14 Days   Lab Units 10/16/23  1555   LACTATE mmol/L 1.0     Results from last 7 days   Lab Units 10/18/23  0634 10/17/23  0639 10/16/23  1542   SODIUM mmol/L 139 137 134*   POTASSIUM mmol/L 4.1 4.1 3.9   CHLORIDE mmol/L 110* 102 97*   CO2 mmol/L 22.7 26.1 25.0   BUN mg/dL 7 10 14   CREATININE mg/dL 0.74 0.97 1.05*   CALCIUM mg/dL 8.5* 8.4* 9.5   AST (SGOT) U/L  --   --  17   ALT (SGPT) U/L  --   --  11   ANION GAP mmol/L 6.3 8.9 12.0   ALBUMIN g/dL  --   --  4.4                 Results from last 7 days   Lab Units 10/18/23  0633 10/17/23  0639 10/16/23  1542   WBC 10*3/mm3 4.35 10.91* 13.50*   HEMOGLOBIN g/dL 10.3* 11.4* 14.1   HEMATOCRIT % 30.1* 33.9* 40.9   PLATELETS 10*3/mm3 159 210 264   MCV fL 90.1 89.9 89.7   NEUTROPHIL % %  --   --  89.4*   LYMPHOCYTE % %  --   --  6.0*   MONOCYTES % %  --   --  3.9*   EOSINOPHIL % %  --   --  0.1*   BASOPHIL % %  --   --  0.2   IMM GRAN % %  --   --  0.4                   Invalid input(s): \"LDLCALC\"          No results found for: \"POCGLU\"  Results from last 7 days   Lab Units 10/16/23  1555 10/16/23  1542   PROCALCITONIN ng/mL  --  0.20   LACTATE mmol/L 1.0  --      Results from last 7 days   Lab Units 10/17/23  0819 10/16/23  1619 " 10/16/23  1555   BLOODCX   --  No growth at 2 days No growth at 2 days   STREP PNEUMO AG  Negative  --   --    L. PNEUMOPHILA SEROGP 1 UR AG  Negative  --   --      Results from last 7 days   Lab Units 10/16/23  1543   NITRITE UA  Negative     Results from last 7 days   Lab Units 10/16/23  1555   COVID19  Not Detected   ADENOVIRUS DETECTION BY PCR  Not Detected   CORONAVIRUS 229E  Not Detected   CORONAVIRUS HKU1  Not Detected   CORONAVIRUS NL63  Not Detected   CORONAVIRUS OC43  Not Detected   HUMAN METAPNEUMOVIRUS  Not Detected   HUMAN RHINOVIRUS/ENTEROVIRUS  Not Detected   INFLUENZA B PCR  Not Detected   PARAINFLUENZA 1  Not Detected   PARAINFLUENZA VIRUS 2  Not Detected   PARAINFLUENZA VIRUS 3  Not Detected   PARAINFLUENZA VIRUS 4  Not Detected   BORDETELLA PERTUSSIS PCR  Not Detected   BORDETELLA PARAPERTUSSIS PCR  Not Detected   CHLAMYDOPHILA PNEUMONIAE PCR  Not Detected   MYCOPLAMA PNEUMO PCR  Not Detected   RSV, PCR  Not Detected               Imaging:   Imaging Results (All)       Procedure Component Value Units Date/Time         I reviewed the patient's new clinical results.  I personally viewed and interpreted the patient's imaging results:        Medication Review:   acetylcysteine, 4 mL, Nebulization, 4x Daily - RT  azithromycin, 500 mg, Intravenous, Q24H  budesonide-formoterol, 2 puff, Inhalation, BID  cefTRIAXone, 2,000 mg, Intravenous, Q24H  gabapentin, 300 mg, Oral, TID  guaiFENesin, 1,200 mg, Oral, Q12H  ipratropium-albuterol, 3 mL, Nebulization, 4x Daily - RT  senna-docusate sodium, 2 tablet, Oral, BID             ASSESSMENT:   Acute hypoxic respiratory failure  New left lower lobe infiltrate  Recently treated pneumonia improved on most recent CT chest  COPD  Current cigarette smoker  Bipolar disorder  PTSD  Chronic pain    PLAN:  Patient has significant atelectasis of a atelectasis of some of the subsegment of the left lower lobe with obvious filling defect in the bronchus leading to that  suggestive of mucous plugging.  She is a risk for lung malignancy and she would benefit from bronchoscopy for the duo diagnostic and therapeutic purposes.  Discussed with the patient and she is agreeable, discussed with the family, discussed with the endoscopy suite and we will try to get her on the schedule for tomorrow at 11 in the morning  She is to stay n.p.o. after midnight  Continue with the current antibiotic and continue with the mucolytic therapy and with the bronchodilators  Discussed with Dr. Fajardo  Labs/Notes/films were independently reviewed and pertinent results are summarized above  The copied texts in this note were reviewed and they are accurate as of 10/18/23    Disposition: Home    Oracio Rhodes MD  10/18/23  16:37 EDT           Dictated utilizing Dragon dictation

## 2023-10-18 NOTE — PROGRESS NOTES
[facility]  PROGRESS NOTE    Patient Name: Jesus Ovalles  : 1975  MRN: [id number]    Date of Admission: 10/16/2023  Primary Care Physician: Aminta Quezada PA-C    Subjective   Subjective     CC:  Follow-up fever    Subjective:  Patient continues to have cough with sputum production.  She is eagerly and adamantly wanting to get a bronchoscopy as she feels she benefited from the previous bronchoscopy.  I discussed the risk versus benefits of this procedure and she is very eager and adamant that she would like to have the procedure again.  She says she wants to go home tomorrow.  She denies any other new complaints.      Review of Systems  No current fevers or chills  No current nausea, vomiting, or diarrhea  No current chest pain or palpitations      Objective   Objective     Vital Signs:   Temp:  [97.9 °F (36.6 °C)-99 °F (37.2 °C)] 98.4 °F (36.9 °C)  Heart Rate:  [69-87] 69  Resp:  [18] 18  BP: ()/(55-85) 100/83        Physical Exam:  Constitutional:Awake, alert  HENT: NCAT, mucous membranes moist, neck supple  Respiratory: Cough is present, coarse sounds, no wheezes, borderline tachypnea  Cardiovascular: Pulse rate is normal, normal radial pulses  Gastrointestinal: soft, nontender, nondistended  Musculoskeletal: Normal musculature for age, no lower extremity edema, BMI 22  Psychiatric: Mildly anxious affect, cooperative, conversational  Neurologic: No slurred speech or facial droop, follows commands  Skin: No rashes or jaundice, warm      Results Reviewed:  Results from last 7 days   Lab Units 10/18/23  0633 10/17/23  0639 10/16/23  1542   WBC 10*3/mm3 4.35 10.91* 13.50*   HEMOGLOBIN g/dL 10.3* 11.4* 14.1   HEMATOCRIT % 30.1* 33.9* 40.9   PLATELETS 10*3/mm3 159 210 264   PROCALCITONIN ng/mL  --   --  0.20     Results from last 7 days   Lab Units 10/18/23  0634 10/17/23  0639 10/16/23  1542   SODIUM mmol/L 139 137 134*   POTASSIUM mmol/L 4.1 4.1 3.9   CHLORIDE mmol/L 110* 102  97*   CO2 mmol/L 22.7 26.1 25.0   BUN mg/dL 7 10 14   CREATININE mg/dL 0.74 0.97 1.05*   GLUCOSE mg/dL 93 107* 110*   CALCIUM mg/dL 8.5* 8.4* 9.5   ALK PHOS U/L  --   --  83   ALT (SGPT) U/L  --   --  11   AST (SGOT) U/L  --   --  17     Estimated Creatinine Clearance: 89.5 mL/min (by C-G formula based on SCr of 0.74 mg/dL).    Microbiology Results Abnormal       Procedure Component Value - Date/Time    Blood Culture - Blood, Arm, Left [[id number]]  (Normal) Collected: 10/16/23 1619    Lab Status: Preliminary result Specimen: Blood from Arm, Left Updated: 10/17/23 1645     Blood Culture No growth at 24 hours    Blood Culture - Blood, Arm, Left [[id number]]  (Normal) Collected: 10/16/23 1555    Lab Status: Preliminary result Specimen: Blood from Arm, Left Updated: 10/17/23 1601     Blood Culture No growth at 24 hours    MRSA Screen, PCR (Inpatient) - Swab, Nares [[id number]]  (Normal) Collected: 10/17/23 0819    Lab Status: Final result Specimen: Swab from Nares Updated: 10/17/23 0958     MRSA PCR No MRSA Detected    Narrative:      The negative predictive value of this diagnostic test is high and should only be used to consider de-escalating anti-MRSA therapy. A positive result may indicate colonization with MRSA and must be correlated clinically.    Legionella Antigen, Urine - Urine, Urine, Clean Catch [[id number]]  (Normal) Collected: 10/17/23 0819    Lab Status: Final result Specimen: Urine, Clean Catch Updated: 10/17/23 0913     LEGIONELLA ANTIGEN, URINE Negative    S. Pneumo Ag Urine or CSF - Urine, Urine, Clean Catch [[id number]]  (Normal) Collected: 10/17/23 0819    Lab Status: Final result Specimen: Urine, Clean Catch Updated: 10/17/23 0913     Strep Pneumo Ag Negative    Respiratory Panel PCR w/COVID-19(SARS-CoV-2) GREGORY/MAYELA/CHARO/PAD/COR/MAD/JEREMY In-House, NP Swab in UTM/VTM, 3-4 HR TAT - Swab, Nasopharynx [[id number]]  (Normal) Collected: 10/16/23 1552    Lab Status: Final result Specimen: Swab from Nasopharynx  Updated: 10/16/23 1650     ADENOVIRUS, PCR Not Detected     Coronavirus 229E Not Detected     Coronavirus HKU1 Not Detected     Coronavirus NL63 Not Detected     Coronavirus OC43 Not Detected     COVID19 Not Detected     Human Metapneumovirus Not Detected     Human Rhinovirus/Enterovirus Not Detected     Influenza A PCR Not Detected     Influenza B PCR Not Detected     Parainfluenza Virus 1 Not Detected     Parainfluenza Virus 2 Not Detected     Parainfluenza Virus 3 Not Detected     Parainfluenza Virus 4 Not Detected     RSV, PCR Not Detected     Bordetella pertussis pcr Not Detected     Bordetella parapertussis PCR Not Detected     Chlamydophila pneumoniae PCR Not Detected     Mycoplasma pneumo by PCR Not Detected    Narrative:      In the setting of a positive respiratory panel with a viral infection PLUS a negative procalcitonin without other underlying concern for bacterial infection, consider observing off antibiotics or discontinuation of antibiotics and continue supportive care. If the respiratory panel is positive for atypical bacterial infection (Bordetella pertussis, Chlamydophila pneumoniae, or Mycoplasma pneumoniae), consider antibiotic de-escalation to target atypical bacterial infection.            Imaging Results (Last 24 Hours)       Procedure Component Value Units Date/Time    CT Chest Without Contrast Diagnostic [[id number]] Collected: 10/17/23 1827     Updated: 10/17/23 1850    Narrative:      CHEST CT WITHOUT CONTRAST     HISTORY: Shortness of breath and cough. Follow-up mucous plugging.     TECHNIQUE: Noncontrast chest CT is provided and correlated with chest CT  10/09/2023, chest x-ray 10/16/2023 and CT angiogram chest 07/07/2023.  .     Radiation dose reduction techniques were utilized, including automated  exposure control and exposure modulation based on body size.     FINDINGS: In the interval since October 9, there has been reaccumulation  of extensive secretions in the peripheral lower  lobe bronchial tree with  patchy pneumonia most extensive in the perihilar and medial left lower  lobe but also in the right lower lobe. There did not appear to be any  central obstructing lesion on the October 9 exam. There is some  peripheral secretions and infiltrate in the lingula and the inferomedial  right middle lobe. Minor emphysematous change in the upper lobes. No  lymphadenopathy. Mediastinal vessels are normal in caliber.       Impression:      In the interval since 10/09/2023 there has been  reaccumulation of substantial secretions in the lower lung zones with  patchy pneumonia in both lower lobes, much more extensive on the left  than the right.     This report was finalized on 10/17/2023 6:47 PM by Dr. Harshil Ghosh M.D on Workstation: [id number]                   I have reviewed the medications:  Scheduled Meds:acetylcysteine, 4 mL, Nebulization, 4x Daily - RT  azithromycin, 500 mg, Intravenous, Q24H  budesonide-formoterol, 2 puff, Inhalation, BID  cefTRIAXone, 2,000 mg, Intravenous, Q24H  gabapentin, 300 mg, Oral, TID  ipratropium-albuterol, 3 mL, Nebulization, 4x Daily - RT  senna-docusate sodium, 2 tablet, Oral, BID      Continuous Infusions:sodium chloride, 75 mL/hr, Last Rate: Stopped (10/18/23 1125)      PRN Meds:.  acetaminophen    albuterol    senna-docusate sodium **AND** polyethylene glycol **AND** bisacodyl **AND** bisacodyl    HYDROcodone-acetaminophen    melatonin    ondansetron **OR** ondansetron    [COMPLETED] Insert Peripheral IV **AND** sodium chloride    Assessment & Plan   Assessment & Plan     Active Hospital Problems    Diagnosis  POA    **Pneumonia [J18.9]  Yes    Mucus plugging of bronchi [T17.500A]  Yes    Sepsis [A41.9]  Yes    EMMA (generalized anxiety disorder) [F41.1]  Yes    Bipolar 1 disorder [F31.9]  Yes    HLD (hyperlipidemia) [E78.5]  Yes    Chronic obstructive pulmonary disease [J44.9]  Yes    Asthma [J45.909]  Yes    Chronic pain [G89.29]  Yes    Tobacco use  [Z72.0]  Yes      Resolved Hospital Problems   No resolved problems to display.        Brief Hospital Course to date:  Jesus Ovalles is a 48 y.o. female presents the hospital with sepsis secondary to pneumonia with significant hypotension and fevers.    Discussion/plan for today:  Case discussed with pulmonology today.  They are unable to do bronchoscopy today.  They requested to start a diet and make her n.p.o. at midnight and are planning to do a bronchoscopy early tomorrow morning.  Patient is very adamant she wants this procedure.  I again reiterated that there are risks versus benefits with this procedure but she still feels strongly that she would like to proceed.  Her oxygenation has improved greatly.  Start Mucinex to also help with mucous plugging.  CT images reviewed.     Blood pressure now much improved.  Plan to monitor off IV fluid for the remainder of the day and can restart fluid or bolus if further hypotension were to occur.   Etiology of hypotension thought to be sepsis.    CT scan ordered for further evaluation of the lungs.  Patient has history of mucous plugging which may be contributing to her illness.  Unclear why patient is having so much issues with recurrent pneumonia.  I am personally concerned patient could be having aspiration pneumonias as she is on multiple sedating medications that could be contributing.  Continue antibiotics for pneumonia.  Stop azithromycin after this evening's dose and continue ceftriaxone.  Anticipate patient will discharge on cephalosporin therapy likely with Vantin.    Continue gabapentin and Norco for chronic pain.  No current oversedation noted.  I counseled patient to try to minimize home pain medication is much as possible.  Bipolar currently appears stable.  Not on current home medications.    Symbicort for COPD and reported history of asthma.  Nebulizers as needed.    Tobacco cessation counseled.  Patient says she is motivated to quit.    DVT Prophylaxis:  Lovenox      Disposition: Home likely tomorrow.    CODE STATUS:   Code Status and Medical Interventions:   Ordered at: 10/16/23 1823     Code Status (Patient has no pulse and is not breathing):    CPR (Attempt to Resuscitate)     Medical Interventions (Patient has pulse or is breathing):    Full       Rolando Fajardo MD  10/18/23

## 2023-10-19 ENCOUNTER — ANESTHESIA (OUTPATIENT)
Dept: GASTROENTEROLOGY | Facility: HOSPITAL | Age: 48
End: 2023-10-19
Payer: MEDICARE

## 2023-10-19 ENCOUNTER — ANESTHESIA EVENT (OUTPATIENT)
Dept: GASTROENTEROLOGY | Facility: HOSPITAL | Age: 48
End: 2023-10-19
Payer: MEDICARE

## 2023-10-19 ENCOUNTER — READMISSION MANAGEMENT (OUTPATIENT)
Dept: CALL CENTER | Facility: HOSPITAL | Age: 48
End: 2023-10-19
Payer: MEDICARE

## 2023-10-19 VITALS
TEMPERATURE: 99.1 F | HEART RATE: 88 BPM | BODY MASS INDEX: 23.76 KG/M2 | RESPIRATION RATE: 20 BRPM | SYSTOLIC BLOOD PRESSURE: 117 MMHG | DIASTOLIC BLOOD PRESSURE: 70 MMHG | HEIGHT: 65 IN | OXYGEN SATURATION: 92 % | WEIGHT: 142.6 LBS

## 2023-10-19 PROBLEM — T17.500A MUCUS PLUGGING OF BRONCHI: Status: RESOLVED | Noted: 2023-10-18 | Resolved: 2023-10-19

## 2023-10-19 PROBLEM — A41.9 SEPSIS: Status: RESOLVED | Noted: 2023-10-17 | Resolved: 2023-10-19

## 2023-10-19 LAB
ANION GAP SERPL CALCULATED.3IONS-SCNC: 7.8 MMOL/L (ref 5–15)
BUN SERPL-MCNC: 4 MG/DL (ref 6–20)
BUN/CREAT SERPL: 5.7 (ref 7–25)
CALCIUM SPEC-SCNC: 9.2 MG/DL (ref 8.6–10.5)
CHLORIDE SERPL-SCNC: 105 MMOL/L (ref 98–107)
CO2 SERPL-SCNC: 29.2 MMOL/L (ref 22–29)
CREAT SERPL-MCNC: 0.7 MG/DL (ref 0.57–1)
DEPRECATED RDW RBC AUTO: 38.8 FL (ref 37–54)
EGFRCR SERPLBLD CKD-EPI 2021: 106.8 ML/MIN/1.73
ERYTHROCYTE [DISTWIDTH] IN BLOOD BY AUTOMATED COUNT: 11.8 % (ref 12.3–15.4)
GLUCOSE SERPL-MCNC: 89 MG/DL (ref 65–99)
HCT VFR BLD AUTO: 31.2 % (ref 34–46.6)
HGB BLD-MCNC: 10.5 G/DL (ref 12–15.9)
MCH RBC QN AUTO: 30.5 PG (ref 26.6–33)
MCHC RBC AUTO-ENTMCNC: 33.7 G/DL (ref 31.5–35.7)
MCV RBC AUTO: 90.7 FL (ref 79–97)
PLATELET # BLD AUTO: 199 10*3/MM3 (ref 140–450)
PMV BLD AUTO: 10 FL (ref 6–12)
POTASSIUM SERPL-SCNC: 4 MMOL/L (ref 3.5–5.2)
RBC # BLD AUTO: 3.44 10*6/MM3 (ref 3.77–5.28)
SODIUM SERPL-SCNC: 142 MMOL/L (ref 136–145)
WBC NRBC COR # BLD: 2.21 10*3/MM3 (ref 3.4–10.8)

## 2023-10-19 PROCEDURE — 25810000003 SODIUM CHLORIDE 0.9 % SOLUTION: Performed by: INTERNAL MEDICINE

## 2023-10-19 PROCEDURE — 25010000002 PROPOFOL 10 MG/ML EMULSION: Performed by: ANESTHESIOLOGY

## 2023-10-19 PROCEDURE — 3E1F88Z IRRIGATION OF RESPIRATORY TRACT USING IRRIGATING SUBSTANCE, VIA NATURAL OR ARTIFICIAL OPENING ENDOSCOPIC: ICD-10-PCS | Performed by: INTERNAL MEDICINE

## 2023-10-19 PROCEDURE — 94799 UNLISTED PULMONARY SVC/PX: CPT

## 2023-10-19 PROCEDURE — 85027 COMPLETE CBC AUTOMATED: CPT | Performed by: INTERNAL MEDICINE

## 2023-10-19 PROCEDURE — 87070 CULTURE OTHR SPECIMN AEROBIC: CPT | Performed by: INTERNAL MEDICINE

## 2023-10-19 PROCEDURE — 25010000002 CEFTRIAXONE PER 250 MG: Performed by: INTERNAL MEDICINE

## 2023-10-19 PROCEDURE — 80048 BASIC METABOLIC PNL TOTAL CA: CPT | Performed by: INTERNAL MEDICINE

## 2023-10-19 PROCEDURE — 87205 SMEAR GRAM STAIN: CPT | Performed by: INTERNAL MEDICINE

## 2023-10-19 PROCEDURE — 25810000003 LACTATED RINGERS PER 1000 ML: Performed by: ANESTHESIOLOGY

## 2023-10-19 PROCEDURE — 94761 N-INVAS EAR/PLS OXIMETRY MLT: CPT

## 2023-10-19 PROCEDURE — 94664 DEMO&/EVAL PT USE INHALER: CPT

## 2023-10-19 RX ORDER — FLUCONAZOLE 150 MG/1
150 TABLET ORAL ONCE
Qty: 1 TABLET | Refills: 0 | Status: SHIPPED | OUTPATIENT
Start: 2023-10-19 | End: 2023-10-20

## 2023-10-19 RX ORDER — SODIUM CHLORIDE, SODIUM LACTATE, POTASSIUM CHLORIDE, CALCIUM CHLORIDE 600; 310; 30; 20 MG/100ML; MG/100ML; MG/100ML; MG/100ML
INJECTION, SOLUTION INTRAVENOUS CONTINUOUS PRN
Status: DISCONTINUED | OUTPATIENT
Start: 2023-10-19 | End: 2023-10-19 | Stop reason: SURG

## 2023-10-19 RX ORDER — PROPOFOL 10 MG/ML
VIAL (ML) INTRAVENOUS AS NEEDED
Status: DISCONTINUED | OUTPATIENT
Start: 2023-10-19 | End: 2023-10-19 | Stop reason: SURG

## 2023-10-19 RX ORDER — SODIUM CHLORIDE 9 MG/ML
30 INJECTION, SOLUTION INTRAVENOUS CONTINUOUS PRN
Status: DISCONTINUED | OUTPATIENT
Start: 2023-10-19 | End: 2023-10-19 | Stop reason: HOSPADM

## 2023-10-19 RX ORDER — ACETAMINOPHEN 325 MG/1
650 TABLET ORAL EVERY 6 HOURS PRN
Start: 2023-10-19 | End: 2024-05-07

## 2023-10-19 RX ORDER — CEFPODOXIME PROXETIL 200 MG/1
200 TABLET, FILM COATED ORAL EVERY 12 HOURS
Qty: 8 TABLET | Refills: 0 | Status: SHIPPED | OUTPATIENT
Start: 2023-10-20 | End: 2023-10-24

## 2023-10-19 RX ORDER — LIDOCAINE HYDROCHLORIDE 10 MG/ML
INJECTION, SOLUTION EPIDURAL; INFILTRATION; INTRACAUDAL; PERINEURAL AS NEEDED
Status: DISCONTINUED | OUTPATIENT
Start: 2023-10-19 | End: 2023-10-19 | Stop reason: HOSPADM

## 2023-10-19 RX ADMIN — SODIUM CHLORIDE, POTASSIUM CHLORIDE, SODIUM LACTATE AND CALCIUM CHLORIDE: 600; 310; 30; 20 INJECTION, SOLUTION INTRAVENOUS at 11:09

## 2023-10-19 RX ADMIN — IPRATROPIUM BROMIDE AND ALBUTEROL SULFATE 3 ML: 2.5; .5 SOLUTION RESPIRATORY (INHALATION) at 07:16

## 2023-10-19 RX ADMIN — HYDROCODONE BITARTRATE AND ACETAMINOPHEN 1 TABLET: 10; 325 TABLET ORAL at 05:35

## 2023-10-19 RX ADMIN — PROPOFOL 200 MCG/KG/MIN: 10 INJECTION, EMULSION INTRAVENOUS at 11:21

## 2023-10-19 RX ADMIN — PROPOFOL 150 MG: 10 INJECTION, EMULSION INTRAVENOUS at 11:21

## 2023-10-19 RX ADMIN — BUDESONIDE AND FORMOTEROL FUMARATE DIHYDRATE 2 PUFF: 160; 4.5 AEROSOL RESPIRATORY (INHALATION) at 07:16

## 2023-10-19 RX ADMIN — CEFTRIAXONE 2000 MG: 2 INJECTION, POWDER, FOR SOLUTION INTRAMUSCULAR; INTRAVENOUS at 14:02

## 2023-10-19 RX ADMIN — SODIUM CHLORIDE 30 ML/HR: 9 INJECTION, SOLUTION INTRAVENOUS at 10:54

## 2023-10-19 RX ADMIN — HYDROCODONE BITARTRATE AND ACETAMINOPHEN 1 TABLET: 10; 325 TABLET ORAL at 12:34

## 2023-10-19 RX ADMIN — ALBUTEROL SULFATE 2.5 MG: 2.5 SOLUTION RESPIRATORY (INHALATION) at 12:02

## 2023-10-19 NOTE — PROGRESS NOTES
Seen and examined for her bronchoscopy  Patient is still having cough with productive secretions, wheezing and chest tightness  She has been n.p.o. since last night  No contraindication to proceed with the bronchoscopy is planned

## 2023-10-19 NOTE — ANESTHESIA PROCEDURE NOTES
Airway  Urgency: elective    Date/Time: 10/19/2023 11:24 AM    General Information and Staff    Patient location during procedure: OR  Anesthesiologist: Render, John Ray, MD    Indications and Patient Condition  Indications for airway management: airway protection    Preoxygenated: yes  Mask difficulty assessment: 1 - vent by mask    Final Airway Details  Final airway type: supraglottic airway      Successful airway: classic  Size 3

## 2023-10-19 NOTE — DISCHARGE SUMMARY
[facility]  DISCHARGE SUMMARY    Patient Name: Jesus Ovalles  : 1975  MRN: [id number]    Date of Admission: 10/16/2023  3:25 PM  Date of Discharge: 10/19/2023  Primary Care Physician: Aminta Quezada PA-C    Consults       Date and Time Order Name Status Description    10/16/2023 10:52 PM Inpatient Pulmonology Consult Completed     10/16/2023  6:07 PM A (on-call MD unless specified) Details              Hospital Course       Active Hospital Problems    Diagnosis  POA    **Pneumonia [J18.9]  Yes    EMMA (generalized anxiety disorder) [F41.1]  Yes    Bipolar 1 disorder [F31.9]  Yes    HLD (hyperlipidemia) [E78.5]  Yes    Chronic obstructive pulmonary disease [J44.9]  Yes    Asthma [J45.909]  Yes    Chronic pain [G89.29]  Yes    Tobacco use [Z72.0]  Yes      Resolved Hospital Problems    Diagnosis Date Resolved POA    Mucus plugging of bronchi [T17.500A] 10/19/2023 Yes    Sepsis [A41.9] 10/19/2023 Yes          Hospital Course:  Jesus Ovalles is a 48 y.o. female presents to the hospital with pneumonia and borderline hypoxia and was found on CT imaging to have mucous plugging.  Patient has underlying COPD and reported history of asthma with ongoing recent tobacco use.  Tobacco cessation was counseled and patient says she is motivated to quit.  She responded very well to cephalosporin therapy will transition to oral cephalosporin therapy at discharge.  Pulmonology evaluated and based on CT scan finding recommended she complete a bronchoscopy for her mucous plugging before she leaves the hospital as they feel this will greatly improve her symptoms.  I discussed case with pulmonology who has cleared her to discharge today.  Patient is eager to leave and feels substantially better after her inpatient treatment.  Also of note patient had intermittent episodes of hypotension early in the hospital stay which has resolved.  Patient is also very particular about the medications that she takes.   She reports she is not willing to take albuterol or Mucinex due to headaches but agrees to take Symbicort at discharge.    At the time of discharge patient was told to take all medications as prescribed, keep all follow-up appointments, and call their doctor or return to the hospital with any worsening or concerning symptoms.    Please note that this note was made using Dragon voice recognition software            Day of Discharge     Subjective:  Patient says she feels great.  She is still having some productive sputum and feels the bronchoscopy will help her today.  She is eager to leave the hospital and denies any new complaints.  She is currently 93 to 98% on room air oxygen.    Vital Signs:   Temp:  [98.1 °F (36.7 °C)-99.3 °F (37.4 °C)] 98.1 °F (36.7 °C)  Heart Rate:  [67-80] 68  Resp:  [14-18] 14  BP: ()/(61-78) 108/68     Physical Exam:    Constitutional:Awake, alert  HENT: NCAT, mucous membranes moist, neck supple  Respiratory: Cough is mostly resolved, no wheezes, coarse sounds resolved, breathing is nonlabored on room air  Cardiovascular: Pulse rate is normal, normal radial pulses  Gastrointestinal: soft, nontender, nondistended  Musculoskeletal: Normal musculature for age, no lower extremity edema, BMI 22  Psychiatric: Normal affect, cooperative, conversational  Neurologic: No slurred speech or facial droop, follows commands  Skin: No rashes or jaundice, warm    Pertinent  and/or Most Recent Results     Results from last 7 days   Lab Units 10/19/23  0628 10/18/23  0634 10/18/23  0633 10/17/23  0639 10/16/23  1542   WBC 10*3/mm3 2.21*  --  4.35 10.91* 13.50*   HEMOGLOBIN g/dL 10.5*  --  10.3* 11.4* 14.1   HEMATOCRIT % 31.2*  --  30.1* 33.9* 40.9   PLATELETS 10*3/mm3 199  --  159 210 264   SODIUM mmol/L 142 139  --  137 134*   POTASSIUM mmol/L 4.0 4.1  --  4.1 3.9   CHLORIDE mmol/L 105 110*  --  102 97*   CO2 mmol/L 29.2* 22.7  --  26.1 25.0   BUN mg/dL 4* 7  --  10 14   CREATININE mg/dL 0.70 0.74  --  " 0.97 1.05*   GLUCOSE mg/dL 89 93  --  107* 110*   CALCIUM mg/dL 9.2 8.5*  --  8.4* 9.5     Results from last 7 days   Lab Units 10/16/23  1542   BILIRUBIN mg/dL 0.3   ALK PHOS U/L 83   ALT (SGPT) U/L 11   AST (SGOT) U/L 17           Invalid input(s): \"TG\", \"LDLCALC\", \"LDLREALC\"  Results from last 7 days   Lab Units 10/16/23  1555 10/16/23  1542   PROCALCITONIN ng/mL  --  0.20   LACTATE mmol/L 1.0  --        Brief Urine Lab Results  (Last result in the past 365 days)        Color   Clarity   Blood   Leuk Est   Nitrite   Protein   CREAT   Urine HCG        10/16/23 1543 Yellow   Clear   Negative   Negative   Negative   Negative                   Microbiology Results Abnormal       Procedure Component Value - Date/Time    Blood Culture - Blood, Arm, Left [[id number]]  (Normal) Collected: 10/16/23 1619    Lab Status: Preliminary result Specimen: Blood from Arm, Left Updated: 10/18/23 1632     Blood Culture No growth at 2 days    Blood Culture - Blood, Arm, Left [[id number]]  (Normal) Collected: 10/16/23 1555    Lab Status: Preliminary result Specimen: Blood from Arm, Left Updated: 10/18/23 1615     Blood Culture No growth at 2 days    MRSA Screen, PCR (Inpatient) - Swab, Nares [[id number]]  (Normal) Collected: 10/17/23 0819    Lab Status: Final result Specimen: Swab from Nares Updated: 10/17/23 0958     MRSA PCR No MRSA Detected    Narrative:      The negative predictive value of this diagnostic test is high and should only be used to consider de-escalating anti-MRSA therapy. A positive result may indicate colonization with MRSA and must be correlated clinically.    Legionella Antigen, Urine - Urine, Urine, Clean Catch [[id number]]  (Normal) Collected: 10/17/23 0819    Lab Status: Final result Specimen: Urine, Clean Catch Updated: 10/17/23 0913     LEGIONELLA ANTIGEN, URINE Negative    S. Pneumo Ag Urine or CSF - Urine, Urine, Clean Catch [[id number]]  (Normal) Collected: 10/17/23 0819    Lab Status: Final result Specimen: " Urine, Clean Catch Updated: 10/17/23 0913     Strep Pneumo Ag Negative    Respiratory Panel PCR w/COVID-19(SARS-CoV-2) GREGORY/MAYELA/CHARO/PAD/COR/MAD/JEREMY In-House, NP Swab in UTM/VTM, 3-4 HR TAT - Swab, Nasopharynx [[id number]]  (Normal) Collected: 10/16/23 1551    Lab Status: Final result Specimen: Swab from Nasopharynx Updated: 10/16/23 1650     ADENOVIRUS, PCR Not Detected     Coronavirus 229E Not Detected     Coronavirus HKU1 Not Detected     Coronavirus NL63 Not Detected     Coronavirus OC43 Not Detected     COVID19 Not Detected     Human Metapneumovirus Not Detected     Human Rhinovirus/Enterovirus Not Detected     Influenza A PCR Not Detected     Influenza B PCR Not Detected     Parainfluenza Virus 1 Not Detected     Parainfluenza Virus 2 Not Detected     Parainfluenza Virus 3 Not Detected     Parainfluenza Virus 4 Not Detected     RSV, PCR Not Detected     Bordetella pertussis pcr Not Detected     Bordetella parapertussis PCR Not Detected     Chlamydophila pneumoniae PCR Not Detected     Mycoplasma pneumo by PCR Not Detected    Narrative:      In the setting of a positive respiratory panel with a viral infection PLUS a negative procalcitonin without other underlying concern for bacterial infection, consider observing off antibiotics or discontinuation of antibiotics and continue supportive care. If the respiratory panel is positive for atypical bacterial infection (Bordetella pertussis, Chlamydophila pneumoniae, or Mycoplasma pneumoniae), consider antibiotic de-escalation to target atypical bacterial infection.            Imaging Results (All)       Procedure Component Value Units Date/Time    CT Chest Without Contrast Diagnostic [[id number]] Collected: 10/17/23 1827     Updated: 10/17/23 1850    Narrative:      CHEST CT WITHOUT CONTRAST     HISTORY: Shortness of breath and cough. Follow-up mucous plugging.     TECHNIQUE: Noncontrast chest CT is provided and correlated with chest CT  10/09/2023, chest x-ray  10/16/2023 and CT angiogram chest 07/07/2023.  .     Radiation dose reduction techniques were utilized, including automated  exposure control and exposure modulation based on body size.     FINDINGS: In the interval since October 9, there has been reaccumulation  of extensive secretions in the peripheral lower lobe bronchial tree with  patchy pneumonia most extensive in the perihilar and medial left lower  lobe but also in the right lower lobe. There did not appear to be any  central obstructing lesion on the October 9 exam. There is some  peripheral secretions and infiltrate in the lingula and the inferomedial  right middle lobe. Minor emphysematous change in the upper lobes. No  lymphadenopathy. Mediastinal vessels are normal in caliber.       Impression:      In the interval since 10/09/2023 there has been  reaccumulation of substantial secretions in the lower lung zones with  patchy pneumonia in both lower lobes, much more extensive on the left  than the right.     This report was finalized on 10/17/2023 6:47 PM by Dr. Harshil Ghosh M.D on Workstation: [id number]       XR Chest 1 View [[id number]] Collected: 10/16/23 1615     Updated: 10/16/23 1619    Narrative:      XR CHEST 1 VW-10/16/2023     HISTORY: Fever.     Heart size is within normal limits. There is moderate patchy increased  density in the left lung base but this finding is also seen on the  previous study of 7/7/2023 but not seen on the 7/2/2023 study. Left  upper lung and right lung appear clear.       Impression:      1. Moderate persistent infiltrate in the left lung base is consistent  with atelectasis and/or pneumonia. This is not seen on the 7/2/2023  study.  2. Please correlate with the clinical findings.        This report was finalized on 10/16/2023 4:16 PM by Dr. Adan Garcia M.D on Workstation: [id number]               Results for orders placed during the hospital encounter of 07/07/23    Duplex Venous Lower Extremity - Bilateral  CAR    Interpretation Summary    Normal bilateral lower extremity venous duplex scan.      Results for orders placed during the hospital encounter of 07/07/23    Duplex Venous Lower Extremity - Bilateral CAR    Interpretation Summary    Normal bilateral lower extremity venous duplex scan.          Discharge Details        Discharge Medications        New Medications        Instructions Start Date   acetaminophen 325 MG tablet  Commonly known as: TYLENOL   650 mg, Oral, Every 6 Hours PRN      cefpodoxime 200 MG tablet  Commonly known as: VANTIN   200 mg, Oral, Every 12 Hours   Start Date: October 20, 2023            Continue These Medications        Instructions Start Date   gabapentin 300 MG capsule  Commonly known as: NEURONTIN   300 mg, Oral, 3 Times Daily      HYDROcodone-acetaminophen  MG per tablet  Commonly known as: Norco   1 tablet, Oral, Every 6 Hours      Symbicort 160-4.5 MCG/ACT inhaler  Generic drug: budesonide-formoterol   2 puffs, Inhalation, 2 Times Daily             Stop These Medications      predniSONE 20 MG tablet  Commonly known as: DELTASONE              Allergies   Allergen Reactions    Penicillins Anaphylaxis and Swelling     Tolerates cephalosporins     Vancomycin Rash     Generalized Rashes and severe itching    Codeine Hives and Itching    Lyrica [Pregabalin] Swelling    Buprenorphine Rash         Discharge Disposition:  Home or Self Care    Diet:  Hospital:  Diet Order   Procedures    NPO Diet NPO Type: Strict NPO       Activity:  Activity Instructions       Activity as Tolerated                   CODE STATUS:    Code Status and Medical Interventions:   Ordered at: 10/16/23 1823     Code Status (Patient has no pulse and is not breathing):    CPR (Attempt to Resuscitate)     Medical Interventions (Patient has pulse or is breathing):    Full       Additional Instructions for the Follow-ups that You Need to Schedule       Discharge Follow-up with PCP   As directed       Currently  Documented PCP:    Aminta Quezada PA-C    PCP Phone Number:    None     Follow Up Details: Recommend primary care provider follow-up within 1 week for general hospital follow-up.  Please call today to schedule.        Discharge Follow-up with Specified Provider: Recommend follow-up in pulmonology clinic as instructed.  Please call the clinic with any questions.   As directed      To: Recommend follow-up in pulmonology clinic as instructed.  Please call the clinic with any questions.               Follow-up Information       Aminta Quezada PA-C .    Specialties: Internal Medicine, Physician Assistant  Why: Recommend primary care provider follow-up within 1 week for general hospital follow-up.  Please call today to schedule.                               Rolando Fajardo MD  10/19/23      Time Spent on Discharge:  I spent greater than 35 minutes on this discharge activity which included: face-to-face encounter with the patient, reviewing the data in the system, coordination of the care with the nursing staff as well as consultants, documentation, and entering orders.

## 2023-10-19 NOTE — CASE MANAGEMENT/SOCIAL WORK
Case Management Discharge Note      Final Note: home no needs         Selected Continued Care - Discharged on 10/19/2023 Admission date: 10/16/2023 - Discharge disposition: Home or Self Care      Destination    No services have been selected for the patient.                Durable Medical Equipment    No services have been selected for the patient.                Dialysis/Infusion    No services have been selected for the patient.                Home Medical Care    No services have been selected for the patient.                Therapy    No services have been selected for the patient.                Community Resources    No services have been selected for the patient.                Community & DME    No services have been selected for the patient.                    Transportation Services  Private: Car    Final Discharge Disposition Code: 01 - home or self-care

## 2023-10-19 NOTE — OP NOTE
Bronchoscopy Procedure Note    Procedure:  Bronchoscopy, Diagnostic  Bronchoscopy, Therapeutic    Pre-Operative Diagnosis: Mucous plugging    Post-Operative Diagnosis: Same    Indication: Therapeutic retrieval of respiratory secretions    Anesthesia: Monitored Anesthesia Care (MAC)    Procedure Details: Patient was consented for the procedure with all risks and benefits of the procedure explained in detail.  Patient was given the opportunity to ask questions and all concerns were answered.  The bronchocope was inserted into the main airway via the oropharynx. An anatomical survey was done of the main airways and the subsegmental bronchus.  The findings are reported above.  A bronchial washing was performed using aliquots of normal saline instilled into the airways then aspirated back.    Findings:  Vocal cords were normal  The trachea was overall normal with no evidence of any abnormal curvature or endobronchial masses or lesion but she did have scattered yellow thick secretion lining the tracheal wall and coming from both lower lobe mostly from the left.  The right lung was inspected first and washings were done and most of the  thick mucus was retrieved  The airways look normal post washing  On the left side however there was more mucus with evidence of airway edema suggestive of pneumonia and airway irritation.  Washing was performed in the distal lung segments until no more secretion were left behind  Final inspection showed no evidence of any endobronchial masses or lesion or foreign bodies.    Estimated Blood Loss:   None           Specimens: Washing from the left lung                Complications:  None; patient tolerated the procedure well.           Disposition: PACU - hemodynamically stable.      Patient tolerated the procedure well.    While I was in the room and during my examination of the patient I wore gown, gloves, mask, eye protection and washed my hands before and after the encounter.  Proper  enhanced droplet precautions and isolation precautions were taken.    Oracio Rhodes MD  10/19/2023  11:45 EDT

## 2023-10-19 NOTE — ANESTHESIA PREPROCEDURE EVALUATION
Anesthesia Evaluation                  Airway   Mallampati: II  TM distance: >3 FB  Neck ROM: full  Possible difficult intubation  Comment: 2a view reported  Dental - normal exam     Pulmonary    (+) pneumonia , COPD, asthma,rhonchi  Cardiovascular     Rhythm: regular    (+) hypertension      Neuro/Psych  GI/Hepatic/Renal/Endo      Musculoskeletal     Abdominal    Substance History      OB/GYN          Other                    Anesthesia Plan    ASA 3     general     (Cervical epidural steroid injection)  intravenous induction     Anesthetic plan, risks, benefits, and alternatives have been provided, discussed and informed consent has been obtained with: patient.    CODE STATUS:    Code Status (Patient has no pulse and is not breathing): CPR (Attempt to Resuscitate)  Medical Interventions (Patient has pulse or is breathing): Full

## 2023-10-20 NOTE — OUTREACH NOTE
Prep Survey      Flowsheet Row Responses   [facility] facility patient discharged from? [place]   Is LACE score < 7 ? No   Eligibility Readm Mgmt   Discharge diagnosis Pneumonia-Sepsis   Does the patient have one of the following disease processes/diagnoses(primary or secondary)? Pneumonia   Does the patient have Home health ordered? No   Is there a DME ordered? No   Prep survey completed? Yes            ZAC RUIZ - Registered Nurse

## 2023-10-21 LAB
BACTERIA SPEC AEROBE CULT: NORMAL
BACTERIA SPEC AEROBE CULT: NORMAL
BACTERIA SPEC RESP CULT: NO GROWTH
GRAM STN SPEC: NORMAL
GRAM STN SPEC: NORMAL

## 2023-10-23 ENCOUNTER — READMISSION MANAGEMENT (OUTPATIENT)
Dept: CALL CENTER | Facility: HOSPITAL | Age: 48
End: 2023-10-23
Payer: MEDICARE

## 2023-10-23 NOTE — OUTREACH NOTE
COPD/PN Week 1 Survey      Flowsheet Row Responses   [facility] facility patient discharged from? [place]   Does the patient have one of the following disease processes/diagnoses(primary or secondary)? Pneumonia   Week 1 attempt successful? No   Unsuccessful attempts Attempt 1            Mony MCCOY - Registered Nurse

## 2023-10-25 ENCOUNTER — READMISSION MANAGEMENT (OUTPATIENT)
Dept: CALL CENTER | Facility: HOSPITAL | Age: 48
End: 2023-10-25
Payer: MEDICARE

## 2023-10-25 NOTE — OUTREACH NOTE
COPD/PN Week 1 Survey      Flowsheet Row Responses   [facility] facility patient discharged from? [place]   Does the patient have one of the following disease processes/diagnoses(primary or secondary)? Pneumonia   Week 1 attempt successful? No   Unsuccessful attempts Attempt 2            Muriel MUNGUIA - Licensed Nurse

## 2023-10-27 ENCOUNTER — READMISSION MANAGEMENT (OUTPATIENT)
Dept: CALL CENTER | Facility: HOSPITAL | Age: 48
End: 2023-10-27
Payer: MEDICARE

## 2023-10-27 NOTE — OUTREACH NOTE
COPD/PN Week 1 Survey      Flowsheet Row Responses   [facility] patient discharged from? [place]   Does the patient have one of the following disease processes/diagnoses(primary or secondary)? Pneumonia   Week 1 attempt successful? Yes   Call start time 1731   Call end time 1735   Discharge diagnosis Pneumonia-Sepsis   Person spoke with today (if not patient) and relationship Patient   Meds reviewed with patient/caregiver? Yes   Is the patient having any side effects they believe may be caused by any medication additions or changes? No   Does the patient have all medications ordered at discharge? Yes   Is the patient taking all medications as directed (includes completed medication regime)? No   What is preventing the patient from taking all medications as directed? Side effects   Medication comments Pt states she did not finish all her antibiotics, they made her sick.   Does the patient have a primary care provider?  Yes   Does the patient have an appointment with their PCP or specialist within 7 days of discharge? No   What is preventing the patient from scheduling follow up appointments within 7 days of discharge? Haven't had time   Nursing Interventions Advised patient to make appointment   Has the patient kept scheduled appointments due by today? N/A   Has home health visited the patient within 72 hours of discharge? N/A   Psychosocial issues? No   Comments Pt states she is doing a lot better. No shortness of breath. still has a cough, but has not smoked since getting out of the hospital.   Did the patient receive a copy of their discharge instructions? Yes   Nursing interventions Reviewed instructions with patient   What is the patient's perception of their health status since discharge? Improving   Nursing Interventions Nurse provided patient education   If the patient is a current smoker, are they able to teach back resources for cessation? --  [pt reports she has not smoked since discharge from  hospital]   Is the patient/caregiver able to teach back the hierarchy of who to call/visit for symptoms/problems? PCP, Specialist, Home health nurse, Urgent Care, ED, 911 Yes   Is the patient/caregiver able to teach back signs and symptoms of worsening condition: Fever/chills, Shortness of breath, Chest pain   Is the patient/caregiver able to teach back importance of completing antibiotic course of treatment? Yes   Week 1 call completed? Yes   Graduated Yes   Is the patient interested in additional calls from an ambulatory ? No   Wrap up additional comments Pt denies any needs or concerns. No further calls needed.   Call end time 0645            Mony MCCOY - Registered Nurse

## 2023-10-27 NOTE — PROGRESS NOTES
Enter Query Response Below      Query Response: Acute respiratory failure was not supported              If applicable, please update the problem list.     Patient: Jesus Ovalles        : 1975  Account: [id number]           Admit Date:         How to Respond to this query:       a. Click New Note     b. Answer query within the yellow box.                c. Update the Problem List, if applicable.      If you have any questions about this query contact me at: [email or web address]    Dr. Rhodes,     Patient admitted with pneumonia, sepsis.  Oxygen saturations during admission % on room air, 94-96% on 1-2L.  Respiratory rate 14-20, 22 x1 and 24 x1.  Documentation of shortness of breath, no distress, normal effort. Acute hypoxic respiratory failure documented by pulmonary consult. Per dc summary patient with borderline hypoxia.     After study, was acute respiratory failure clinically supported during this admission?  Acute respiratory failure was supported with additional clinical indicators:____________  Acute respiratory failure was not supported  Other- specify_____________  Unable to determine    By submitting this query, we are merely seeking further clarification of documentation to accurately reflect all conditions that you are monitoring, evaluating, treating or that extend the hospitalization or utilize additional resources of care. Please utilize your independent clinical judgment when addressing the question(s) above.     This query and your response, once completed, will be entered into the legal medical record.    Sincerely,  Heather Schwab RN BSN  Clinical Documentation Integrity Program

## 2024-01-18 ENCOUNTER — TRANSCRIBE ORDERS (OUTPATIENT)
Dept: ADMINISTRATIVE | Facility: HOSPITAL | Age: 49
End: 2024-01-18
Payer: MEDICARE

## 2024-01-18 DIAGNOSIS — R93.89 ABNORMAL CHEST CT: Primary | ICD-10-CM

## 2024-01-21 ENCOUNTER — HOSPITAL ENCOUNTER (EMERGENCY)
Facility: HOSPITAL | Age: 49
Discharge: HOME | End: 2024-01-21
Attending: EMERGENCY MEDICINE | Admitting: EMERGENCY MEDICINE
Payer: MEDICARE

## 2024-01-21 ENCOUNTER — APPOINTMENT (OUTPATIENT)
Dept: CT IMAGING | Facility: HOSPITAL | Age: 49
End: 2024-01-21
Payer: MEDICARE

## 2024-01-21 VITALS
HEART RATE: 71 BPM | TEMPERATURE: 98.6 F | DIASTOLIC BLOOD PRESSURE: 78 MMHG | OXYGEN SATURATION: 96 % | BODY MASS INDEX: 23.32 KG/M2 | SYSTOLIC BLOOD PRESSURE: 119 MMHG | HEIGHT: 65 IN | RESPIRATION RATE: 16 BRPM | WEIGHT: 140 LBS

## 2024-01-21 DIAGNOSIS — R10.32 LEFT LOWER QUADRANT ABDOMINAL PAIN: Primary | ICD-10-CM

## 2024-01-21 LAB
ALBUMIN SERPL-MCNC: 4.6 G/DL (ref 3.5–5.2)
ALBUMIN/GLOB SERPL: 2 G/DL
ALP SERPL-CCNC: 68 U/L (ref 39–117)
ALT SERPL W P-5'-P-CCNC: 15 U/L (ref 1–33)
ANION GAP SERPL CALCULATED.3IONS-SCNC: 12.4 MMOL/L (ref 5–15)
AST SERPL-CCNC: 16 U/L (ref 1–32)
BASOPHILS # BLD AUTO: 0.03 10*3/MM3 (ref 0–0.2)
BASOPHILS NFR BLD AUTO: 0.6 % (ref 0–1.5)
BILIRUB SERPL-MCNC: 0.2 MG/DL (ref 0–1.2)
BILIRUB UR QL STRIP: NEGATIVE
BUN SERPL-MCNC: 10 MG/DL (ref 6–20)
BUN/CREAT SERPL: 12.3 (ref 7–25)
CALCIUM SPEC-SCNC: 8.9 MG/DL (ref 8.6–10.5)
CHLORIDE SERPL-SCNC: 103 MMOL/L (ref 98–107)
CLARITY UR: CLEAR
CO2 SERPL-SCNC: 24.6 MMOL/L (ref 22–29)
COLOR UR: YELLOW
CREAT SERPL-MCNC: 0.81 MG/DL (ref 0.57–1)
DEPRECATED RDW RBC AUTO: 42.1 FL (ref 37–54)
EGFRCR SERPLBLD CKD-EPI 2021: 89.7 ML/MIN/1.73
EOSINOPHIL # BLD AUTO: 0.08 10*3/MM3 (ref 0–0.4)
EOSINOPHIL NFR BLD AUTO: 1.6 % (ref 0.3–6.2)
ERYTHROCYTE [DISTWIDTH] IN BLOOD BY AUTOMATED COUNT: 12.8 % (ref 12.3–15.4)
GLOBULIN UR ELPH-MCNC: 2.3 GM/DL
GLUCOSE SERPL-MCNC: 93 MG/DL (ref 65–99)
GLUCOSE UR STRIP-MCNC: NEGATIVE MG/DL
HCT VFR BLD AUTO: 40.6 % (ref 34–46.6)
HGB BLD-MCNC: 13.2 G/DL (ref 12–15.9)
HGB UR QL STRIP.AUTO: NEGATIVE
IMM GRANULOCYTES # BLD AUTO: 0.01 10*3/MM3 (ref 0–0.05)
IMM GRANULOCYTES NFR BLD AUTO: 0.2 % (ref 0–0.5)
KETONES UR QL STRIP: NEGATIVE
LEUKOCYTE ESTERASE UR QL STRIP.AUTO: NEGATIVE
LYMPHOCYTES # BLD AUTO: 1.47 10*3/MM3 (ref 0.7–3.1)
LYMPHOCYTES NFR BLD AUTO: 28.6 % (ref 19.6–45.3)
MCH RBC QN AUTO: 29 PG (ref 26.6–33)
MCHC RBC AUTO-ENTMCNC: 32.5 G/DL (ref 31.5–35.7)
MCV RBC AUTO: 89.2 FL (ref 79–97)
MONOCYTES # BLD AUTO: 0.26 10*3/MM3 (ref 0.1–0.9)
MONOCYTES NFR BLD AUTO: 5.1 % (ref 5–12)
NEUTROPHILS NFR BLD AUTO: 3.29 10*3/MM3 (ref 1.7–7)
NEUTROPHILS NFR BLD AUTO: 63.9 % (ref 42.7–76)
NITRITE UR QL STRIP: NEGATIVE
NRBC BLD AUTO-RTO: 0 /100 WBC (ref 0–0.2)
PH UR STRIP.AUTO: 5.5 [PH] (ref 5–8)
PLATELET # BLD AUTO: 240 10*3/MM3 (ref 140–450)
PMV BLD AUTO: 9.5 FL (ref 6–12)
POTASSIUM SERPL-SCNC: 4.2 MMOL/L (ref 3.5–5.2)
PROT SERPL-MCNC: 6.9 G/DL (ref 6–8.5)
PROT UR QL STRIP: NEGATIVE
RBC # BLD AUTO: 4.55 10*6/MM3 (ref 3.77–5.28)
SODIUM SERPL-SCNC: 140 MMOL/L (ref 136–145)
SP GR UR STRIP: 1.01 (ref 1–1.03)
UROBILINOGEN UR QL STRIP: NORMAL
WBC NRBC COR # BLD AUTO: 5.14 10*3/MM3 (ref 3.4–10.8)

## 2024-01-21 PROCEDURE — 81003 URINALYSIS AUTO W/O SCOPE: CPT | Performed by: NURSE PRACTITIONER

## 2024-01-21 PROCEDURE — 25510000001 IOPAMIDOL 61 % SOLUTION: Performed by: EMERGENCY MEDICINE

## 2024-01-21 PROCEDURE — 25010000002 KETOROLAC TROMETHAMINE PER 15 MG: Performed by: NURSE PRACTITIONER

## 2024-01-21 PROCEDURE — 74177 CT ABD & PELVIS W/CONTRAST: CPT

## 2024-01-21 PROCEDURE — 99285 EMERGENCY DEPT VISIT HI MDM: CPT

## 2024-01-21 PROCEDURE — 85025 COMPLETE CBC W/AUTO DIFF WBC: CPT | Performed by: NURSE PRACTITIONER

## 2024-01-21 PROCEDURE — 80053 COMPREHEN METABOLIC PANEL: CPT | Performed by: NURSE PRACTITIONER

## 2024-01-21 PROCEDURE — 25810000003 SODIUM CHLORIDE 0.9 % SOLUTION: Performed by: NURSE PRACTITIONER

## 2024-01-21 PROCEDURE — 96374 THER/PROPH/DIAG INJ IV PUSH: CPT

## 2024-01-21 RX ORDER — SODIUM CHLORIDE 0.9 % (FLUSH) 0.9 %
10 SYRINGE (ML) INJECTION AS NEEDED
Status: DISCONTINUED | OUTPATIENT
Start: 2024-01-21 | End: 2024-01-21 | Stop reason: HOSPADM

## 2024-01-21 RX ORDER — ONDANSETRON 2 MG/ML
4 INJECTION INTRAMUSCULAR; INTRAVENOUS ONCE
Status: DISCONTINUED | OUTPATIENT
Start: 2024-01-21 | End: 2024-01-21 | Stop reason: HOSPADM

## 2024-01-21 RX ORDER — KETOROLAC TROMETHAMINE 15 MG/ML
15 INJECTION, SOLUTION INTRAMUSCULAR; INTRAVENOUS ONCE
Status: COMPLETED | OUTPATIENT
Start: 2024-01-21 | End: 2024-01-21

## 2024-01-21 RX ADMIN — SODIUM CHLORIDE 1000 ML: 9 INJECTION, SOLUTION INTRAVENOUS at 09:40

## 2024-01-21 RX ADMIN — KETOROLAC TROMETHAMINE 15 MG: 15 INJECTION, SOLUTION INTRAMUSCULAR; INTRAVENOUS at 09:53

## 2024-01-21 RX ADMIN — IOPAMIDOL 85 ML: 612 INJECTION, SOLUTION INTRAVENOUS at 10:45

## 2024-01-21 NOTE — DISCHARGE INSTRUCTIONS
You have been evaluated in the Emergency Department today for abdominal pain. Your evaluation was not suggestive of any emergent condition requiring medical intervention at this time. However, some abdominal problems make take more time to appear.  Continue your current home medications for pain  Try taking your muscle relaxer   Diet as tolerated  Please follow up with your primary care physician or Clinic as needed, call to schedule appointment  Return to the Emergency Department if you experience worsening pain, persistent fevers greater than 100.4, recurrent vomiting, blood in vomit, blood in stool, dark tarry stool, chest pain, difficulty breathing, or any other concerning symptoms.

## 2024-01-21 NOTE — ED PROVIDER NOTES
EMERGENCY DEPARTMENT MD ATTESTATION NOTE    SHARED VISIT: This visit was performed by BOTH a physician and an APC. The substantive portion of the medical decision making was performed by this attesting physician who made or approved the management plan and takes responsibility for patient management. All studies documented in the APC note (if performed) were independently interpreted by me.    The CRUZ and I have discussed this patient's history, physical exam, MDM, and treatment plan.  I have reviewed the documentation and personally had a face to face interaction with the patient. The attached note describes my personal findings.        Room Number:  03/03  PCP: Aminta Salomon APRN  Independent Historians: Patient    HPI:  A complete HPI/ROS/PMH/PSH/SH/FH are unobtainable due to: None    Chronic or social conditions impacting patient care (Social Determinants of Health): None      Context: Jesus Ovalles is a 48 y.o. female with a medical history of COPD, hypertension, degenerative disc disease and status post partial hysterectomy who presents to the ED c/o acute abdomen pain in the central and left lower quadrant regions for the past 3 days.  She denies any recent injuries to this area.  She has had some nausea with dry heaving but no vomiting.  She denies any diarrhea.  Denies any dysuria or hematuria.  She does not have a regular GYN specialist with whom she follows.  Is well-established with her PCP, Aminta Salomon, though.      Review of prior external notes (non-ED) -and- Review of prior external test results outside of this encounter: I reviewed the discharge summary from October 19, 2023 when she was admitted here for pneumonia and borderline hypoxia with mucous plugging.    Prescription drug monitoring program review:     N/A and SKY query complete and reviewed. Patient receives regular prescriptions for controlled substances.      PHYSICAL EXAM    I have reviewed the triage vital signs and  nursing notes.    ED Triage Vitals   Temp Heart Rate Resp BP SpO2   01/21/24 0916 01/21/24 0916 01/21/24 0916 01/21/24 0922 01/21/24 0916   98.6 °F (37 °C) 103 16 119/78 96 %      Temp src Heart Rate Source Patient Position BP Location FiO2 (%)   01/21/24 0916 01/21/24 0916 -- -- --   Tympanic Monitor          Physical Exam  GENERAL: alert, no acute distress  SKIN: Warm, dry, no diaphoresis  HENT: Normocephalic, atraumatic  EYES: no scleral icterus EOMI, PERRL  CV: regular rhythm, regular rate no murmurs  RESPIRATORY: normal effort, lungs clear to auscultation bilaterally  ABDOMEN: soft, nondistended, there is mild to moderate tenderness in the left lower quadrant region only.  No peritoneal features elicited.  No masses palpable.  MUSCULOSKELETAL: no deformity, no asymmetry, no edema to the lower extremities  NEURO: alert, moves all extremities, follows commands      NIH:                                                             MEDICATIONS GIVEN IN ER  Medications   sodium chloride 0.9 % flush 10 mL (has no administration in time range)   ondansetron (ZOFRAN) injection 4 mg (has no administration in time range)   sodium chloride 0.9 % bolus 1,000 mL (1,000 mL Intravenous New Bag 1/21/24 0940)   ketorolac (TORADOL) injection 15 mg (15 mg Intravenous Given 1/21/24 0953)   iopamidol (ISOVUE-300) 61 % injection 100 mL (85 mL Intravenous Given by Other 1/21/24 1045)         ORDERS PLACED DURING THIS VISIT:  Orders Placed This Encounter   Procedures    CT Abdomen Pelvis With Contrast    Comprehensive Metabolic Panel    Urinalysis With Microscopic If Indicated (No Culture) - Urine, Clean Catch    CBC Auto Differential    Insert Peripheral IV    CBC & Differential         PROCEDURES  Procedures            PROGRESS, DATA ANALYSIS, CONSULTS, AND MEDICAL DECISION MAKING  All labs have been independently interpreted by me.  All radiology studies have been reviewed by me. All EKG's have been independently viewed and  interpreted by me.  Discussion below represents my analysis of pertinent findings related to patient's condition, differential diagnosis, treatment plan and final disposition.    Differential diagnosis includes but is not limited to ovarian cyst, UTI, kidney stone, diverticulitis, constipation.    Clinical Scores:                  ED Course as of 01/21/24 1621   Sun Jan 21, 2024   0945 Patient is a 48-year-old who presents with left lower quadrant pain for the past 3 days.  Plan for labs including urinalysis and CT of the abdomen and pelvis to evaluate for diverticulitis, ovarian cyst or other intra-abdominal abnormalities. [MS]   1020 WBC: 5.14 [MS]   1020 Hemoglobin: 13.2 [MS]   1020 Hematocrit: 40.6 [MS]   1020 BUN: 10 [MS]   1020 Creatinine: 0.81 [MS]   1123 Reviewed pt's history and workup with Dr. Miles.  After a bedside evaluation, he agrees with the plan of care.     [MS]   1220 Patient updated on unremarkable workup including labs and CT of the abdomen pelvis.  She states that she has been increasing her workout, walking on the treadmill and doing sit ups.  Discussed the possibility that she could have pulled a muscle in her abdomen.  She should take her home pain medications as scheduled and she does have muscle relaxers at home.  She was given strict return to ER precautions and close follow-up with her PMD.  She verbalized understanding and is agreeable to this plan. [MS]      ED Course User Index  [MS] Olimpia Barrera, APRN       MDM:   I independently interpreted the CT abdomen pelvis with contrast and my findings are: No free air, no bowel obstruction, appendix is normal, no abnormal adnexal mass identified    The right lower quadrant is benign on palpation and physical exam.  Therefore I have no clinical concern for appendicitis.  Urinalysis is completely normal without any sign of infection to suggest cystitis or pyelonephritis as a diagnosis.  CBC and CMP are similarly completely  "normal.    At this time, there does not appear to be any acute emergent pathology identifiable to explain the patient's abdominal pain symptoms today.  Certainly there does not appear to be any acute surgical emergency present.  Her pain could possibly be related to a small ovarian cyst problem but certainly there is no large ovarian cyst or mass present on CT scan.  At this point, I think it is safe to discharge her home in good condition for continued outpatient symptomatic management and follow-up with her PCP.  Will discuss with her usual \"return to ER\" instructions prior to discharge.    COMPLEXITY OF CARE  Admission was considered but after careful review of the patient's presentation, physical examination, diagnostic results, and response to treatment the patient may be safely discharged with outpatient follow-up.    Please note that portions of this document were completed with a voice recognition program.    Note Disclaimer: At [facility], we believe that sharing information builds trust and better relationships. You are receiving this note because you recently visited [facility]. It is possible you will see health information before a provider has talked with you about it. This kind of information can be easy to misunderstand. To help you fully understand what it means for your health, we urge you to discuss this note with your provider.         Milton Miles MD  01/21/24 1523    "

## 2024-01-21 NOTE — ED PROVIDER NOTES
EMERGENCY DEPARTMENT ENCOUNTER  Room Number:  03/03  PCP: Aminta Salomon APRN  Independent Historians: Patient and Family      HPI:  Chief Complaint: had concerns including Abdominal Pain.     A complete HPI/ROS/PMH/PSH/SH/FH are unobtainable due to: None    Chronic or social conditions impacting patient care (Social Determinants of Health): None      Context: Jesus Ovalles is a 48 y.o. female with a medical history of COPD, bipolar, HTN who presents to the ED c/o acute left lower abdominal pain for the past 3 days.  Patient describes the pain as sharp and constant in nature.  The pain does increase with cough or movement.  The only other time she has had pain like this was related to an ovarian cyst.  She has also had dry heaves and nausea.  She denies fever, chills, diarrhea, dysuria.  She takes Norco 10 mg for back pain and states this has not helped her pain at all.      Review of prior external notes (non-ED) -and- Review of prior external test results outside of this encounter:  Medical records reviewed in [facility], patient's last admission was 10/16/2023 through 10/19/2023 for pneumonia, mucous plugging and sepsis.    Prescription drug monitoring program review: [facility] reviewed by Milton Miles MD   N/A    PAST MEDICAL HISTORY  Active Ambulatory Problems     Diagnosis Date Noted    Asthma 03/02/2018    Chronic obstructive pulmonary disease 03/02/2018    Chronic pain 09/27/2015    Depression 03/24/2015    EMMA (generalized anxiety disorder) 02/10/2022    Hx of suicide attempt 09/27/2015    Tobacco use 09/27/2015    Bipolar 1 disorder 02/10/2022    PTSD (post-traumatic stress disorder) 02/10/2022    HLD (hyperlipidemia) 02/10/2022    Type I or II open fracture of right tibia and fibula 02/10/2022    Right leg pain 02/19/2022    Tibia/fibula fracture, shaft, left, closed, with nonunion, subsequent encounter 08/29/2022    Tibial fracture 08/29/2022    Infection due to parainfluenza virus 4 10/12/2022     Acute bronchitis due to other specified organisms 10/13/2022    Wound infection 01/22/2023    Rhinovirus 07/02/2023    Pneumonia 07/07/2023     Resolved Ambulatory Problems     Diagnosis Date Noted    Sepsis 10/17/2023    Mucus plugging of bronchi 10/18/2023     Past Medical History:   Diagnosis Date    Bipolar affective     COPD (chronic obstructive pulmonary disease)     DDD (degenerative disc disease), cervical     History of tachycardia     Hypertension     Suicidal risk          PAST SURGICAL HISTORY  Past Surgical History:   Procedure Laterality Date    ANKLE OPEN REDUCTION INTERNAL FIXATION Right 02/11/2022    Procedure: ANKLE OPEN REDUCTION INTERNAL FIXATION;  Surgeon: Maurice Pugh II, MD;  Location: [facility] OR;  Service: Orthopedics;  Laterality: Right;    BRONCHOSCOPY Bilateral 10/19/2023    Procedure: BRONCHOSCOPY;  Surgeon: Oracio Rhodes MD;  Location: [facility] ENDOSCOPY;  Service: Pulmonary;  Laterality: Bilateral;  PREOP/ MUCOUS PLUPS- POSTOP/    CARPAL TUNNEL RELEASE      DILATATION AND CURETTAGE      HYSTERECTOMY      JOINT REPLACEMENT      SHOULDER ARTHROSCOPY W/ ROTATOR CUFF REPAIR      TIBIA IM NAILING/RODDING Right 02/11/2022    Procedure: TIBIA INTRAMEDULLARY NAIL/LAURI INSERTION WITH IRRIGATION AND DEBRIDEMENT;  Surgeon: Maurice Pugh II, MD;  Location: [facility] OR;  Service: Orthopedics;  Laterality: Right;    TIBIA IM NAILING/RODDING Right 08/29/2022    Procedure: REVISION RIGHT TIBIAL NAILING WITH BONE GRAFTING;  Surgeon: Maurice Pugh II, MD;  Location: [facility];  Service: Orthopedics;  Laterality: Right;    TIBIA IM NAILING/RODDING Right 1/25/2023    Procedure: TIBIA  FIBULAR HARDWARE REMOVAL WITH IRRIGATION DEBRIDEMENT OF BONE;  Surgeon: Maurice Pugh II, MD;  Location: [facility] OR;  Service: Orthopedics;  Laterality: Right;         FAMILY HISTORY  Family History   Problem Relation Age of Onset    Heart disease Mother     Michael  Hyperthermia Neg Hx          SOCIAL HISTORY  Social History     Socioeconomic History    Marital status:    Tobacco Use    Smoking status: Every Day     Packs/day: 1     Types: Cigarettes    Smokeless tobacco: Never   Vaping Use    Vaping Use: Never used   Substance and Sexual Activity    Alcohol use: Never    Drug use: No    Sexual activity: Defer         ALLERGIES  Penicillins, Vancomycin, Codeine, Lyrica [pregabalin], and Buprenorphine      REVIEW OF SYSTEMS  Review of Systems  Included in HPI  All systems reviewed and negative except for those discussed in HPI.      PHYSICAL EXAM    I have reviewed the triage vital signs and nursing notes.    ED Triage Vitals   Temp Heart Rate Resp BP SpO2   01/21/24 0916 01/21/24 0916 01/21/24 0916 01/21/24 0922 01/21/24 0916   98.6 °F (37 °C) 103 16 119/78 96 %       Physical Exam  GENERAL: Awake and alert, no acute distress  SKIN: Warm, dry  HENT: Normocephalic, atraumatic  EYES: no scleral icterus  CV: regular rhythm, regular rate  RESPIRATORY: normal effort, lungs clear  ABDOMEN: soft, normal bowel sounds, left lower quadrant tenderness.  No rebound, guarding or rigidity.  No CVA or flank tenderness bilaterally  MUSCULOSKELETAL: no deformity  NEURO: alert, moves all extremities, follows commands          LAB RESULTS  Recent Results (from the past 24 hour(s))   Comprehensive Metabolic Panel    Collection Time: 01/21/24  9:39 AM    Specimen: Blood   Result Value Ref Range    Glucose 93 65 - 99 mg/dL    BUN 10 6 - 20 mg/dL    Creatinine 0.81 0.57 - 1.00 mg/dL    Sodium 140 136 - 145 mmol/L    Potassium 4.2 3.5 - 5.2 mmol/L    Chloride 103 98 - 107 mmol/L    CO2 24.6 22.0 - 29.0 mmol/L    Calcium 8.9 8.6 - 10.5 mg/dL    Total Protein 6.9 6.0 - 8.5 g/dL    Albumin 4.6 3.5 - 5.2 g/dL    ALT (SGPT) 15 1 - 33 U/L    AST (SGOT) 16 1 - 32 U/L    Alkaline Phosphatase 68 39 - 117 U/L    Total Bilirubin 0.2 0.0 - 1.2 mg/dL    Globulin 2.3 gm/dL    A/G Ratio 2.0 g/dL     BUN/Creatinine Ratio 12.3 7.0 - 25.0    Anion Gap 12.4 5.0 - 15.0 mmol/L    eGFR 89.7 >60.0 mL/min/1.73   Urinalysis With Microscopic If Indicated (No Culture) - Urine, Clean Catch    Collection Time: 01/21/24  9:39 AM    Specimen: Urine, Clean Catch   Result Value Ref Range    Color, UA Yellow Yellow, Straw    Appearance, UA Clear Clear    pH, UA 5.5 5.0 - 8.0    Specific Gravity, UA 1.009 1.005 - 1.030    Glucose, UA Negative Negative    Ketones, UA Negative Negative    Bilirubin, UA Negative Negative    Blood, UA Negative Negative    Protein, UA Negative Negative    Leuk Esterase, UA Negative Negative    Nitrite, UA Negative Negative    Urobilinogen, UA 0.2 E.U./dL 0.2 - 1.0 E.U./dL   CBC Auto Differential    Collection Time: 01/21/24  9:39 AM    Specimen: Blood   Result Value Ref Range    WBC 5.14 3.40 - 10.80 10*3/mm3    RBC 4.55 3.77 - 5.28 10*6/mm3    Hemoglobin 13.2 12.0 - 15.9 g/dL    Hematocrit 40.6 34.0 - 46.6 %    MCV 89.2 79.0 - 97.0 fL    MCH 29.0 26.6 - 33.0 pg    MCHC 32.5 31.5 - 35.7 g/dL    RDW 12.8 12.3 - 15.4 %    RDW-SD 42.1 37.0 - 54.0 fl    MPV 9.5 6.0 - 12.0 fL    Platelets 240 140 - 450 10*3/mm3    Neutrophil % 63.9 42.7 - 76.0 %    Lymphocyte % 28.6 19.6 - 45.3 %    Monocyte % 5.1 5.0 - 12.0 %    Eosinophil % 1.6 0.3 - 6.2 %    Basophil % 0.6 0.0 - 1.5 %    Immature Grans % 0.2 0.0 - 0.5 %    Neutrophils, Absolute 3.29 1.70 - 7.00 10*3/mm3    Lymphocytes, Absolute 1.47 0.70 - 3.10 10*3/mm3    Monocytes, Absolute 0.26 0.10 - 0.90 10*3/mm3    Eosinophils, Absolute 0.08 0.00 - 0.40 10*3/mm3    Basophils, Absolute 0.03 0.00 - 0.20 10*3/mm3    Immature Grans, Absolute 0.01 0.00 - 0.05 10*3/mm3    nRBC 0.0 0.0 - 0.2 /100 WBC         RADIOLOGY  CT Abdomen Pelvis With Contrast    Result Date: 1/21/2024  ABDOMEN AND PELVIS CT WITH CONTRAST  HISTORY: Left lower quadrant abdominal pain with dry heaves.  TECHNIQUE: Abdomen and pelvis CT was performed using 85 mL Isovue-300 IV contrast. Correlation with  abdomen pelvis CT November 14, 2020.  The visualized lung bases are clear. There is a 6 mm cyst in the right hepatic lobe. Otherwise, the parenchyma of the liver, spleen, adrenals, pancreas, and kidneys appears normal. No hydronephrosis or perinephric stranding. The gallbladder is in situ and appears normal.  Prior hysterectomy. The ovaries remain in situ and appear normal.  The stomach, small bowel, appendix, and the large bowel appear normal.  There is no mesenteric or omental inflammatory change. No free fluid or lymphadenopathy. Mesenteric vasculature enhances as expected. No bone or body wall lesion.      Prior hysterectomy. No significant abnormality is present.  Radiation dose reduction techniques were utilized, including automated exposure control and exposure modulation based on body size.   This report was finalized on 1/21/2024 11:28 AM by Dr. Harshil Ghosh M.D on Workstation: [id number]         MEDICATIONS GIVEN IN ER  Medications   sodium chloride 0.9 % flush 10 mL (has no administration in time range)   ondansetron (ZOFRAN) injection 4 mg (has no administration in time range)   sodium chloride 0.9 % bolus 1,000 mL (1,000 mL Intravenous New Bag 1/21/24 0940)   ketorolac (TORADOL) injection 15 mg (15 mg Intravenous Given 1/21/24 0953)   iopamidol (ISOVUE-300) 61 % injection 100 mL (85 mL Intravenous Given by Other 1/21/24 1045)         ORDERS PLACED DURING THIS VISIT:  Orders Placed This Encounter   Procedures    CT Abdomen Pelvis With Contrast    Comprehensive Metabolic Panel    Urinalysis With Microscopic If Indicated (No Culture) - Urine, Clean Catch    CBC Auto Differential    Insert Peripheral IV    CBC & Differential         OUTPATIENT MEDICATION MANAGEMENT:  Current Facility-Administered Medications Ordered in Epic   Medication Dose Route Frequency Provider Last Rate Last Admin    ondansetron (ZOFRAN) injection 4 mg  4 mg Intravenous Once Olimpia Barrera APRN        sodium chloride 0.9 %  flush 10 mL  10 mL Intravenous PRN Olimpia Barrera APRN         Current Outpatient Medications Ordered in Epic   Medication Sig Dispense Refill    acetaminophen (TYLENOL) 325 MG tablet Take 2 tablets by mouth Every 6 (Six) Hours As Needed for Mild Pain.      albuterol (PROVENTIL) (2.5 MG/3ML) 0.083% nebulizer solution Inhale the contents of 1 vial by nebulization Every 4 (Four) Hours As Needed. 360 mL 11    atorvastatin (LIPITOR) 20 MG tablet Take 1 tablet by mouth every night at bedtime. 30 tablet 6    Budeson-Glycopyrrol-Formoterol (Breztri Aerosphere) 160-9-4.8 MCG/ACT aerosol inhaler Inhale 2 puffs 2 (Two) Times a Day. 10.7 g 11    budesonide-formoterol (Symbicort) 160-4.5 MCG/ACT inhaler Inhale 2 puffs 2 (Two) Times a Day. 10.2 g 0    gabapentin (NEURONTIN) 300 MG capsule Take 1 capsule by mouth 3 (Three) Times a Day.      HYDROcodone-acetaminophen (Norco)  MG per tablet Take 1 tablet by mouth Every 6 (Six) Hours. 120 tablet 0    HYDROcodone-acetaminophen (Norco)  MG per tablet Take 1 tablet by mouth Every 6 (Six) Hours. 120 tablet 0    HYDROcodone-acetaminophen (Norco)  MG per tablet Take 1 tablet by mouth Every 6 (Six) Hours. 120 tablet 0    levothyroxine (SYNTHROID, LEVOTHROID) 25 MCG tablet Take 1 tablet by mouth Daily. 30 tablet 6    metFORMIN (GLUCOPHAGE) 500 MG tablet Take 1 tablet by mouth Daily. 30 tablet 0    QUEtiapine (SEROquel) 100 MG tablet Take 1-2 tablets by mouth every night at bedtime. 60 tablet 2         PROCEDURES  Procedures      PROGRESS, DATA ANALYSIS, CONSULTS, AND MEDICAL DECISION MAKING  All labs have been independently interpreted by me.  All radiology studies have been reviewed by me. All EKG's have been independently viewed and interpreted by me.  Discussion below represents my analysis of pertinent findings related to patient's condition, differential diagnosis, treatment plan and final disposition.    Differential diagnosis for abdominal pain include but  are not limited to the following:    -Hepatobiliary pathology such as cholecystitis, cholangitis, and symptomatic cholelithiasis  -GERD  -Pancreatitis  -Small or large bowel obstruction  -Appendicitis  -Diverticulitis  -UTI including pyelonephritis  -Ureteral stone  -Zoster  -Colitis, including infectious and ischemic  -Atypical ACS      ED Course as of 01/21/24 1222   Sun Jan 21, 2024   0945 Patient is a 48-year-old who presents with left lower quadrant pain for the past 3 days.  Plan for labs including urinalysis and CT of the abdomen and pelvis to evaluate for diverticulitis, ovarian cyst or other intra-abdominal abnormalities. [MS]   1020 WBC: 5.14 [MS]   1020 Hemoglobin: 13.2 [MS]   1020 Hematocrit: 40.6 [MS]   1020 BUN: 10 [MS]   1020 Creatinine: 0.81 [MS]   1123 Reviewed pt's history and workup with Dr. Miles.  After a bedside evaluation, he agrees with the plan of care.     [MS]   1220 Patient updated on unremarkable workup including labs and CT of the abdomen pelvis.  She states that she has been increasing her workout, walking on the treadmill and doing sit ups.  Discussed the possibility that she could have pulled a muscle in her abdomen.  She should take her home pain medications as scheduled and she does have muscle relaxers at home.  She was given strict return to ER precautions and close follow-up with her PMD.  She verbalized understanding and is agreeable to this plan. [MS]      ED Course User Index  [MS] Olimpia Barrera, APRN         AS OF 12:22 EST VITALS:    BP - 119/78  HR - 71  TEMP - 98.6 °F (37 °C) (Tympanic)  O2 SATS - 96%        DIAGNOSIS  Final diagnoses:   Left lower quadrant abdominal pain         DISPOSITION  ED Disposition       ED Disposition   Discharge    Condition   Stable    Comment   --              Discussed plan for discharge, as there is no emergent indication for admission. Pt/family is agreeable and understands need for follow up and repeat testing.  Pt is aware  that discharge does not mean that nothing is wrong but it indicates no emergency is present that requires admission and they must continue care with follow-up as given below or physician of their choice.   Patient/Family voiced understanding of above instructions.  Patient discharged in stable condition.    FOLLOW UP   Aminta Salomon, APRN  [address] [place] [address] [place] [id number]  [phone]    Call in 1 day        RX     Medication List      No changes were made to your prescriptions during this visit.           Please note that portions of this document were completed with a voice recognition program.    Note Disclaimer: At [facility], we believe that sharing information builds trust and better relationships. You are receiving this note because you recently visited [facility]. It is possible you will see health information before a provider has talked with you about it. This kind of information can be easy to misunderstand. To help you fully understand what it means for your health, we urge you to discuss this note with your provider.         Olimpia Barrera, APRN  01/21/24 1222

## 2024-02-12 ENCOUNTER — HOSPITAL ENCOUNTER (OUTPATIENT)
Facility: HOSPITAL | Age: 49
Discharge: HOME | End: 2024-02-12
Admitting: INTERNAL MEDICINE
Payer: MEDICARE

## 2024-02-12 DIAGNOSIS — R93.89 ABNORMAL CHEST CT: ICD-10-CM

## 2024-02-12 PROCEDURE — 71250 CT THORAX DX C-: CPT

## 2024-03-18 ENCOUNTER — DOCUMENTATION (OUTPATIENT)
Dept: OBSTETRICS AND GYNECOLOGY | Facility: CLINIC | Age: 49
End: 2024-03-18
Payer: MEDICARE

## 2024-03-18 NOTE — PROGRESS NOTES
JENNY for patient to return call in regards to appt on 5/3, as  will not be in office was going to get her rescheduled [place]

## 2024-03-21 ENCOUNTER — DOCUMENTATION (OUTPATIENT)
Dept: OBSTETRICS AND GYNECOLOGY | Facility: CLINIC | Age: 49
End: 2024-03-21
Payer: MEDICARE

## 2024-03-21 NOTE — PROGRESS NOTES
Spoke with patient about  being out on office on day of appt 5/3, she stated she was going to look at her calendar and give us a call back, but patient is aware he is not in office and will need to reschedule xSydney

## 2024-05-07 ENCOUNTER — OFFICE VISIT (OUTPATIENT)
Dept: OBSTETRICS AND GYNECOLOGY | Facility: CLINIC | Age: 49
End: 2024-05-07
Payer: MEDICARE

## 2024-05-07 VITALS
SYSTOLIC BLOOD PRESSURE: 115 MMHG | BODY MASS INDEX: 23.49 KG/M2 | DIASTOLIC BLOOD PRESSURE: 77 MMHG | WEIGHT: 141 LBS | HEIGHT: 65 IN

## 2024-05-07 DIAGNOSIS — A63.0 VULVOVAGINAL CONDYLOMA: ICD-10-CM

## 2024-05-07 DIAGNOSIS — Z12.39 ENCOUNTER FOR SCREENING FOR MALIGNANT NEOPLASM OF BREAST, UNSPECIFIED SCREENING MODALITY: Primary | ICD-10-CM

## 2024-05-07 DIAGNOSIS — Z12.72 SCREENING FOR VAGINAL CANCER: ICD-10-CM

## 2024-05-07 DIAGNOSIS — Z12.31 ENCOUNTER FOR SCREENING MAMMOGRAM FOR MALIGNANT NEOPLASM OF BREAST: ICD-10-CM

## 2024-05-07 DIAGNOSIS — N90.89 VULVAR LESION: ICD-10-CM

## 2024-05-07 DIAGNOSIS — Z91.89 GYN EXAM FOR HIGH-RISK MEDICARE PATIENT: ICD-10-CM

## 2024-05-07 DIAGNOSIS — Z01.411 ENCOUNTER FOR GYNECOLOGICAL EXAMINATION WITH ABNORMAL FINDING: ICD-10-CM

## 2024-05-07 PROCEDURE — 1160F RVW MEDS BY RX/DR IN RCRD: CPT | Performed by: OBSTETRICS & GYNECOLOGY

## 2024-05-07 PROCEDURE — G0101 CA SCREEN;PELVIC/BREAST EXAM: HCPCS | Performed by: OBSTETRICS & GYNECOLOGY

## 2024-05-07 PROCEDURE — 1159F MED LIST DOCD IN RCRD: CPT | Performed by: OBSTETRICS & GYNECOLOGY

## 2024-05-07 PROCEDURE — 99203 OFFICE O/P NEW LOW 30 MIN: CPT | Performed by: OBSTETRICS & GYNECOLOGY

## 2024-05-07 PROCEDURE — 3015F CERV CANCER SCREEN DOCD: CPT | Performed by: OBSTETRICS & GYNECOLOGY

## 2024-05-09 ENCOUNTER — PATIENT ROUNDING (BHMG ONLY) (OUTPATIENT)
Dept: OBSTETRICS AND GYNECOLOGY | Facility: CLINIC | Age: 49
End: 2024-05-09
Payer: MEDICARE

## 2024-05-09 ENCOUNTER — PATIENT MESSAGE (OUTPATIENT)
Dept: OBSTETRICS AND GYNECOLOGY | Facility: CLINIC | Age: 49
End: 2024-05-09
Payer: MEDICARE

## 2024-05-10 LAB
C TRACH RRNA CVX QL NAA+PROBE: NEGATIVE
CYTOLOGIST CVX/VAG CYTO: NORMAL
CYTOLOGY CVX/VAG DOC CYTO: NORMAL
CYTOLOGY CVX/VAG DOC THIN PREP: NORMAL
DX ICD CODE: NORMAL
HPV GENOTYPE REFLEX: NORMAL
HPV I/H RISK 4 DNA CVX QL PROBE+SIG AMP: NEGATIVE
Lab: NORMAL
N GONORRHOEA RRNA CVX QL NAA+PROBE: NEGATIVE
OTHER STN SPEC: NORMAL
STAT OF ADQ CVX/VAG CYTO-IMP: NORMAL
T VAGINALIS RRNA SPEC QL NAA+PROBE: NEGATIVE

## 2024-05-23 ENCOUNTER — PROCEDURE VISIT (OUTPATIENT)
Dept: OBSTETRICS AND GYNECOLOGY | Facility: CLINIC | Age: 49
End: 2024-05-23
Payer: MEDICARE

## 2024-05-23 VITALS
WEIGHT: 136.4 LBS | DIASTOLIC BLOOD PRESSURE: 73 MMHG | SYSTOLIC BLOOD PRESSURE: 108 MMHG | BODY MASS INDEX: 22.73 KG/M2 | HEIGHT: 65 IN

## 2024-05-23 DIAGNOSIS — Z12.31 VISIT FOR SCREENING MAMMOGRAM: Primary | ICD-10-CM

## 2024-05-23 DIAGNOSIS — N90.89 VULVAR LESION: Primary | ICD-10-CM

## 2024-05-23 DIAGNOSIS — A63.0 CONDYLOMA ACUMINATUM OF VULVA: ICD-10-CM

## 2024-05-23 RX ORDER — IMIQUIMOD 12.5 MG/.25G
1 CREAM TOPICAL 3 TIMES WEEKLY
Qty: 12 EACH | Refills: 3 | Status: SHIPPED | OUTPATIENT
Start: 2024-05-24

## 2024-05-23 NOTE — PROGRESS NOTES
"Chief Complaint  Procedure (Patient is here for 2 week f/u (excision vulvar lesion,would like to know if can swim later on today in pool))    Subjective        Jesus Ovalles presents to [facility]  History of Present Illness  Patient presents today for treatment for vulvar lesions.  She wishes for excision of the cluster of lesions on the left side, but for topical medical therapy for this single isolated lesion on the right side.  She is otherwise in her usual state of health.    Objective   Vital Signs:  /73   Ht 165.1 cm (65\")   Wt 61.9 kg (136 lb 6.4 oz)   BMI 22.70 kg/m²   Estimated body mass index is 22.7 kg/m² as calculated from the following:    Height as of this encounter: 165.1 cm (65\").    Weight as of this encounter: 61.9 kg (136 lb 6.4 oz).       BMI is within normal parameters. No other follow-up for BMI required.      Physical Exam   Gen: No acute distress, awake and oriented times three  Abdomen: soft, nontender, no masses or hernia, non distended, normoactive bowel sounds  Pelvic: Exam performed in the presence of a female chaperone  Patient has provided verbal consent to proceed with exam.  Normal external female genitalia   cluster of about 3 small  1-2 mm pedunculated warty lesions at about the 5 o'clock position on the left side  There is a single 1 mm raised lesion on the right side at about 7 o'clock position.  External anal exam: Normal appearance, no lesions or hemorrhoids  Rectal: Deferred  Psych: Good judgement and insight, normal affect and mood    Result Review :                     Assessment and Plan     Diagnoses and all orders for this visit:    1. Vulvar lesion (Primary)  -     Reference Histopathology    2. Condyloma acuminatum of vulva  -     imiquimod (Aldara) 5 % cream; Apply 1 Application topically to the appropriate area as directed 3 (Three) Times a Week.  Dispense: 12 each; Refill: 3    Patient had surgical excision in the office today of the " left-sided condylomatous lesions.  She elects for topical therapy for the single right lesion.  Instructions on the use of Aldara were provided to the patient.  All questions answered.         Follow Up     Return if symptoms worsen or fail to improve.  Patient was given instructions and counseling regarding her condition or for health maintenance advice. Please see specific information pulled into the AVS if appropriate.

## 2024-05-23 NOTE — PROGRESS NOTES
"Procedure: Excision of vulvar lesions left,     Indications: 1.  Vulvar lesions  2.  Suspected condyloma acuminata      Findings: Patient with a small cluster of pedunculated warty type lesions at about the 5 \"o'clock position on the vulva on the left side.  There are about 3 individual lesions each 1-2 mm in size.  Patient also has a single very small 1 mm lesion on the right vulva, but she elects for topical therapy for the right vulvar lesion.    Pathology: Vulvar lesions, clinically consistent with condyloma    Estimated blood loss: Minimal    Procedure in detail: The patient was counseled about the indications for the procedure.  The risks, benefits, and alternatives were discussed with the patient at length including the risks of bleeding, infection, pain, wound breakdown.  She verbalized understanding and gave verbal consent to proceed.  Betadine was applied to the area to be biopsied.  About 2 cc of 1% lidocaine with epinephrine was injected just underneath the skin.  Each of the lesions was elevated with pickups and excised at the base using a scalpel.  This was performed x 3.  Pressure was held with sterile gauze and silver nitrate was applied to the biopsy site.  Excellent hemostasis was noted.  There were no complications.  The patient tolerated the procedure well.  Wound care instructions were given to the patient.  We will notify the patient of the results of the biopsy.  The patient is instructed to call our office in 2 weeks if she has not heard any results.  She verbalized understanding.   "

## 2024-05-28 ENCOUNTER — TELEPHONE (OUTPATIENT)
Dept: OBSTETRICS AND GYNECOLOGY | Facility: CLINIC | Age: 49
End: 2024-05-28
Payer: MEDICARE

## 2024-05-28 DIAGNOSIS — R92.8 ABNORMAL MAMMOGRAM: Primary | ICD-10-CM

## 2024-05-28 DIAGNOSIS — N64.89 BREAST ASYMMETRY: ICD-10-CM

## 2024-05-28 NOTE — TELEPHONE ENCOUNTER
Patient Transferred to: [facility]  Handoff Report Given to: *rn Pt is aware of appt at [facility] on 6/3/24 @ 8 & 830am for DX Left Mammo & Left Limited US suggested from her screening mammogram.      Marylou Edmond RN  5/28/2024 10:31 AM EDT Back to Top      Unable to leave , no mailbox set up. Order placed for L dx mammo/US    Ranjit Bruno MD  5/28/2024 10:13 AM EDT       Please notify the patient that radiologist feel they need additional images of the left breast in order to have an adequate study.  They are recommending diagnostic mammogram and limited left breast ultrasound.  Please help her set this up.

## 2024-05-30 LAB
DX ICD CODE: NORMAL
PATH REPORT.FINAL DX SPEC: NORMAL
PATH REPORT.GROSS SPEC: NORMAL
PATH REPORT.RELEVANT HX SPEC: NORMAL
PATH REPORT.SITE OF ORIGIN SPEC: NORMAL
PATHOLOGIST NAME: NORMAL
PAYMENT PROCEDURE: NORMAL

## 2024-06-03 ENCOUNTER — APPOINTMENT (OUTPATIENT)
Dept: WOMENS IMAGING | Facility: HOSPITAL | Age: 49
End: 2024-06-03
Payer: MEDICARE

## 2024-06-03 PROCEDURE — 76642 ULTRASOUND BREAST LIMITED: CPT | Performed by: RADIOLOGY

## 2024-06-03 PROCEDURE — 77065 DX MAMMO INCL CAD UNI: CPT | Performed by: RADIOLOGY

## 2024-06-03 PROCEDURE — G0279 TOMOSYNTHESIS, MAMMO: HCPCS | Performed by: RADIOLOGY

## 2024-06-04 DIAGNOSIS — R92.8 ABNORMAL MAMMOGRAM: ICD-10-CM

## 2024-06-04 DIAGNOSIS — R92.8 ABNORMAL MAMMOGRAM OF LEFT BREAST: Primary | ICD-10-CM

## 2024-06-04 DIAGNOSIS — N64.89 BREAST ASYMMETRY: ICD-10-CM

## 2024-06-04 DIAGNOSIS — N63.20 MASS OF LEFT BREAST, UNSPECIFIED QUADRANT: ICD-10-CM

## 2024-06-05 ENCOUNTER — TELEPHONE (OUTPATIENT)
Dept: OBSTETRICS AND GYNECOLOGY | Facility: CLINIC | Age: 49
End: 2024-06-05
Payer: MEDICARE

## 2024-06-05 DIAGNOSIS — F41.9 ANXIETY DUE TO INVASIVE PROCEDURE: Primary | ICD-10-CM

## 2024-06-05 RX ORDER — ALPRAZOLAM 0.5 MG/1
0.5 TABLET ORAL ONCE
Qty: 1 TABLET | Refills: 0 | Status: SHIPPED | OUTPATIENT
Start: 2024-06-05 | End: 2024-06-05

## 2024-06-05 NOTE — TELEPHONE ENCOUNTER
----- Message from Ranjit Bruno sent at 6/4/2024  2:03 PM EDT -----  Please notify the patient that I did see an area around the nipple on the left breast that appears to be a cyst, but cannot rule out something more concerning.  They are recommending cyst aspiration, and then biopsy if necessary.

## 2024-06-05 NOTE — TELEPHONE ENCOUNTER
Pt is aware of results and is scheduled at [facility] 6/24/24 @ 10am for the Breast Biopsy.    Pt stated that she will need something to relax her for procedure.  She has a fear of needles.    Pt uses  Pharmacy.

## 2024-06-11 ENCOUNTER — TELEPHONE (OUTPATIENT)
Dept: OBSTETRICS AND GYNECOLOGY | Facility: CLINIC | Age: 49
End: 2024-06-11
Payer: MEDICARE

## 2024-06-11 DIAGNOSIS — F41.9 ANXIETY DUE TO INVASIVE PROCEDURE: Primary | ICD-10-CM

## 2024-06-11 RX ORDER — LORAZEPAM 0.5 MG/1
0.5 TABLET ORAL ONCE
Qty: 1 TABLET | Refills: 0 | Status: SHIPPED | OUTPATIENT
Start: 2024-06-11 | End: 2024-06-18

## 2024-06-11 NOTE — TELEPHONE ENCOUNTER
Caller: CharletteJesus sylvester    Relationship: Self    Best call back number: [phone] CALL ANYTIME. UNSURE IF VOICEMAIL IS SET UP.     Requested Prescriptions:   Requested Prescriptions      No prescriptions requested or ordered in this encounter      MEDICATION TO HELP RELAX FOR BREAST BIOPSY DUE FEAR NEEDLES. INSURANCE WILL NOT COVER XANAX.    Pharmacy where request should be sent: [facility]     Last office visit with prescribing clinician: 5/7/2024   Last telemedicine visit with prescribing clinician: Visit date not found   Next office visit with prescribing clinician: Visit date not found     Additional details provided by patient: INSURANCE WILL NOT COVER XANAX. PATIENT DOES NOT WANT TO PAY $16 OUT OF POCKET FOR ONE DOSE. PATIENT IS REQUESTING SOMETHING ELSE SENT.    Does the patient have less than a 3 day supply:  [] Yes  [] No -NA RX REQUEST    Would you like a call back once the refill request has been completed: [] Yes [x] No.    If the office needs to give you a call back, can they leave a voicemail: [x] Yes [] No    Sandi Steiner Rep   06/11/24 10:09 EDT

## 2024-06-11 NOTE — TELEPHONE ENCOUNTER
5/23/24 vaginal lesion. States her insurance will not cover the xanax and for 1 pill it is $16. Requesting something else to help her relax for her breast biopsy - fear of needles. [facility] [place]. Thank you

## 2024-06-24 ENCOUNTER — LAB REQUISITION (OUTPATIENT)
Dept: LAB | Facility: HOSPITAL | Age: 49
End: 2024-06-24
Payer: MEDICARE

## 2024-06-24 ENCOUNTER — APPOINTMENT (OUTPATIENT)
Dept: WOMENS IMAGING | Facility: HOSPITAL | Age: 49
End: 2024-06-24
Payer: MEDICARE

## 2024-06-24 DIAGNOSIS — N63.0 UNSPECIFIED LUMP IN UNSPECIFIED BREAST: ICD-10-CM

## 2024-06-24 PROCEDURE — 88305 TISSUE EXAM BY PATHOLOGIST: CPT | Performed by: OBSTETRICS & GYNECOLOGY

## 2024-06-24 PROCEDURE — A4648 IMPLANTABLE TISSUE MARKER: HCPCS | Performed by: RADIOLOGY

## 2024-06-24 PROCEDURE — 19083 BX BREAST 1ST LESION US IMAG: CPT | Performed by: RADIOLOGY

## 2024-06-25 ENCOUNTER — TELEPHONE (OUTPATIENT)
Dept: OBSTETRICS AND GYNECOLOGY | Facility: CLINIC | Age: 49
End: 2024-06-25
Payer: MEDICARE

## 2024-06-25 LAB
DX PRELIMINARY: NORMAL
LAB AP CASE REPORT: NORMAL
PATH REPORT.FINAL DX SPEC: NORMAL
PATH REPORT.GROSS SPEC: NORMAL

## 2024-06-25 NOTE — TELEPHONE ENCOUNTER
----- Message from Ranjit Bruno sent at 6/25/2024 10:52 AM EDT -----  Attempted to call the patient with her breast biopsy results, but there was no answer.  He is reach out to the patient and let her know that her breast biopsy was benign showing just a benign called a fibroadenoma.  Nothing further is necessary at this time other than the return to routine yearly testing.

## 2024-06-27 DIAGNOSIS — N63.20 MASS OF LEFT BREAST, UNSPECIFIED QUADRANT: ICD-10-CM

## 2024-06-27 DIAGNOSIS — R92.8 ABNORMAL MAMMOGRAM OF LEFT BREAST: ICD-10-CM

## 2024-07-10 ENCOUNTER — HOSPITAL ENCOUNTER (EMERGENCY)
Facility: HOSPITAL | Age: 49
Discharge: HOME | End: 2024-07-10
Attending: EMERGENCY MEDICINE
Payer: MEDICARE

## 2024-07-10 ENCOUNTER — APPOINTMENT (OUTPATIENT)
Dept: GENERAL RADIOLOGY | Facility: HOSPITAL | Age: 49
End: 2024-07-10
Payer: MEDICARE

## 2024-07-10 ENCOUNTER — APPOINTMENT (OUTPATIENT)
Dept: CT IMAGING | Facility: HOSPITAL | Age: 49
End: 2024-07-10
Payer: MEDICARE

## 2024-07-10 VITALS
OXYGEN SATURATION: 95 % | RESPIRATION RATE: 16 BRPM | DIASTOLIC BLOOD PRESSURE: 67 MMHG | SYSTOLIC BLOOD PRESSURE: 95 MMHG | TEMPERATURE: 98.3 F | HEART RATE: 66 BPM

## 2024-07-10 DIAGNOSIS — R07.9 CHEST PAIN, UNSPECIFIED TYPE: Primary | ICD-10-CM

## 2024-07-10 DIAGNOSIS — R03.1 LOW BLOOD PRESSURE, NOT HYPOTENSION: ICD-10-CM

## 2024-07-10 LAB
ALBUMIN SERPL-MCNC: 4.6 G/DL (ref 3.5–5.2)
ALBUMIN/GLOB SERPL: 2 G/DL
ALP SERPL-CCNC: 82 U/L (ref 39–117)
ALT SERPL W P-5'-P-CCNC: 11 U/L (ref 1–33)
AMPHET+METHAMPHET UR QL: NEGATIVE
ANION GAP SERPL CALCULATED.3IONS-SCNC: 12.9 MMOL/L (ref 5–15)
AST SERPL-CCNC: 15 U/L (ref 1–32)
BARBITURATES UR QL SCN: NEGATIVE
BASOPHILS # BLD AUTO: 0.03 10*3/MM3 (ref 0–0.2)
BASOPHILS NFR BLD AUTO: 0.5 % (ref 0–1.5)
BENZODIAZ UR QL SCN: NEGATIVE
BILIRUB SERPL-MCNC: 0.4 MG/DL (ref 0–1.2)
BILIRUB UR QL STRIP: NEGATIVE
BUN SERPL-MCNC: 9 MG/DL (ref 6–20)
BUN/CREAT SERPL: 10.2 (ref 7–25)
CALCIUM SPEC-SCNC: 9 MG/DL (ref 8.6–10.5)
CANNABINOIDS SERPL QL: NEGATIVE
CHLORIDE SERPL-SCNC: 102 MMOL/L (ref 98–107)
CLARITY UR: CLEAR
CO2 SERPL-SCNC: 24.1 MMOL/L (ref 22–29)
COCAINE UR QL: NEGATIVE
COLOR UR: YELLOW
CREAT SERPL-MCNC: 0.88 MG/DL (ref 0.57–1)
DEPRECATED RDW RBC AUTO: 41.4 FL (ref 37–54)
EGFRCR SERPLBLD CKD-EPI 2021: 80.7 ML/MIN/1.73
EOSINOPHIL # BLD AUTO: 0.08 10*3/MM3 (ref 0–0.4)
EOSINOPHIL NFR BLD AUTO: 1.4 % (ref 0.3–6.2)
ERYTHROCYTE [DISTWIDTH] IN BLOOD BY AUTOMATED COUNT: 12.2 % (ref 12.3–15.4)
FENTANYL UR-MCNC: NEGATIVE NG/ML
GEN 5 2HR TROPONIN T REFLEX: <6 NG/L
GLOBULIN UR ELPH-MCNC: 2.3 GM/DL
GLUCOSE SERPL-MCNC: 92 MG/DL (ref 65–99)
GLUCOSE UR STRIP-MCNC: ABNORMAL MG/DL
HCT VFR BLD AUTO: 44.5 % (ref 34–46.6)
HGB BLD-MCNC: 14.9 G/DL (ref 12–15.9)
HGB UR QL STRIP.AUTO: NEGATIVE
HOLD SPECIMEN: NORMAL
HOLD SPECIMEN: NORMAL
IMM GRANULOCYTES # BLD AUTO: 0.01 10*3/MM3 (ref 0–0.05)
IMM GRANULOCYTES NFR BLD AUTO: 0.2 % (ref 0–0.5)
KETONES UR QL STRIP: NEGATIVE
LEUKOCYTE ESTERASE UR QL STRIP.AUTO: NEGATIVE
LYMPHOCYTES # BLD AUTO: 1.3 10*3/MM3 (ref 0.7–3.1)
LYMPHOCYTES NFR BLD AUTO: 23.2 % (ref 19.6–45.3)
MAGNESIUM SERPL-MCNC: 2.3 MG/DL (ref 1.6–2.6)
MCH RBC QN AUTO: 30.8 PG (ref 26.6–33)
MCHC RBC AUTO-ENTMCNC: 33.5 G/DL (ref 31.5–35.7)
MCV RBC AUTO: 92.1 FL (ref 79–97)
METHADONE UR QL SCN: NEGATIVE
MONOCYTES # BLD AUTO: 0.28 10*3/MM3 (ref 0.1–0.9)
MONOCYTES NFR BLD AUTO: 5 % (ref 5–12)
NEUTROPHILS NFR BLD AUTO: 3.91 10*3/MM3 (ref 1.7–7)
NEUTROPHILS NFR BLD AUTO: 69.7 % (ref 42.7–76)
NITRITE UR QL STRIP: NEGATIVE
NRBC BLD AUTO-RTO: 0 /100 WBC (ref 0–0.2)
OPIATES UR QL: NEGATIVE
OXYCODONE UR QL SCN: NEGATIVE
PH UR STRIP.AUTO: 7 [PH] (ref 5–8)
PLATELET # BLD AUTO: 206 10*3/MM3 (ref 140–450)
PMV BLD AUTO: 9.9 FL (ref 6–12)
POTASSIUM SERPL-SCNC: 3.9 MMOL/L (ref 3.5–5.2)
PROT SERPL-MCNC: 6.9 G/DL (ref 6–8.5)
PROT UR QL STRIP: NEGATIVE
QT INTERVAL: 389 MS
QTC INTERVAL: 423 MS
RBC # BLD AUTO: 4.83 10*6/MM3 (ref 3.77–5.28)
SODIUM SERPL-SCNC: 139 MMOL/L (ref 136–145)
SP GR UR STRIP: >1.03 (ref 1–1.03)
T4 FREE SERPL-MCNC: 1.13 NG/DL (ref 0.92–1.68)
TROPONIN T DELTA: NORMAL
TROPONIN T SERPL HS-MCNC: <6 NG/L
TSH SERPL DL<=0.05 MIU/L-ACNC: 3.92 UIU/ML (ref 0.27–4.2)
UROBILINOGEN UR QL STRIP: ABNORMAL
WBC NRBC COR # BLD AUTO: 5.61 10*3/MM3 (ref 3.4–10.8)
WHOLE BLOOD HOLD COAG: NORMAL
WHOLE BLOOD HOLD SPECIMEN: NORMAL

## 2024-07-10 PROCEDURE — 85025 COMPLETE CBC W/AUTO DIFF WBC: CPT

## 2024-07-10 PROCEDURE — 36415 COLL VENOUS BLD VENIPUNCTURE: CPT

## 2024-07-10 PROCEDURE — 80307 DRUG TEST PRSMV CHEM ANLYZR: CPT | Performed by: EMERGENCY MEDICINE

## 2024-07-10 PROCEDURE — 80053 COMPREHEN METABOLIC PANEL: CPT

## 2024-07-10 PROCEDURE — 93005 ELECTROCARDIOGRAM TRACING: CPT

## 2024-07-10 PROCEDURE — 81003 URINALYSIS AUTO W/O SCOPE: CPT | Performed by: EMERGENCY MEDICINE

## 2024-07-10 PROCEDURE — 84439 ASSAY OF FREE THYROXINE: CPT | Performed by: EMERGENCY MEDICINE

## 2024-07-10 PROCEDURE — 25810000003 SODIUM CHLORIDE 0.9 % SOLUTION: Performed by: EMERGENCY MEDICINE

## 2024-07-10 PROCEDURE — 71045 X-RAY EXAM CHEST 1 VIEW: CPT

## 2024-07-10 PROCEDURE — 99285 EMERGENCY DEPT VISIT HI MDM: CPT

## 2024-07-10 PROCEDURE — 25510000001 IOPAMIDOL PER 1 ML: Performed by: EMERGENCY MEDICINE

## 2024-07-10 PROCEDURE — 84484 ASSAY OF TROPONIN QUANT: CPT

## 2024-07-10 PROCEDURE — 71275 CT ANGIOGRAPHY CHEST: CPT

## 2024-07-10 PROCEDURE — 84443 ASSAY THYROID STIM HORMONE: CPT | Performed by: EMERGENCY MEDICINE

## 2024-07-10 PROCEDURE — 93010 ELECTROCARDIOGRAM REPORT: CPT | Performed by: INTERNAL MEDICINE

## 2024-07-10 PROCEDURE — 83735 ASSAY OF MAGNESIUM: CPT | Performed by: EMERGENCY MEDICINE

## 2024-07-10 RX ORDER — ASPIRIN 325 MG
325 TABLET ORAL ONCE
Status: COMPLETED | OUTPATIENT
Start: 2024-07-10 | End: 2024-07-10

## 2024-07-10 RX ORDER — SODIUM CHLORIDE 0.9 % (FLUSH) 0.9 %
10 SYRINGE (ML) INJECTION AS NEEDED
Status: DISCONTINUED | OUTPATIENT
Start: 2024-07-10 | End: 2024-07-10 | Stop reason: HOSPADM

## 2024-07-10 RX ADMIN — SODIUM CHLORIDE 500 ML: 9 INJECTION, SOLUTION INTRAVENOUS at 15:02

## 2024-07-10 RX ADMIN — ASPIRIN 325 MG: 325 TABLET ORAL at 12:54

## 2024-07-10 RX ADMIN — IOPAMIDOL 85 ML: 755 INJECTION, SOLUTION INTRAVENOUS at 12:45

## 2024-07-10 NOTE — ED PROVIDER NOTES
EMERGENCY DEPARTMENT ENCOUNTER    Room Number:  34/34  Date seen:  7/10/2024  PCP: Aminta Salomon APRN  Historian: Patient      HPI:  Chief Complaint: Chest pain  Context: Jesus Ovalles is a 49 y.o. female who presents to the ED c/o remittent chest pain for the past several months.  The patient states she has had episodes of aching in her chest that last 10 to 15 seconds at a time and come and go irregardless of activity.  She reports some mild shortness of breath with it.  She denies radiation, diaphoresis or nausea.  The patient states that she has elevated cholesterol and is on medication for it.  She also states over the past several months that her blood pressures been running low and that yesterday at her pain management doctor's office it was low.  She states this morning her blood pressure was in the 80s.  She states she spoke with her primary care doctor who advised her to come to the emergency room for evaluation.  The patient states she is a smoker and in the past had recurrent pneumonia secondary to a mucous plug which had to be removed.  Currently she denies chest pain, cough, shortness of breath, pedal edema or calf tenderness.  The patient states she had 1 episode of chest tightness yesterday that lasted approximately 15 seconds.      PAST MEDICAL HISTORY  Active Ambulatory Problems     Diagnosis Date Noted    Asthma 03/02/2018    Chronic obstructive pulmonary disease 03/02/2018    Chronic pain 09/27/2015    Depression 03/24/2015    EMMA (generalized anxiety disorder) 02/10/2022    Hx of suicide attempt 09/27/2015    Tobacco use 09/27/2015    Bipolar 1 disorder 02/10/2022    PTSD (post-traumatic stress disorder) 02/10/2022    HLD (hyperlipidemia) 02/10/2022    Type I or II open fracture of right tibia and fibula 02/10/2022    Right leg pain 02/19/2022    Tibia/fibula fracture, shaft, left, closed, with nonunion, subsequent encounter 08/29/2022    Tibial fracture 08/29/2022    Infection due to  parainfluenza virus 4 10/12/2022    Acute bronchitis due to other specified organisms 10/13/2022    Wound infection 01/22/2023    Rhinovirus 07/02/2023    Pneumonia 07/07/2023     Resolved Ambulatory Problems     Diagnosis Date Noted    Sepsis 10/17/2023    Mucus plugging of bronchi 10/18/2023     Past Medical History:   Diagnosis Date    Bipolar affective     COPD (chronic obstructive pulmonary disease)     DDD (degenerative disc disease), cervical     History of tachycardia     Hypertension     Suicidal risk          REVIEW OF SYSTEMS  All systems reviewed and negative except for those discussed in HPI.       PAST SURGICAL HISTORY  Past Surgical History:   Procedure Laterality Date    ANKLE OPEN REDUCTION INTERNAL FIXATION Right 02/11/2022    Procedure: ANKLE OPEN REDUCTION INTERNAL FIXATION;  Surgeon: Maurice Pugh II, MD;  Location: [facility] MAIN OR;  Service: Orthopedics;  Laterality: Right;    BRONCHOSCOPY Bilateral 10/19/2023    Procedure: BRONCHOSCOPY;  Surgeon: Oracio Rhodes MD;  Location: [facility] ENDOSCOPY;  Service: Pulmonary;  Laterality: Bilateral;  PREOP/ MUCOUS PLUPS- POSTOP/    CARPAL TUNNEL RELEASE      DILATATION AND CURETTAGE      HYSTERECTOMY      JOINT REPLACEMENT      SHOULDER ARTHROSCOPY W/ ROTATOR CUFF REPAIR      TIBIA IM NAILING/RODDING Right 02/11/2022    Procedure: TIBIA INTRAMEDULLARY NAIL/LAURI INSERTION WITH IRRIGATION AND DEBRIDEMENT;  Surgeon: Maurice Pugh II, MD;  Location: [facility] MAIN OR;  Service: Orthopedics;  Laterality: Right;    TIBIA IM NAILING/RODDING Right 08/29/2022    Procedure: REVISION RIGHT TIBIAL NAILING WITH BONE GRAFTING;  Surgeon: Maurice Pugh II, MD;  Location: [facility] OSC;  Service: Orthopedics;  Laterality: Right;    TIBIA IM NAILING/RODDING Right 1/25/2023    Procedure: TIBIA  FIBULAR HARDWARE REMOVAL WITH IRRIGATION DEBRIDEMENT OF BONE;  Surgeon: Maurice Pugh II, MD;  Location: [facility] MAIN OR;  Service:  Orthopedics;  Laterality: Right;         FAMILY HISTORY  Family History   Problem Relation Age of Onset    Heart disease Mother     Michael Hyperthermia Neg Hx     Colon cancer Neg Hx     Uterine cancer Neg Hx     Ovarian cancer Neg Hx          SOCIAL HISTORY  Social History     Socioeconomic History    Marital status:    Tobacco Use    Smoking status: Every Day     Current packs/day: 1.00     Types: Cigarettes    Smokeless tobacco: Never   Vaping Use    Vaping status: Never Used   Substance and Sexual Activity    Alcohol use: Never    Drug use: No    Sexual activity: Defer         ALLERGIES  Penicillins, Vancomycin, Codeine, Lyrica [pregabalin], and Buprenorphine      PHYSICAL EXAM  ED Triage Vitals [07/10/24 1136]   Temp Heart Rate Resp BP SpO2   98.3 °F (36.8 °C) 100 16 -- 95 %      Temp src Heart Rate Source Patient Position BP Location FiO2 (%)   Tympanic Monitor -- -- --       Physical Exam      GENERAL: 49-year-old female in no acute distress  HENT: NCAT: nares patent: Neck supple  EYES: no scleral icterus  CV: regular rhythm, normal rate  RESPIRATORY: normal effort  ABDOMEN: soft, NTND: Bowel sounds positive  MUSCULOSKELETAL: no deformity  NEURO: alert with nonfocal neuro exam  PSYCH:  calm, cooperative  SKIN: warm, dry    Vital signs and nursing notes reviewed.      LAB RESULTS  Recent Results (from the past 24 hour(s))   ECG 12 Lead ED Triage Standing Order; Chest Pain    Collection Time: 07/10/24 11:53 AM   Result Value Ref Range    QT Interval 389 ms    QTC Interval 423 ms   Comprehensive Metabolic Panel    Collection Time: 07/10/24 11:54 AM    Specimen: Blood   Result Value Ref Range    Glucose 92 65 - 99 mg/dL    BUN 9 6 - 20 mg/dL    Creatinine 0.88 0.57 - 1.00 mg/dL    Sodium 139 136 - 145 mmol/L    Potassium 3.9 3.5 - 5.2 mmol/L    Chloride 102 98 - 107 mmol/L    CO2 24.1 22.0 - 29.0 mmol/L    Calcium 9.0 8.6 - 10.5 mg/dL    Total Protein 6.9 6.0 - 8.5 g/dL    Albumin 4.6 3.5 - 5.2 g/dL    ALT  (SGPT) 11 1 - 33 U/L    AST (SGOT) 15 1 - 32 U/L    Alkaline Phosphatase 82 39 - 117 U/L    Total Bilirubin 0.4 0.0 - 1.2 mg/dL    Globulin 2.3 gm/dL    A/G Ratio 2.0 g/dL    BUN/Creatinine Ratio 10.2 7.0 - 25.0    Anion Gap 12.9 5.0 - 15.0 mmol/L    eGFR 80.7 >60.0 mL/min/1.73   High Sensitivity Troponin T    Collection Time: 07/10/24 11:54 AM    Specimen: Blood   Result Value Ref Range    HS Troponin T <6 <14 ng/L   Green Top (Gel)    Collection Time: 07/10/24 11:54 AM   Result Value Ref Range    Extra Tube Hold for add-ons.    Lavender Top    Collection Time: 07/10/24 11:54 AM   Result Value Ref Range    Extra Tube hold for add-on    Gold Top - SST    Collection Time: 07/10/24 11:54 AM   Result Value Ref Range    Extra Tube Hold for add-ons.    Light Blue Top    Collection Time: 07/10/24 11:54 AM   Result Value Ref Range    Extra Tube Hold for add-ons.    CBC Auto Differential    Collection Time: 07/10/24 11:54 AM    Specimen: Blood   Result Value Ref Range    WBC 5.61 3.40 - 10.80 10*3/mm3    RBC 4.83 3.77 - 5.28 10*6/mm3    Hemoglobin 14.9 12.0 - 15.9 g/dL    Hematocrit 44.5 34.0 - 46.6 %    MCV 92.1 79.0 - 97.0 fL    MCH 30.8 26.6 - 33.0 pg    MCHC 33.5 31.5 - 35.7 g/dL    RDW 12.2 (L) 12.3 - 15.4 %    RDW-SD 41.4 37.0 - 54.0 fl    MPV 9.9 6.0 - 12.0 fL    Platelets 206 140 - 450 10*3/mm3    Neutrophil % 69.7 42.7 - 76.0 %    Lymphocyte % 23.2 19.6 - 45.3 %    Monocyte % 5.0 5.0 - 12.0 %    Eosinophil % 1.4 0.3 - 6.2 %    Basophil % 0.5 0.0 - 1.5 %    Immature Grans % 0.2 0.0 - 0.5 %    Neutrophils, Absolute 3.91 1.70 - 7.00 10*3/mm3    Lymphocytes, Absolute 1.30 0.70 - 3.10 10*3/mm3    Monocytes, Absolute 0.28 0.10 - 0.90 10*3/mm3    Eosinophils, Absolute 0.08 0.00 - 0.40 10*3/mm3    Basophils, Absolute 0.03 0.00 - 0.20 10*3/mm3    Immature Grans, Absolute 0.01 0.00 - 0.05 10*3/mm3    nRBC 0.0 0.0 - 0.2 /100 WBC   Magnesium    Collection Time: 07/10/24 11:54 AM    Specimen: Blood   Result Value Ref Range     Magnesium 2.3 1.6 - 2.6 mg/dL   TSH    Collection Time: 07/10/24 11:54 AM    Specimen: Blood   Result Value Ref Range    TSH 3.920 0.270 - 4.200 uIU/mL   T4, Free    Collection Time: 07/10/24 11:54 AM    Specimen: Blood   Result Value Ref Range    Free T4 1.13 0.92 - 1.68 ng/dL   Urinalysis With Microscopic If Indicated (No Culture) - Urine, Clean Catch    Collection Time: 07/10/24  1:51 PM    Specimen: Urine, Clean Catch   Result Value Ref Range    Color, UA Yellow Yellow, Straw    Appearance, UA Clear Clear    pH, UA 7.0 5.0 - 8.0    Specific Gravity, UA >1.030 (H) 1.005 - 1.030    Glucose, UA >=1000 mg/dL (3+) (A) Negative    Ketones, UA Negative Negative    Bilirubin, UA Negative Negative    Blood, UA Negative Negative    Protein, UA Negative Negative    Leuk Esterase, UA Negative Negative    Nitrite, UA Negative Negative    Urobilinogen, UA 0.2 E.U./dL 0.2 - 1.0 E.U./dL   Urine Drug Screen - Urine, Clean Catch    Collection Time: 07/10/24  1:51 PM    Specimen: Urine, Clean Catch   Result Value Ref Range    Amphet/Methamphet, Screen Negative Negative    Barbiturates Screen, Urine Negative Negative    Benzodiazepine Screen, Urine Negative Negative    Cocaine Screen, Urine Negative Negative    Opiate Screen Negative Negative    THC, Screen, Urine Negative Negative    Methadone Screen, Urine Negative Negative    Oxycodone Screen, Urine Negative Negative    Fentanyl, Urine Negative Negative   High Sensitivity Troponin T 2Hr    Collection Time: 07/10/24  2:36 PM    Specimen: Blood   Result Value Ref Range    HS Troponin T <6 <14 ng/L    Troponin T Delta         Ordered the above labs and reviewed the results.        RADIOLOGY  CT Angiogram Chest    Result Date: 7/10/2024  CT ANGIOGRAM OF THE CHEST. MULTIPLE CORONAL, SAGITTAL, AND 3-D RECONSTRUCTIONS.  HISTORY: Hypotension. Wakes up with coughing and chest pain.  TECHNIQUE: Radiation dose reduction techniques were utilized, including automated exposure control and  exposure modulation based on body size. CT angiogram of the chest was performed following the administration of IV contrast. Coronal, sagittal, and 3-D reconstruction images were obtained.  COMPARISON: CT chest 02/12/2024, 10/17/2023.  FINDINGS: There is no intrapulmonary arterial filling defect to diagnose a pulmonary embolus. No endotracheal or central endobronchial lesion.  There is mild pulmonary emphysema. Linear subsegmental right middle lobe atelectasis and to a lesser degree lingular atelectasis. No suspicious pulmonary nodule or infiltrate. Imaging through the upper abdomen appears within normal limits.      1. No evidence for pulmonary thromboembolic disease or acute abnormality in the chest. 2. Mild pulmonary emphysema.  This report was finalized on 7/10/2024 1:16 PM by Dr. Fabio De Luna M.D on Workstation: [id number]      XR Chest 1 View    Result Date: 7/10/2024  XR CHEST 1 VW-  INDICATION: Chest pain  COMPARISON: CT chest 2/12/2024 and chest radiographs dating back to 8/14/2016      Subtle faint lateral left lower lobe nodular opacities could represent infiltrate. Recommend imaging follow-up to ensure clearance. No pleural effusion or pneumothorax.  Normal size cardiomediastinal silhouette.  No focal osseous abnormality.   This report was finalized on 7/10/2024 12:20 PM by Dr. Ze Tyler M.D on Workstation: [id number]       Ordered the above noted radiological studies. Reviewed by me in PACS.            PROCEDURES  Procedures          MEDICATIONS GIVEN IN ER  Medications   sodium chloride 0.9 % flush 10 mL (has no administration in time range)   sodium chloride 0.9 % flush 10 mL (has no administration in time range)   sodium chloride 0.9 % bolus 500 mL (500 mL Intravenous New Bag 7/10/24 1502)   aspirin tablet 325 mg (325 mg Oral Given 7/10/24 1254)   iopamidol (ISOVUE-370) 76 % injection 100 mL (85 mL Intravenous Given by Other 7/10/24 1245)             MEDICAL DECISION MAKING, PROGRESS,  and CONSULTS    All labs have been independently reviewed by me.  All radiology studies have been reviewed by me and I have also reviewed the radiology report.   EKG's independently viewed and interpreted by me.  Discussion below represents my analysis of pertinent findings related to patient's condition, differential diagnosis, treatment plan and final disposition.      Additional sources:  - Discussed/ obtained information from independent historians: Patient's  is here who states she has had intermittent episodes of chest pain lasting seconds at a time for the past multiple months.    - External (non-ED) record review: I reviewed a message to the patient on 6/24 which states that her breast biopsy was benign with a fibroadenoma.  I reviewed the patient's admission to the hospital in August of last year which she did have a bronchoscopy for mucous plugging.    - Chronic or social conditions impacting care: Patient lives at home with her     - Shared decision making: After shared decision-making discussion we agreed she is stable for discharge.  I advised him to follow-up with her PCP in the next week and possibly have stress testing done as an outpatient.  The patient understands and agrees to the plan.  I gave her careful return emergency room instructions.      Orders placed during this visit:  Orders Placed This Encounter   Procedures    XR Chest 1 View    CT Angiogram Chest    Shelley Draw    Comprehensive Metabolic Panel    High Sensitivity Troponin T    CBC Auto Differential    Urinalysis With Microscopic If Indicated (No Culture) - Urine, Clean Catch    Urine Drug Screen - Urine, Clean Catch    Magnesium    TSH    T4, Free    High Sensitivity Troponin T 2Hr    NPO Diet NPO Type: Strict NPO    Undress & Gown    Monitor Blood Pressure    Orthostatic Vitals    Continuous Pulse Oximetry    Oxygen Therapy- Nasal Cannula; Titrate 1-6 LPM Per SpO2; 90 - 95%    ECG 12 Lead ED Triage Standing Order;  Chest Pain    ECG 12 Lead ED Triage Standing Order; Chest Pain    Insert Peripheral IV    Insert Peripheral IV    CBC & Differential    Green Top (Gel)    Lavender Top    Gold Top - SST    Light Blue Top         Differential diagnosis:  My differential diagnosis includes but is not limited to myocardial infarction, acute coronary syndrome, pericarditis, chest wall pain, pneumonia, pulmonary embolism, pneumothorax, or esophageal spasm.      Independent interpretation of labs, radiology studies, and discussions with consultants:  ED Course as of 07/10/24 1513   Wed Jul 10, 2024   1210 EKG    EKG time: 1153  Rhythm/Rate: Normal sinus rhythm at 71  No Acute Ischemia  Non-Specific ST-T changes    Similar compared to prior on 7/7/2023    Interpreted Contemporaneously by me.  Independently viewed by me     [GP]   1210 I will check EKG, chest x-ray and CTA along with labs for further evaluation.  Currently patient is hemodynamically stable. [GP]   1252 My independent interpretation of the patient's chest x-ray is some small nodules in her left lower lung but no definitive infiltrate, CHF or pneumothorax.  I will CT her chest. [GP]   1407 The patient's CTA chest is negative except for mild COPD [GP]   1432 On repeat examination the patient is resting comfortably with a blood pressure in the 90s/60s.  Her heart rate is 56.  I will give her 500 cc fluid bolus.  The patient's urine is concentrated however her creatinine is normal. [GP]   1506 The patient has an unremarkable EKG, 2 negative troponins and a CTA chest that is unremarkable.  At this point I believe she is stable for discharge and outpatient follow-up with her PCP.  The patient understands and agrees with the plan. [GP]   1512 On repeat examination I advised the patient of her unremarkable EKG, 2 negative troponins and negative CTA chest.  I advised her that she is stable for discharge home and outpatient follow-up with her PCP.  Advised her to push fluids.  The  patient understands and agrees with the plan. [GP]      ED Course User Index  [GP] Fabio Patterson MD               DIAGNOSIS  Final diagnoses:   Chest pain, unspecified type   Low blood pressure, not hypotension         DISPOSITION  DISCHARGE    Patient discharged in stable condition.    Reviewed implications of results, diagnosis, meds, responsibility to follow up, warning signs and symptoms of possible worsening, potential complications and reasons to return to ER, including worsening symptoms, passing out or difficulty breathing.    Patient/Family voiced understanding of above instructions.    Discussed plan for discharge, as there is no emergent indication for admission.  Pt/family is agreeable and understands need for follow up and repeat testing.  Pt is aware that discharge does not mean that nothing is wrong but it indicates no emergency is present and they must continue care with follow-up as given below or physician of their choice.     FOLLOW-UP  Aminta Salomon, APRN  [address] [place] [address] [place] [facility]  [phone]    Schedule an appointment as soon as possible for a visit                   Latest Documented Vital Signs:  As of 15:13 EDT  BP- 102/68 HR- 52 Temp- 98.3 °F (36.8 °C) (Tympanic) O2 sat- 90%--      --------------------  Please note that portions of this were completed with a voice recognition program.       Note Disclaimer: At [facility], we believe that sharing information builds trust and better relationships. You are receiving this note because you are receiving care at [facility] or recently visited. It is possible you will see health information before a provider has talked with you about it. This kind of information can be easy to misunderstand. To help you fully understand what it means for your health, we urge you to discuss this note with your provider.             Fabio Patterson MD  07/10/24 1691

## 2024-07-10 NOTE — DISCHARGE INSTRUCTIONS
Go home and rest inside today.  Push fluids.  Call your doctor for follow-up next week.  Return if worse.

## 2024-07-24 ENCOUNTER — APPOINTMENT (OUTPATIENT)
Dept: GENERAL RADIOLOGY | Facility: HOSPITAL | Age: 49
End: 2024-07-24
Payer: MEDICARE

## 2024-07-24 ENCOUNTER — HOSPITAL ENCOUNTER (EMERGENCY)
Facility: HOSPITAL | Age: 49
Discharge: HOME | End: 2024-07-24
Attending: EMERGENCY MEDICINE
Payer: MEDICARE

## 2024-07-24 VITALS
TEMPERATURE: 97.6 F | HEART RATE: 64 BPM | SYSTOLIC BLOOD PRESSURE: 109 MMHG | DIASTOLIC BLOOD PRESSURE: 74 MMHG | BODY MASS INDEX: 22.74 KG/M2 | HEIGHT: 65 IN | OXYGEN SATURATION: 96 % | WEIGHT: 136.47 LBS | RESPIRATION RATE: 16 BRPM

## 2024-07-24 DIAGNOSIS — J06.9 VIRAL URI WITH COUGH: Primary | ICD-10-CM

## 2024-07-24 LAB
ALBUMIN SERPL-MCNC: 4.3 G/DL (ref 3.5–5.2)
ALBUMIN/GLOB SERPL: 1.8 G/DL
ALP SERPL-CCNC: 84 U/L (ref 39–117)
ALT SERPL W P-5'-P-CCNC: 13 U/L (ref 1–33)
ANION GAP SERPL CALCULATED.3IONS-SCNC: 10.6 MMOL/L (ref 5–15)
AST SERPL-CCNC: 13 U/L (ref 1–32)
B PARAPERT DNA SPEC QL NAA+PROBE: NOT DETECTED
B PERT DNA SPEC QL NAA+PROBE: NOT DETECTED
BASOPHILS # BLD AUTO: 0.05 10*3/MM3 (ref 0–0.2)
BASOPHILS NFR BLD AUTO: 0.8 % (ref 0–1.5)
BILIRUB SERPL-MCNC: <0.2 MG/DL (ref 0–1.2)
BUN SERPL-MCNC: 13 MG/DL (ref 6–20)
BUN/CREAT SERPL: 13.7 (ref 7–25)
C PNEUM DNA NPH QL NAA+NON-PROBE: NOT DETECTED
CALCIUM SPEC-SCNC: 8.9 MG/DL (ref 8.6–10.5)
CHLORIDE SERPL-SCNC: 104 MMOL/L (ref 98–107)
CO2 SERPL-SCNC: 25.4 MMOL/L (ref 22–29)
CREAT SERPL-MCNC: 0.95 MG/DL (ref 0.57–1)
DEPRECATED RDW RBC AUTO: 43.7 FL (ref 37–54)
EGFRCR SERPLBLD CKD-EPI 2021: 73.6 ML/MIN/1.73
EOSINOPHIL # BLD AUTO: 0.1 10*3/MM3 (ref 0–0.4)
EOSINOPHIL NFR BLD AUTO: 1.6 % (ref 0.3–6.2)
ERYTHROCYTE [DISTWIDTH] IN BLOOD BY AUTOMATED COUNT: 12.7 % (ref 12.3–15.4)
FLUAV SUBTYP SPEC NAA+PROBE: NOT DETECTED
FLUBV RNA ISLT QL NAA+PROBE: NOT DETECTED
GLOBULIN UR ELPH-MCNC: 2.4 GM/DL
GLUCOSE SERPL-MCNC: 89 MG/DL (ref 65–99)
HADV DNA SPEC NAA+PROBE: NOT DETECTED
HCOV 229E RNA SPEC QL NAA+PROBE: NOT DETECTED
HCOV HKU1 RNA SPEC QL NAA+PROBE: NOT DETECTED
HCOV NL63 RNA SPEC QL NAA+PROBE: NOT DETECTED
HCOV OC43 RNA SPEC QL NAA+PROBE: NOT DETECTED
HCT VFR BLD AUTO: 45.8 % (ref 34–46.6)
HGB BLD-MCNC: 15.3 G/DL (ref 12–15.9)
HMPV RNA NPH QL NAA+NON-PROBE: NOT DETECTED
HOLD SPECIMEN: NORMAL
HPIV1 RNA ISLT QL NAA+PROBE: NOT DETECTED
HPIV2 RNA SPEC QL NAA+PROBE: NOT DETECTED
HPIV3 RNA NPH QL NAA+PROBE: NOT DETECTED
HPIV4 P GENE NPH QL NAA+PROBE: NOT DETECTED
IMM GRANULOCYTES # BLD AUTO: 0.02 10*3/MM3 (ref 0–0.05)
IMM GRANULOCYTES NFR BLD AUTO: 0.3 % (ref 0–0.5)
LYMPHOCYTES # BLD AUTO: 1.74 10*3/MM3 (ref 0.7–3.1)
LYMPHOCYTES NFR BLD AUTO: 27.7 % (ref 19.6–45.3)
M PNEUMO IGG SER IA-ACNC: NOT DETECTED
MCH RBC QN AUTO: 31.4 PG (ref 26.6–33)
MCHC RBC AUTO-ENTMCNC: 33.4 G/DL (ref 31.5–35.7)
MCV RBC AUTO: 93.9 FL (ref 79–97)
MONOCYTES # BLD AUTO: 0.39 10*3/MM3 (ref 0.1–0.9)
MONOCYTES NFR BLD AUTO: 6.2 % (ref 5–12)
NEUTROPHILS NFR BLD AUTO: 3.99 10*3/MM3 (ref 1.7–7)
NEUTROPHILS NFR BLD AUTO: 63.4 % (ref 42.7–76)
NRBC BLD AUTO-RTO: 0 /100 WBC (ref 0–0.2)
PLATELET # BLD AUTO: 300 10*3/MM3 (ref 140–450)
PMV BLD AUTO: 9.1 FL (ref 6–12)
POTASSIUM SERPL-SCNC: 4.5 MMOL/L (ref 3.5–5.2)
PROCALCITONIN SERPL-MCNC: 0.02 NG/ML (ref 0–0.25)
PROT SERPL-MCNC: 6.7 G/DL (ref 6–8.5)
RBC # BLD AUTO: 4.88 10*6/MM3 (ref 3.77–5.28)
RHINOVIRUS RNA SPEC NAA+PROBE: NOT DETECTED
RSV RNA NPH QL NAA+NON-PROBE: NOT DETECTED
SARS-COV-2 RNA NPH QL NAA+NON-PROBE: NOT DETECTED
SODIUM SERPL-SCNC: 140 MMOL/L (ref 136–145)
WBC NRBC COR # BLD AUTO: 6.29 10*3/MM3 (ref 3.4–10.8)
WHOLE BLOOD HOLD COAG: NORMAL

## 2024-07-24 PROCEDURE — 80053 COMPREHEN METABOLIC PANEL: CPT | Performed by: EMERGENCY MEDICINE

## 2024-07-24 PROCEDURE — 71045 X-RAY EXAM CHEST 1 VIEW: CPT

## 2024-07-24 PROCEDURE — 84145 PROCALCITONIN (PCT): CPT | Performed by: EMERGENCY MEDICINE

## 2024-07-24 PROCEDURE — 99283 EMERGENCY DEPT VISIT LOW MDM: CPT

## 2024-07-24 PROCEDURE — 85025 COMPLETE CBC W/AUTO DIFF WBC: CPT | Performed by: EMERGENCY MEDICINE

## 2024-07-24 PROCEDURE — 0202U NFCT DS 22 TRGT SARS-COV-2: CPT | Performed by: EMERGENCY MEDICINE

## 2024-07-24 RX ORDER — FLUCONAZOLE 150 MG/1
150 TABLET ORAL ONCE
Qty: 1 TABLET | Refills: 0 | Status: SHIPPED | OUTPATIENT
Start: 2024-07-24 | End: 2024-07-25

## 2024-07-24 RX ORDER — BENZONATATE 200 MG/1
200 CAPSULE ORAL 3 TIMES DAILY PRN
Qty: 21 CAPSULE | Refills: 0 | Status: SHIPPED | OUTPATIENT
Start: 2024-07-24

## 2024-07-24 NOTE — ED PROVIDER NOTES
EMERGENCY DEPARTMENT ENCOUNTER  Room Number:  28/28  Date of encounter:  7/24/2024  PCP: Aminta Salomon APRN  Patient Care Team:  Aminta Salomon APRN as PCP - General (Family Medicine)     HPI:  Context: Jesus Ovalles is a 49 y.o. female who presents to the ED c/o chief complaint of runny nose and cough.  Patient reports symptoms began on the 17th, initially was having nasal discharge as well as watering eyes.  Patient reports that she was seen in urgent care, placed on cefdinir as well as steroid, reports that she completed the steroid, did not complete the antibiotic because she felt like it was affecting her breathing.  Patient denied any rash itching or swelling.  Patient reports cough is productive in nature.  No vomiting or diarrhea, no fevers.    MEDICAL HISTORY REVIEW  Reviewed in EPIC    PAST MEDICAL HISTORY  Active Ambulatory Problems     Diagnosis Date Noted    Asthma 03/02/2018    Chronic obstructive pulmonary disease 03/02/2018    Chronic pain 09/27/2015    Depression 03/24/2015    EMMA (generalized anxiety disorder) 02/10/2022    Hx of suicide attempt 09/27/2015    Tobacco use 09/27/2015    Bipolar 1 disorder 02/10/2022    PTSD (post-traumatic stress disorder) 02/10/2022    HLD (hyperlipidemia) 02/10/2022    Type I or II open fracture of right tibia and fibula 02/10/2022    Right leg pain 02/19/2022    Tibia/fibula fracture, shaft, left, closed, with nonunion, subsequent encounter 08/29/2022    Tibial fracture 08/29/2022    Infection due to parainfluenza virus 4 10/12/2022    Acute bronchitis due to other specified organisms 10/13/2022    Wound infection 01/22/2023    Rhinovirus 07/02/2023    Pneumonia 07/07/2023     Resolved Ambulatory Problems     Diagnosis Date Noted    Sepsis 10/17/2023    Mucus plugging of bronchi 10/18/2023     Past Medical History:   Diagnosis Date    Bipolar affective     COPD (chronic obstructive pulmonary disease)     DDD (degenerative disc disease), cervical      History of tachycardia     Hypertension     Suicidal risk        PAST SURGICAL HISTORY  Past Surgical History:   Procedure Laterality Date    ANKLE OPEN REDUCTION INTERNAL FIXATION Right 02/11/2022    Procedure: ANKLE OPEN REDUCTION INTERNAL FIXATION;  Surgeon: Maurice Pugh II, MD;  Location: [facility] MAIN OR;  Service: Orthopedics;  Laterality: Right;    BRONCHOSCOPY Bilateral 10/19/2023    Procedure: BRONCHOSCOPY;  Surgeon: Oracio Rhodes MD;  Location: [facility] ENDOSCOPY;  Service: Pulmonary;  Laterality: Bilateral;  PREOP/ MUCOUS PLUPS- POSTOP/    CARPAL TUNNEL RELEASE      DILATATION AND CURETTAGE      HYSTERECTOMY      JOINT REPLACEMENT      SHOULDER ARTHROSCOPY W/ ROTATOR CUFF REPAIR      TIBIA IM NAILING/RODDING Right 02/11/2022    Procedure: TIBIA INTRAMEDULLARY NAIL/LAURI INSERTION WITH IRRIGATION AND DEBRIDEMENT;  Surgeon: Maurice Pugh II, MD;  Location: [facility] MAIN OR;  Service: Orthopedics;  Laterality: Right;    TIBIA IM NAILING/RODDING Right 08/29/2022    Procedure: REVISION RIGHT TIBIAL NAILING WITH BONE GRAFTING;  Surgeon: Maurice Pugh II, MD;  Location: [facility];  Service: Orthopedics;  Laterality: Right;    TIBIA IM NAILING/RODDING Right 1/25/2023    Procedure: TIBIA  FIBULAR HARDWARE REMOVAL WITH IRRIGATION DEBRIDEMENT OF BONE;  Surgeon: Maurice Pugh II, MD;  Location: [facility] OR;  Service: Orthopedics;  Laterality: Right;       FAMILY HISTORY  Family History   Problem Relation Age of Onset    Heart disease Mother     Malig Hyperthermia Neg Hx     Colon cancer Neg Hx     Uterine cancer Neg Hx     Ovarian cancer Neg Hx        SOCIAL HISTORY  Social History     Socioeconomic History    Marital status:    Tobacco Use    Smoking status: Every Day     Current packs/day: 1.00     Types: Cigarettes    Smokeless tobacco: Never   Vaping Use    Vaping status: Never Used   Substance and Sexual Activity    Alcohol use: Never    Drug use: No     Sexual activity: Defer       ALLERGIES  Penicillins, Vancomycin, Codeine, Lyrica [pregabalin], and Buprenorphine    The patient's allergies have been reviewed    REVIEW OF SYSTEMS  All systems reviewed and negative except for those discussed in HPI.     PHYSICAL EXAM  I have reviewed the triage vital signs and nursing notes.  ED Triage Vitals   Temp Heart Rate Resp BP SpO2   07/24/24 1019 07/24/24 1019 07/24/24 1019 07/24/24 1023 07/24/24 1019   97.6 °F (36.4 °C) 64 16 121/76 96 %      Temp src Heart Rate Source Patient Position BP Location FiO2 (%)   -- -- -- -- --              General: No acute distress.  HENT: NCAT, PERRL, Nares patent.  Eyes: no scleral icterus.  Neck: trachea midline, no ROM limitations.  CV: regular rhythm, regular rate.  Respiratory: normal effort, CTAB.  Abdomen: soft, nondistended, NTTP, no rebound tenderness, no guarding or rigidity.  Musculoskeletal: no deformity.  Neuro: alert, moves all extremities, follows commands.  Skin: warm, dry.    LAB RESULTS  Recent Results (from the past 24 hour(s))   Comprehensive Metabolic Panel    Collection Time: 07/24/24 10:42 AM    Specimen: Blood   Result Value Ref Range    Glucose 89 65 - 99 mg/dL    BUN 13 6 - 20 mg/dL    Creatinine 0.95 0.57 - 1.00 mg/dL    Sodium 140 136 - 145 mmol/L    Potassium 4.5 3.5 - 5.2 mmol/L    Chloride 104 98 - 107 mmol/L    CO2 25.4 22.0 - 29.0 mmol/L    Calcium 8.9 8.6 - 10.5 mg/dL    Total Protein 6.7 6.0 - 8.5 g/dL    Albumin 4.3 3.5 - 5.2 g/dL    ALT (SGPT) 13 1 - 33 U/L    AST (SGOT) 13 1 - 32 U/L    Alkaline Phosphatase 84 39 - 117 U/L    Total Bilirubin <0.2 0.0 - 1.2 mg/dL    Globulin 2.4 gm/dL    A/G Ratio 1.8 g/dL    BUN/Creatinine Ratio 13.7 7.0 - 25.0    Anion Gap 10.6 5.0 - 15.0 mmol/L    eGFR 73.6 >60.0 mL/min/1.73   Procalcitonin    Collection Time: 07/24/24 10:42 AM    Specimen: Blood   Result Value Ref Range    Procalcitonin 0.02 0.00 - 0.25 ng/mL   CBC Auto Differential    Collection Time: 07/24/24 10:42  AM    Specimen: Blood   Result Value Ref Range    WBC 6.29 3.40 - 10.80 10*3/mm3    RBC 4.88 3.77 - 5.28 10*6/mm3    Hemoglobin 15.3 12.0 - 15.9 g/dL    Hematocrit 45.8 34.0 - 46.6 %    MCV 93.9 79.0 - 97.0 fL    MCH 31.4 26.6 - 33.0 pg    MCHC 33.4 31.5 - 35.7 g/dL    RDW 12.7 12.3 - 15.4 %    RDW-SD 43.7 37.0 - 54.0 fl    MPV 9.1 6.0 - 12.0 fL    Platelets 300 140 - 450 10*3/mm3    Neutrophil % 63.4 42.7 - 76.0 %    Lymphocyte % 27.7 19.6 - 45.3 %    Monocyte % 6.2 5.0 - 12.0 %    Eosinophil % 1.6 0.3 - 6.2 %    Basophil % 0.8 0.0 - 1.5 %    Immature Grans % 0.3 0.0 - 0.5 %    Neutrophils, Absolute 3.99 1.70 - 7.00 10*3/mm3    Lymphocytes, Absolute 1.74 0.70 - 3.10 10*3/mm3    Monocytes, Absolute 0.39 0.10 - 0.90 10*3/mm3    Eosinophils, Absolute 0.10 0.00 - 0.40 10*3/mm3    Basophils, Absolute 0.05 0.00 - 0.20 10*3/mm3    Immature Grans, Absolute 0.02 0.00 - 0.05 10*3/mm3    nRBC 0.0 0.0 - 0.2 /100 WBC   Gold Top - SST    Collection Time: 07/24/24 10:42 AM   Result Value Ref Range    Extra Tube Hold for add-ons.    Light Blue Top    Collection Time: 07/24/24 10:42 AM   Result Value Ref Range    Extra Tube Hold for add-ons.    Respiratory Panel PCR w/COVID-19(SARS-CoV-2) GREGORY/MAYELA/CHARO/PAD/COR/JEREMY In-House, NP Swab in UTM/VTM, 2 HR TAT - Swab, Nasopharynx    Collection Time: 07/24/24 10:43 AM    Specimen: Nasopharynx; Swab   Result Value Ref Range    ADENOVIRUS, PCR Not Detected Not Detected    Coronavirus 229E Not Detected Not Detected    Coronavirus HKU1 Not Detected Not Detected    Coronavirus NL63 Not Detected Not Detected    Coronavirus OC43 Not Detected Not Detected    COVID19 Not Detected Not Detected - Ref. Range    Human Metapneumovirus Not Detected Not Detected    Human Rhinovirus/Enterovirus Not Detected Not Detected    Influenza A PCR Not Detected Not Detected    Influenza B PCR Not Detected Not Detected    Parainfluenza Virus 1 Not Detected Not Detected    Parainfluenza Virus 2 Not Detected Not Detected     Parainfluenza Virus 3 Not Detected Not Detected    Parainfluenza Virus 4 Not Detected Not Detected    RSV, PCR Not Detected Not Detected    Bordetella pertussis pcr Not Detected Not Detected    Bordetella parapertussis PCR Not Detected Not Detected    Chlamydophila pneumoniae PCR Not Detected Not Detected    Mycoplasma pneumo by PCR Not Detected Not Detected       I ordered the above labs and reviewed the results.    RADIOLOGY  XR Chest 1 View    Result Date: 7/24/2024  XR CHEST 1 VW-7/24/2024  HISTORY: Cough.  Heart size is within normal limits. Lungs appear free of acute infiltrates. Bones and soft tissues are unremarkable.      1. No acute process.   This report was finalized on 7/24/2024 10:57 AM by Dr. Adan Garcia M.D on Workstation: [id number]       I ordered the above noted radiological studies. I reviewed the images and results. I agree with the radiologist interpretation.    PROCEDURES  Procedures    MEDICATIONS GIVEN IN ER  Medications - No data to display    PROGRESS, DATA ANALYSIS, CONSULTS, AND MEDICAL DECISION MAKING  A complete history and physical exam have been performed.  All available laboratory and imaging results have been reviewed by myself prior to disposition.    MDM    After the initial H&P, I discussed pertinent information from history and physical exam with patient/family.  Discussed differential diagnosis.  Discussed plan for ED evaluation/workup/treatment.  All questions answered.  Patient/family is agreeable with plan.  ED Course as of 07/24/24 1203   Wed Jul 24, 2024   1120 Reviewed chest x-ray in PACS, no pulmonary infiltrates per my read. [JG]   1153 Patient afebrile, vital signs stable, no leukocytosis or left shift, procalcitonin normal, chest x-ray shows no sign of pneumonia.  Given duration of symptoms without fever and reassuring workup, symptoms consistent with viral infection.  Patient negative for COVID-19, negative for influenza.  Patient is well-appearing  appears appropriate for discharge with outpatient follow-up. [JG]      ED Course User Index  [JG] Bakari Siddiqui MD       AS OF 12:03 EDT VITALS:    BP - 111/73  HR - 64  TEMP - 97.6 °F (36.4 °C)  O2 SATS - 96%    DIAGNOSIS  Final diagnoses:   Viral URI with cough         DISPOSITION  DISCHARGE    Patient discharged in stable condition.    Reviewed implications of results, diagnosis, meds, responsibility to follow up, warning signs and symptoms of possible worsening, potential complications and reasons to return to ER.    Patient/Family voiced understanding of above instructions.    Discussed plan for discharge, as there is no emergent indication for admission. Patient referred to primary care provider for BP management due to today's BP. Pt/family is agreeable and understands need for follow up and repeat testing.  Pt is aware that discharge does not mean that nothing is wrong but it indicates no emergency is present that requires admission and they must continue care with follow-up as given below or physician of their choice.     FOLLOW-UP  Aminta Salomon, APRN  [address] [place] [address]  [phone]    Schedule an appointment as soon as possible for a visit in 2 days  even if well         Medication List        New Prescriptions      benzonatate 200 MG capsule  Commonly known as: TESSALON  Take 1 capsule by mouth 3 (Three) Times a Day As Needed for Cough.     Sudafed PE Head Congestion 5--325 MG tablet  Generic drug: Phenylephrine-DM-GG-APAP  Take 2 tablets by mouth Every 8 (Eight) Hours As Needed (cough/congestion).               Where to Get Your Medications        These medications were sent to [facility] [id number]      Hours: Monday to Friday 7 AM to 6 PM, Saturday & Sunday 8 AM to 4:30 PM (Closed 12 PM to 12:30 PM) Phone: [phone]   benzonatate 200 MG capsule  Sudafed PE Head Congestion 5--325 MG tablet             Bakari Siddiqui MD  07/24/24 0702

## 2024-07-24 NOTE — ED NOTES
"Patient to ER via car from home for \"I have yellow stuff pouring out of my nose and eyes and coughing it up\" x 2 weeks    Patient reports she was placed on steroids and abx treatment states she thinks she isnt being treated right [place]      Denies any fever or chills  "

## 2024-08-20 ENCOUNTER — OFFICE VISIT (OUTPATIENT)
Dept: CARDIOLOGY | Facility: CLINIC | Age: 49
End: 2024-08-20
Payer: MEDICARE

## 2024-08-20 VITALS
HEART RATE: 57 BPM | BODY MASS INDEX: 22.56 KG/M2 | DIASTOLIC BLOOD PRESSURE: 76 MMHG | OXYGEN SATURATION: 98 % | SYSTOLIC BLOOD PRESSURE: 118 MMHG | HEIGHT: 65 IN | WEIGHT: 135.4 LBS

## 2024-08-20 DIAGNOSIS — E11.9 TYPE 2 DIABETES MELLITUS WITHOUT COMPLICATION, WITHOUT LONG-TERM CURRENT USE OF INSULIN: ICD-10-CM

## 2024-08-20 DIAGNOSIS — R06.09 DOE (DYSPNEA ON EXERTION): ICD-10-CM

## 2024-08-20 DIAGNOSIS — R53.83 OTHER FATIGUE: Primary | ICD-10-CM

## 2024-08-20 DIAGNOSIS — E78.2 MIXED HYPERLIPIDEMIA: ICD-10-CM

## 2024-08-20 NOTE — PROGRESS NOTES
"      CARDIOLOGY    Charmaine Dougherty MD    ENCOUNTER DATE:  2024    Jesus Ovalles / 49 y.o. / female        CHIEF COMPLAINT / REASON FOR OFFICE VISIT     Hypertension and Abnormal ECG      HISTORY OF PRESENT ILLNESS       HPI    Jesus Ovalles is a 49 y.o. female     This is a lady with diabetes and hyperlipidemia.  She smokes about a pack of cigarettes a day.  She has a strong family history of heart disease including her mother who  at the age of 32 from heart problems.  She reports having had a workup in the remote past while living in [place].  It is unclear from what she describes to me what they saw but it sounds like she may have had some valvular regurgitation.    She had a trip to the emergency room in 2024 due to fatigue and on and off chest pain and pain in her leg.  She had 2 negative troponins.  EKG did not show acute changes.  She had a CTA of the chest which did not show pulmonary embolism.  I reviewed the CTA images myself and of course the cardiac structure was blurry since it was not a gated study.  I did not see any coronary artery calcification.  I am a little concerned that the right atrium might be enlarged but again, it is also a nongated study.    She does not do formal exercise but stays busy around the house and taking care of grandchildren.    VITAL SIGNS     Visit Vitals  /76   Pulse 57   Ht 165.1 cm (65\")   Wt 61.4 kg (135 lb 6.4 oz)   SpO2 98%   BMI 22.53 kg/m²         Wt Readings from Last 3 Encounters:   24 61.4 kg (135 lb 6.4 oz)   24 61.9 kg (136 lb 7.4 oz)   24 61.9 kg (136 lb 6.4 oz)     Body mass index is 22.53 kg/m².      PHYSICAL EXAMINATION     Constitutional:       General: Not in acute distress.  Neck:      Vascular: No carotid bruit or JVD.   Pulmonary:      Effort: Pulmonary effort is normal.      Breath sounds: Normal breath sounds.   Cardiovascular:      Normal rate. Regular rhythm.      Murmurs: There is no murmur.   Psychiatric:    "      Mood and Affect: Mood and affect normal.           REVIEWED DATA       ECG 12 Lead    Date/Time: 8/20/2024 10:40 AM  Performed by: Charmaine Dougherty MD    Authorized by: Charmaine Dougherty MD  Comparison: compared with previous ECG from 7/10/2024  Similar to previous ECG  Rhythm: sinus rhythm  BPM: 57  Conduction: conduction normal  ST Segments: ST segments normal  T Waves: T waves normal  QRS axis: right    Clinical impression: abnormal EKG                Lab Results   Component Value Date    GLUCOSE 89 07/24/2024    BUN 13 07/24/2024    CREATININE 0.95 07/24/2024     07/24/2024    K 4.5 07/24/2024     07/24/2024    CALCIUM 8.9 07/24/2024    PROTEINTOT 6.7 07/24/2024    ALBUMIN 4.3 07/24/2024    ALT 13 07/24/2024    AST 13 07/24/2024    ALKPHOS 84 07/24/2024    BILITOT <0.2 07/24/2024    GLOB 2.4 07/24/2024    AGRATIO 1.8 07/24/2024    BCR 13.7 07/24/2024    ANIONGAP 10.6 07/24/2024    EGFR 73.6 07/24/2024       ASSESSMENT & PLAN      Diagnosis Plan   1. Other fatigue  Adult Stress Echo W/ Cont or Stress Agent if Necessary Per Protocol      2. Mixed hyperlipidemia  Adult Stress Echo W/ Cont or Stress Agent if Necessary Per Protocol      3. Type 2 diabetes mellitus without complication, without long-term current use of insulin  Adult Stress Echo W/ Cont or Stress Agent if Necessary Per Protocol      4. AMES (dyspnea on exertion)  Adult Stress Echo W/ Cont or Stress Agent if Necessary Per Protocol          1.  Fatigue and shortness of breath.  I recommend checking a stress echocardiogram.  2.  Chest pain.  As above.  3.  Possible history of valvular regurgitation.  Check echocardiogram.  I also want a look at the right atrium as I suspect it might be dilated based on a nongated CTA of the chest from July 2024.  4.  Hyperlipidemia.  On atorvastatin  5.  Diabetes.  She is on Jardiance and reports good blood  sugar control.  6.  Family history of premature heart disease.  7.  Tobacco use.  She is smoking  about a pack a day.  Of asked her to try to cut back.  8.  Anxiety and stress    One of my nurses or I will go over the results of the stress echo when it becomes available.        Orders Placed This Encounter   Procedures    ECG 12 Lead     This order was created via procedure documentation     Order Specific Question:   Release to patient     Answer:   Routine Release [[id number]]    Adult Stress Echo W/ Cont or Stress Agent if Necessary Per Protocol     Standing Status:   Future     Standing Expiration Date:   8/20/2025     Order Specific Question:   What stress agent will be used?     Answer:   Exercise with Possible Pharmalogic     Order Specific Question:   Reason for exam?     Answer:   Dyspnea     Order Specific Question:   Reason for exam?     Answer:   Abnormal EKG     Order Specific Question:   Release to patient     Answer:   Routine Release [[id number]]           MEDICATIONS         Discharge Medications            Accurate as of August 20, 2024 10:44 AM. If you have any questions, ask your nurse or doctor.                Continue These Medications        Instructions Start Date   albuterol (2.5 MG/3ML) 0.083% nebulizer solution  Commonly known as: PROVENTIL   Inhale the contents of 1 vial by nebulization Every 4 (Four) Hours As Needed.      atorvastatin 20 MG tablet  Commonly known as: LIPITOR   20 mg, Oral, Every Night at Bedtime      baclofen 10 MG tablet  Commonly known as: LIORESAL   Take 1 tablet by mouth 3 (Three) Times a Day As Needed for muscle spasms      Breztri Aerosphere 160-9-4.8 MCG/ACT aerosol inhaler  Generic drug: Budeson-Glycopyrrol-Formoterol   2 puffs, Inhalation, 2 Times Daily      fluticasone 50 MCG/ACT nasal spray  Commonly known as: Flonase Allergy Relief   1 spray, Each Nare, Daily      HYDROcodone-acetaminophen  MG per tablet  Commonly known as: Norco   1 tablet, Oral, Every 6 Hours PRN      Jardiance 10 MG tablet tablet  Generic drug: empagliflozin   10 mg, Oral, Every  Early Morning      Symbicort 160-4.5 MCG/ACT inhaler  Generic drug: budesonide-formoterol   2 puffs, Inhalation, 2 Times Daily             Stop These Medications      benzonatate 200 MG capsule  Commonly known as: TESSALON  Stopped by: Charmaine Dougherty     cefdinir 300 MG capsule  Commonly known as: OMNICEF  Stopped by: Charmaine Dougherty     Diclofenac Sodium 1 % gel gel  Commonly known as: VOLTAREN  Stopped by: Charmaine Dougherty     doxycycline 100 MG capsule  Commonly known as: MONODOX  Stopped by: Charmaine Dougherty     hydrOXYzine pamoate 50 MG capsule  Commonly known as: Vistaril  Stopped by: Charmaine Dougherty     imiquimod 5 % cream  Commonly known as: Aldara  Stopped by: Charmaine Dougherty     levothyroxine 25 MCG tablet  Commonly known as: SYNTHROID, LEVOTHROID  Stopped by: Charmaine Dougherty     methylPREDNISolone 4 MG dose pack  Commonly known as: Medrol  Stopped by: Charmaine Dougherty     promethazine-dextromethorphan 6.25-15 MG/5ML syrup  Commonly known as: PROMETHAZINE-DM  Stopped by: Charmaine Dougherty     Stahist AD 25-60 MG tablet  Generic drug: Chlorcyclizine-Pseudoephed  Stopped by: Charmaine Dougherty     Sudafed PE Head Congestion 5--325 MG tablet  Generic drug: Phenylephrine-DM-GG-APAP  Stopped by: Charmaine Dougherty MD  08/20/24  10:44 EDT    Part of this note may be an electronic transcription/translation of spoken language to printed text using the Dragon dictation system.

## 2024-08-26 ENCOUNTER — TELEPHONE (OUTPATIENT)
Dept: CARDIOLOGY | Facility: CLINIC | Age: 49
End: 2024-08-26
Payer: MEDICARE

## 2024-08-27 ENCOUNTER — TELEPHONE (OUTPATIENT)
Age: 49
End: 2024-08-27
Payer: MEDICARE

## 2024-08-27 ENCOUNTER — HOSPITAL ENCOUNTER (OUTPATIENT)
Dept: CARDIOLOGY | Facility: HOSPITAL | Age: 49
Discharge: HOME | End: 2024-08-27
Admitting: INTERNAL MEDICINE
Payer: MEDICARE

## 2024-08-27 VITALS
BODY MASS INDEX: 22.49 KG/M2 | DIASTOLIC BLOOD PRESSURE: 78 MMHG | HEIGHT: 65 IN | SYSTOLIC BLOOD PRESSURE: 92 MMHG | WEIGHT: 135 LBS

## 2024-08-27 DIAGNOSIS — R06.09 DOE (DYSPNEA ON EXERTION): ICD-10-CM

## 2024-08-27 DIAGNOSIS — E78.2 MIXED HYPERLIPIDEMIA: ICD-10-CM

## 2024-08-27 DIAGNOSIS — R53.83 OTHER FATIGUE: Primary | ICD-10-CM

## 2024-08-27 DIAGNOSIS — E11.9 TYPE 2 DIABETES MELLITUS WITHOUT COMPLICATION, WITHOUT LONG-TERM CURRENT USE OF INSULIN: ICD-10-CM

## 2024-08-27 DIAGNOSIS — R53.83 OTHER FATIGUE: ICD-10-CM

## 2024-08-27 LAB
AORTIC DIMENSIONLESS INDEX: 0.6 (DI)
ASCENDING AORTA: 2.8 CM
BH CV ECHO MEAS - ACS: 1.5 CM
BH CV ECHO MEAS - AO MAX PG: 6.6 MMHG
BH CV ECHO MEAS - AO MEAN PG: 3 MMHG
BH CV ECHO MEAS - AO ROOT DIAM: 2.5 CM
BH CV ECHO MEAS - AO V2 MAX: 128 CM/SEC
BH CV ECHO MEAS - AO V2 VTI: 29.5 CM
BH CV ECHO MEAS - AVA(I,D): 2.03 CM2
BH CV ECHO MEAS - EDV(CUBED): 79.5 ML
BH CV ECHO MEAS - EDV(MOD-SP2): 95 ML
BH CV ECHO MEAS - EDV(MOD-SP4): 66 ML
BH CV ECHO MEAS - EF(MOD-BP): 57.5 %
BH CV ECHO MEAS - EF(MOD-SP2): 57.9 %
BH CV ECHO MEAS - EF(MOD-SP4): 57.6 %
BH CV ECHO MEAS - ESV(CUBED): 25.4 ML
BH CV ECHO MEAS - ESV(MOD-SP2): 40 ML
BH CV ECHO MEAS - ESV(MOD-SP4): 28 ML
BH CV ECHO MEAS - FS: 31.6 %
BH CV ECHO MEAS - IVS/LVPW: 0.88 CM
BH CV ECHO MEAS - IVSD: 0.7 CM
BH CV ECHO MEAS - LAT PEAK E' VEL: 14.8 CM/SEC
BH CV ECHO MEAS - LV DIASTOLIC VOL/BSA (35-75): 39.4 CM2
BH CV ECHO MEAS - LV MASS(C)D: 96.8 GRAMS
BH CV ECHO MEAS - LV MAX PG: 2.9 MMHG
BH CV ECHO MEAS - LV MEAN PG: 1 MMHG
BH CV ECHO MEAS - LV SYSTOLIC VOL/BSA (12-30): 16.7 CM2
BH CV ECHO MEAS - LV V1 MAX: 84.8 CM/SEC
BH CV ECHO MEAS - LV V1 VTI: 19.1 CM
BH CV ECHO MEAS - LVIDD: 4.3 CM
BH CV ECHO MEAS - LVIDS: 2.9 CM
BH CV ECHO MEAS - LVOT AREA: 3.1 CM2
BH CV ECHO MEAS - LVOT DIAM: 2 CM
BH CV ECHO MEAS - LVPWD: 0.8 CM
BH CV ECHO MEAS - MED PEAK E' VEL: 12.4 CM/SEC
BH CV ECHO MEAS - MR MAX PG: 82.2 MMHG
BH CV ECHO MEAS - MR MAX VEL: 453.4 CM/SEC
BH CV ECHO MEAS - MV A DUR: 0.13 SEC
BH CV ECHO MEAS - MV A MAX VEL: 28.7 CM/SEC
BH CV ECHO MEAS - MV DEC SLOPE: 296.5 CM/SEC2
BH CV ECHO MEAS - MV DEC TIME: 0.28 SEC
BH CV ECHO MEAS - MV E MAX VEL: 85.7 CM/SEC
BH CV ECHO MEAS - MV E/A: 3
BH CV ECHO MEAS - MV MAX PG: 3.7 MMHG
BH CV ECHO MEAS - MV MEAN PG: 0.93 MMHG
BH CV ECHO MEAS - MV P1/2T: 102.6 MSEC
BH CV ECHO MEAS - MV V2 VTI: 35.6 CM
BH CV ECHO MEAS - MVA(P1/2T): 2.15 CM2
BH CV ECHO MEAS - MVA(VTI): 1.68 CM2
BH CV ECHO MEAS - PA ACC TIME: 0.16 SEC
BH CV ECHO MEAS - PA V2 MAX: 63.8 CM/SEC
BH CV ECHO MEAS - PULM A REVS DUR: 0.11 SEC
BH CV ECHO MEAS - PULM A REVS VEL: 31.7 CM/SEC
BH CV ECHO MEAS - PULM DIAS VEL: 72.4 CM/SEC
BH CV ECHO MEAS - PULM S/D: 0.87
BH CV ECHO MEAS - PULM SYS VEL: 63 CM/SEC
BH CV ECHO MEAS - QP/QS: 0.49
BH CV ECHO MEAS - RAP SYSTOLE: 3 MMHG
BH CV ECHO MEAS - RV MAX PG: 1.01 MMHG
BH CV ECHO MEAS - RV V1 MAX: 50.1 CM/SEC
BH CV ECHO MEAS - RV V1 VTI: 13.5 CM
BH CV ECHO MEAS - RVOT DIAM: 1.66 CM
BH CV ECHO MEAS - RVSP: 14 MMHG
BH CV ECHO MEAS - SUP REN AO DIAM: 1.8 CM
BH CV ECHO MEAS - SV(LVOT): 59.9 ML
BH CV ECHO MEAS - SV(MOD-SP2): 55 ML
BH CV ECHO MEAS - SV(MOD-SP4): 38 ML
BH CV ECHO MEAS - SV(RVOT): 29.3 ML
BH CV ECHO MEAS - SVI(LVOT): 35.8 ML/M2
BH CV ECHO MEAS - SVI(MOD-SP2): 32.9 ML/M2
BH CV ECHO MEAS - SVI(MOD-SP4): 22.7 ML/M2
BH CV ECHO MEAS - TAPSE (>1.6): 2.5 CM
BH CV ECHO MEAS - TR MAX PG: 11 MMHG
BH CV ECHO MEAS - TR MAX VEL: 165.8 CM/SEC
BH CV ECHO MEASUREMENTS AVERAGE E/E' RATIO: 6.3
BH CV STRESS BP STAGE 1: NORMAL
BH CV STRESS BP STAGE 2: NORMAL
BH CV STRESS BP STAGE 3: NORMAL
BH CV STRESS DURATION MIN STAGE 1: 3
BH CV STRESS DURATION MIN STAGE 2: 3
BH CV STRESS DURATION MIN STAGE 3: 3
BH CV STRESS DURATION SEC STAGE 1: 0
BH CV STRESS DURATION SEC STAGE 2: 0
BH CV STRESS DURATION SEC STAGE 3: 0
BH CV STRESS ECHO POST STRESS EJECTION FRACTION EF: 81 %
BH CV STRESS GRADE STAGE 1: 10
BH CV STRESS GRADE STAGE 2: 12
BH CV STRESS GRADE STAGE 3: 14
BH CV STRESS HR STAGE 1: 94
BH CV STRESS HR STAGE 2: 119
BH CV STRESS HR STAGE 3: 135
BH CV STRESS METS STAGE 1: 5
BH CV STRESS METS STAGE 2: 7.5
BH CV STRESS METS STAGE 3: 10
BH CV STRESS PROTOCOL 1: NORMAL
BH CV STRESS SPEED STAGE 1: 1.7
BH CV STRESS SPEED STAGE 2: 2.5
BH CV STRESS SPEED STAGE 3: 3.4
BH CV STRESS STAGE 1: 1
BH CV STRESS STAGE 2: 2
BH CV STRESS STAGE 3: 3
BH CV XLRA - RV BASE: 3 CM
BH CV XLRA - RV LENGTH: 7.4 CM
BH CV XLRA - RV MID: 2.41 CM
BH CV XLRA - TDI S': 9.1 CM/SEC
LEFT ATRIUM VOLUME INDEX: 21.6 ML/M2
MAXIMAL PREDICTED HEART RATE: 171 BPM
PERCENT MAX PREDICTED HR: 78.95 %
SINUS: 2.6 CM
STJ: 2.32 CM
STRESS BASELINE BP: NORMAL MMHG
STRESS BASELINE HR: 63 BPM
STRESS PERCENT HR: 93 %
STRESS POST ESTIMATED WORKLOAD: 10 METS
STRESS POST EXERCISE DUR MIN: 9 MIN
STRESS POST EXERCISE DUR SEC: 0 SEC
STRESS POST PEAK BP: NORMAL MMHG
STRESS POST PEAK HR: 135 BPM
STRESS TARGET HR: 145 BPM

## 2024-08-27 PROCEDURE — 93325 DOPPLER ECHO COLOR FLOW MAPG: CPT

## 2024-08-27 PROCEDURE — 25510000001 PERFLUTREN 6.52 MG/ML SUSPENSION 2 ML VIAL: Performed by: INTERNAL MEDICINE

## 2024-08-27 PROCEDURE — 93350 STRESS TTE ONLY: CPT

## 2024-08-27 PROCEDURE — 93017 CV STRESS TEST TRACING ONLY: CPT

## 2024-08-27 PROCEDURE — 93320 DOPPLER ECHO COMPLETE: CPT

## 2024-08-27 RX ADMIN — PERFLUTREN 4 ML: 6.52 INJECTION, SUSPENSION INTRAVENOUS at 13:32

## 2024-08-27 NOTE — TELEPHONE ENCOUNTER
Please let her know that she did great with the stress test but did not reach her target heart rate.  There were no abnormalities noted on the stress test.  However, given all of her risk factors and the stress test being submaximal, I do think we should try to do a different type of stress test that does not involve exercise to make sure that it does not look like she has any actively blocked arteries.  I am placing an order for Lexiscan nuclear stress test.

## 2024-08-27 NOTE — TELEPHONE ENCOUNTER
I spoke with Jesus Ovalles and updated pt on results/recommendations from provider.  Pt verbalized understanding and has no further questions at this time.    Scheduling,  Please contact pt to have Lexiscan set up.    Thank you,    Daina BOYD, RN  Triage [facility]  08/27/24 15:23 EDT

## 2024-09-16 ENCOUNTER — TELEPHONE (OUTPATIENT)
Dept: CARDIOLOGY | Facility: CLINIC | Age: 49
End: 2024-09-16
Payer: MEDICARE

## 2024-09-17 ENCOUNTER — HOSPITAL ENCOUNTER (OUTPATIENT)
Dept: CARDIOLOGY | Facility: HOSPITAL | Age: 49
Discharge: HOME | End: 2024-09-17
Admitting: INTERNAL MEDICINE
Payer: MEDICARE

## 2024-09-17 DIAGNOSIS — R06.09 DOE (DYSPNEA ON EXERTION): ICD-10-CM

## 2024-09-17 DIAGNOSIS — R53.83 OTHER FATIGUE: ICD-10-CM

## 2024-09-17 DIAGNOSIS — E78.2 MIXED HYPERLIPIDEMIA: ICD-10-CM

## 2024-09-17 DIAGNOSIS — E11.9 TYPE 2 DIABETES MELLITUS WITHOUT COMPLICATION, WITHOUT LONG-TERM CURRENT USE OF INSULIN: ICD-10-CM

## 2024-09-17 LAB
BH CV NUCLEAR PRIOR STUDY: 1
BH CV REST NUCLEAR ISOTOPE DOSE: 10.8 MCI
BH CV STRESS BP STAGE 1: NORMAL
BH CV STRESS COMMENTS STAGE 1: NORMAL
BH CV STRESS DOSE REGADENOSON STAGE 1: 0.4
BH CV STRESS DURATION MIN STAGE 1: 0
BH CV STRESS DURATION SEC STAGE 1: 10
BH CV STRESS HR STAGE 1: 120
BH CV STRESS NUCLEAR ISOTOPE DOSE: 35.1 MCI
BH CV STRESS PROTOCOL 1: NORMAL
BH CV STRESS RECOVERY BP: NORMAL MMHG
BH CV STRESS RECOVERY HR: 96 BPM
BH CV STRESS STAGE 1: 1
LV EF NUC BP: 65 %
MAXIMAL PREDICTED HEART RATE: 171 BPM
PERCENT MAX PREDICTED HR: 70.18 %
STRESS BASELINE BP: NORMAL MMHG
STRESS BASELINE HR: 71 BPM
STRESS PERCENT HR: 83 %
STRESS POST EXERCISE DUR SEC: 10 SEC
STRESS POST PEAK BP: NORMAL MMHG
STRESS POST PEAK HR: 120 BPM
STRESS TARGET HR: 145 BPM

## 2024-09-17 PROCEDURE — A9502 TC99M TETROFOSMIN: HCPCS | Performed by: INTERNAL MEDICINE

## 2024-09-17 PROCEDURE — 25010000002 REGADENOSON 0.4 MG/5ML SOLUTION: Performed by: INTERNAL MEDICINE

## 2024-09-17 PROCEDURE — 78452 HT MUSCLE IMAGE SPECT MULT: CPT | Performed by: INTERNAL MEDICINE

## 2024-09-17 PROCEDURE — 93018 CV STRESS TEST I&R ONLY: CPT | Performed by: INTERNAL MEDICINE

## 2024-09-17 PROCEDURE — 0 TECHNETIUM TETROFOSMIN KIT: Performed by: INTERNAL MEDICINE

## 2024-09-17 PROCEDURE — 93016 CV STRESS TEST SUPVJ ONLY: CPT | Performed by: INTERNAL MEDICINE

## 2024-09-17 PROCEDURE — 78452 HT MUSCLE IMAGE SPECT MULT: CPT

## 2024-09-17 PROCEDURE — 93017 CV STRESS TEST TRACING ONLY: CPT

## 2024-09-17 RX ORDER — REGADENOSON 0.08 MG/ML
0.4 INJECTION, SOLUTION INTRAVENOUS
Status: COMPLETED | OUTPATIENT
Start: 2024-09-17 | End: 2024-09-17

## 2024-09-17 RX ADMIN — REGADENOSON 0.4 MG: 0.08 INJECTION, SOLUTION INTRAVENOUS at 10:25

## 2024-09-17 RX ADMIN — TETROFOSMIN 1 DOSE: 1.38 INJECTION, POWDER, LYOPHILIZED, FOR SOLUTION INTRAVENOUS at 09:30

## 2024-09-17 RX ADMIN — TETROFOSMIN 1 DOSE: 1.38 INJECTION, POWDER, LYOPHILIZED, FOR SOLUTION INTRAVENOUS at 10:25

## 2024-09-18 ENCOUNTER — TELEPHONE (OUTPATIENT)
Dept: CARDIOLOGY | Facility: CLINIC | Age: 49
End: 2024-09-18
Payer: MEDICARE

## 2024-10-21 DIAGNOSIS — R05.1 ACUTE COUGH: Primary | ICD-10-CM

## 2024-11-11 ENCOUNTER — APPOINTMENT (OUTPATIENT)
Dept: GENERAL RADIOLOGY | Facility: HOSPITAL | Age: 49
End: 2024-11-11
Payer: MEDICARE

## 2024-11-11 ENCOUNTER — HOSPITAL ENCOUNTER (EMERGENCY)
Facility: HOSPITAL | Age: 49
Discharge: HOME | End: 2024-11-11
Attending: EMERGENCY MEDICINE | Admitting: EMERGENCY MEDICINE
Payer: MEDICARE

## 2024-11-11 VITALS
DIASTOLIC BLOOD PRESSURE: 72 MMHG | RESPIRATION RATE: 18 BRPM | HEART RATE: 75 BPM | SYSTOLIC BLOOD PRESSURE: 106 MMHG | OXYGEN SATURATION: 97 % | TEMPERATURE: 97.9 F

## 2024-11-11 DIAGNOSIS — J18.9 COMMUNITY ACQUIRED PNEUMONIA OF RIGHT LUNG, UNSPECIFIED PART OF LUNG: Primary | ICD-10-CM

## 2024-11-11 LAB
ALBUMIN SERPL-MCNC: 4.4 G/DL (ref 3.5–5.2)
ALBUMIN/GLOB SERPL: 2.1 G/DL
ALP SERPL-CCNC: 69 U/L (ref 39–117)
ALT SERPL W P-5'-P-CCNC: 12 U/L (ref 1–33)
ANION GAP SERPL CALCULATED.3IONS-SCNC: 10 MMOL/L (ref 5–15)
AST SERPL-CCNC: 14 U/L (ref 1–32)
B PARAPERT DNA SPEC QL NAA+PROBE: NOT DETECTED
B PERT DNA SPEC QL NAA+PROBE: NOT DETECTED
BASOPHILS # BLD AUTO: 0.05 10*3/MM3 (ref 0–0.2)
BASOPHILS NFR BLD AUTO: 1 % (ref 0–1.5)
BILIRUB SERPL-MCNC: 0.2 MG/DL (ref 0–1.2)
BUN SERPL-MCNC: 9 MG/DL (ref 6–20)
BUN/CREAT SERPL: 9.7 (ref 7–25)
C PNEUM DNA NPH QL NAA+NON-PROBE: NOT DETECTED
CALCIUM SPEC-SCNC: 9.9 MG/DL (ref 8.6–10.5)
CHLORIDE SERPL-SCNC: 105 MMOL/L (ref 98–107)
CO2 SERPL-SCNC: 26 MMOL/L (ref 22–29)
CREAT SERPL-MCNC: 0.93 MG/DL (ref 0.57–1)
DEPRECATED RDW RBC AUTO: 42.6 FL (ref 37–54)
EGFRCR SERPLBLD CKD-EPI 2021: 75.5 ML/MIN/1.73
EOSINOPHIL # BLD AUTO: 0.1 10*3/MM3 (ref 0–0.4)
EOSINOPHIL NFR BLD AUTO: 2 % (ref 0.3–6.2)
ERYTHROCYTE [DISTWIDTH] IN BLOOD BY AUTOMATED COUNT: 12.7 % (ref 12.3–15.4)
FLUAV SUBTYP SPEC NAA+PROBE: NOT DETECTED
FLUBV RNA ISLT QL NAA+PROBE: NOT DETECTED
GLOBULIN UR ELPH-MCNC: 2.1 GM/DL
GLUCOSE SERPL-MCNC: 83 MG/DL (ref 65–99)
HADV DNA SPEC NAA+PROBE: NOT DETECTED
HCOV 229E RNA SPEC QL NAA+PROBE: NOT DETECTED
HCOV HKU1 RNA SPEC QL NAA+PROBE: NOT DETECTED
HCOV NL63 RNA SPEC QL NAA+PROBE: NOT DETECTED
HCOV OC43 RNA SPEC QL NAA+PROBE: NOT DETECTED
HCT VFR BLD AUTO: 43.2 % (ref 34–46.6)
HGB BLD-MCNC: 14.1 G/DL (ref 12–15.9)
HMPV RNA NPH QL NAA+NON-PROBE: NOT DETECTED
HOLD SPECIMEN: NORMAL
HOLD SPECIMEN: NORMAL
HPIV1 RNA ISLT QL NAA+PROBE: NOT DETECTED
HPIV2 RNA SPEC QL NAA+PROBE: NOT DETECTED
HPIV3 RNA NPH QL NAA+PROBE: NOT DETECTED
HPIV4 P GENE NPH QL NAA+PROBE: NOT DETECTED
IMM GRANULOCYTES # BLD AUTO: 0.01 10*3/MM3 (ref 0–0.05)
IMM GRANULOCYTES NFR BLD AUTO: 0.2 % (ref 0–0.5)
LYMPHOCYTES # BLD AUTO: 1.67 10*3/MM3 (ref 0.7–3.1)
LYMPHOCYTES NFR BLD AUTO: 33.9 % (ref 19.6–45.3)
M PNEUMO IGG SER IA-ACNC: NOT DETECTED
MCH RBC QN AUTO: 30.4 PG (ref 26.6–33)
MCHC RBC AUTO-ENTMCNC: 32.6 G/DL (ref 31.5–35.7)
MCV RBC AUTO: 93.1 FL (ref 79–97)
MONOCYTES # BLD AUTO: 0.24 10*3/MM3 (ref 0.1–0.9)
MONOCYTES NFR BLD AUTO: 4.9 % (ref 5–12)
NEUTROPHILS NFR BLD AUTO: 2.85 10*3/MM3 (ref 1.7–7)
NEUTROPHILS NFR BLD AUTO: 58 % (ref 42.7–76)
NRBC BLD AUTO-RTO: 0 /100 WBC (ref 0–0.2)
NT-PROBNP SERPL-MCNC: 270 PG/ML (ref 0–450)
PLATELET # BLD AUTO: 235 10*3/MM3 (ref 140–450)
PMV BLD AUTO: 9.2 FL (ref 6–12)
POTASSIUM SERPL-SCNC: 3.9 MMOL/L (ref 3.5–5.2)
PROT SERPL-MCNC: 6.5 G/DL (ref 6–8.5)
RBC # BLD AUTO: 4.64 10*6/MM3 (ref 3.77–5.28)
RHINOVIRUS RNA SPEC NAA+PROBE: NOT DETECTED
RSV RNA NPH QL NAA+NON-PROBE: NOT DETECTED
SARS-COV-2 RNA NPH QL NAA+NON-PROBE: NOT DETECTED
SODIUM SERPL-SCNC: 141 MMOL/L (ref 136–145)
TROPONIN T SERPL HS-MCNC: <6 NG/L
WBC NRBC COR # BLD AUTO: 4.92 10*3/MM3 (ref 3.4–10.8)
WHOLE BLOOD HOLD COAG: NORMAL
WHOLE BLOOD HOLD SPECIMEN: NORMAL

## 2024-11-11 PROCEDURE — 83880 ASSAY OF NATRIURETIC PEPTIDE: CPT | Performed by: EMERGENCY MEDICINE

## 2024-11-11 PROCEDURE — 84484 ASSAY OF TROPONIN QUANT: CPT | Performed by: EMERGENCY MEDICINE

## 2024-11-11 PROCEDURE — 36415 COLL VENOUS BLD VENIPUNCTURE: CPT

## 2024-11-11 PROCEDURE — 99284 EMERGENCY DEPT VISIT MOD MDM: CPT

## 2024-11-11 PROCEDURE — 80053 COMPREHEN METABOLIC PANEL: CPT | Performed by: EMERGENCY MEDICINE

## 2024-11-11 PROCEDURE — 85025 COMPLETE CBC W/AUTO DIFF WBC: CPT

## 2024-11-11 PROCEDURE — 93005 ELECTROCARDIOGRAM TRACING: CPT

## 2024-11-11 PROCEDURE — 0202U NFCT DS 22 TRGT SARS-COV-2: CPT | Performed by: EMERGENCY MEDICINE

## 2024-11-11 PROCEDURE — 93005 ELECTROCARDIOGRAM TRACING: CPT | Performed by: EMERGENCY MEDICINE

## 2024-11-11 PROCEDURE — 71045 X-RAY EXAM CHEST 1 VIEW: CPT

## 2024-11-11 RX ORDER — BUDESONIDE, GLYCOPYRROLATE, AND FORMOTEROL FUMARATE 160; 9; 4.8 UG/1; UG/1; UG/1
2 AEROSOL, METERED RESPIRATORY (INHALATION) 2 TIMES DAILY
Qty: 10.7 G | Refills: 0 | Status: SHIPPED | OUTPATIENT
Start: 2024-11-11

## 2024-11-11 RX ORDER — LEVOFLOXACIN 750 MG/1
750 TABLET, FILM COATED ORAL DAILY
Qty: 7 TABLET | Refills: 0 | Status: SHIPPED | OUTPATIENT
Start: 2024-11-11 | End: 2024-11-19

## 2024-11-11 RX ORDER — SODIUM CHLORIDE 0.9 % (FLUSH) 0.9 %
10 SYRINGE (ML) INJECTION AS NEEDED
Status: DISCONTINUED | OUTPATIENT
Start: 2024-11-11 | End: 2024-11-12 | Stop reason: HOSPADM

## 2024-11-11 RX ORDER — BUDESONIDE AND FORMOTEROL FUMARATE DIHYDRATE 160; 4.5 UG/1; UG/1
2 AEROSOL RESPIRATORY (INHALATION) 2 TIMES DAILY
Qty: 10.2 G | Refills: 0 | Status: SHIPPED | OUTPATIENT
Start: 2024-11-11

## 2024-11-11 RX ORDER — ALBUTEROL SULFATE 0.83 MG/ML
2.5 SOLUTION RESPIRATORY (INHALATION) EVERY 4 HOURS PRN
Qty: 360 ML | Refills: 0 | Status: SHIPPED | OUTPATIENT
Start: 2024-11-11

## 2024-11-11 NOTE — ED NOTES
Patient arrives via pv from home for complaints of cough and sob x1 month. Patient states she has had two rounds of steroids and abx and it has not seemed to help. Patient states last time she had these symptoms they diagnosed her with a mucous plug.

## 2024-11-12 LAB
QT INTERVAL: 332 MS
QTC INTERVAL: 404 MS

## 2024-11-12 NOTE — ED PROVIDER NOTES
EMERGENCY DEPARTMENT ENCOUNTER  Room Number:  18/18  PCP: Aminta Salomon APRN  Independent Historians: Patient and Family  Date of encounter:  11/11/2024  Patient Care Team:  Aminta Salomon APRN as PCP - General (Family Medicine)     HPI:  Chief Complaint: had concerns including Shortness of Breath and Cough.     A complete HPI/ROS/PMH/PSH/SH/FH are unobtainable due to: None    Chronic or social conditions impacting patient care (Social Determinants of Health): None    Context: Jesus Ovalles is a 49 y.o. female with a medical history of asthma, COPD, tobacco abuse, anxiety, hyperlipidemia, borderline diabetes who presents to the ED c/o acute cough.  Patient has had 1 month of a persistent cough.  She will occasionally have the sputum production but none recently.  She denies any recent fevers or chills.  States that the cough is so severe will keep her up at night.  She has been on 2 courses of steroids over the past month, course of azithromycin and doxycycline.  She is also using Tessalon Perles and promethazine cough syrup with minimal relief of symptoms.  Patient still smokes.  She denies any recent sick contacts.  She also states that she previously had a infection in her right leg and a chronic wound and had to be on IV antibiotics for almost an entire year for it to heal.  She also reports that she has had recurrent pneumonia since she was a child.    Patient denies any history of DVT or PE.  They deny any recent travel, trauma or surgery in the past month.  They deny any exogenous hormone use.    Review of prior external notes (non-ED) -and- Review of prior external test results outside of this encounter:  Reviewed stress test from 9/17/2024.  Calculated EF 65%.  Low risk study with no evidence of ischemia.    PAST MEDICAL HISTORY  Active Ambulatory Problems     Diagnosis Date Noted    Asthma 03/02/2018    Chronic obstructive pulmonary disease 03/02/2018    Chronic pain 09/27/2015     Depression 03/24/2015    EMMA (generalized anxiety disorder) 02/10/2022    Hx of suicide attempt 09/27/2015    Tobacco use 09/27/2015    Bipolar 1 disorder 02/10/2022    PTSD (post-traumatic stress disorder) 02/10/2022    HLD (hyperlipidemia) 02/10/2022    Type I or II open fracture of right tibia and fibula 02/10/2022    Right leg pain 02/19/2022    Tibia/fibula fracture, shaft, left, closed, with nonunion, subsequent encounter 08/29/2022    Tibial fracture 08/29/2022    Infection due to parainfluenza virus 4 10/12/2022    Acute bronchitis due to other specified organisms 10/13/2022    Wound infection 01/22/2023    Rhinovirus 07/02/2023    Pneumonia 07/07/2023     Resolved Ambulatory Problems     Diagnosis Date Noted    Sepsis 10/17/2023    Mucus plugging of bronchi 10/18/2023     Past Medical History:   Diagnosis Date    Bipolar affective     COPD (chronic obstructive pulmonary disease)     DDD (degenerative disc disease), cervical     History of tachycardia     Hypertension     Suicidal risk        PAST SURGICAL HISTORY  Past Surgical History:   Procedure Laterality Date    ANKLE OPEN REDUCTION INTERNAL FIXATION Right 02/11/2022    Procedure: ANKLE OPEN REDUCTION INTERNAL FIXATION;  Surgeon: Maurice Pugh II, MD;  Location: [facility] MAIN OR;  Service: Orthopedics;  Laterality: Right;    BRONCHOSCOPY Bilateral 10/19/2023    Procedure: BRONCHOSCOPY;  Surgeon: Oracio Rhodes MD;  Location: [facility] ENDOSCOPY;  Service: Pulmonary;  Laterality: Bilateral;  PREOP/ MUCOUS PLUPS- POSTOP/    CARPAL TUNNEL RELEASE      DILATATION AND CURETTAGE      HYSTERECTOMY      JOINT REPLACEMENT      SHOULDER ARTHROSCOPY W/ ROTATOR CUFF REPAIR      TIBIA IM NAILING/RODDING Right 02/11/2022    Procedure: TIBIA INTRAMEDULLARY NAIL/LAURI INSERTION WITH IRRIGATION AND DEBRIDEMENT;  Surgeon: Maurice Pugh II, MD;  Location: [facility] MAIN OR;  Service: Orthopedics;  Laterality: Right;    TIBIA IM NAILING/RODDING Right  08/29/2022    Procedure: REVISION RIGHT TIBIAL NAILING WITH BONE GRAFTING;  Surgeon: Maurice Pugh II, MD;  Location: [facility];  Service: Orthopedics;  Laterality: Right;    TIBIA IM NAILING/RODDING Right 1/25/2023    Procedure: TIBIA  FIBULAR HARDWARE REMOVAL WITH IRRIGATION DEBRIDEMENT OF BONE;  Surgeon: Maurice Pugh II, MD;  Location: [facility] OR;  Service: Orthopedics;  Laterality: Right;       FAMILY HISTORY  Family History   Problem Relation Age of Onset    Heart disease Mother     Malig Hyperthermia Neg Hx     Colon cancer Neg Hx     Uterine cancer Neg Hx     Ovarian cancer Neg Hx        SOCIAL HISTORY  Social History     Socioeconomic History    Marital status:    Tobacco Use    Smoking status: Every Day     Current packs/day: 1.00     Types: Cigarettes    Smokeless tobacco: Never   Vaping Use    Vaping status: Never Used   Substance and Sexual Activity    Alcohol use: Never    Drug use: No    Sexual activity: Defer       ALLERGIES  Penicillins, Vancomycin, Codeine, Lyrica [pregabalin], and Buprenorphine    REVIEW OF SYSTEMS  Review of Systems  Included in HPI  All systems reviewed and negative except for those discussed in HPI.    PHYSICAL EXAM    I have reviewed the triage vital signs and nursing notes.    ED Triage Vitals   Temp Heart Rate Resp BP SpO2   11/11/24 1844 11/11/24 1844 11/11/24 1848 11/11/24 1848 11/11/24 1844   97.9 °F (36.6 °C) 107 18 113/74 97 %      Temp src Heart Rate Source Patient Position BP Location FiO2 (%)   -- -- -- -- --              Physical Exam  Constitutional:       General: She is not in acute distress.     Appearance: Normal appearance. She is not ill-appearing or toxic-appearing.   HENT:      Head: Normocephalic and atraumatic.      Nose: Nose normal.      Mouth/Throat:      Mouth: Mucous membranes are moist.      Pharynx: Oropharynx is clear.   Eyes:      Extraocular Movements: Extraocular movements intact.      Conjunctiva/sclera:  Conjunctivae normal.      Pupils: Pupils are equal, round, and reactive to light.   Cardiovascular:      Rate and Rhythm: Normal rate and regular rhythm.      Pulses: Normal pulses.      Heart sounds: Normal heart sounds. No murmur heard.     No friction rub. No gallop.   Pulmonary:      Effort: Pulmonary effort is normal. No respiratory distress.      Breath sounds: No stridor. Examination of the right-middle field reveals rales. Rales present. No wheezing or rhonchi.   Abdominal:      General: Abdomen is flat. There is no distension.      Palpations: Abdomen is soft.      Tenderness: There is no abdominal tenderness. There is no guarding or rebound.   Musculoskeletal:      Cervical back: Normal range of motion and neck supple.   Skin:     General: Skin is warm and dry.   Neurological:      General: No focal deficit present.      Mental Status: She is alert and oriented to person, place, and time. Mental status is at baseline.   Psychiatric:         Mood and Affect: Mood normal.         Behavior: Behavior normal.         Thought Content: Thought content normal.         Judgment: Judgment normal.         LAB RESULTS  Recent Results (from the past 24 hours)   ECG 12 Lead ED Triage Standing Order; SOA    Collection Time: 11/11/24  6:48 PM   Result Value Ref Range    QT Interval 332 ms    QTC Interval 404 ms   Comprehensive Metabolic Panel    Collection Time: 11/11/24  6:57 PM    Specimen: Arm, Left; Blood   Result Value Ref Range    Glucose 83 65 - 99 mg/dL    BUN 9 6 - 20 mg/dL    Creatinine 0.93 0.57 - 1.00 mg/dL    Sodium 141 136 - 145 mmol/L    Potassium 3.9 3.5 - 5.2 mmol/L    Chloride 105 98 - 107 mmol/L    CO2 26.0 22.0 - 29.0 mmol/L    Calcium 9.9 8.6 - 10.5 mg/dL    Total Protein 6.5 6.0 - 8.5 g/dL    Albumin 4.4 3.5 - 5.2 g/dL    ALT (SGPT) 12 1 - 33 U/L    AST (SGOT) 14 1 - 32 U/L    Alkaline Phosphatase 69 39 - 117 U/L    Total Bilirubin 0.2 0.0 - 1.2 mg/dL    Globulin 2.1 gm/dL    A/G Ratio 2.1 g/dL     BUN/Creatinine Ratio 9.7 7.0 - 25.0    Anion Gap 10.0 5.0 - 15.0 mmol/L    eGFR 75.5 >60.0 mL/min/1.73   BNP    Collection Time: 11/11/24  6:57 PM    Specimen: Arm, Left; Blood   Result Value Ref Range    proBNP 270.0 0.0 - 450.0 pg/mL   Single High Sensitivity Troponin T    Collection Time: 11/11/24  6:57 PM    Specimen: Arm, Left; Blood   Result Value Ref Range    HS Troponin T <6 <14 ng/L   Green Top (Gel)    Collection Time: 11/11/24  6:57 PM   Result Value Ref Range    Extra Tube Hold for add-ons.    Lavender Top    Collection Time: 11/11/24  6:57 PM   Result Value Ref Range    Extra Tube hold for add-on    Gold Top - SST    Collection Time: 11/11/24  6:57 PM   Result Value Ref Range    Extra Tube Hold for add-ons.    Light Blue Top    Collection Time: 11/11/24  6:57 PM   Result Value Ref Range    Extra Tube Hold for add-ons.    CBC Auto Differential    Collection Time: 11/11/24  6:57 PM    Specimen: Arm, Left; Blood   Result Value Ref Range    WBC 4.92 3.40 - 10.80 10*3/mm3    RBC 4.64 3.77 - 5.28 10*6/mm3    Hemoglobin 14.1 12.0 - 15.9 g/dL    Hematocrit 43.2 34.0 - 46.6 %    MCV 93.1 79.0 - 97.0 fL    MCH 30.4 26.6 - 33.0 pg    MCHC 32.6 31.5 - 35.7 g/dL    RDW 12.7 12.3 - 15.4 %    RDW-SD 42.6 37.0 - 54.0 fl    MPV 9.2 6.0 - 12.0 fL    Platelets 235 140 - 450 10*3/mm3    Neutrophil % 58.0 42.7 - 76.0 %    Lymphocyte % 33.9 19.6 - 45.3 %    Monocyte % 4.9 (L) 5.0 - 12.0 %    Eosinophil % 2.0 0.3 - 6.2 %    Basophil % 1.0 0.0 - 1.5 %    Immature Grans % 0.2 0.0 - 0.5 %    Neutrophils, Absolute 2.85 1.70 - 7.00 10*3/mm3    Lymphocytes, Absolute 1.67 0.70 - 3.10 10*3/mm3    Monocytes, Absolute 0.24 0.10 - 0.90 10*3/mm3    Eosinophils, Absolute 0.10 0.00 - 0.40 10*3/mm3    Basophils, Absolute 0.05 0.00 - 0.20 10*3/mm3    Immature Grans, Absolute 0.01 0.00 - 0.05 10*3/mm3    nRBC 0.0 0.0 - 0.2 /100 WBC   Respiratory Panel PCR w/COVID-19(SARS-CoV-2) GREGORY/MAYELA/CHARO/PAD/COR/JEREMY In-House, NP Swab in UTM/VTM, 2 HR TAT -  Swab, Nasopharynx    Collection Time: 11/11/24  8:07 PM    Specimen: Nasopharynx; Swab   Result Value Ref Range    ADENOVIRUS, PCR Not Detected Not Detected    Coronavirus 229E Not Detected Not Detected    Coronavirus HKU1 Not Detected Not Detected    Coronavirus NL63 Not Detected Not Detected    Coronavirus OC43 Not Detected Not Detected    COVID19 Not Detected Not Detected - Ref. Range    Human Metapneumovirus Not Detected Not Detected    Human Rhinovirus/Enterovirus Not Detected Not Detected    Influenza A PCR Not Detected Not Detected    Influenza B PCR Not Detected Not Detected    Parainfluenza Virus 1 Not Detected Not Detected    Parainfluenza Virus 2 Not Detected Not Detected    Parainfluenza Virus 3 Not Detected Not Detected    Parainfluenza Virus 4 Not Detected Not Detected    RSV, PCR Not Detected Not Detected    Bordetella pertussis pcr Not Detected Not Detected    Bordetella parapertussis PCR Not Detected Not Detected    Chlamydophila pneumoniae PCR Not Detected Not Detected    Mycoplasma pneumo by PCR Not Detected Not Detected       RADIOLOGY  XR Chest 1 View    Result Date: 11/11/2024  XR CHEST 1 VW-11/11/2024  HISTORY: Shortness of breath.  Heart size is within normal limits. There are some minimal patchy increased density in the right infrahilar region slightly more prominent than on the previous study of 7/24/2024 and could be related to some mild atelectasis or pneumonia. Minimally prominent left infrahilar markings are seen but this is probably stable since the 7/24/2024 study.  Bony structures appear unremarkable.      1. There may be some mild atelectasis or developing pneumonia in the right infrahilar region.  This report was finalized on 11/11/2024 7:22 PM by Dr. Adan Garcia M.D on Workstation: [id number]       MEDICATIONS GIVEN IN ER  Medications   sodium chloride 0.9 % flush 10 mL (has no administration in time range)       ORDERS PLACED DURING THIS VISIT:  Orders Placed This  Encounter   Procedures    Respiratory Panel PCR w/COVID-19(SARS-CoV-2) GREGORY/MAYELA/CHARO/PAD/COR/JEREMY In-House, NP Swab in UTM/VTM, 2 HR TAT - Swab, Nasopharynx    XR Chest 1 View    [facility] Draw    Comprehensive Metabolic Panel    BNP    Single High Sensitivity Troponin T    CBC Auto Differential    NPO Diet NPO Type: Strict NPO    Undress & Gown    Continuous Pulse Oximetry    Vital Signs    Oxygen Therapy- Nasal Cannula; Titrate 1-6 LPM Per SpO2; 90 - 95%    ECG 12 Lead ED Triage Standing Order; SOA    Insert Peripheral IV    CBC & Differential    Green Top (Gel)    Lavender Top    Gold Top - SST    Light Blue Top       OUTPATIENT MEDICATION MANAGEMENT:  Current Facility-Administered Medications Ordered in Epic   Medication Dose Route Frequency Provider Last Rate Last Admin    sodium chloride 0.9 % flush 10 mL  10 mL Intravenous PRN Moises Moser MD         Current Outpatient Medications Ordered in Epic   Medication Sig Dispense Refill    albuterol (PROVENTIL) (2.5 MG/3ML) 0.083% nebulizer solution Inhale the contents of 1 vial by nebulization Every 4 (Four) Hours As Needed. 360 mL 0    Budeson-Glycopyrrol-Formoterol (Breztri Aerosphere) 160-9-4.8 MCG/ACT aerosol inhaler Inhale 2 puffs 2 (Two) Times a Day. 10.7 g 0    budesonide-formoterol (Symbicort) 160-4.5 MCG/ACT inhaler Inhale 2 puffs 2 (Two) Times a Day. 10.2 g 0    atorvastatin (LIPITOR) 20 MG tablet Take 1 tablet by mouth every night at bedtime. 30 tablet 6    azithromycin (ZITHROMAX) 250 MG tablet take 2 tablets by mouth one time on the first day then 1 tablet daily for 4 days 6 tablet 0    baclofen (LIORESAL) 10 MG tablet Take 1 tablet by mouth 3 (Three) Times a Day As Needed for muscle spasms 90 tablet 0    benzonatate (TESSALON) 200 MG capsule Take 1 capsule by mouth 3 (Three) Times a Day As Needed for cough 15 capsule 1    doxycycline (VIBRAMYCIN) 100 MG capsule Take 1 capsule by mouth 2 (Two) Times a Day. 14 capsule 0    empagliflozin (Jardiance) 10 MG  tablet tablet Take 1 tablet by mouth Every Morning. 30 tablet 2    empagliflozin (Jardiance) 10 MG tablet tablet Take 1 tablet by mouth Daily. 30 tablet 3    fluconazole (DIFLUCAN) 150 MG tablet Take one tablet by mouth on Day 1, may repeat x 1 on Day 4 if needed 2 tablet 0    fluticasone (Flonase Allergy Relief) 50 MCG/ACT nasal spray instill 1 spray into each nostril Daily. 16 g 0    guaiFENesin ER 1200 MG tablet sustained-release 12 hour take 1 tablet by mouth  every 12 hours 20 tablet 0    HYDROcodone-acetaminophen (Norco)  MG per tablet Take 1 tablet by mouth Every 6 (Six) Hours As Needed. 120 tablet 0    HYDROcodone-acetaminophen (NORCO)  MG per tablet Take 1 tablet by mouth Every 6 (Six) Hours As Needed for severe pain. 120 tablet 0    HYDROcodone-acetaminophen (NORCO)  MG per tablet Take 1 tablet by mouth Every 6 (Six) Hours As Needed for pain. 120 tablet 0    HYDROcodone-acetaminophen (NORCO)  MG per tablet Take 1 tablet by mouth Every 6 (Six) Hours As Needed for pain. 120 tablet 0    HYDROcodone-acetaminophen (NORCO)  MG per tablet Take 1 tablet by mouth Every 6 (Six) Hours As Needed for severe pain 120 tablet 0    imiquimod (Aldara) 5 % cream Apply 1 Application topically to the appropriate area as directed 3 (Three) Times a Week. (Patient not taking: Reported on 8/20/2024) 12 each 3    levoFLOXacin (LEVAQUIN) 750 MG tablet Take 1 tablet by mouth Daily for 7 days. 7 tablet 0    metaxalone (SKELAXIN) 400 MG tablet Take 1-2 tablets by mouth 3 (Three) to 4 (Four) times daily as needed for muscle spasms. 120 tablet 0    methylPREDNISolone (Medrol) 4 MG dose pack Take as directed 21 each 0    promethazine-dextromethorphan (PROMETHAZINE-DM) 6.25-15 MG/5ML syrup Take 5 mL by mouth Every 4 (Four) Hours As Needed for cough 150 mL 1       PROCEDURES  Procedures    PROGRESS, DATA ANALYSIS, CONSULTS, AND MEDICAL DECISION MAKING  All labs have been independently interpreted by me.  All  radiology studies have been reviewed by me. All EKG's have been independently viewed and interpreted by me.  Discussion below represents my analysis of pertinent findings related to patient's condition, differential diagnosis, treatment plan and final disposition.    Differential diagnosis includes but is not limited to pneumonia, COVID, influenza.    Clinical Scores:                   ED Course as of 11/11/24 2201   Mon Nov 11, 2024 1952 My independent interpretation of the imaging study is no pneumothorax, trachea midline [AB]   1952 EKG interpreted by myself  Time: 1848  Rate: 89  Rhythm: NSR  No ST elevation or depression  Normal intervals  Normal morphology [AB]   2157 Spoke with the patient and her  regarding workup.  Patient's lab work is reassuring.  Chest x-ray shows early developing pneumonia which is consistent with the patient's symptoms and exam.  Given that the patient has failed 2 courses of antibiotics recently, I offered her admission for IV antibiotics.  Patient states that she does not want to stay in the hospital.  She states that Levaquin has worked well for her in the past.  I will prescribe her Levaquin as well as a refill of her daily inhalers and rescue inhaler.  Also strongly encouraged her to quit smoking.  Patient will follow-up with PCP. [AB]      ED Course User Index  [AB] Moises Moser MD             AS OF 22:01 EST VITALS:    BP - 106/72  HR - 75  TEMP - 97.9 °F (36.6 °C)  O2 SATS - 97%    COMPLEXITY OF CARE  Admission was considered but after careful review of the patient's presentation, physical examination, diagnostic results, and response to treatment the patient may be safely discharged with outpatient follow-up.      DIAGNOSIS  Final diagnoses:   Community acquired pneumonia of right lung, unspecified part of lung         DISPOSITION  ED Disposition       ED Disposition   Discharge    Condition   Stable    Comment   --                Please note that portions of this  document were completed with a voice recognition program.    Note Disclaimer: At [facility], we believe that sharing information builds trust and better relationships. You are receiving this note because you recently visited [facility]. It is possible you will see health information before a provider has talked with you about it. This kind of information can be easy to misunderstand. To help you fully understand what it means for your health, we urge you to discuss this note with your provider.         Moises Moser MD  11/11/24 2438

## 2025-03-04 RX ORDER — BUDESONIDE AND FORMOTEROL FUMARATE DIHYDRATE 160; 4.5 UG/1; UG/1
2 AEROSOL RESPIRATORY (INHALATION) 2 TIMES DAILY
Qty: 10.2 G | Refills: 0 | OUTPATIENT
Start: 2025-03-04

## 2025-03-04 RX ORDER — BUDESONIDE, GLYCOPYRROLATE, AND FORMOTEROL FUMARATE 160; 9; 4.8 UG/1; UG/1; UG/1
2 AEROSOL, METERED RESPIRATORY (INHALATION) 2 TIMES DAILY
Qty: 10.7 G | Refills: 0 | OUTPATIENT
Start: 2025-03-04

## 2025-05-26 ENCOUNTER — HOSPITAL ENCOUNTER (EMERGENCY)
Facility: HOSPITAL | Age: 50
Discharge: HOME | End: 2025-05-26
Attending: EMERGENCY MEDICINE | Admitting: EMERGENCY MEDICINE
Payer: MEDICARE

## 2025-05-26 ENCOUNTER — APPOINTMENT (OUTPATIENT)
Dept: CT IMAGING | Facility: HOSPITAL | Age: 50
End: 2025-05-26
Payer: MEDICARE

## 2025-05-26 VITALS
HEART RATE: 59 BPM | OXYGEN SATURATION: 94 % | TEMPERATURE: 98.2 F | DIASTOLIC BLOOD PRESSURE: 76 MMHG | SYSTOLIC BLOOD PRESSURE: 106 MMHG | RESPIRATION RATE: 18 BRPM

## 2025-05-26 DIAGNOSIS — R10.30 LOWER ABDOMINAL PAIN: ICD-10-CM

## 2025-05-26 DIAGNOSIS — R35.0 URINARY FREQUENCY: Primary | ICD-10-CM

## 2025-05-26 LAB
ALBUMIN SERPL-MCNC: 4.5 G/DL (ref 3.5–5.2)
ALBUMIN/GLOB SERPL: 2 G/DL
ALP SERPL-CCNC: 60 U/L (ref 39–117)
ALT SERPL W P-5'-P-CCNC: 13 U/L (ref 1–33)
ANION GAP SERPL CALCULATED.3IONS-SCNC: 9 MMOL/L (ref 5–15)
AST SERPL-CCNC: 16 U/L (ref 1–32)
BASOPHILS # BLD AUTO: 0.03 10*3/MM3 (ref 0–0.2)
BASOPHILS NFR BLD AUTO: 1 % (ref 0–1.5)
BILIRUB SERPL-MCNC: 0.3 MG/DL (ref 0–1.2)
BILIRUB UR QL STRIP: NEGATIVE
BUN SERPL-MCNC: 10 MG/DL (ref 6–20)
BUN/CREAT SERPL: 10.9 (ref 7–25)
CALCIUM SPEC-SCNC: 9.5 MG/DL (ref 8.6–10.5)
CHLORIDE SERPL-SCNC: 101 MMOL/L (ref 98–107)
CLARITY UR: CLEAR
CO2 SERPL-SCNC: 29 MMOL/L (ref 22–29)
COLOR UR: YELLOW
CREAT SERPL-MCNC: 0.92 MG/DL (ref 0.57–1)
D-LACTATE SERPL-SCNC: 0.6 MMOL/L (ref 0.5–2)
DEPRECATED RDW RBC AUTO: 45.8 FL (ref 37–54)
EGFRCR SERPLBLD CKD-EPI 2021: 76 ML/MIN/1.73
EOSINOPHIL # BLD AUTO: 0.07 10*3/MM3 (ref 0–0.4)
EOSINOPHIL NFR BLD AUTO: 2.4 % (ref 0.3–6.2)
ERYTHROCYTE [DISTWIDTH] IN BLOOD BY AUTOMATED COUNT: 13.1 % (ref 12.3–15.4)
GLOBULIN UR ELPH-MCNC: 2.3 GM/DL
GLUCOSE SERPL-MCNC: 94 MG/DL (ref 65–99)
GLUCOSE UR STRIP-MCNC: ABNORMAL MG/DL
HCT VFR BLD AUTO: 42.6 % (ref 34–46.6)
HGB BLD-MCNC: 14.1 G/DL (ref 12–15.9)
HGB UR QL STRIP.AUTO: NEGATIVE
HOLD SPECIMEN: NORMAL
HOLD SPECIMEN: NORMAL
IMM GRANULOCYTES # BLD AUTO: 0.01 10*3/MM3 (ref 0–0.05)
IMM GRANULOCYTES NFR BLD AUTO: 0.3 % (ref 0–0.5)
KETONES UR QL STRIP: NEGATIVE
LEUKOCYTE ESTERASE UR QL STRIP.AUTO: NEGATIVE
LIPASE SERPL-CCNC: 24 U/L (ref 13–60)
LYMPHOCYTES # BLD AUTO: 1.03 10*3/MM3 (ref 0.7–3.1)
LYMPHOCYTES NFR BLD AUTO: 35.4 % (ref 19.6–45.3)
MCH RBC QN AUTO: 31.3 PG (ref 26.6–33)
MCHC RBC AUTO-ENTMCNC: 33.1 G/DL (ref 31.5–35.7)
MCV RBC AUTO: 94.7 FL (ref 79–97)
MONOCYTES # BLD AUTO: 0.19 10*3/MM3 (ref 0.1–0.9)
MONOCYTES NFR BLD AUTO: 6.5 % (ref 5–12)
NEUTROPHILS NFR BLD AUTO: 1.58 10*3/MM3 (ref 1.7–7)
NEUTROPHILS NFR BLD AUTO: 54.4 % (ref 42.7–76)
NITRITE UR QL STRIP: NEGATIVE
NRBC BLD AUTO-RTO: 0 /100 WBC (ref 0–0.2)
PH UR STRIP.AUTO: 6 [PH] (ref 5–8)
PLATELET # BLD AUTO: 199 10*3/MM3 (ref 140–450)
PMV BLD AUTO: 9.1 FL (ref 6–12)
POTASSIUM SERPL-SCNC: 4.5 MMOL/L (ref 3.5–5.2)
PROT SERPL-MCNC: 6.8 G/DL (ref 6–8.5)
PROT UR QL STRIP: NEGATIVE
RBC # BLD AUTO: 4.5 10*6/MM3 (ref 3.77–5.28)
SODIUM SERPL-SCNC: 139 MMOL/L (ref 136–145)
SP GR UR STRIP: 1.01 (ref 1–1.03)
UROBILINOGEN UR QL STRIP: ABNORMAL
WBC NRBC COR # BLD AUTO: 2.91 10*3/MM3 (ref 3.4–10.8)
WHOLE BLOOD HOLD COAG: NORMAL
WHOLE BLOOD HOLD SPECIMEN: NORMAL

## 2025-05-26 PROCEDURE — 83605 ASSAY OF LACTIC ACID: CPT | Performed by: EMERGENCY MEDICINE

## 2025-05-26 PROCEDURE — 96374 THER/PROPH/DIAG INJ IV PUSH: CPT

## 2025-05-26 PROCEDURE — 83690 ASSAY OF LIPASE: CPT | Performed by: EMERGENCY MEDICINE

## 2025-05-26 PROCEDURE — 96375 TX/PRO/DX INJ NEW DRUG ADDON: CPT

## 2025-05-26 PROCEDURE — 81003 URINALYSIS AUTO W/O SCOPE: CPT | Performed by: EMERGENCY MEDICINE

## 2025-05-26 PROCEDURE — 99285 EMERGENCY DEPT VISIT HI MDM: CPT

## 2025-05-26 PROCEDURE — 80053 COMPREHEN METABOLIC PANEL: CPT | Performed by: EMERGENCY MEDICINE

## 2025-05-26 PROCEDURE — 85025 COMPLETE CBC W/AUTO DIFF WBC: CPT | Performed by: EMERGENCY MEDICINE

## 2025-05-26 PROCEDURE — 36415 COLL VENOUS BLD VENIPUNCTURE: CPT | Performed by: EMERGENCY MEDICINE

## 2025-05-26 PROCEDURE — 74177 CT ABD & PELVIS W/CONTRAST: CPT

## 2025-05-26 PROCEDURE — 25010000002 ONDANSETRON PER 1 MG

## 2025-05-26 PROCEDURE — 25510000001 IOPAMIDOL 61 % SOLUTION: Performed by: EMERGENCY MEDICINE

## 2025-05-26 PROCEDURE — 25010000002 HYDROMORPHONE PER 4 MG

## 2025-05-26 RX ORDER — SODIUM CHLORIDE 0.9 % (FLUSH) 0.9 %
10 SYRINGE (ML) INJECTION AS NEEDED
Status: DISCONTINUED | OUTPATIENT
Start: 2025-05-26 | End: 2025-05-26 | Stop reason: HOSPADM

## 2025-05-26 RX ORDER — HYDROMORPHONE HYDROCHLORIDE 1 MG/ML
0.5 INJECTION, SOLUTION INTRAMUSCULAR; INTRAVENOUS; SUBCUTANEOUS ONCE
Status: COMPLETED | OUTPATIENT
Start: 2025-05-26 | End: 2025-05-26

## 2025-05-26 RX ORDER — IOPAMIDOL 612 MG/ML
100 INJECTION, SOLUTION INTRAVASCULAR
Status: COMPLETED | OUTPATIENT
Start: 2025-05-26 | End: 2025-05-26

## 2025-05-26 RX ORDER — ONDANSETRON 4 MG/1
8 TABLET, ORALLY DISINTEGRATING ORAL ONCE
Status: DISCONTINUED | OUTPATIENT
Start: 2025-05-26 | End: 2025-05-26

## 2025-05-26 RX ORDER — ONDANSETRON 2 MG/ML
4 INJECTION INTRAMUSCULAR; INTRAVENOUS ONCE
Status: COMPLETED | OUTPATIENT
Start: 2025-05-26 | End: 2025-05-26

## 2025-05-26 RX ADMIN — IOPAMIDOL 85 ML: 612 INJECTION, SOLUTION INTRAVENOUS at 11:21

## 2025-05-26 RX ADMIN — HYDROMORPHONE HYDROCHLORIDE 0.5 MG: 1 INJECTION, SOLUTION INTRAMUSCULAR; INTRAVENOUS; SUBCUTANEOUS at 11:03

## 2025-05-26 RX ADMIN — ONDANSETRON 4 MG: 2 INJECTION, SOLUTION INTRAMUSCULAR; INTRAVENOUS at 11:04

## 2025-05-26 NOTE — DISCHARGE INSTRUCTIONS
Please follow-up with your PCP.    Rest.  Increase fluid intake.  Ice as needed.  Apply warm compress several times per day.     Although you are being discharged in the ED today, I encourage you to return for worsening symptoms.  Things can, and do, change such a treatment at home with medication may not be adequate.  Specifically I recommend returning for chest pain or discomfort, difficulty breathing, persistent vomiting or difficulty holding down liquids or medications, fever > 102.0 F,  or any other worsening or alarming symptoms.     Take meds as prescribed.     You have been evaluated in the emergency department for your presenting symptoms and deemed appropriate for discharge home.  Understand that your health care does not end here today.  It is important that your primary care physician be made aware of your visit.  I recommend calling your primary care provider in the next 48 hours to arrange follow-up care.  Your primary care provider needs to review your treatment and recovery to ensure that no further treatment is necessary.  Additional testing or procedures may be necessary as an outpatient at the discretion of your primary care provider.    I also recommend following up with your PCP for recheck of your blood pressure and treatment as needed.

## 2025-05-26 NOTE — ED PROVIDER NOTES
SHARED VISIT: This visit was performed by BOTH a physician and an APC. The substantive portion of the medical decision making was performed by this attesting physician who made or approved the management plan and takes responsibility for patient management. All studies in the APC note (if performed) were independently interpreted by me.      The CRUZ and I have discussed this patient's history, physical exam, and treatment plan.  I have reviewed the documentation and personally had a face to face interaction with the patient. I affirm the documentation and agree with the treatment and plan.  The attached note describes my personal findings.       I provided a substantive portion of the care of the patient.  I personally performed the physical exam in its entirety, and below are my findings.        History  50-year-old female with history of chronic pain, bipolar disorder presents with abdominal pain over the last 5 days.  She does complain of urinary frequency and difficulty sleeping.    Physical Exam  Vital Signs reviewed  GENERAL: Alert well-appearing female no obvious distress.  Triage vitals reviewed notable for benign heart rate, blood pressure, temperature and O2 saturations  HENT: nares patent  EYES: no scleral icterus  CV: regular rhythm, regular rate  RESPIRATORY: normal effort  ABDOMEN: soft, mild lower abdominal tenderness without rebound or guarding  MUSCULOSKELETAL: no deformity  NEURO: Strength sensation and coordination are grossly intact.  Speech and mentation are unremarkable  SKIN: warm, dry      Assessment and Data Review    ED Course as of 05/26/25 1456   Mon May 26, 2025   1135 Glucose: 94 [CV]   1135 BUN: 10 [CV]   1135 Creatinine: 0.92 [CV]   1135 WBC(!): 2.91 [CV]   1135 RBC: 4.50 [CV]   1135 Lactate: 0.6 [CV]      ED Course User Index  [CV] Ranjit Lu PA-C       I did discuss treatment and evaluation of this patient with MICHI Lu.    I did independently interpret  and evaluate ED testing which is notable for the following:    CBC without evidence of significant infection or anemia  Chemistries relatively benign with adequate renal  Urinalysis reassuring  CT scan without obvious perforation or obstruction, no obvious acute disease    Given benign examination, lab testing and CT imaging will discharge home with outpatient workup.     Quang Moya MD  05/26/25 3193

## 2025-05-26 NOTE — ED PROVIDER NOTES
" EMERGENCY DEPARTMENT ENCOUNTER    Room Number:  03/03  Date of encounter:  5/26/2025  PCP: Aminta Salomon APRN  Historian: Patient  Full history not obtainable due to: None    HPI:  Chief Complaint: Abdominal pain    Context: Jesus Ovalles is a 50 y.o. female with a PMH significant for EMMA, depression, chronic pain, COPD, PTSD, bipolar 1 disorder, right leg pain, bronchitis,  who presents to the ED c/o PD for the past 5 days.  Patient says for the last 5 days, she has been unable to sleep because she has had to pee so often.  Denies any burning while she pees, denies any sensation of urinary retention.  Patient also notes that beginning last night, she began to have abdominal pain throughout her lower abdomen radiating into her lower back.  Patient does have a history of chronic lower back pain.  Patient says that the pain is worse when she is moving, denies any new trauma or falls.  Patient does note that she started a new diet due to weight gain after she stopped smoking cigarettes 3 months prior, she now is drinking \"a coffee day with honey and vipin, she is unsure if this is contributing to her urinary frequency.  Patient denies any vomiting, does endorse some nausea, also endorses some constipation but is still having bowel movements and passing gas.      MEDICAL RECORD REVIEW:    Upon review of the medical record it appears the patient was evaluated 3/8/2023.  The patient had a normal CRP and ESR.    PAST MEDICAL HISTORY    Active Ambulatory Problems     Diagnosis Date Noted    Asthma 03/02/2018    Chronic obstructive pulmonary disease 03/02/2018    Chronic pain 09/27/2015    Depression 03/24/2015    EMMA (generalized anxiety disorder) 02/10/2022    Hx of suicide attempt 09/27/2015    Tobacco use 09/27/2015    Bipolar 1 disorder 02/10/2022    PTSD (post-traumatic stress disorder) 02/10/2022    HLD (hyperlipidemia) 02/10/2022    Type I or II open fracture of right tibia and fibula 02/10/2022    Right " leg pain 02/19/2022    Tibia/fibula fracture, shaft, left, closed, with nonunion, subsequent encounter 08/29/2022    Tibial fracture 08/29/2022    Infection due to parainfluenza virus 4 10/12/2022    Acute bronchitis due to other specified organisms 10/13/2022    Wound infection 01/22/2023    Rhinovirus 07/02/2023    Pneumonia 07/07/2023     Resolved Ambulatory Problems     Diagnosis Date Noted    Sepsis 10/17/2023    Mucus plugging of bronchi 10/18/2023     Past Medical History:   Diagnosis Date    Bipolar affective     COPD (chronic obstructive pulmonary disease)     DDD (degenerative disc disease), cervical     History of tachycardia     Hypertension     Suicidal risk          PAST SURGICAL HISTORY  Past Surgical History:   Procedure Laterality Date    ANKLE OPEN REDUCTION INTERNAL FIXATION Right 02/11/2022    Procedure: ANKLE OPEN REDUCTION INTERNAL FIXATION;  Surgeon: Maurice Pugh II, MD;  Location: [facility];  Service: Orthopedics;  Laterality: Right;    BRONCHOSCOPY Bilateral 10/19/2023    Procedure: BRONCHOSCOPY;  Surgeon: Oracio Rhodes MD;  Location: [facility] ENDOSCOPY;  Service: Pulmonary;  Laterality: Bilateral;  PREOP/ MUCOUS PLUPS- POSTOP/    CARPAL TUNNEL RELEASE      DILATATION AND CURETTAGE      HYSTERECTOMY      JOINT REPLACEMENT      SHOULDER ARTHROSCOPY W/ ROTATOR CUFF REPAIR      TIBIA IM NAILING/RODDING Right 02/11/2022    Procedure: TIBIA INTRAMEDULLARY NAIL/LAURI INSERTION WITH IRRIGATION AND DEBRIDEMENT;  Surgeon: Maurice Pugh II, MD;  Location: [facility] OR;  Service: Orthopedics;  Laterality: Right;    TIBIA IM NAILING/RODDING Right 08/29/2022    Procedure: REVISION RIGHT TIBIAL NAILING WITH BONE GRAFTING;  Surgeon: Maurice Pugh II, MD;  Location: [facility];  Service: Orthopedics;  Laterality: Right;    TIBIA IM NAILING/RODDING Right 1/25/2023    Procedure: TIBIA  FIBULAR HARDWARE REMOVAL WITH IRRIGATION DEBRIDEMENT OF BONE;  Surgeon: Keaton  Maurice Cortés II, MD;  Location: [facility] OR;  Service: Orthopedics;  Laterality: Right;         FAMILY HISTORY  Family History   Problem Relation Age of Onset    Heart disease Mother     Malhyacinth Hyperthermia Neg Hx     Colon cancer Neg Hx     Uterine cancer Neg Hx     Ovarian cancer Neg Hx          SOCIAL HISTORY  Social History     Socioeconomic History    Marital status:    Tobacco Use    Smoking status: Every Day     Current packs/day: 1.00     Types: Cigarettes    Smokeless tobacco: Never   Vaping Use    Vaping status: Never Used   Substance and Sexual Activity    Alcohol use: Never    Drug use: No    Sexual activity: Defer         ALLERGIES  Penicillins, Vancomycin, Codeine, Lyrica [pregabalin], and Buprenorphine        REVIEW OF SYSTEMS    All systems reviewed and marked as negative except as listed in HPI     PHYSICAL EXAM    I have reviewed the triage vital signs and nursing notes.    ED Triage Vitals [05/26/25 0956]   Temp Heart Rate Resp BP SpO2   98 °F (36.7 °C) 78 18 117/86 98 %      Temp src Heart Rate Source Patient Position BP Location FiO2 (%)   Oral Monitor Lying Right arm --       Physical Exam  Constitutional:       General: She is not in acute distress.     Appearance: Normal appearance. She is not ill-appearing.   HENT:      Head: Normocephalic and atraumatic.      Nose: Nose normal.   Eyes:      Extraocular Movements: Extraocular movements intact.      Pupils: Pupils are equal, round, and reactive to light.   Cardiovascular:      Rate and Rhythm: Normal rate and regular rhythm.      Pulses: Normal pulses.      Heart sounds: Normal heart sounds.   Pulmonary:      Effort: Pulmonary effort is normal. No respiratory distress.      Breath sounds: Normal breath sounds.   Abdominal:      General: Abdomen is flat.      Palpations: Abdomen is soft.      Tenderness: There is abdominal tenderness in the right lower quadrant, suprapubic area and left lower quadrant. There is no right CVA  tenderness or left CVA tenderness.   Musculoskeletal:      Cervical back: Normal.      Thoracic back: Normal.      Lumbar back: No spasms, tenderness or bony tenderness.   Skin:     General: Skin is warm and dry.      Coloration: Skin is not jaundiced.   Neurological:      Mental Status: She is alert and oriented to person, place, and time.   Psychiatric:         Mood and Affect: Mood normal.         Behavior: Behavior normal.         Thought Content: Thought content normal.         Vital signs and nursing notes reviewed.      LAB RESULTS  Recent Results (from the past 24 hours)   Comprehensive Metabolic Panel    Collection Time: 05/26/25 10:51 AM    Specimen: Arm, Right; Blood   Result Value Ref Range    Glucose 94 65 - 99 mg/dL    BUN 10 6 - 20 mg/dL    Creatinine 0.92 0.57 - 1.00 mg/dL    Sodium 139 136 - 145 mmol/L    Potassium 4.5 3.5 - 5.2 mmol/L    Chloride 101 98 - 107 mmol/L    CO2 29.0 22.0 - 29.0 mmol/L    Calcium 9.5 8.6 - 10.5 mg/dL    Total Protein 6.8 6.0 - 8.5 g/dL    Albumin 4.5 3.5 - 5.2 g/dL    ALT (SGPT) 13 1 - 33 U/L    AST (SGOT) 16 1 - 32 U/L    Alkaline Phosphatase 60 39 - 117 U/L    Total Bilirubin 0.3 0.0 - 1.2 mg/dL    Globulin 2.3 gm/dL    A/G Ratio 2.0 g/dL    BUN/Creatinine Ratio 10.9 7.0 - 25.0    Anion Gap 9.0 5.0 - 15.0 mmol/L    eGFR 76.0 >60.0 mL/min/1.73   Lipase    Collection Time: 05/26/25 10:51 AM    Specimen: Arm, Right; Blood   Result Value Ref Range    Lipase 24 13 - 60 U/L   Lactic Acid, Plasma    Collection Time: 05/26/25 10:51 AM    Specimen: Arm, Right; Blood   Result Value Ref Range    Lactate 0.6 0.5 - 2.0 mmol/L   Green Top (Gel)    Collection Time: 05/26/25 10:51 AM   Result Value Ref Range    Extra Tube Hold for add-ons.    Lavender Top    Collection Time: 05/26/25 10:51 AM   Result Value Ref Range    Extra Tube hold for add-on    Gold Top - SST    Collection Time: 05/26/25 10:51 AM   Result Value Ref Range    Extra Tube Hold for add-ons.    Light Blue Top     Collection Time: 05/26/25 10:51 AM   Result Value Ref Range    Extra Tube Hold for add-ons.    CBC Auto Differential    Collection Time: 05/26/25 10:51 AM    Specimen: Arm, Right; Blood   Result Value Ref Range    WBC 2.91 (L) 3.40 - 10.80 10*3/mm3    RBC 4.50 3.77 - 5.28 10*6/mm3    Hemoglobin 14.1 12.0 - 15.9 g/dL    Hematocrit 42.6 34.0 - 46.6 %    MCV 94.7 79.0 - 97.0 fL    MCH 31.3 26.6 - 33.0 pg    MCHC 33.1 31.5 - 35.7 g/dL    RDW 13.1 12.3 - 15.4 %    RDW-SD 45.8 37.0 - 54.0 fl    MPV 9.1 6.0 - 12.0 fL    Platelets 199 140 - 450 10*3/mm3    Neutrophil % 54.4 42.7 - 76.0 %    Lymphocyte % 35.4 19.6 - 45.3 %    Monocyte % 6.5 5.0 - 12.0 %    Eosinophil % 2.4 0.3 - 6.2 %    Basophil % 1.0 0.0 - 1.5 %    Immature Grans % 0.3 0.0 - 0.5 %    Neutrophils, Absolute 1.58 (L) 1.70 - 7.00 10*3/mm3    Lymphocytes, Absolute 1.03 0.70 - 3.10 10*3/mm3    Monocytes, Absolute 0.19 0.10 - 0.90 10*3/mm3    Eosinophils, Absolute 0.07 0.00 - 0.40 10*3/mm3    Basophils, Absolute 0.03 0.00 - 0.20 10*3/mm3    Immature Grans, Absolute 0.01 0.00 - 0.05 10*3/mm3    nRBC 0.0 0.0 - 0.2 /100 WBC   Urinalysis With Microscopic If Indicated (No Culture) - Urine, Clean Catch    Collection Time: 05/26/25 11:02 AM    Specimen: Urine, Clean Catch   Result Value Ref Range    Color, UA Yellow Yellow, Straw    Appearance, UA Clear Clear    pH, UA 6.0 5.0 - 8.0    Specific Gravity, UA 1.011 1.005 - 1.030    Glucose,  mg/dL (2+) (A) Negative    Ketones, UA Negative Negative    Bilirubin, UA Negative Negative    Blood, UA Negative Negative    Protein, UA Negative Negative    Leuk Esterase, UA Negative Negative    Nitrite, UA Negative Negative    Urobilinogen, UA 0.2 E.U./dL 0.2 - 1.0 E.U./dL       Ordered the above labs and independently reviewed the results.        RADIOLOGY  CT Abdomen Pelvis With Contrast  Result Date: 5/26/2025  CT ABDOMEN AND PELVIS WITH IV CONTRAST  HISTORY: Abdominal pain  TECHNIQUE: Radiation dose reduction techniques  were utilized, including automated exposure control and exposure modulation based on body size. 3 mm images were obtained through the abdomen and pelvis after the administration of IV contrast.  COMPARISON: 1/21/2025   FINDINGS: Calcified splenic granuloma. Small right hepatic cyst. Normal nondilated appendix (series 4/image 64). Hysterectomy. Subcentimeter left adnexal/ovarian cyst. Remaining solid organs and bowel are normal. No abdominopelvic lymphadenopathy. No focal osseous abnormality.        No acute abdominopelvic abnormality.  This report was finalized on 5/26/2025 12:10 PM by Dr. Ze Tyler M.D on Workstation: [id number]        I ordered the above noted radiological studies. Independently reviewed by me and discussed with radiologist.  See dictation above for official radiology interpretation.        MEDICATIONS GIVEN IN ER    Medications   HYDROmorphone (DILAUDID) injection 0.5 mg (0.5 mg Intravenous Given 5/26/25 1103)   ondansetron (ZOFRAN) injection 4 mg (4 mg Intravenous Given 5/26/25 1104)   iopamidol (ISOVUE-300) 61 % injection 100 mL (85 mL Intravenous Given by Other 5/26/25 1121)         PROGRESS, DATA ANALYSIS, CONSULTS, AND MEDICAL DECISION MAKING    All labs have been independently interpreted by me.  All radiology studies have been interpreted by me.  Discussion below represents my analysis of pertinent findings related to patient's condition, differential diagnosis, treatment plan and final disposition.    Patient CT scan was negative for any acute pathology.  Pain controlled in the emergency department today.  No evidence of leukocytosis, significant electrolyte derangement, anemia.  And overall benign workup, was hemodynamically stable.  Is being referred to urology for further workup of frequent urination.  Encourage patient to use lidocaine patches, Tylenol and ibuprofen for other pain.  There is evidence of a small left ovarian cyst which could be contributing to patient's abdominal  discomfort.     - Chronic or social conditions impacting care: Depression      DIFFERENTIAL DIAGNOSIS INCLUDE BUT NOT LIMITED TO: Differential diagnosis includes but is not limited to:  - Hepatobiliary pathology such as cholecystitis, cholangitis, and symptomatic cholelithiasis  - Pancreatitis  - Dyspepsia  - Small bowel obstruction  - Appendicitis  - Diverticulitis  - UTI including pyelonephritis  - Ureteral stone  - Zoster  - Colitis, including infectious and ischemic  - Atypical ACS        Orders placed during this visit:  Orders Placed This Encounter   Procedures    CT Abdomen Pelvis With Contrast    [place] Draw    Comprehensive Metabolic Panel    Lipase    Urinalysis With Microscopic If Indicated (No Culture) - Urine, Clean Catch    Lactic Acid, Plasma    CBC Auto Differential    CBC & Differential    Green Top (Gel)    Lavender Top    Gold Top - SST    Light Blue Top         ED Course as of 05/26/25 1618   Mon May 26, 2025   1135 Glucose: 94 [CV]   1135 BUN: 10 [CV]   1135 Creatinine: 0.92 [CV]   1135 WBC(!): 2.91 [CV]   1135 RBC: 4.50 [CV]   1135 Lactate: 0.6 [CV]      ED Course User Index  [CV] Ranjit Lu PA-C       AS OF 16:18 EDT VITALS:    BP - 106/76  HR - 59  TEMP - 98.2 °F (36.8 °C) (Oral)  02 SATS - 94%    I rechecked the patient.  I discussed the patient's labs, radiology findings (including all incidental findings), diagnosis, and plan for discharge.  A repeat exam reveals no new worrisome changes from my initial exam findings.  The patient understands that the fact that they are being discharged does not denote that nothing is abnormal, it indicates that no clinical emergency is present and that they must follow-up as directed in order to properly maintain their health.  Follow-up instructions (specifically listed below) and return to ER precautions were given at this time.  I specifically instructed the patient to follow-up with their PCP.  The patient understands and agrees with  the plan, and is ready for discharge.  All questions answered.    Aminta Salomon, APRN  [address] [place] [address] [place] [address]  [phone]    Go in 2 days      [facility]  [address] [facility] [address]  [phone]  Call in 1 day  To establish care, As needed         Medication List      No changes were made to your prescriptions during this visit.           DIAGNOSIS  Final diagnoses:   Urinary frequency   Lower abdominal pain         DISPOSITION  Discharge    Pt masked in first look. I wore a surgical mask throughout my encounters with the pt. I performed hand hygiene on entry into the pt room and upon exit.     Dictated utilizing Dragon dictation     Note Disclaimer: At [facility], we believe that sharing information builds trust and better relationships. You are receiving this note because you recently visited [facility]. It is possible you will see health information before a provider has talked with you about it. This kind of information can be easy to misunderstand. To help you fully understand what it means for your health, we urge you to discuss this note with your provider.      Ranjit Lu PA-C  05/26/25 1137

## 2025-05-26 NOTE — ED NOTES
Pt arrived to ER via pv, c/o lower abdominal pain and lower back pain that started last night. Also states she is peeing more than usual.

## (undated) DEVICE — PK EXTREM 50

## (undated) DEVICE — APPL CHLORAPREP HI/LITE 26ML ORNG

## (undated) DEVICE — GLV SURG SENSICARE PI ORTHO PF SZ7 LF STRL

## (undated) DEVICE — BNDG ELAS ELITE V/CLOSE 6IN 5YD LF STRL

## (undated) DEVICE — BNDG ELAS ELITE V/CLOSE 4IN 5YD LF STRL

## (undated) DEVICE — 3.0MM X 1000MM BALL TIP GUIDE ROD: Brand: TRIGEN

## (undated) DEVICE — GUIDEWIRE, NON-THREADED, 1.4 X 150MM: Brand: PENDING

## (undated) DEVICE — MAT FLR ABSORBENT LG 4FT 10 2.5FT

## (undated) DEVICE — ANTIBACTERIAL UNDYED BRAIDED (POLYGLACTIN 910), SYNTHETIC ABSORBABLE SUTURE: Brand: COATED VICRYL

## (undated) DEVICE — SPNG GZ WOVN 4X4IN 12PLY 10/BX STRL

## (undated) DEVICE — 3M™ STERI-DRAPE™ U-DRAPE 1015: Brand: STERI-DRAPE™

## (undated) DEVICE — DRAPE,U/ SHT,SPLIT,PLAS,STERIL: Brand: MEDLINE

## (undated) DEVICE — TBG PENCL TELESCP MEGADYNE SMOKE EVAC 10FT

## (undated) DEVICE — 4.0MM SHORT PILOT DRILL WITH AO CONNECTOR: Brand: TRIGEN

## (undated) DEVICE — PAD,ABDOMINAL,8"X10",ST,LF: Brand: MEDLINE

## (undated) DEVICE — GOWN,REINFORCE,POLY,SIRUS,BREATH SLV,XLG: Brand: MEDLINE

## (undated) DEVICE — GLV SURG SIGNATURE ESSENTIAL PF LTX SZ8.5

## (undated) DEVICE — DRSNG GZ CURAD XEROFORM NONADHR OVERWRAP 5X9IN

## (undated) DEVICE — SINGLE USE SUCTION VALVE MAJ-209: Brand: SINGLE USE SUCTION VALVE (STERILE)

## (undated) DEVICE — UNDERCAST PADDING: Brand: DEROYAL

## (undated) DEVICE — 3M™ STERI-STRIP™ REINFORCED ADHESIVE SKIN CLOSURES, R1547, 1/2 IN X 4 IN (12 MM X 100 MM), 6 STRIPS/ENVELOPE: Brand: 3M™ STERI-STRIP™

## (undated) DEVICE — CULT AER/ANAEROB FASTIDIOUS BACT

## (undated) DEVICE — PK ORTHO MAJ 40

## (undated) DEVICE — DRAPE,REIN 53X77,STERILE: Brand: MEDLINE

## (undated) DEVICE — GLV SURG SENSICARE PI PF LF 7 GRN STRL

## (undated) DEVICE — PATIENT RETURN ELECTRODE, SINGLE-USE, CONTACT QUALITY MONITORING, ADULT, WITH 9FT CORD, FOR PATIENTS WEIGING OVER 33LBS. (15KG): Brand: MEGADYNE

## (undated) DEVICE — GUIDE PIN 3.2MM X 343MM: Brand: TRIGEN

## (undated) DEVICE — PAD UNDERCAST WYTEX 4IN 4YD LF STRL

## (undated) DEVICE — SUT VIC 0 CP1 27IN J267H

## (undated) DEVICE — MSK AIRWY LARYNG LMA PILOT SZ4

## (undated) DEVICE — TRIGEN META NAIL CAP SET SCREW 0MM
Type: IMPLANTABLE DEVICE | Site: TIBIA | Status: NON-FUNCTIONAL
Brand: TRIGEN

## (undated) DEVICE — GLV SURG PREMIERPRO ORTHO LTX PF SZ8.5 BRN

## (undated) DEVICE — 4.0MM LONG AO PILOT DRILL: Brand: TRIGEN

## (undated) DEVICE — TRIGEN DISPOSABLE EXTRACTOR: Brand: TRIGEN

## (undated) DEVICE — Device

## (undated) DEVICE — GLV SURG SENSICARE PI ORTHO SZ8.5 LF STRL

## (undated) DEVICE — SUT ETHLN 2/0 PS 18IN 585H

## (undated) DEVICE — ADAPT SWVL FIBROPTIC BRONCH

## (undated) DEVICE — TRIGEN LOW PROFILE SCREW 5.0MM X 40MM
Type: IMPLANTABLE DEVICE | Site: LEG | Status: NON-FUNCTIONAL
Brand: TRIGEN
Removed: 2022-02-11

## (undated) DEVICE — SPLNT SCOTCHCAST QUICKSTEP DBL/SD/FELT FIBRGLS 4X30IN WHT

## (undated) DEVICE — T-DRAPE,EXTREMITY,STERILE: Brand: MEDLINE

## (undated) DEVICE — DRP C/ARM 41X74IN

## (undated) DEVICE — SOL ISO/ALC RUB 70PCT 4OZ

## (undated) DEVICE — DRSNG GZ PETROLTM XEROFORM CURAD 1X8IN STRL

## (undated) DEVICE — DRILL BIT, SOLID CORE, 2.8MM: Brand: MEDLINE

## (undated) DEVICE — DRP C/ARMOR

## (undated) DEVICE — DRSNG GZ CURAD XEROFORM PETROLTM OVERWRP 1X8IN STRL

## (undated) DEVICE — KT SURG TURNOVER 050

## (undated) DEVICE — REAMER SHAFT, MOD.TRINKLE: Brand: BIXCUT

## (undated) DEVICE — GLV SURG BIOGEL LTX PF 7

## (undated) DEVICE — OCCLUSIVE GAUZE STRIP OVERWRAP,3% BISMUTH TRIBROMOPHENATE IN PETROLATUM BLEND: Brand: XEROFORM

## (undated) DEVICE — COVER,TABLE,HEAVY DUTY,79"X110",STRL: Brand: MEDLINE

## (undated) DEVICE — UNDERGLV SURG BIOGEL INDICAT PI SZ8.5 BLU

## (undated) DEVICE — PROXIMATE RH ROTATING HEAD SKIN STAPLERS (35 WIDE) CONTAINS 35 STAINLESS STEEL STAPLES: Brand: PROXIMATE

## (undated) DEVICE — 9165 UNIVERSAL PATIENT PLATE: Brand: 3M™

## (undated) DEVICE — SINGLE USE BIOPSY VALVE MAJ-210: Brand: SINGLE USE BIOPSY VALVE (STERILE)

## (undated) DEVICE — BANDAGE,GAUZE,BULKEE II,4.5"X4.1YD,STRL: Brand: MEDLINE

## (undated) DEVICE — TRAP FLD MINIVAC MEGADYNE 100ML

## (undated) DEVICE — SUT VIC 2/0 CT1 36IN

## (undated) DEVICE — DISPOSABLE TOURNIQUET CUFF SINGLE BLADDER, SINGLE PORT AND QUICK CONNECT CONNECTOR: Brand: COLOR CUFF

## (undated) DEVICE — VITAL SIGNS™ JACKSON-REES CIRCUITS: Brand: VITAL SIGNS™

## (undated) DEVICE — SUT VIC 0 CT1 36IN J946H

## (undated) DEVICE — TUBING, SUCTION, 1/4" X 10', STRAIGHT: Brand: MEDLINE

## (undated) DEVICE — ADAPT CLN BIOGUARD AIR/H2O DISP

## (undated) DEVICE — GAUZE,SPONGE,FLUFF,6"X6.75",STRL,5/TRAY: Brand: MEDLINE

## (undated) DEVICE — SUT VIC 2/0 CT2 27IN J269H

## (undated) DEVICE — SENSR O2 OXIMAX FNGR A/ 18IN NONSTR

## (undated) DEVICE — DRILL BIT, CANN, AO/QC, HOOK SCRW, 3.0MM: Brand: MEDLINEUNITE

## (undated) DEVICE — UNDRGLV SURG BIOGEL PUNCTUREINDICATION SZ7 PF STRL

## (undated) DEVICE — C-ARM: Brand: UNBRANDED

## (undated) DEVICE — ST IRR CYSTO W/SPK 77IN LF